# Patient Record
Sex: MALE | Race: WHITE | NOT HISPANIC OR LATINO | URBAN - METROPOLITAN AREA
[De-identification: names, ages, dates, MRNs, and addresses within clinical notes are randomized per-mention and may not be internally consistent; named-entity substitution may affect disease eponyms.]

---

## 2018-02-24 ENCOUNTER — INPATIENT (INPATIENT)
Facility: HOSPITAL | Age: 83
LOS: 8 days | Discharge: NURSING, REHAB OR LONG-TERM CARE | End: 2018-03-05
Payer: MEDICARE

## 2018-02-24 VITALS
DIASTOLIC BLOOD PRESSURE: 55 MMHG | SYSTOLIC BLOOD PRESSURE: 112 MMHG | OXYGEN SATURATION: 97 % | HEIGHT: 68 IN | TEMPERATURE: 98 F | WEIGHT: 169.98 LBS | HEART RATE: 64 BPM | RESPIRATION RATE: 14 BRPM

## 2018-02-24 LAB
ADD ON TEST-SPECIMEN IN LAB: SIGNIFICANT CHANGE UP
ALBUMIN SERPL ELPH-MCNC: 3.3 G/DL — SIGNIFICANT CHANGE UP (ref 3.3–5)
ALP SERPL-CCNC: 128 U/L — HIGH (ref 40–120)
ALT FLD-CCNC: 10 U/L — LOW (ref 12–78)
ANION GAP SERPL CALC-SCNC: 6 MMOL/L — SIGNIFICANT CHANGE UP (ref 5–17)
ANION GAP SERPL CALC-SCNC: 8 MMOL/L — SIGNIFICANT CHANGE UP (ref 5–17)
APTT BLD: 40.3 SEC — HIGH (ref 27.5–37.4)
AST SERPL-CCNC: 25 U/L — SIGNIFICANT CHANGE UP (ref 15–37)
BILIRUB SERPL-MCNC: 0.7 MG/DL — SIGNIFICANT CHANGE UP (ref 0.2–1.2)
BUN SERPL-MCNC: 49 MG/DL — HIGH (ref 7–23)
BUN SERPL-MCNC: 50 MG/DL — HIGH (ref 7–23)
CALCIUM SERPL-MCNC: 8.2 MG/DL — LOW (ref 8.5–10.1)
CALCIUM SERPL-MCNC: 8.4 MG/DL — LOW (ref 8.5–10.1)
CHLORIDE SERPL-SCNC: 95 MMOL/L — LOW (ref 96–108)
CHLORIDE SERPL-SCNC: 98 MMOL/L — SIGNIFICANT CHANGE UP (ref 96–108)
CK SERPL-CCNC: 85 U/L — SIGNIFICANT CHANGE UP (ref 26–308)
CO2 SERPL-SCNC: 35 MMOL/L — HIGH (ref 22–31)
CO2 SERPL-SCNC: 35 MMOL/L — HIGH (ref 22–31)
CREAT SERPL-MCNC: 1.91 MG/DL — HIGH (ref 0.5–1.3)
CREAT SERPL-MCNC: 2.15 MG/DL — HIGH (ref 0.5–1.3)
GLUCOSE SERPL-MCNC: 117 MG/DL — HIGH (ref 70–99)
GLUCOSE SERPL-MCNC: 164 MG/DL — HIGH (ref 70–99)
HCT VFR BLD CALC: 35.5 % — LOW (ref 39–50)
HGB BLD-MCNC: 12 G/DL — LOW (ref 13–17)
INR BLD: 1.99 RATIO — HIGH (ref 0.88–1.16)
MAGNESIUM SERPL-MCNC: 2 MG/DL — SIGNIFICANT CHANGE UP (ref 1.6–2.6)
MCHC RBC-ENTMCNC: 31 PG — SIGNIFICANT CHANGE UP (ref 27–34)
MCHC RBC-ENTMCNC: 33.7 GM/DL — SIGNIFICANT CHANGE UP (ref 32–36)
MCV RBC AUTO: 92 FL — SIGNIFICANT CHANGE UP (ref 80–100)
NT-PROBNP SERPL-SCNC: 1379 PG/ML — HIGH (ref 0–450)
PLATELET # BLD AUTO: 132 K/UL — LOW (ref 150–400)
POTASSIUM SERPL-MCNC: 2.6 MMOL/L — CRITICAL LOW (ref 3.5–5.3)
POTASSIUM SERPL-MCNC: 2.9 MMOL/L — CRITICAL LOW (ref 3.5–5.3)
POTASSIUM SERPL-SCNC: 2.6 MMOL/L — CRITICAL LOW (ref 3.5–5.3)
POTASSIUM SERPL-SCNC: 2.9 MMOL/L — CRITICAL LOW (ref 3.5–5.3)
PROT SERPL-MCNC: 7.3 GM/DL — SIGNIFICANT CHANGE UP (ref 6–8.3)
PROTHROM AB SERPL-ACNC: 21.8 SEC — HIGH (ref 9.8–12.7)
RBC # BLD: 3.86 M/UL — LOW (ref 4.2–5.8)
RBC # FLD: 13.9 % — SIGNIFICANT CHANGE UP (ref 10.3–14.5)
SODIUM SERPL-SCNC: 138 MMOL/L — SIGNIFICANT CHANGE UP (ref 135–145)
SODIUM SERPL-SCNC: 139 MMOL/L — SIGNIFICANT CHANGE UP (ref 135–145)
TROPONIN I SERPL-MCNC: 0.02 NG/ML — SIGNIFICANT CHANGE UP (ref 0.01–0.04)
TROPONIN I SERPL-MCNC: <0.015 NG/ML — SIGNIFICANT CHANGE UP (ref 0.01–0.04)
WBC # BLD: 3.9 K/UL — SIGNIFICANT CHANGE UP (ref 3.8–10.5)
WBC # FLD AUTO: 3.9 K/UL — SIGNIFICANT CHANGE UP (ref 3.8–10.5)

## 2018-02-24 PROCEDURE — 71045 X-RAY EXAM CHEST 1 VIEW: CPT | Mod: 26

## 2018-02-24 PROCEDURE — 99285 EMERGENCY DEPT VISIT HI MDM: CPT

## 2018-02-24 PROCEDURE — 93010 ELECTROCARDIOGRAM REPORT: CPT

## 2018-02-24 RX ORDER — CLOPIDOGREL BISULFATE 75 MG/1
75 TABLET, FILM COATED ORAL DAILY
Qty: 0 | Refills: 0 | Status: DISCONTINUED | OUTPATIENT
Start: 2018-02-24 | End: 2018-03-05

## 2018-02-24 RX ORDER — AMPICILLIN SODIUM AND SULBACTAM SODIUM 250; 125 MG/ML; MG/ML
3 INJECTION, POWDER, FOR SUSPENSION INTRAMUSCULAR; INTRAVENOUS ONCE
Qty: 0 | Refills: 0 | Status: COMPLETED | OUTPATIENT
Start: 2018-02-24 | End: 2018-02-24

## 2018-02-24 RX ORDER — SENNA PLUS 8.6 MG/1
2 TABLET ORAL AT BEDTIME
Qty: 0 | Refills: 0 | Status: DISCONTINUED | OUTPATIENT
Start: 2018-02-24 | End: 2018-03-05

## 2018-02-24 RX ORDER — POTASSIUM CHLORIDE 20 MEQ
40 PACKET (EA) ORAL EVERY 4 HOURS
Qty: 0 | Refills: 0 | Status: COMPLETED | OUTPATIENT
Start: 2018-02-24 | End: 2018-02-25

## 2018-02-24 RX ORDER — AMPICILLIN SODIUM AND SULBACTAM SODIUM 250; 125 MG/ML; MG/ML
INJECTION, POWDER, FOR SUSPENSION INTRAMUSCULAR; INTRAVENOUS
Qty: 0 | Refills: 0 | Status: DISCONTINUED | OUTPATIENT
Start: 2018-02-24 | End: 2018-03-05

## 2018-02-24 RX ORDER — SODIUM CHLORIDE 9 MG/ML
3 INJECTION INTRAMUSCULAR; INTRAVENOUS; SUBCUTANEOUS EVERY 8 HOURS
Qty: 0 | Refills: 0 | Status: DISCONTINUED | OUTPATIENT
Start: 2018-02-24 | End: 2018-02-24

## 2018-02-24 RX ORDER — ONDANSETRON 8 MG/1
4 TABLET, FILM COATED ORAL EVERY 6 HOURS
Qty: 0 | Refills: 0 | Status: DISCONTINUED | OUTPATIENT
Start: 2018-02-24 | End: 2018-03-05

## 2018-02-24 RX ORDER — POTASSIUM CHLORIDE 20 MEQ
10 PACKET (EA) ORAL
Qty: 0 | Refills: 0 | Status: COMPLETED | OUTPATIENT
Start: 2018-02-24 | End: 2018-02-24

## 2018-02-24 RX ORDER — ATORVASTATIN CALCIUM 80 MG/1
10 TABLET, FILM COATED ORAL AT BEDTIME
Qty: 0 | Refills: 0 | Status: DISCONTINUED | OUTPATIENT
Start: 2018-02-24 | End: 2018-03-05

## 2018-02-24 RX ORDER — IPRATROPIUM/ALBUTEROL SULFATE 18-103MCG
3 AEROSOL WITH ADAPTER (GRAM) INHALATION EVERY 6 HOURS
Qty: 0 | Refills: 0 | Status: DISCONTINUED | OUTPATIENT
Start: 2018-02-24 | End: 2018-03-05

## 2018-02-24 RX ORDER — CARVEDILOL PHOSPHATE 80 MG/1
3.12 CAPSULE, EXTENDED RELEASE ORAL EVERY 12 HOURS
Qty: 0 | Refills: 0 | Status: DISCONTINUED | OUTPATIENT
Start: 2018-02-24 | End: 2018-03-05

## 2018-02-24 RX ORDER — MAGNESIUM SULFATE 500 MG/ML
1 VIAL (ML) INJECTION ONCE
Qty: 0 | Refills: 0 | Status: COMPLETED | OUTPATIENT
Start: 2018-02-24 | End: 2018-02-24

## 2018-02-24 RX ORDER — FLUDROCORTISONE ACETATE 0.1 MG/1
0.1 TABLET ORAL DAILY
Qty: 0 | Refills: 0 | Status: DISCONTINUED | OUTPATIENT
Start: 2018-02-24 | End: 2018-03-05

## 2018-02-24 RX ORDER — PANTOPRAZOLE SODIUM 20 MG/1
40 TABLET, DELAYED RELEASE ORAL
Qty: 0 | Refills: 0 | Status: DISCONTINUED | OUTPATIENT
Start: 2018-02-24 | End: 2018-03-05

## 2018-02-24 RX ORDER — FUROSEMIDE 40 MG
20 TABLET ORAL ONCE
Qty: 0 | Refills: 0 | Status: COMPLETED | OUTPATIENT
Start: 2018-02-24 | End: 2018-02-24

## 2018-02-24 RX ORDER — FUROSEMIDE 40 MG
40 TABLET ORAL
Qty: 0 | Refills: 0 | Status: DISCONTINUED | OUTPATIENT
Start: 2018-02-24 | End: 2018-02-25

## 2018-02-24 RX ORDER — DONEPEZIL HYDROCHLORIDE 10 MG/1
5 TABLET, FILM COATED ORAL AT BEDTIME
Qty: 0 | Refills: 0 | Status: DISCONTINUED | OUTPATIENT
Start: 2018-02-24 | End: 2018-03-05

## 2018-02-24 RX ORDER — AMPICILLIN SODIUM AND SULBACTAM SODIUM 250; 125 MG/ML; MG/ML
3 INJECTION, POWDER, FOR SUSPENSION INTRAMUSCULAR; INTRAVENOUS EVERY 12 HOURS
Qty: 0 | Refills: 0 | Status: DISCONTINUED | OUTPATIENT
Start: 2018-02-25 | End: 2018-03-05

## 2018-02-24 RX ORDER — WARFARIN SODIUM 2.5 MG/1
2 TABLET ORAL DAILY
Qty: 0 | Refills: 0 | Status: COMPLETED | OUTPATIENT
Start: 2018-02-24 | End: 2018-02-26

## 2018-02-24 RX ORDER — ACETAMINOPHEN 500 MG
650 TABLET ORAL EVERY 6 HOURS
Qty: 0 | Refills: 0 | Status: DISCONTINUED | OUTPATIENT
Start: 2018-02-24 | End: 2018-03-05

## 2018-02-24 RX ORDER — ENOXAPARIN SODIUM 100 MG/ML
40 INJECTION SUBCUTANEOUS EVERY 24 HOURS
Qty: 0 | Refills: 0 | Status: DISCONTINUED | OUTPATIENT
Start: 2018-02-24 | End: 2018-02-24

## 2018-02-24 RX ORDER — CARBIDOPA AND LEVODOPA 25; 100 MG/1; MG/1
1 TABLET ORAL THREE TIMES A DAY
Qty: 0 | Refills: 0 | Status: DISCONTINUED | OUTPATIENT
Start: 2018-02-24 | End: 2018-03-05

## 2018-02-24 RX ADMIN — Medication 100 MILLIEQUIVALENT(S): at 17:02

## 2018-02-24 RX ADMIN — ATORVASTATIN CALCIUM 10 MILLIGRAM(S): 80 TABLET, FILM COATED ORAL at 23:16

## 2018-02-24 RX ADMIN — Medication 100 MILLIEQUIVALENT(S): at 13:59

## 2018-02-24 RX ADMIN — Medication 100 MILLIEQUIVALENT(S): at 19:51

## 2018-02-24 RX ADMIN — Medication 40 MILLIGRAM(S): at 19:51

## 2018-02-24 RX ADMIN — DONEPEZIL HYDROCHLORIDE 5 MILLIGRAM(S): 10 TABLET, FILM COATED ORAL at 23:17

## 2018-02-24 RX ADMIN — AMPICILLIN SODIUM AND SULBACTAM SODIUM 200 GRAM(S): 250; 125 INJECTION, POWDER, FOR SUSPENSION INTRAMUSCULAR; INTRAVENOUS at 23:16

## 2018-02-24 RX ADMIN — WARFARIN SODIUM 2 MILLIGRAM(S): 2.5 TABLET ORAL at 23:17

## 2018-02-24 RX ADMIN — Medication 100 GRAM(S): at 18:13

## 2018-02-24 RX ADMIN — CARBIDOPA AND LEVODOPA 1 TABLET(S): 25; 100 TABLET ORAL at 23:16

## 2018-02-24 RX ADMIN — Medication 20 MILLIGRAM(S): at 13:59

## 2018-02-24 RX ADMIN — Medication 40 MILLIEQUIVALENT(S): at 18:13

## 2018-02-24 RX ADMIN — Medication 40 MILLIEQUIVALENT(S): at 23:17

## 2018-02-24 NOTE — H&P ADULT - ASSESSMENT
89 y/o M with a PMHx of CAD, AICD, CABG, HTN, CHF, Parkinson's presents to the ED with one of increased lower extremity edema and blisters on her lower extremities.  Patient lives with daughter and son in law  whom are the care providers for patient. Patient is a poor historian and hisotry is provided soley by the familyt at bedside.  Patient has had increased sob, weakness and edema developing over the last several days.  He denies anychest pain, sob, fevers, n/v/d.     1- Acute exacerbation of chf  -echo pending  -BNP 1379  -c/w lasix 40 iv q12h  -c/w metolazone   -cardiology consult      2-CAD- c/w plavix and statin    3- HTN- c/w carvedilol    4-elevated creatinine- ARF vs CKD?  -hold lisinopril    5-Hypokalemia  -2.9  -one krider given in ER  -start 40meq x 3   -magnesium 1 gm iv ordered  -repeat BMP @10pm    6-dvt proph- sc lovenox

## 2018-02-24 NOTE — ED PROVIDER NOTE - OBJECTIVE STATEMENT
89 y/o M with a PMHx of CAD, AICD, CABG, HTN, CHF, Parkinson's presents to the ED c/o AMS, weakness as per son who is at bedside. Pt son states that he has been experiencing worsening bilat lower extremity swelling with weeping. Pt son states that he takes Lasix every day in attempt to take fluid off. Pt currently calm, unable to provide hx himself with no other acute c/o at this time. Cardio Anto.

## 2018-02-24 NOTE — H&P ADULT - HISTORY OF PRESENT ILLNESS
89 y/o M with a PMHx of CAD, AICD, CABG, HTN, CHF, Parkinson's presents to the ED c/o AMS, weakness as per son who is at bedside. Pt son states that he has been experiencing worsening bilat lower extremity swelling with weeping. Pt son states that he takes Lasix every day in attempt to take fluid off. Pt currently calm, unable to provide hx himself with no other acute c/o at this time. Cardio Anto. 89 y/o M with a PMHx of CAD, AICD, CABG, HTN, CHF, Parkinson's presents to the ED with one of increased lower extremity edema and blisters on her lower extremities.  Patient lives with daughter and son in law  whom are the care providers for patient. Patient is a poor historian and hisotry is provided soley by the familyt at bedside.  Patient has had increased sob, weakness and edema developing over the last several days.  He denies anychest pain, sob, fevers, n/v/d.

## 2018-02-24 NOTE — ED PROVIDER NOTE - PMH
CAD (coronary artery disease)    CHF (congestive heart failure)    HTN (hypertension)    Hyperlipidemia

## 2018-02-24 NOTE — H&P ADULT - NSHPLABSRESULTS_GEN_ALL_CORE
24 Feb 2018 11:42    139    |  98     |  49     ----------------------------<  117    2.9     |  35     |  2.15     Ca    8.4        24 Feb 2018 11:42    TPro  7.3    /  Alb  3.3    /  TBili  0.7    /  DBili  x      /  AST  25     /  ALT  10     /  AlkPhos  128    24 Feb 2018 11:42  LIVER FUNCTIONS - ( 24 Feb 2018 11:42 )  Alb: 3.3 g/dL / Pro: 7.3 gm/dL / ALK PHOS: 128 U/L / ALT: 10 U/L / AST: 25 U/L / GGT: x         PT/INR - ( 24 Feb 2018 15:59 )   PT: 21.8 sec;   INR: 1.99 ratio         PTT - ( 24 Feb 2018 15:59 )  PTT:40.3 secCBC Full  -  ( 24 Feb 2018 11:42 )  WBC Count : 3.9 K/uL  Hemoglobin : 12.0 g/dL  Hematocrit : 35.5 %  Platelet Count - Automated : 132 K/uL  Mean Cell Volume : 92.0 fl  Mean Cell Hemoglobin : 31.0 pg  Mean Cell Hemoglobin Concentration : 33.7 gm/dL  Auto Neutrophil # : x  Auto Lymphocyte # : x  Auto Monocyte # : x  Auto Eosinophil # : x  Auto Basophil # : x  Auto Neutrophil % : x  Auto Lymphocyte % : x  Auto Monocyte % : x  Auto Eosinophil % : x  Auto Basophil % : x  CARDIAC MARKERS ( 24 Feb 2018 15:59 )  <0.015 ng/mL / x     / x     / x     / x      CARDIAC MARKERS ( 24 Feb 2018 12:11 )  0.021 ng/mL / x     / x     / x     / x      CARDIAC MARKERS ( 24 Feb 2018 11:42 )  x     / x     / 85 U/L / x     / x

## 2018-02-24 NOTE — PATIENT PROFILE ADULT. - ABILITY TO HEAR (WITH HEARING AID OR HEARING APPLIANCE IF NORMALLY USED):
Mildly to Moderately Impaired: difficulty hearing in some environments or speaker may need to increase volume or speak distinctly/has hearing aide; not brought to hospital

## 2018-02-24 NOTE — ED PROVIDER NOTE - SKIN, MLM
Skin normal color for race, warm, dry and intact, except for bilat lower extremity edema with weeping, bandages in place.

## 2018-02-24 NOTE — ED ADULT NURSE REASSESSMENT NOTE - NS ED NURSE REASSESS COMMENT FT1
Patient care received from EDMUND KHAN Patient A&Ox4, resting comfortably in bed. VSS, denies pain/discomfort. Reports mild SOB, lungs diminished with expiratory wheezing to auscultation; no s/s of respiratory distress present. MD Jewell notified, new orders received. BLE edema, extremities elevated at rest. Safety & comfort measures in place, family at bedside. Will continue to monitor.
Patient received from Chichi CHEW RN. Patient resting comfortably in bed. Safety and comfort maintained. Will continue to monitor

## 2018-02-24 NOTE — ED ADULT NURSE NOTE - OBJECTIVE STATEMENT
Pt presents to ED via EMS from home, pt alert and orientedx4, VSS afebrile, pt c/o bilateral elg swelling with weeping, as well as weight gain, SOB and wheezing. pt hypotensive at home given 750ml of fluid by EMS, fluid discontinued at time of arrival. pt denies respiratory distres sor pain. pt states he has hx of parkinsons. safety maintained will continue to monitor

## 2018-02-24 NOTE — H&P ADULT - NSHPPHYSICALEXAM_GEN_ALL_CORE
Vital Signs Last 24 Hrs  T(C): 36.6 (24 Feb 2018 17:29), Max: 36.9 (24 Feb 2018 11:28)  T(F): 97.9 (24 Feb 2018 17:29), Max: 98.5 (24 Feb 2018 11:28)  HR: 88 (24 Feb 2018 17:29) (64 - 88)  BP: 101/61 (24 Feb 2018 17:29) (98/67 - 120/74)  BP(mean): --  RR: 18 (24 Feb 2018 17:29) (14 - 18)  SpO2: 97% (24 Feb 2018 17:29) (97% - 100%)    HEENT:   pupils equal and reactive, EOMI, no oropharyngeal lesions, erythema, exudates, oral thrush    NECK:   supple, no carotid bruits, no palpable lymph nodes, no thyromegaly    CV:  +S1, +S2, regular, no murmurs or rubs    RESP:   lungs clear to auscultation bilaterally, no wheezing, rales, rhonchi, good air entry bilaterally    BREAST:  not examined    GI:  abdomen soft, non-tender, non-distended, normal BS, no bruits, no abdominal masses, no palpable masses    RECTAL:  not examined    :  not examined    MSK:   normal muscle tone, no atrophy, no rigidity, no contractions    EXT:   no clubbing, no cyanosis, no edema, no calf pain, swelling or erythema    VASCULAR:  pulses equal and symmetric in the upper and lower extremities    NEURO:  AAOX3, no focal neurological deficits, follows all commands, able to move extremities spontaneously    SKIN:  no ulcers, lesions or rashes

## 2018-02-25 ENCOUNTER — MOBILE ON CALL (OUTPATIENT)
Age: 83
End: 2018-02-25

## 2018-02-25 DIAGNOSIS — I50.9 HEART FAILURE, UNSPECIFIED: ICD-10-CM

## 2018-02-25 DIAGNOSIS — I25.10 ATHEROSCLEROTIC HEART DISEASE OF NATIVE CORONARY ARTERY WITHOUT ANGINA PECTORIS: ICD-10-CM

## 2018-02-25 LAB
ALBUMIN SERPL ELPH-MCNC: 3.2 G/DL — LOW (ref 3.3–5)
ALP SERPL-CCNC: 159 U/L — HIGH (ref 40–120)
ALT FLD-CCNC: 31 U/L — SIGNIFICANT CHANGE UP (ref 12–78)
ANION GAP SERPL CALC-SCNC: 6 MMOL/L — SIGNIFICANT CHANGE UP (ref 5–17)
ANION GAP SERPL CALC-SCNC: 6 MMOL/L — SIGNIFICANT CHANGE UP (ref 5–17)
APTT BLD: 40.7 SEC — HIGH (ref 27.5–37.4)
AST SERPL-CCNC: 32 U/L — SIGNIFICANT CHANGE UP (ref 15–37)
BASOPHILS # BLD AUTO: 0.1 K/UL — SIGNIFICANT CHANGE UP (ref 0–0.2)
BASOPHILS NFR BLD AUTO: 1.5 % — SIGNIFICANT CHANGE UP (ref 0–2)
BILIRUB SERPL-MCNC: 0.5 MG/DL — SIGNIFICANT CHANGE UP (ref 0.2–1.2)
BUN SERPL-MCNC: 46 MG/DL — HIGH (ref 7–23)
BUN SERPL-MCNC: 49 MG/DL — HIGH (ref 7–23)
CALCIUM SERPL-MCNC: 8.4 MG/DL — LOW (ref 8.5–10.1)
CALCIUM SERPL-MCNC: 8.6 MG/DL — SIGNIFICANT CHANGE UP (ref 8.5–10.1)
CHLORIDE SERPL-SCNC: 96 MMOL/L — SIGNIFICANT CHANGE UP (ref 96–108)
CHLORIDE SERPL-SCNC: 98 MMOL/L — SIGNIFICANT CHANGE UP (ref 96–108)
CO2 SERPL-SCNC: 37 MMOL/L — HIGH (ref 22–31)
CO2 SERPL-SCNC: 38 MMOL/L — HIGH (ref 22–31)
CREAT SERPL-MCNC: 1.8 MG/DL — HIGH (ref 0.5–1.3)
CREAT SERPL-MCNC: 1.94 MG/DL — HIGH (ref 0.5–1.3)
EOSINOPHIL # BLD AUTO: 0.1 K/UL — SIGNIFICANT CHANGE UP (ref 0–0.5)
EOSINOPHIL NFR BLD AUTO: 3.2 % — SIGNIFICANT CHANGE UP (ref 0–6)
GLUCOSE SERPL-MCNC: 114 MG/DL — HIGH (ref 70–99)
GLUCOSE SERPL-MCNC: 134 MG/DL — HIGH (ref 70–99)
HCT VFR BLD CALC: 36.2 % — LOW (ref 39–50)
HGB BLD-MCNC: 12 G/DL — LOW (ref 13–17)
INR BLD: 2.02 RATIO — HIGH (ref 0.88–1.16)
LYMPHOCYTES # BLD AUTO: 1.2 K/UL — SIGNIFICANT CHANGE UP (ref 1–3.3)
LYMPHOCYTES # BLD AUTO: 28.2 % — SIGNIFICANT CHANGE UP (ref 13–44)
MAGNESIUM SERPL-MCNC: 2.1 MG/DL — SIGNIFICANT CHANGE UP (ref 1.6–2.6)
MCHC RBC-ENTMCNC: 30.5 PG — SIGNIFICANT CHANGE UP (ref 27–34)
MCHC RBC-ENTMCNC: 33 GM/DL — SIGNIFICANT CHANGE UP (ref 32–36)
MCV RBC AUTO: 92.5 FL — SIGNIFICANT CHANGE UP (ref 80–100)
MONOCYTES # BLD AUTO: 0.5 K/UL — SIGNIFICANT CHANGE UP (ref 0–0.9)
MONOCYTES NFR BLD AUTO: 12.4 % — SIGNIFICANT CHANGE UP (ref 2–14)
NEUTROPHILS # BLD AUTO: 2.4 K/UL — SIGNIFICANT CHANGE UP (ref 1.8–7.4)
NEUTROPHILS NFR BLD AUTO: 54.8 % — SIGNIFICANT CHANGE UP (ref 43–77)
PHOSPHATE SERPL-MCNC: 3.7 MG/DL — SIGNIFICANT CHANGE UP (ref 2.5–4.5)
PLATELET # BLD AUTO: 116 K/UL — LOW (ref 150–400)
POTASSIUM SERPL-MCNC: 3 MMOL/L — LOW (ref 3.5–5.3)
POTASSIUM SERPL-MCNC: 3.3 MMOL/L — LOW (ref 3.5–5.3)
POTASSIUM SERPL-SCNC: 3 MMOL/L — LOW (ref 3.5–5.3)
POTASSIUM SERPL-SCNC: 3.3 MMOL/L — LOW (ref 3.5–5.3)
PROT SERPL-MCNC: 7.6 GM/DL — SIGNIFICANT CHANGE UP (ref 6–8.3)
PROTHROM AB SERPL-ACNC: 22.1 SEC — HIGH (ref 9.8–12.7)
RBC # BLD: 3.92 M/UL — LOW (ref 4.2–5.8)
RBC # FLD: 14.2 % — SIGNIFICANT CHANGE UP (ref 10.3–14.5)
SODIUM SERPL-SCNC: 140 MMOL/L — SIGNIFICANT CHANGE UP (ref 135–145)
SODIUM SERPL-SCNC: 141 MMOL/L — SIGNIFICANT CHANGE UP (ref 135–145)
WBC # BLD: 4.4 K/UL — SIGNIFICANT CHANGE UP (ref 3.8–10.5)
WBC # FLD AUTO: 4.4 K/UL — SIGNIFICANT CHANGE UP (ref 3.8–10.5)

## 2018-02-25 PROCEDURE — 99223 1ST HOSP IP/OBS HIGH 75: CPT

## 2018-02-25 RX ORDER — POTASSIUM CHLORIDE 20 MEQ
10 PACKET (EA) ORAL
Qty: 0 | Refills: 0 | Status: COMPLETED | OUTPATIENT
Start: 2018-02-25 | End: 2018-02-25

## 2018-02-25 RX ORDER — MAGNESIUM SULFATE 500 MG/ML
1 VIAL (ML) INJECTION ONCE
Qty: 0 | Refills: 0 | Status: DISCONTINUED | OUTPATIENT
Start: 2018-02-25 | End: 2018-02-25

## 2018-02-25 RX ORDER — POTASSIUM CHLORIDE 20 MEQ
40 PACKET (EA) ORAL EVERY 4 HOURS
Qty: 0 | Refills: 0 | Status: COMPLETED | OUTPATIENT
Start: 2018-02-25 | End: 2018-02-26

## 2018-02-25 RX ORDER — POTASSIUM CHLORIDE 20 MEQ
40 PACKET (EA) ORAL EVERY 4 HOURS
Qty: 0 | Refills: 0 | Status: DISCONTINUED | OUTPATIENT
Start: 2018-02-25 | End: 2018-02-25

## 2018-02-25 RX ADMIN — CARVEDILOL PHOSPHATE 3.12 MILLIGRAM(S): 80 CAPSULE, EXTENDED RELEASE ORAL at 06:58

## 2018-02-25 RX ADMIN — Medication 40 MILLIEQUIVALENT(S): at 09:41

## 2018-02-25 RX ADMIN — Medication 100 MILLIEQUIVALENT(S): at 17:06

## 2018-02-25 RX ADMIN — Medication 3 MILLILITER(S): at 09:59

## 2018-02-25 RX ADMIN — AMPICILLIN SODIUM AND SULBACTAM SODIUM 200 GRAM(S): 250; 125 INJECTION, POWDER, FOR SUSPENSION INTRAMUSCULAR; INTRAVENOUS at 22:15

## 2018-02-25 RX ADMIN — CLOPIDOGREL BISULFATE 75 MILLIGRAM(S): 75 TABLET, FILM COATED ORAL at 11:28

## 2018-02-25 RX ADMIN — Medication 40 MILLIEQUIVALENT(S): at 22:15

## 2018-02-25 RX ADMIN — AMPICILLIN SODIUM AND SULBACTAM SODIUM 200 GRAM(S): 250; 125 INJECTION, POWDER, FOR SUSPENSION INTRAMUSCULAR; INTRAVENOUS at 09:37

## 2018-02-25 RX ADMIN — Medication 3 MILLILITER(S): at 21:20

## 2018-02-25 RX ADMIN — WARFARIN SODIUM 2 MILLIGRAM(S): 2.5 TABLET ORAL at 22:15

## 2018-02-25 RX ADMIN — FLUDROCORTISONE ACETATE 0.1 MILLIGRAM(S): 0.1 TABLET ORAL at 06:52

## 2018-02-25 RX ADMIN — ATORVASTATIN CALCIUM 10 MILLIGRAM(S): 80 TABLET, FILM COATED ORAL at 22:15

## 2018-02-25 RX ADMIN — Medication 40 MILLIEQUIVALENT(S): at 07:14

## 2018-02-25 RX ADMIN — PANTOPRAZOLE SODIUM 40 MILLIGRAM(S): 20 TABLET, DELAYED RELEASE ORAL at 06:50

## 2018-02-25 RX ADMIN — Medication 3 MILLILITER(S): at 01:34

## 2018-02-25 RX ADMIN — Medication 100 MILLIEQUIVALENT(S): at 13:07

## 2018-02-25 RX ADMIN — CARVEDILOL PHOSPHATE 3.12 MILLIGRAM(S): 80 CAPSULE, EXTENDED RELEASE ORAL at 17:06

## 2018-02-25 RX ADMIN — Medication 3 MILLILITER(S): at 15:05

## 2018-02-25 RX ADMIN — Medication 100 MILLIEQUIVALENT(S): at 10:19

## 2018-02-25 RX ADMIN — CARBIDOPA AND LEVODOPA 1 TABLET(S): 25; 100 TABLET ORAL at 06:52

## 2018-02-25 RX ADMIN — CARBIDOPA AND LEVODOPA 1 TABLET(S): 25; 100 TABLET ORAL at 15:01

## 2018-02-25 RX ADMIN — Medication 40 MILLIEQUIVALENT(S): at 17:05

## 2018-02-25 RX ADMIN — CARBIDOPA AND LEVODOPA 1 TABLET(S): 25; 100 TABLET ORAL at 22:15

## 2018-02-25 RX ADMIN — DONEPEZIL HYDROCHLORIDE 5 MILLIGRAM(S): 10 TABLET, FILM COATED ORAL at 22:15

## 2018-02-25 RX ADMIN — Medication 40 MILLIGRAM(S): at 07:00

## 2018-02-25 NOTE — PROGRESS NOTE ADULT - SUBJECTIVE AND OBJECTIVE BOX
CHIEF COMPLAINT:    SUBJECTIVE:     REVIEW OF SYSTEMS:    CONSTITUTIONAL: No weakness, fevers or chills  EYES/ENT: No visual changes;  No vertigo or throat pain   NECK: No pain or stiffness  RESPIRATORY: No cough, wheezing, hemoptysis; No shortness of breath  CARDIOVASCULAR: No chest pain or palpitations  GASTROINTESTINAL: No abdominal or epigastric pain. No nausea, vomiting, or hematemesis; No diarrhea or constipation. No melena or hematochezia.  GENITOURINARY: No dysuria, frequency or hematuria  NEUROLOGICAL: No numbness or weakness  SKIN: No itching, burning, rashes, or lesions   All other review of systems is negative unless indicated above    Vital Signs Last 24 Hrs  T(C): 36.6 (25 Feb 2018 05:22), Max: 36.9 (24 Feb 2018 11:28)  T(F): 97.9 (25 Feb 2018 05:22), Max: 98.5 (24 Feb 2018 11:28)  HR: 54 (25 Feb 2018 05:22) (54 - 88)  BP: 96/52 (25 Feb 2018 05:22) (96/52 - 120/74)  BP(mean): --  RR: 18 (25 Feb 2018 05:22) (14 - 20)  SpO2: 97% (25 Feb 2018 05:22) (97% - 100%)    I&O's Summary      CAPILLARY BLOOD GLUCOSE          PHYSICAL EXAM:    Constitutional: NAD, awake and alert, well-developed  HEENT: PERR, EOMI, Normal Hearing, MMM  Neck: Soft and supple, No LAD, No JVD  Respiratory: Breath sounds are clear bilaterally, No wheezing, rales or rhonchi  Cardiovascular: S1 and S2, regular rate and rhythm, no Murmurs, gallops or rubs  Gastrointestinal: Bowel Sounds present, soft, nontender, nondistended, no guarding, no rebound  Extremities: No peripheral edema  Vascular: 2+ peripheral pulses  Neurological: A/O x 3, no focal deficits  Musculoskeletal: 5/5 strength b/l upper and lower extremities  Skin: No rashes    MEDICATIONS:  MEDICATIONS  (STANDING):  ALBUTerol/ipratropium for Nebulization 3 milliLiter(s) Nebulizer every 6 hours  ampicillin/sulbactam  IVPB      ampicillin/sulbactam  IVPB 3 Gram(s) IV Intermittent every 12 hours  atorvastatin 10 milliGRAM(s) Oral at bedtime  carbidopa/levodopa  10/100 1 Tablet(s) Oral three times a day  carvedilol 3.125 milliGRAM(s) Oral every 12 hours  clopidogrel Tablet 75 milliGRAM(s) Oral daily  donepezil 5 milliGRAM(s) Oral at bedtime  fludroCORTISONE 0.1 milliGRAM(s) Oral daily  furosemide   Injectable 40 milliGRAM(s) IV Push two times a day  metolazone 2.5 milliGRAM(s) Oral daily  pantoprazole    Tablet 40 milliGRAM(s) Oral before breakfast  warfarin 2 milliGRAM(s) Oral daily      LABS: All Labs Reviewed:                        12.0   4.4   )-----------( 116      ( 25 Feb 2018 07:47 )             36.2     02-24    138  |  95<L>  |  50<H>  ----------------------------<  164<H>  2.6<LL>   |  35<H>  |  1.91<H>    Ca    8.2<L>      24 Feb 2018 21:48  Mg     2.0     02-24    TPro  7.3  /  Alb  3.3  /  TBili  0.7  /  DBili  x   /  AST  25  /  ALT  10<L>  /  AlkPhos  128<H>  02-24    PT/INR - ( 25 Feb 2018 07:47 )   PT: 22.1 sec;   INR: 2.02 ratio         PTT - ( 25 Feb 2018 07:47 )  PTT:40.7 sec  CARDIAC MARKERS ( 24 Feb 2018 15:59 )  <0.015 ng/mL / x     / x     / x     / x      CARDIAC MARKERS ( 24 Feb 2018 12:11 )  0.021 ng/mL / x     / x     / x     / x      CARDIAC MARKERS ( 24 Feb 2018 11:42 )  x     / x     / 85 U/L / x     / x              Assessment and Plan    89 y/o M with a PMHx of CAD, AICD, CABG, HTN, CHF, Parkinson's presents to the ED with one of increased lower extremity edema and blisters on her lower extremities.  Patient lives with daughter and son in law  whom are the care providers for patient. Patient is a poor historian and hisotry is provided soley by the familyt at bedside.  Patient has had increased sob, weakness and edema developing over the last several days.  He denies anychest pain, sob, fevers, n/v/d.     1- Acute exacerbation of chf  -echo pending  -BNP 1379  -c/w lasix 40 iv q12h  -c/w metolazone   -cardiology consult      2-CAD- c/w plavix and statin    3- HTN- c/w carvedilol    4-elevated creatinine- ARF vs CKD?  -hold lisinopril    5-Hypokalemia  -2.9  -one krider given in ER  -start 40meq x 3   -magnesium 1 gm iv ordered  -repeat BMP @10pm    6-dvt proph- sc lovenox CHIEF COMPLAINT/Diagnosis: chf exacerbation/ hypokalemia    SUBJECTIVE: no complaints    REVIEW OF SYSTEMS:    CONSTITUTIONAL: No weakness, fevers or chills  EYES/ENT: No visual changes;  No vertigo or throat pain   NECK: No pain or stiffness  RESPIRATORY: No cough, wheezing, hemoptysis; No shortness of breath  CARDIOVASCULAR: No chest pain or palpitations  GASTROINTESTINAL: No abdominal or epigastric pain. No nausea, vomiting, or hematemesis; No diarrhea or constipation. No melena or hematochezia.  GENITOURINARY: No dysuria, frequency or hematuria  NEUROLOGICAL: No numbness or weakness  SKIN: No itching, burning, rashes, or lesions   All other review of systems is negative unless indicated above    Vital Signs Last 24 Hrs  T(C): 36.6 (25 Feb 2018 05:22), Max: 36.9 (24 Feb 2018 11:28)  T(F): 97.9 (25 Feb 2018 05:22), Max: 98.5 (24 Feb 2018 11:28)  HR: 54 (25 Feb 2018 05:22) (54 - 88)  BP: 96/52 (25 Feb 2018 05:22) (96/52 - 120/74)  BP(mean): --  RR: 18 (25 Feb 2018 05:22) (14 - 20)  SpO2: 97% (25 Feb 2018 05:22) (97% - 100%)    I&O's Summary      CAPILLARY BLOOD GLUCOSE          PHYSICAL EXAM:    Constitutional: NAD, awake and alert, well-developed  HEENT: PERR, EOMI, Normal Hearing, MMM  Neck: Soft and supple, No LAD, No JVD  Respiratory: Breath sounds are clear bilaterally, No wheezing, rales or rhonchi  Cardiovascular: S1 and S2, regular rate and rhythm, no Murmurs, gallops or rubs  Gastrointestinal: Bowel Sounds present, soft, nontender, nondistended, no guarding, no rebound  Extremities: No peripheral edema  Vascular: 2+ peripheral pulses  Neurological: A/O x 3, no focal deficits  Musculoskeletal: 5/5 strength b/l upper and lower extremities  Skin: No rashes    MEDICATIONS:  MEDICATIONS  (STANDING):  ALBUTerol/ipratropium for Nebulization 3 milliLiter(s) Nebulizer every 6 hours  ampicillin/sulbactam  IVPB      ampicillin/sulbactam  IVPB 3 Gram(s) IV Intermittent every 12 hours  atorvastatin 10 milliGRAM(s) Oral at bedtime  carbidopa/levodopa  10/100 1 Tablet(s) Oral three times a day  carvedilol 3.125 milliGRAM(s) Oral every 12 hours  clopidogrel Tablet 75 milliGRAM(s) Oral daily  donepezil 5 milliGRAM(s) Oral at bedtime  fludroCORTISONE 0.1 milliGRAM(s) Oral daily  furosemide   Injectable 40 milliGRAM(s) IV Push two times a day  metolazone 2.5 milliGRAM(s) Oral daily  pantoprazole    Tablet 40 milliGRAM(s) Oral before breakfast  warfarin 2 milliGRAM(s) Oral daily      LABS: All Labs Reviewed:                        12.0   4.4   )-----------( 116      ( 25 Feb 2018 07:47 )             36.2     02-24    138  |  95<L>  |  50<H>  ----------------------------<  164<H>  2.6<LL>   |  35<H>  |  1.91<H>    Ca    8.2<L>      24 Feb 2018 21:48  Mg     2.0     02-24    TPro  7.3  /  Alb  3.3  /  TBili  0.7  /  DBili  x   /  AST  25  /  ALT  10<L>  /  AlkPhos  128<H>  02-24    PT/INR - ( 25 Feb 2018 07:47 )   PT: 22.1 sec;   INR: 2.02 ratio         PTT - ( 25 Feb 2018 07:47 )  PTT:40.7 sec  CARDIAC MARKERS ( 24 Feb 2018 15:59 )  <0.015 ng/mL / x     / x     / x     / x      CARDIAC MARKERS ( 24 Feb 2018 12:11 )  0.021 ng/mL / x     / x     / x     / x      CARDIAC MARKERS ( 24 Feb 2018 11:42 )  x     / x     / 85 U/L / x     / x              Assessment and Plan    89 y/o M with a PMHx of CAD, AICD, CABG, HTN, CHF, Parkinson's presents to the ED with one of increased lower extremity edema and blisters on her lower extremities.  Patient lives with daughter and son in law  whom are the care providers for patient. Patient is a poor historian and hisotry is provided soley by the familyt at bedside.  Patient has had increased sob, weakness and edema developing over the last several days.  He denies anychest pain, sob, fevers, n/v/d.     1- Acute exacerbation of chf  -echo pending  -BNP 1379  -c/w lasix 40 iv q12h  -c/w metolazone   -cardiology consult    2-CAD- c/w plavix and statin    3- HTN- c/w carvedilol    4-elevated creatinine- ARF vs CKD?  -hold lisinopril    5-Hypokalemia  -2.9 >> 2.6 after several oral doses and krider  -give 3 more doses of oral potassium and 3 kriders now  -magneisium is WNLS  -repeat BMP @ 6PM    6-dvt proph- sc lovenox CHIEF COMPLAINT/Diagnosis: chf exacerbation/ hypokalemia    SUBJECTIVE: no complaints    REVIEW OF SYSTEMS:    CONSTITUTIONAL: No weakness, fevers or chills  EYES/ENT: No visual changes;  No vertigo or throat pain   NECK: No pain or stiffness  RESPIRATORY: No cough, wheezing, hemoptysis; No shortness of breath  CARDIOVASCULAR: No chest pain or palpitations  GASTROINTESTINAL: No abdominal or epigastric pain. No nausea, vomiting, or hematemesis; No diarrhea or constipation. No melena or hematochezia.  GENITOURINARY: No dysuria, frequency or hematuria  NEUROLOGICAL: No numbness or weakness  SKIN: No itching, burning, rashes, or lesions   All other review of systems is negative unless indicated above    Vital Signs Last 24 Hrs  T(C): 36.6 (25 Feb 2018 05:22), Max: 36.9 (24 Feb 2018 11:28)  T(F): 97.9 (25 Feb 2018 05:22), Max: 98.5 (24 Feb 2018 11:28)  HR: 54 (25 Feb 2018 05:22) (54 - 88)  BP: 96/52 (25 Feb 2018 05:22) (96/52 - 120/74)  BP(mean): --  RR: 18 (25 Feb 2018 05:22) (14 - 20)  SpO2: 97% (25 Feb 2018 05:22) (97% - 100%)    I&O's Summary      CAPILLARY BLOOD GLUCOSE          PHYSICAL EXAM:    Constitutional: NAD, awake and alert, well-developed  HEENT: PERR, EOMI, Normal Hearing, MMM  Neck: Soft and supple, No LAD, No JVD  Respiratory: Breath sounds are clear bilaterally, No wheezing, rales or rhonchi  Cardiovascular: S1 and S2, regular rate and rhythm, no Murmurs, gallops or rubs  Gastrointestinal: Bowel Sounds present, soft, nontender, nondistended, no guarding, no rebound  Extremities: No peripheral edema  Vascular: 2+ peripheral pulses  Neurological: A/O x 3, no focal deficits  Musculoskeletal: 5/5 strength b/l upper and lower extremities  Skin: No rashes    MEDICATIONS:  MEDICATIONS  (STANDING):  ALBUTerol/ipratropium for Nebulization 3 milliLiter(s) Nebulizer every 6 hours  ampicillin/sulbactam  IVPB      ampicillin/sulbactam  IVPB 3 Gram(s) IV Intermittent every 12 hours  atorvastatin 10 milliGRAM(s) Oral at bedtime  carbidopa/levodopa  10/100 1 Tablet(s) Oral three times a day  carvedilol 3.125 milliGRAM(s) Oral every 12 hours  clopidogrel Tablet 75 milliGRAM(s) Oral daily  donepezil 5 milliGRAM(s) Oral at bedtime  fludroCORTISONE 0.1 milliGRAM(s) Oral daily  furosemide   Injectable 40 milliGRAM(s) IV Push two times a day  metolazone 2.5 milliGRAM(s) Oral daily  pantoprazole    Tablet 40 milliGRAM(s) Oral before breakfast  warfarin 2 milliGRAM(s) Oral daily      LABS: All Labs Reviewed:                        12.0   4.4   )-----------( 116      ( 25 Feb 2018 07:47 )             36.2     02-24    138  |  95<L>  |  50<H>  ----------------------------<  164<H>  2.6<LL>   |  35<H>  |  1.91<H>    Ca    8.2<L>      24 Feb 2018 21:48  Mg     2.0     02-24    TPro  7.3  /  Alb  3.3  /  TBili  0.7  /  DBili  x   /  AST  25  /  ALT  10<L>  /  AlkPhos  128<H>  02-24    PT/INR - ( 25 Feb 2018 07:47 )   PT: 22.1 sec;   INR: 2.02 ratio         PTT - ( 25 Feb 2018 07:47 )  PTT:40.7 sec  CARDIAC MARKERS ( 24 Feb 2018 15:59 )  <0.015 ng/mL / x     / x     / x     / x      CARDIAC MARKERS ( 24 Feb 2018 12:11 )  0.021 ng/mL / x     / x     / x     / x      CARDIAC MARKERS ( 24 Feb 2018 11:42 )  x     / x     / 85 U/L / x     / x              Assessment and Plan    88 y/o M with a PMHx of CAD, AICD, CABG, HTN, CHF, Parkinson's presents to the ED with one of increased lower extremity edema and blisters on her lower extremities.  Patient lives with daughter and son in law  whom are the care providers for patient. Patient is a poor historian and hisotry is provided soley by the familyt at bedside.  Patient has had increased sob, weakness and edema developing over the last several days.  He denies anychest pain, sob, fevers, n/v/d.     1- Acute exacerbation of chf  -echo pending  -BNP 1379  -hold iv lasix until electrolytes have normalized  -c/w metolazone   -cardiology consult    2-CAD- c/w plavix and statin    3- HTN- c/w carvedilol    4-elevated creatinine- ARF vs CKD?  -hold lisinopril    5-Hypokalemia  -2.9 >> 2.6 after several oral doses and krider  -give 3 more doses of oral potassium and 3 kriders now  -magneisium is WNLS  -repeat BMP @ 6PM    6-dvt proph- sc lovenox

## 2018-02-25 NOTE — CONSULT NOTE ADULT - SUBJECTIVE AND OBJECTIVE BOX
CHIEF COMPLAINT: Patient is a 89y old  Male who presents with a chief complaint worsening lower extremity edema    HPI:  89 y/o man with a history of CAD s/p CABG, CHF (chronic systolic with mild LV dysfunction ~ 48% in 2012), HTN, Parkinson's Disease, presented to the ER with complaints of bilateral lower extremity edema -- present x "months" and worsening; now associated with wound / blistering of distal RLE; unable to identify exacerbating or alleviating factors.  He also describes dyspnea ("comes and goes"); denies angina.  Patient cannot tell me who his outpatient physicians are -- his last office visit with Dr. Brady was in January 2016.    PAST MEDICAL & SURGICAL HISTORY:  CHF (congestive heart failure)  Hyperlipidemia  HTN (hypertension)  CAD (coronary artery disease)  AICD (automatic cardioverter/defibrillator) present  S/P CABG    SOCIAL HISTORY:   Alcohol: Denied  Smoking: Nonsmoker    FAMILY HISTORY:   Non-contributory to presenting problem    MEDICATIONS  (STANDING):  ALBUTerol/ipratropium for Nebulization 3 milliLiter(s) Nebulizer every 6 hours  ampicillin/sulbactam  IVPB 3 Gram(s) IV Intermittent every 12 hours  atorvastatin 10 milliGRAM(s) Oral at bedtime  carbidopa/levodopa  10/100 1 Tablet(s) Oral three times a day  carvedilol 3.125 milliGRAM(s) Oral every 12 hours  clopidogrel Tablet 75 milliGRAM(s) Oral daily  donepezil 5 milliGRAM(s) Oral at bedtime  fludroCORTISONE 0.1 milliGRAM(s) Oral daily  metolazone 2.5 milliGRAM(s) Oral daily  pantoprazole    Tablet 40 milliGRAM(s) Oral before breakfast  potassium chloride    Tablet ER 40 milliEquivalent(s) Oral every 4 hours  potassium chloride  10 mEq/100 mL IVPB 10 milliEquivalent(s) IV Intermittent every 1 hour  warfarin 2 milliGRAM(s) Oral daily    MEDICATIONS  (PRN):  acetaminophen   Tablet 650 milliGRAM(s) Oral every 6 hours PRN For Temp greater than 38 C (100.4 F) or mild pain  ondansetron Injectable 4 milliGRAM(s) IV Push every 6 hours PRN Nausea  senna 2 Tablet(s) Oral at bedtime PRN Constipation    Allergies: morphine (Headache)    REVIEW OF SYSTEMS:  CONSTITUTIONAL: No weakness, fevers or chills  EYES/ENT: No visual changes;  No throat pain   NECK: No pain or stiffness  RESPIRATORY: No cough, wheezing, hemoptysis; + shortness of breath  CARDIOVASCULAR: No chest pain or palpitations; + edema  GASTROINTESTINAL: No abdominal pain.   GENITOURINARY: No dysuria, frequency or hematuria  NEUROLOGICAL: No numbness.  SKIN: + erythema of legs  All other review of systems is negative unless indicated above    VITAL SIGNS:   Vital Signs Last 24 Hrs  T(C): 36.6 (25 Feb 2018 05:22), Max: 36.9 (24 Feb 2018 11:28)  T(F): 97.9 (25 Feb 2018 05:22), Max: 98.5 (24 Feb 2018 11:28)  HR: 79 (25 Feb 2018 10:01) (54 - 88)  BP: 96/52 (25 Feb 2018 05:22) (96/52 - 120/74)  RR: 18 (25 Feb 2018 05:22) (14 - 20)  SpO2: 97% (25 Feb 2018 05:22) (97% - 100%)    PHYSICAL EXAM:  Constitutional: NAD, appears stated age  HEENT:  Hoarse; Pupils round, No oral cyanosis.  Pulmonary: Non-labored, breath sounds are clear bilaterally but decreased at bases  Cardiovascular: S1 and S2, regular rate and rhythm  Gastrointestinal: Bowel Sounds present, soft, nontender.   Lymph: + pitting b/l lower extremity edema to mid leg. No cervical lymphadenopathy.  Neurological: Alert, no focal deficits  Skin: B/l leg erythema  Psych:  Mood & affect appropriate    LABS:              12.0   3.9   )-----------( 132      ( 24 Feb 2018 11:42 )             35.5     139    |  98     |  49     ----------------------------<  117    2.9     |  35     |  2.15     PT/INR - ( 25 Feb 2018 07:47 )   PT: 22.1 sec;   INR: 2.02 ratio    PTT - ( 25 Feb 2018 07:47 )  PTT:40.7 sec    CARDIAC MARKERS ( 24 Feb 2018 15:59 ) <0.015 ng/mL / x     / x     / x     / x      CARDIAC MARKERS ( 24 Feb 2018 12:11 ) 0.021 ng/mL / x     / x     / x     / x      CARDIAC MARKERS ( 24 Feb 2018 11:42 ) x     / x     / 85 U/L / x     / x        Pro Bnp 1379    ECG: Artifact, possible AF with ventricular demand pacing; RBBB

## 2018-02-25 NOTE — CONSULT NOTE ADULT - PROBLEM SELECTOR RECOMMENDATION 9
Edema is likely related to worsening of chronic HF -- probably systolic but no recent echocardiogram and only mildly decreased EF on echo from 2012; continue to diurese with metolazone; can add furosemide (IV) if BP allows. No ACE-I due to recent worsening of renal function and low BP; await echocardiogram.

## 2018-02-26 LAB
ANION GAP SERPL CALC-SCNC: 4 MMOL/L — LOW (ref 5–17)
APTT BLD: 41.9 SEC — HIGH (ref 27.5–37.4)
BUN SERPL-MCNC: 41 MG/DL — HIGH (ref 7–23)
CALCIUM SERPL-MCNC: 8.4 MG/DL — LOW (ref 8.5–10.1)
CHLORIDE SERPL-SCNC: 100 MMOL/L — SIGNIFICANT CHANGE UP (ref 96–108)
CO2 SERPL-SCNC: 37 MMOL/L — HIGH (ref 22–31)
CREAT SERPL-MCNC: 1.53 MG/DL — HIGH (ref 0.5–1.3)
GLUCOSE SERPL-MCNC: 110 MG/DL — HIGH (ref 70–99)
INR BLD: 2.43 RATIO — HIGH (ref 0.88–1.16)
POTASSIUM SERPL-MCNC: 3.5 MMOL/L — SIGNIFICANT CHANGE UP (ref 3.5–5.3)
POTASSIUM SERPL-SCNC: 3.5 MMOL/L — SIGNIFICANT CHANGE UP (ref 3.5–5.3)
PROTHROM AB SERPL-ACNC: 26.7 SEC — HIGH (ref 9.8–12.7)
SODIUM SERPL-SCNC: 141 MMOL/L — SIGNIFICANT CHANGE UP (ref 135–145)

## 2018-02-26 PROCEDURE — 93306 TTE W/DOPPLER COMPLETE: CPT | Mod: 26

## 2018-02-26 PROCEDURE — 93010 ELECTROCARDIOGRAM REPORT: CPT

## 2018-02-26 PROCEDURE — 99233 SBSQ HOSP IP/OBS HIGH 50: CPT

## 2018-02-26 RX ORDER — FUROSEMIDE 40 MG
40 TABLET ORAL
Qty: 0 | Refills: 0 | Status: DISCONTINUED | OUTPATIENT
Start: 2018-02-26 | End: 2018-03-03

## 2018-02-26 RX ORDER — POTASSIUM CHLORIDE 20 MEQ
20 PACKET (EA) ORAL DAILY
Qty: 0 | Refills: 0 | Status: DISCONTINUED | OUTPATIENT
Start: 2018-02-26 | End: 2018-03-05

## 2018-02-26 RX ORDER — POTASSIUM CHLORIDE 20 MEQ
40 PACKET (EA) ORAL ONCE
Qty: 0 | Refills: 0 | Status: COMPLETED | OUTPATIENT
Start: 2018-02-26 | End: 2018-02-26

## 2018-02-26 RX ORDER — LISINOPRIL 2.5 MG/1
5 TABLET ORAL DAILY
Qty: 0 | Refills: 0 | Status: DISCONTINUED | OUTPATIENT
Start: 2018-02-26 | End: 2018-03-05

## 2018-02-26 RX ADMIN — Medication 40 MILLIEQUIVALENT(S): at 06:26

## 2018-02-26 RX ADMIN — Medication 3 MILLILITER(S): at 13:29

## 2018-02-26 RX ADMIN — Medication 40 MILLIEQUIVALENT(S): at 02:13

## 2018-02-26 RX ADMIN — ATORVASTATIN CALCIUM 10 MILLIGRAM(S): 80 TABLET, FILM COATED ORAL at 22:12

## 2018-02-26 RX ADMIN — LISINOPRIL 5 MILLIGRAM(S): 2.5 TABLET ORAL at 17:44

## 2018-02-26 RX ADMIN — AMPICILLIN SODIUM AND SULBACTAM SODIUM 200 GRAM(S): 250; 125 INJECTION, POWDER, FOR SUSPENSION INTRAMUSCULAR; INTRAVENOUS at 11:34

## 2018-02-26 RX ADMIN — Medication 40 MILLIEQUIVALENT(S): at 11:35

## 2018-02-26 RX ADMIN — CARBIDOPA AND LEVODOPA 1 TABLET(S): 25; 100 TABLET ORAL at 22:12

## 2018-02-26 RX ADMIN — CARVEDILOL PHOSPHATE 3.12 MILLIGRAM(S): 80 CAPSULE, EXTENDED RELEASE ORAL at 06:27

## 2018-02-26 RX ADMIN — PANTOPRAZOLE SODIUM 40 MILLIGRAM(S): 20 TABLET, DELAYED RELEASE ORAL at 06:26

## 2018-02-26 RX ADMIN — CLOPIDOGREL BISULFATE 75 MILLIGRAM(S): 75 TABLET, FILM COATED ORAL at 11:35

## 2018-02-26 RX ADMIN — Medication 40 MILLIGRAM(S): at 16:47

## 2018-02-26 RX ADMIN — DONEPEZIL HYDROCHLORIDE 5 MILLIGRAM(S): 10 TABLET, FILM COATED ORAL at 22:12

## 2018-02-26 RX ADMIN — Medication 3 MILLILITER(S): at 19:56

## 2018-02-26 RX ADMIN — CARBIDOPA AND LEVODOPA 1 TABLET(S): 25; 100 TABLET ORAL at 06:27

## 2018-02-26 RX ADMIN — WARFARIN SODIUM 2 MILLIGRAM(S): 2.5 TABLET ORAL at 22:12

## 2018-02-26 RX ADMIN — FLUDROCORTISONE ACETATE 0.1 MILLIGRAM(S): 0.1 TABLET ORAL at 06:27

## 2018-02-26 RX ADMIN — AMPICILLIN SODIUM AND SULBACTAM SODIUM 200 GRAM(S): 250; 125 INJECTION, POWDER, FOR SUSPENSION INTRAMUSCULAR; INTRAVENOUS at 22:12

## 2018-02-26 RX ADMIN — CARVEDILOL PHOSPHATE 3.12 MILLIGRAM(S): 80 CAPSULE, EXTENDED RELEASE ORAL at 17:44

## 2018-02-26 RX ADMIN — Medication 3 MILLILITER(S): at 08:26

## 2018-02-26 RX ADMIN — CARBIDOPA AND LEVODOPA 1 TABLET(S): 25; 100 TABLET ORAL at 14:55

## 2018-02-26 NOTE — PROGRESS NOTE ADULT - ASSESSMENT
Assessment and Recommendation:   Problem/Recommendation - 1:  Problem: Acute on chronic congestive heart failure, systolic - with valvular heart disease with severe pulmonary hypertension. Recommendation: Edema is likely related to worsening of chronic HF in association with severe pulmonary hypertension.  -- echocardiogram ( as above) with LVEF 35-40%; continue to diurese with metolazone and IV Lasix. Is receiving Carvedilol. ACEI on hold in setting of renal insufficiency. Continue to monitor renal function and electrolytes. Was taking ACEI at home. May be able to tolerate it if renal function continues to improve and BP allows. Monitor BMP, Mg++.     Problem/Recommendation - 2:  ·  Problem: CAD (coronary artery disease).  Recommendation: Chronic CAD; no angina; continue medical therapy with atorvastatin, Coreg; repeat ECG is without significant change from yesterday.    Atrial fibrillation - controlled VR. Is anticoagulated with Coumadin. INR todayis therapeutic. Maintain it 2-3.     AICD - will request EP to interrogate.

## 2018-02-26 NOTE — ADVANCED PRACTICE NURSE CONSULT - RECOMMEDATIONS
1) Turn and position every 2 hours  2) Elevate both lower heels off mattress  3) Change dressing to RLE daily with xeroform and kerlix

## 2018-02-26 NOTE — PHYSICAL THERAPY INITIAL EVALUATION ADULT - PERTINENT HX OF CURRENT PROBLEM, REHAB EVAL
89M presents to the ED with one of increased lower extremity edema and blisters on his lower extremities.  Patient lives with daughter and son in law whom are the care providers for patient.

## 2018-02-26 NOTE — PHYSICAL THERAPY INITIAL EVALUATION ADULT - ADDITIONAL COMMENTS
pt is a poor historian, as per pt he lives with his son and occasionally uses a RW for ambulation. pt states his son works and would like to look into hiring [employer] during the day.

## 2018-02-26 NOTE — PROGRESS NOTE ADULT - SUBJECTIVE AND OBJECTIVE BOX
CHIEF COMPLAINT/Diagnosis: lower ext edema/ chf/ cellulitis    SUBJECTIVE: no complaints; says he feels bettter    REVIEW OF SYSTEMS:    CONSTITUTIONAL: No weakness, fevers or chills  EYES/ENT: No visual changes;  No vertigo or throat pain   NECK: No pain or stiffness  RESPIRATORY: No cough, wheezing, hemoptysis; No shortness of breath  CARDIOVASCULAR: No chest pain or palpitations  GASTROINTESTINAL: No abdominal or epigastric pain. No nausea, vomiting, or hematemesis; No diarrhea or constipation. No melena or hematochezia.  GENITOURINARY: No dysuria, frequency or hematuria  NEUROLOGICAL: No numbness or weakness  SKIN: No itching, burning, rashes, or lesions   All other review of systems is negative unless indicated above    Vital Signs Last 24 Hrs  T(C): 36.2 (26 Feb 2018 13:20), Max: 36.9 (26 Feb 2018 05:06)  T(F): 97.1 (26 Feb 2018 13:20), Max: 98.5 (26 Feb 2018 05:06)  HR: 69 (26 Feb 2018 13:30) (63 - 77)  BP: 139/60 (26 Feb 2018 13:20) (103/40 - 139/60)  BP(mean): --  RR: 18 (26 Feb 2018 13:20) (18 - 20)  SpO2: 98% (26 Feb 2018 13:20) (95% - 100%)    I&O's Summary      CAPILLARY BLOOD GLUCOSE          PHYSICAL EXAM:    Constitutional: NAD, awake and alert, well-developed  HEENT: PERR, EOMI, Normal Hearing, MMM  Neck: Soft and supple, No LAD, No JVD  Respiratory: Breath sounds are clear bilaterally, No wheezing, rales or rhonchi  Cardiovascular: S1 and S2, regular rate and rhythm, no Murmurs, gallops or rubs  Gastrointestinal: Bowel Sounds present, soft, nontender, nondistended, no guarding, no rebound  Extremities: No peripheral edema  Vascular: 2+ peripheral pulses  Neurological: A/O x 3, no focal deficits  Musculoskeletal: 5/5 strength b/l upper and lower extremities  Skin: No rashes    MEDICATIONS:  MEDICATIONS  (STANDING):  ALBUTerol/ipratropium for Nebulization 3 milliLiter(s) Nebulizer every 6 hours  ampicillin/sulbactam  IVPB      ampicillin/sulbactam  IVPB 3 Gram(s) IV Intermittent every 12 hours  atorvastatin 10 milliGRAM(s) Oral at bedtime  carbidopa/levodopa  10/100 1 Tablet(s) Oral three times a day  carvedilol 3.125 milliGRAM(s) Oral every 12 hours  clopidogrel Tablet 75 milliGRAM(s) Oral daily  donepezil 5 milliGRAM(s) Oral at bedtime  fludroCORTISONE 0.1 milliGRAM(s) Oral daily  furosemide   Injectable 40 milliGRAM(s) IV Push two times a day  metolazone 2.5 milliGRAM(s) Oral daily  pantoprazole    Tablet 40 milliGRAM(s) Oral before breakfast  potassium chloride    Tablet ER 20 milliEquivalent(s) Oral daily  warfarin 2 milliGRAM(s) Oral daily      LABS: All Labs Reviewed:                        12.0   4.4   )-----------( 116      ( 25 Feb 2018 07:47 )             36.2     02-26    141  |  100  |  41<H>  ----------------------------<  110<H>  3.5   |  37<H>  |  1.53<H>    Ca    8.4<L>      26 Feb 2018 08:35  Phos  3.7     02-25  Mg     2.1     02-25    TPro  7.6  /  Alb  3.2<L>  /  TBili  0.5  /  DBili  x   /  AST  32  /  ALT  31  /  AlkPhos  159<H>  02-25    PT/INR - ( 26 Feb 2018 08:35 )   PT: 26.7 sec;   INR: 2.43 ratio         PTT - ( 26 Feb 2018 08:35 )  PTT:41.9 sec          Assessment and Plan    88 y/o M with a PMHx of CAD, AICD, CABG, HTN, CHF, Parkinson's presents to the ED with one of increased lower extremity edema and blisters on her lower extremities.  Patient lives with daughter and son in law  whom are the care providers for patient. Patient is a poor historian and hisotry is provided soley by the familyt at bedside.  Patient has had increased sob, weakness and edema developing over the last several days.  He denies any chest pain, sob, fevers, n/v/d.     1- Acute exacerbation of chf  -echo pending  -BNP 1379  -restart lasix now that electrolytes are normalized >>> restart 40 IV q12  -c/w metolazone   -cardiology consult    2-lower extremey edema w/ ulcers and udnelrying cellulitis L>R  -c/w unasyn  -can likely transition to oral abx jose    2-CAD- c/w plavix and statin    3- HTN- c/w carvedilol    4-elevated creatinine- ARF vs CKD?  -renal function improving  -restart home lisinopril    5-Hypokalemia  -potassium finally normalzied after several kriders and oral potassium  -3.5  -start daily 20meq potassium    6-dvt proph- sc lovenox

## 2018-02-26 NOTE — ADVANCED PRACTICE NURSE CONSULT - ASSESSMENT
This is an 89 year old male that was admitted to the hospital for swollen lower extremities on 2/24/2018. PMH- CAD s/p CABG, CHF (chronic systolic with mild LV dysfunction ~ 48% in 2012), HTN, Parkinson's Disease.    Requested by MD to assess patient's RLE. Patient presents sitting in chair at the bedside. Patient assisted back to bed for assessment. LLE intact with some edema, weak pedal pulse palpated. RLE with a ruptured/scabbed area that patient reports was a blister to dorsum of foot, positive pedal pulse. Anterior aspect of LE with ruptured blister. Wound 100% pink.  No odor noted. Periwound intact with some erythema. Wound irrigated with normal saline. Xeroform gauze applied to wound to promote moist wound healing and covered with dry gauze and wrapped with kasie.     After assessment, RN and NA changing patient as he is incontinent of urine and stool.

## 2018-02-26 NOTE — PHYSICAL THERAPY INITIAL EVALUATION ADULT - DISCHARGE DISPOSITION, PT EVAL
rehabilitation facility/Recommend continued skilled PT and [facility] when medically stable for discharge.

## 2018-02-27 DIAGNOSIS — I50.9 HEART FAILURE, UNSPECIFIED: ICD-10-CM

## 2018-02-27 LAB
ANION GAP SERPL CALC-SCNC: 7 MMOL/L — SIGNIFICANT CHANGE UP (ref 5–17)
APTT BLD: 37.6 SEC — HIGH (ref 27.5–37.4)
BUN SERPL-MCNC: 43 MG/DL — HIGH (ref 7–23)
CALCIUM SERPL-MCNC: 8.2 MG/DL — LOW (ref 8.5–10.1)
CHLORIDE SERPL-SCNC: 98 MMOL/L — SIGNIFICANT CHANGE UP (ref 96–108)
CO2 SERPL-SCNC: 36 MMOL/L — HIGH (ref 22–31)
CREAT SERPL-MCNC: 1.47 MG/DL — HIGH (ref 0.5–1.3)
GLUCOSE SERPL-MCNC: 118 MG/DL — HIGH (ref 70–99)
HCT VFR BLD CALC: 32.9 % — LOW (ref 39–50)
HGB BLD-MCNC: 10.8 G/DL — LOW (ref 13–17)
INR BLD: 2.01 RATIO — HIGH (ref 0.88–1.16)
MAGNESIUM SERPL-MCNC: 1.8 MG/DL — SIGNIFICANT CHANGE UP (ref 1.6–2.6)
MCHC RBC-ENTMCNC: 30.5 PG — SIGNIFICANT CHANGE UP (ref 27–34)
MCHC RBC-ENTMCNC: 32.9 GM/DL — SIGNIFICANT CHANGE UP (ref 32–36)
MCV RBC AUTO: 92.6 FL — SIGNIFICANT CHANGE UP (ref 80–100)
PLATELET # BLD AUTO: 102 K/UL — LOW (ref 150–400)
POTASSIUM SERPL-MCNC: 3 MMOL/L — LOW (ref 3.5–5.3)
POTASSIUM SERPL-SCNC: 3 MMOL/L — LOW (ref 3.5–5.3)
PROTHROM AB SERPL-ACNC: 22 SEC — HIGH (ref 9.8–12.7)
RBC # BLD: 3.55 M/UL — LOW (ref 4.2–5.8)
RBC # FLD: 14.2 % — SIGNIFICANT CHANGE UP (ref 10.3–14.5)
SODIUM SERPL-SCNC: 141 MMOL/L — SIGNIFICANT CHANGE UP (ref 135–145)
WBC # BLD: 4.6 K/UL — SIGNIFICANT CHANGE UP (ref 3.8–10.5)
WBC # FLD AUTO: 4.6 K/UL — SIGNIFICANT CHANGE UP (ref 3.8–10.5)

## 2018-02-27 PROCEDURE — 99233 SBSQ HOSP IP/OBS HIGH 50: CPT

## 2018-02-27 PROCEDURE — 93287 PERI-PX DEVICE EVAL & PRGR: CPT | Mod: 26

## 2018-02-27 RX ORDER — WARFARIN SODIUM 2.5 MG/1
2 TABLET ORAL DAILY
Qty: 0 | Refills: 0 | Status: COMPLETED | OUTPATIENT
Start: 2018-02-27 | End: 2018-03-01

## 2018-02-27 RX ADMIN — Medication 40 MILLIGRAM(S): at 05:42

## 2018-02-27 RX ADMIN — PANTOPRAZOLE SODIUM 40 MILLIGRAM(S): 20 TABLET, DELAYED RELEASE ORAL at 05:44

## 2018-02-27 RX ADMIN — LISINOPRIL 5 MILLIGRAM(S): 2.5 TABLET ORAL at 06:25

## 2018-02-27 RX ADMIN — ATORVASTATIN CALCIUM 10 MILLIGRAM(S): 80 TABLET, FILM COATED ORAL at 22:28

## 2018-02-27 RX ADMIN — DONEPEZIL HYDROCHLORIDE 5 MILLIGRAM(S): 10 TABLET, FILM COATED ORAL at 22:28

## 2018-02-27 RX ADMIN — Medication 3 MILLILITER(S): at 20:34

## 2018-02-27 RX ADMIN — Medication 40 MILLIGRAM(S): at 14:49

## 2018-02-27 RX ADMIN — CARBIDOPA AND LEVODOPA 1 TABLET(S): 25; 100 TABLET ORAL at 14:49

## 2018-02-27 RX ADMIN — AMPICILLIN SODIUM AND SULBACTAM SODIUM 200 GRAM(S): 250; 125 INJECTION, POWDER, FOR SUSPENSION INTRAMUSCULAR; INTRAVENOUS at 11:17

## 2018-02-27 RX ADMIN — Medication 3 MILLILITER(S): at 14:30

## 2018-02-27 RX ADMIN — CARVEDILOL PHOSPHATE 3.12 MILLIGRAM(S): 80 CAPSULE, EXTENDED RELEASE ORAL at 05:42

## 2018-02-27 RX ADMIN — CARBIDOPA AND LEVODOPA 1 TABLET(S): 25; 100 TABLET ORAL at 05:42

## 2018-02-27 RX ADMIN — WARFARIN SODIUM 2 MILLIGRAM(S): 2.5 TABLET ORAL at 22:28

## 2018-02-27 RX ADMIN — CARVEDILOL PHOSPHATE 3.12 MILLIGRAM(S): 80 CAPSULE, EXTENDED RELEASE ORAL at 17:52

## 2018-02-27 RX ADMIN — CLOPIDOGREL BISULFATE 75 MILLIGRAM(S): 75 TABLET, FILM COATED ORAL at 11:14

## 2018-02-27 RX ADMIN — FLUDROCORTISONE ACETATE 0.1 MILLIGRAM(S): 0.1 TABLET ORAL at 05:42

## 2018-02-27 RX ADMIN — AMPICILLIN SODIUM AND SULBACTAM SODIUM 200 GRAM(S): 250; 125 INJECTION, POWDER, FOR SUSPENSION INTRAMUSCULAR; INTRAVENOUS at 22:28

## 2018-02-27 RX ADMIN — Medication 20 MILLIEQUIVALENT(S): at 11:14

## 2018-02-27 RX ADMIN — Medication 40 MILLIGRAM(S): at 17:52

## 2018-02-27 RX ADMIN — CARBIDOPA AND LEVODOPA 1 TABLET(S): 25; 100 TABLET ORAL at 22:28

## 2018-02-27 NOTE — PROGRESS NOTE ADULT - SUBJECTIVE AND OBJECTIVE BOX
REASON FOR VISIT: CHF    HPI:  88 y/o man with a history of CAD s/p CABG, CHF (chronic systolic with mild LV dysfunction ~ 48% in 2012), HTN, Parkinson's Disease, AF, admitted 2/24 with an exacerbation of CHF manifesting as lower extremity edema and dyspnea.    2/27/18: Continues to feel "better," no new complaints.    MEDICATIONS  (STANDING):  ALBUTerol/ipratropium for Nebulization 3 milliLiter(s) Nebulizer every 6 hours  ampicillin/sulbactam  IVPB 3 Gram(s) IV Intermittent every 12 hours  atorvastatin 10 milliGRAM(s) Oral at bedtime  carbidopa/levodopa  10/100 1 Tablet(s) Oral three times a day  carvedilol 3.125 milliGRAM(s) Oral every 12 hours  clopidogrel Tablet 75 milliGRAM(s) Oral daily  donepezil 5 milliGRAM(s) Oral at bedtime  fludroCORTISONE 0.1 milliGRAM(s) Oral daily  furosemide   Injectable 40 milliGRAM(s) IV Push two times a day  lisinopril 5 milliGRAM(s) Oral daily  metolazone 2.5 milliGRAM(s) Oral daily  pantoprazole    Tablet 40 milliGRAM(s) Oral before breakfast  potassium chloride    Tablet ER 20 milliEquivalent(s) Oral daily    MEDICATIONS  (PRN):  acetaminophen   Tablet 650 milliGRAM(s) Oral every 6 hours PRN For Temp greater than 38 C (100.4 F) or mild pain  ondansetron Injectable 4 milliGRAM(s) IV Push every 6 hours PRN Nausea  senna 2 Tablet(s) Oral at bedtime PRN Constipation    Vital Signs Last 24 Hrs  T(C): 37.2 (27 Feb 2018 05:40), Max: 37.7 (26 Feb 2018 16:46)  T(F): 99 (27 Feb 2018 05:40), Max: 99.9 (26 Feb 2018 16:46)  HR: 67 (27 Feb 2018 05:40) (64 - 74)  BP: 117/44 (27 Feb 2018 05:40) (103/40 - 139/60)  RR: 19 (27 Feb 2018 05:40) (17 - 20)  SpO2: 95% (27 Feb 2018 05:40) (95% - 98%)      PHYSICAL EXAM:    Constitutional: NAD, awake and alert, chronically ill appearing.   HEENT: NC; Nooral cyanosis. + hoarse voice.   Respiratory: Breath sounds are clear bilaterally with decreased BS at the bases.   Cardiovascular: S1 and S2, irregularly, irregular  rhythm.   Gastrointestinal: Bowel Sounds present, soft, nontender.   Extremities: Bilateral LE edema.  No clubbing or cyanosis.   Neurological: Awake and alert.  Musculoskeletal: no calf tenderness.  Skin: LE erythema. Bandage to LE.      LABS: All Labs Reviewed:                        12.0   4.4   )-----------( 116      ( 25 Feb 2018 07:47 )             36.2                         12.0   3.9   )-----------( 132      ( 24 Feb 2018 11:42 )             35.5     26 Feb 2018 08:35    141    |  100    |  41     ----------------------------<  110    3.5     |  37     |  1.53   25 Feb 2018 18:31    141    |  98     |  46     ----------------------------<  134    3.0     |  37     |  1.80   25 Feb 2018 07:47    140    |  96     |  49     ----------------------------<  114    3.3     |  38     |  1.94     Ca    8.4        26 Feb 2018 08:35  Ca    8.4        25 Feb 2018 18:31  Ca    8.6        25 Feb 2018 07:47  Phos  3.7       25 Feb 2018 07:47  Mg     2.1       25 Feb 2018 07:47  Mg     2.0       24 Feb 2018 21:48    TPro  7.6    /  Alb  3.2    /  TBili  0.5    /  DBili  x      /  AST  32     /  ALT  31     /  AlkPhos  159    25 Feb 2018 07:47  TPro  7.3    /  Alb  3.3    /  TBili  0.7    /  DBili  x      /  AST  25     /  ALT  10     /  AlkPhos  128    24 Feb 2018 11:42    PT/INR - ( 26 Feb 2018 08:35 )   PT: 26.7 sec;   INR: 2.43 ratio         PTT - ( 26 Feb 2018 08:35 )  PTT:41.9 sec  CARDIAC MARKERS ( 24 Feb 2018 15:59 )  <0.015 ng/mL / x     / x     / x     / x        02-24 @ 12:11  Pro Bnp 1379        RADIOLOGY/EKG:       Date of study       02/26/2018 08:41                       AM     Height              67.72 in          Weight        169.76 pounds    Type of Study:     TTE procedure: 2D echocardiogram     BP:126/56 mmHg     Study Location: 5ETechnical Quality: Fair    Indications   1) I50.9 - Heart failure    M-Mode Measurements (cm)     LVEDd: 5.4 cm             LVESd: 4.23 cm   IVSEd: 0.95 cm   LVPWd: 0.91 cm            AO Root Dimension: 3.2 cm                       ACS: 1.7 cm                             LA: 4.8 cm    Doppler Measurements:                                    MV Peak E-Wave: 141 cm/s   TR Velocity:381 cm/s           MV Peak A-Wave: 87.4 cm/s   TR Gradient:58.0644 mmHg       MV E/A Ratio: 1.61 %   Estimated RAP:10 mmHg          MV Peak Gradient: 7.95 mmHg   RVSP:57 mmHg     Findings     Mitral Valve   Normal appearing mitral valve structure and function.   Moderate (2+) mitral regurgitation is present.   Mild mitral annular calcification is present.     Aortic Valve   Normal aortic valve structure and function.     Tricuspid Valve   Normal appearing tricuspid valve structure and function.   Moderate to severe (3+) tricuspid valve regurgitation is present.   There is severe elevation in right ventricular systolic pressure     Pulmonic Valve   Normal appearing pulmonic valve structure and function.   Mild pulmonic valvular regurgitation (1+) is present.     Left Atrium   The left atrium is mildly dilated.     Left Ventricle   The left ventricle is normal in size, wall thickness. Moderately   decreased   left ventricular systolic function with an estimated left ventricular   ejection fraction of 35-40 %. There is paradoxical septal motion     Right Atrium   The right atrium appears moderately dilated.   A device wire is seen in the RV and RA.     Right Ventricle   Normal appearing right ventricle structure and function.     Pericardial Effusion   No evidence of pericardial effusion.     Pleural Effusion   No evidence of pleural effusion.     Miscellaneous   All visualized extra cardiac structures appears to be normal.     Impression     Summary     The left ventricle is normal in size, wall thickness. Moderately   decreased   left ventricular systolic function with an estimated left ventricular   ejection fraction of 35-40 %. There is paradoxical septal motion   The right atrium appears moderately dilated.   A device wire is seen in the RV and RA.   Normal aortic valve structure and function.   Normal appearing mitral valve structure and function.   Moderate (2+) mitral regurgitation is present.   Mild mitral annular calcification is present.   Normal appearing tricuspid valve structure and function.   Moderate to severe (3+) tricuspid valve regurgitation is present.   There is severe elevation in right ventricular systolic pressure     Signature     ----------------------------------------------------------------   Electronically signed by Bigg Beyer(Interpreting physician)   on 02/26/2018 11:51 AM   ----------------------------------------------------------------    EXAM:  XR CHEST PORTABLE URGENT 1V                            PROCEDURE DATE:  02/24/2018          INTERPRETATION:  Clinical information: Cough    Portable AP chest radiograph from 2022 hours:    COMPARISON:  December 25, 2016    FINDINGS:  Sternotomy wires, mediastinal surgical clips and a left chest   wall unipolar AICD are in place. The sternotomy wires are fractured.    There is a small left pleural effusion and patchy left mid lung opacity.   There is mild pulmonary vascular congestion. Nopneumothorax.    IMPRESSION: Mild pulmonary vascular congestion. Patchy airspace disease   in the left midlung could be due to more confluent pulmonary edema versus   pneumonia. Continued radiographic follow-up is recommended.        CHLOE BIRD   This document has been electronically signed. Feb 25 2018 10:27A

## 2018-02-27 NOTE — PROGRESS NOTE ADULT - SUBJECTIVE AND OBJECTIVE BOX
CHIEF COMPLAINT/Diagnosis: lower ext edema/ chf/ cellulitis    SUBJECTIVE: pain to leg wound if touched or manipulated, + wheezing    REVIEW OF SYSTEMS:    CONSTITUTIONAL: No weakness, fevers or chills  EYES/ENT: No visual changes;  No vertigo or throat pain   NECK: No pain or stiffness  RESPIRATORY: No cough, wheezing, hemoptysis; No shortness of breath  CARDIOVASCULAR: No chest pain or palpitations  GASTROINTESTINAL: No abdominal or epigastric pain. No nausea, vomiting, or hematemesis; No diarrhea or constipation. No melena or hematochezia.  GENITOURINARY: No dysuria, frequency or hematuria  NEUROLOGICAL: No numbness or weakness  SKIN: No itching, burning, rashes, or lesions   All other review of systems is negative unless indicated above    ICU Vital Signs Last 24 Hrs  T(C): 36.7 (27 Feb 2018 11:01), Max: 37.7 (26 Feb 2018 16:46)  T(F): 98.1 (27 Feb 2018 11:01), Max: 99.9 (26 Feb 2018 16:46)  HR: 57 (27 Feb 2018 11:01) (57 - 74)  BP: 99/45 (27 Feb 2018 11:01) (99/45 - 128/68)  BP(mean): --  ABP: --  ABP(mean): --  RR: 17 (27 Feb 2018 11:01) (17 - 19)  SpO2: 97% (27 Feb 2018 11:01) (95% - 97%)          PHYSICAL EXAM:    Constitutional: NAD, awake and alert, well-developed  HEENT: PERR, EOMI, Normal Hearing, MMM  Neck: Soft and supple, No LAD, No JVD  Respiratory: expiratory wheezing  Cardiovascular: S1 and S2, regular rate and rhythm, no Murmurs, gallops or rubs  Gastrointestinal: Bowel Sounds present, soft, nontender, nondistended, no guarding, no rebound  Extremities: No peripheral edema  Vascular: 2+ peripheral pulses  Neurological: A/O x 3, no focal deficits  Musculoskeletal: 5/5 strength b/l upper and lower extremities  Skin: RLE ulceration to ant leg approx 3cm.     MEDICATIONS:  MEDICATIONS  (STANDING):  ALBUTerol/ipratropium for Nebulization 3 milliLiter(s) Nebulizer every 6 hours  ampicillin/sulbactam  IVPB      ampicillin/sulbactam  IVPB 3 Gram(s) IV Intermittent every 12 hours  atorvastatin 10 milliGRAM(s) Oral at bedtime  carbidopa/levodopa  10/100 1 Tablet(s) Oral three times a day  carvedilol 3.125 milliGRAM(s) Oral every 12 hours  clopidogrel Tablet 75 milliGRAM(s) Oral daily  donepezil 5 milliGRAM(s) Oral at bedtime  fludroCORTISONE 0.1 milliGRAM(s) Oral daily  furosemide   Injectable 40 milliGRAM(s) IV Push two times a day  metolazone 2.5 milliGRAM(s) Oral daily  pantoprazole    Tablet 40 milliGRAM(s) Oral before breakfast  potassium chloride    Tablet ER 20 milliEquivalent(s) Oral daily  warfarin 2 milliGRAM(s) Oral daily      LABS: All Labs Reviewed:                        12.0   4.4   )-----------( 116      ( 25 Feb 2018 07:47 )             36.2     02-26    141  |  100  |  41<H>  ----------------------------<  110<H>  3.5   |  37<H>  |  1.53<H>    Ca    8.4<L>      26 Feb 2018 08:35  Phos  3.7     02-25  Mg     2.1     02-25    TPro  7.6  /  Alb  3.2<L>  /  TBili  0.5  /  DBili  x   /  AST  32  /  ALT  31  /  AlkPhos  159<H>  02-25    PT/INR - ( 26 Feb 2018 08:35 )   PT: 26.7 sec;   INR: 2.43 ratio         PTT - ( 26 Feb 2018 08:35 )  PTT:41.9 sec          Assessment and Plan    88 y/o M with a PMHx of CAD, AICD, CABG, HTN, CHF, Parkinson's presents to the ED with one of increased lower extremity edema and blisters on her lower extremities.  Patient lives with daughter and son in law  whom are the care providers for patient. Patient is a poor historian and hisotry is provided soley by the familyt at bedside.  Patient has had increased sob, weakness and edema developing over the last several days.  He denies any chest pain, sob, fevers, n/v/d.     1- Acute exacerbation of systolic chf  -EF 35%  -BNP 1379  -restart lasix now that electrolytes are normalized >>> restart 40 IV q12  -c/w metolazone   -cont IV lasix BID    2-lower extremity edema w/ ulcers and underlying cellulitis L>R  -c/w unasyn  -can likely transition to oral abx tommorrow    2-CAD- c/w plavix and statin    3- HTN- c/w carvedilol    4-elevated creatinine- ARF vs CKD III  -renal function improving (baseline around 1.5-1.8)   -restart home lisinopril    5-Hypokalemia  -potassium finally normalized after several kriders and oral potassium  -3.5  -start daily 20meq potassium    6-dvt proph- sc lovenox

## 2018-02-27 NOTE — CDI QUERY NOTE - NSCDIOTHERTXTBX_GEN_ALL_CORE_HH
As per medical record pt presents with Cr level of 2.15, now improved to 1.53. GFRs ranging from 26-40, documented with MARILYN vs CKD, and chf exacerbation treated with lasix IV. If possible, please clarify the diagnosed in your note:  A. MARILYN on CKD (include stage of CKD)  B. CKD (include stage)  without MARILYN  C. MARILYN without CKD  D. other

## 2018-02-27 NOTE — PROGRESS NOTE ADULT - ASSESSMENT
PHYSICAL EXAM:  GENERAL: NAD, well-developed  HEAD:  Atraumatic, Normocephalic  EYES: EOMI, PERRLA, conjunctiva and sclera clear  NECK: Supple, No JVD  CHEST/LUNG: right upper- rhonchi, right lower- crackles; left upper clear, left lower crackles.  HEART: IRRegular rate and rhythm; No murmurs, rubs, or gallops  ABDOMEN: Soft, Nontender, Nondistended; Bowel sounds present  EXTREMITIES:  2+ Peripheral Pulses, No clubbing, cyanosis, +4 pitting and weeping left LLE edema.  Right LLE +3 pitting edema  PSYCH: AAOx2  NEUROLOGY: delayed speech secondary to Parkinson's  SKIN: Right LLE has gauze dressing secondary to blisters from edema- CDI.

## 2018-02-27 NOTE — PROGRESS NOTE ADULT - ASSESSMENT
Assessment and Recommendation:     Acute on chronic congestive heart failure, systolic - with valvular heart disease with severe pulmonary hypertension: Continue to diurese with IV lasix + metolazone while monitoring renal function and repleting serum potassium to goal >4; continue coreg and lisinopril.    CAD (coronary artery disease): Chronic CAD; no angina; continue medical therapy with atorvastatin, Coreg;     Atrial fibrillation:  Stable / controlled rate; continue warfarin and daily INRs with warfarin dosing titrated to goal INR 2-3.

## 2018-02-27 NOTE — PROGRESS NOTE ADULT - SUBJECTIVE AND OBJECTIVE BOX
Patient is a 89y old  Male who presents with a chief complaint of sob and increased edema (24 Feb 2018 15:06)    HPI:  89 y/o M with a PMHx of CAD, AICD, CABG, HTN, CHF, Parkinson's presents to the ED with one of increased lower extremity edema and blisters on her lower extremities.  Patient lives with daughter and son in law  whom are the care providers for patient. Patient is a poor historian and hisotry is provided soley by the familyt at bedside.  Patient has had increased sob, weakness and edema developing over the last several days.  He denies anychest pain, sob, fevers, n/v/d. (24 Feb 2018 15:06)    2/27/18- pt A & o x 2.  Knows name and that he's in hospital, but not sure which hospital.  Denies any SOB or CP at this time.  Speech is delayed but clear.  states he is very tired and his son and daughter and law manage his health. RN at bedside states the family usually comes in evening.  Unable to do CHF education secondary to current mentation.    HEALTH ISSUES - PROBLEM Dx:  CAD (coronary artery disease): CAD (coronary artery disease)  Acute on chronic congestive heart failure, unspecified congestive heart failure type: Acute on chronic congestive heart failure, unspecified congestive heart failure type          Daily     Daily     T(C): 36.7 (02-27-18 @ 11:01), Max: 37.7 (02-26-18 @ 16:46)  HR: 70 (02-27-18 @ 14:14) (57 - 74)  BP: 99/45 (02-27-18 @ 11:01) (99/45 - 128/68)  RR: 17 (02-27-18 @ 11:01) (17 - 19)  SpO2: 97% (02-27-18 @ 11:01) (95% - 97%)    Home Medications:  atorvastatin 10 mg oral tablet: 1 tab(s) orally once a day (at bedtime) (24 Feb 2018 15:36)  carbidopa-levodopa 10 mg-100 mg oral tablet: 1 tab(s) orally 3 times a day (24 Feb 2018 15:38)  carvedilol 3.125 mg oral tablet: 1 tab(s) orally 2 times a day (24 Feb 2018 15:40)  Coumadin 2 mg oral tablet: 1 tab(s) orally once a day (24 Feb 2018 15:36)  donepezil 5 mg oral tablet: 1 tab(s) orally once a day (at bedtime) (24 Feb 2018 15:37)  fludrocortisone 0.1 mg oral tablet: 1 tab(s) orally once a day (24 Feb 2018 15:37)  Lasix 40 mg oral tablet: 1  orally 2 times a day (24 Feb 2018 15:39)  lisinopril 5 mg oral tablet: 1 tab(s) orally once a day (24 Feb 2018 15:39)  metOLazone 2.5 mg oral tablet: 1  orally every other day (24 Feb 2018 15:39)  pantoprazole 40 mg oral delayed release tablet: 1 tab(s) orally once a day (24 Feb 2018 15:37)  Plavix 75 mg oral tablet: 1 tab(s) orally once a day (24 Feb 2018 15:38)      Transthoracic Echocardiogram:    EXAM:  2D ECHOCARDIOGRAM AD         PROCEDURE DATE:  02/26/2018        INTERPRETATION:  Transthoracic Echocardiography Report (TTE)     Demographics     Patient name        GABRIEL ZUNIGA   Age           89 year(s)     Med Rec #           [id number]         Gender        Male     Account #           [id number]           Date of Birth 09/02/1928     Interpreting        Bigg Beyer     Room Number   0017   Physician     Referring Physician Rebekah Jewell        Sonographer   Nighat Fernando RDCS     Date of study       02/26/2018 08:41                       AM     Height              67.72 in          Weight        169.76 pounds    Type of Study:     TTE procedure: 2D echocardiogram     BP:126/56 mmHg     Study Location: 5ETechnical Quality: Fair    Indications   1) I50.9 - Heart failure    M-Mode Measurements (cm)     LVEDd: 5.4 cm             LVESd: 4.23 cm   IVSEd: 0.95 cm   LVPWd: 0.91 cm            AO Root Dimension: 3.2 cm                       ACS: 1.7 cm                             LA: 4.8 cm    Doppler Measurements:                                    MV Peak E-Wave: 141 cm/s   TR Velocity:381 cm/s           MV Peak A-Wave: 87.4 cm/s   TR Gradient:58.0644 mmHg       MV E/A Ratio: 1.61 %   Estimated RAP:10 mmHg          MV Peak Gradient: 7.95 mmHg   RVSP:57 mmHg     Findings     Mitral Valve   Normal appearing mitral valve structure and function.   Moderate (2+) mitral regurgitation is present.   Mild mitral annular calcification is present.     Aortic Valve   Normal aortic valve structure and function.     Tricuspid Valve   Normal appearing tricuspid valve structure and function.   Moderate to severe (3+) tricuspid valve regurgitation is present.   There is severe elevation in right ventricular systolic pressure     Pulmonic Valve   Normal appearing pulmonic valve structure and function.   Mild pulmonic valvular regurgitation (1+) is present.     Left Atrium   The left atrium is mildly dilated.     Left Ventricle   The left ventricle is normal in size, wall thickness. Moderately   decreased   left ventricular systolic function with an estimated left ventricular   ejection fraction of 35-40 %. There is paradoxical septal motion     Right Atrium   The right atrium appears moderately dilated.   A device wire is seen in the RV and RA.     Right Ventricle   Normal appearing right ventricle structure and function.     Pericardial Effusion   No evidence of pericardial effusion.     Pleural Effusion   No evidence of pleural effusion.     Miscellaneous   All visualized extra cardiac structures appears to be normal.     Impression     Summary     The left ventricle is normal in size, wall thickness. Moderately   decreased   left ventricular systolic function with an estimated left ventricular   ejection fraction of 35-40 %. There is paradoxical septal motion   The right atrium appears moderately dilated.   A device wire is seen in the RV and RA.   Normal aortic valve structure and function.   Normal appearing mitral valve structure and function.   Moderate (2+) mitral regurgitation is present.   Mild mitral annular calcification is present.   Normal appearing tricuspid valve structure and function.   Moderate to severe (3+) tricuspid valve regurgitation is present.   There is severe elevation in right ventricular systolic pressure     Signature     ----------------------------------------------------------------   Electronically signed by Jason BeyerInterpreting physician)   on 02/26/2018 11:51 AM   ----------------------------------------------------------------    Valves     Mitral Valve     Peak E-Wave: 141 cm/s   Peak A-Wave: 87.4 cm/s   Peak Gradient: 7.95 mmHg                                                 E/A Ratio: 1.61     Aortic Valve     Cusp Separation: 1.7 cm     Tricuspid Valve     TR Velocity: 381 cm/s                  Estimated RAP: 10 mmHg   TR Gradient: 58.0644 mmHg              Estimated RVSP: 57 mmHg     Pulmonic Valve              Estimated PASP: 68.06 mmHg    Structures     Left Atrium     LA Dimension: 4.8 cm          LA Area: 27 cm^2   LA/Aorta: 1.5                 LA Volume/Index: 84 ml /44m^2     Left Ventricle     Diastolic Dimension: 5.4 cm          Systolic Dimension: 4.23 cm   Septum Diastolic: 0.95 cm   PW Diastolic: 0.91 cm     FS: 21.67 %     Right Atrium     RA Systolic Pressure: 10 mmHg     Right Ventricle              RV Systolic Pressure: 68.06 mmHg     Miscellaneous     Aorta     Aortic Root: 3.2 cm                    BIGG BEYER M.D., ATTENDING CARDIOLOGIST  This document has been electronically signed. Feb 26 2018 11:52AM             (02-26-18 @ 09:24)

## 2018-02-28 LAB
ANION GAP SERPL CALC-SCNC: 7 MMOL/L — SIGNIFICANT CHANGE UP (ref 5–17)
BUN SERPL-MCNC: 49 MG/DL — HIGH (ref 7–23)
CALCIUM SERPL-MCNC: 8.4 MG/DL — LOW (ref 8.5–10.1)
CHLORIDE SERPL-SCNC: 95 MMOL/L — LOW (ref 96–108)
CO2 SERPL-SCNC: 37 MMOL/L — HIGH (ref 22–31)
CREAT SERPL-MCNC: 1.58 MG/DL — HIGH (ref 0.5–1.3)
GLUCOSE SERPL-MCNC: 223 MG/DL — HIGH (ref 70–99)
INR BLD: 1.92 RATIO — HIGH (ref 0.88–1.16)
POTASSIUM SERPL-MCNC: 2.3 MMOL/L — CRITICAL LOW (ref 3.5–5.3)
POTASSIUM SERPL-SCNC: 2.3 MMOL/L — CRITICAL LOW (ref 3.5–5.3)
PROTHROM AB SERPL-ACNC: 21 SEC — HIGH (ref 9.8–12.7)
SODIUM SERPL-SCNC: 139 MMOL/L — SIGNIFICANT CHANGE UP (ref 135–145)

## 2018-02-28 PROCEDURE — 99233 SBSQ HOSP IP/OBS HIGH 50: CPT

## 2018-02-28 RX ORDER — POTASSIUM CHLORIDE 20 MEQ
10 PACKET (EA) ORAL ONCE
Qty: 0 | Refills: 0 | Status: COMPLETED | OUTPATIENT
Start: 2018-02-28 | End: 2018-02-28

## 2018-02-28 RX ORDER — POTASSIUM CHLORIDE 20 MEQ
40 PACKET (EA) ORAL EVERY 4 HOURS
Qty: 0 | Refills: 0 | Status: COMPLETED | OUTPATIENT
Start: 2018-02-28 | End: 2018-02-28

## 2018-02-28 RX ORDER — WARFARIN SODIUM 2.5 MG/1
0.5 TABLET ORAL ONCE
Qty: 0 | Refills: 0 | Status: COMPLETED | OUTPATIENT
Start: 2018-02-28 | End: 2018-02-28

## 2018-02-28 RX ADMIN — PANTOPRAZOLE SODIUM 40 MILLIGRAM(S): 20 TABLET, DELAYED RELEASE ORAL at 05:52

## 2018-02-28 RX ADMIN — Medication 40 MILLIGRAM(S): at 05:52

## 2018-02-28 RX ADMIN — WARFARIN SODIUM 0.5 MILLIGRAM(S): 2.5 TABLET ORAL at 22:24

## 2018-02-28 RX ADMIN — Medication 40 MILLIEQUIVALENT(S): at 10:16

## 2018-02-28 RX ADMIN — Medication 20 MILLIEQUIVALENT(S): at 10:17

## 2018-02-28 RX ADMIN — CARBIDOPA AND LEVODOPA 1 TABLET(S): 25; 100 TABLET ORAL at 22:25

## 2018-02-28 RX ADMIN — Medication 3 MILLILITER(S): at 07:48

## 2018-02-28 RX ADMIN — LISINOPRIL 5 MILLIGRAM(S): 2.5 TABLET ORAL at 05:51

## 2018-02-28 RX ADMIN — Medication 3 MILLILITER(S): at 14:20

## 2018-02-28 RX ADMIN — CARBIDOPA AND LEVODOPA 1 TABLET(S): 25; 100 TABLET ORAL at 13:07

## 2018-02-28 RX ADMIN — AMPICILLIN SODIUM AND SULBACTAM SODIUM 200 GRAM(S): 250; 125 INJECTION, POWDER, FOR SUSPENSION INTRAMUSCULAR; INTRAVENOUS at 22:24

## 2018-02-28 RX ADMIN — AMPICILLIN SODIUM AND SULBACTAM SODIUM 200 GRAM(S): 250; 125 INJECTION, POWDER, FOR SUSPENSION INTRAMUSCULAR; INTRAVENOUS at 10:15

## 2018-02-28 RX ADMIN — DONEPEZIL HYDROCHLORIDE 5 MILLIGRAM(S): 10 TABLET, FILM COATED ORAL at 22:25

## 2018-02-28 RX ADMIN — Medication 40 MILLIGRAM(S): at 18:09

## 2018-02-28 RX ADMIN — Medication 40 MILLIEQUIVALENT(S): at 13:07

## 2018-02-28 RX ADMIN — CARVEDILOL PHOSPHATE 3.12 MILLIGRAM(S): 80 CAPSULE, EXTENDED RELEASE ORAL at 05:51

## 2018-02-28 RX ADMIN — Medication 100 MILLIEQUIVALENT(S): at 10:54

## 2018-02-28 RX ADMIN — WARFARIN SODIUM 2 MILLIGRAM(S): 2.5 TABLET ORAL at 22:24

## 2018-02-28 RX ADMIN — CARBIDOPA AND LEVODOPA 1 TABLET(S): 25; 100 TABLET ORAL at 05:51

## 2018-02-28 RX ADMIN — ATORVASTATIN CALCIUM 10 MILLIGRAM(S): 80 TABLET, FILM COATED ORAL at 22:24

## 2018-02-28 RX ADMIN — FLUDROCORTISONE ACETATE 0.1 MILLIGRAM(S): 0.1 TABLET ORAL at 05:51

## 2018-02-28 RX ADMIN — Medication 3 MILLILITER(S): at 20:46

## 2018-02-28 RX ADMIN — CLOPIDOGREL BISULFATE 75 MILLIGRAM(S): 75 TABLET, FILM COATED ORAL at 10:17

## 2018-02-28 RX ADMIN — CARVEDILOL PHOSPHATE 3.12 MILLIGRAM(S): 80 CAPSULE, EXTENDED RELEASE ORAL at 18:09

## 2018-02-28 NOTE — PROGRESS NOTE ADULT - SUBJECTIVE AND OBJECTIVE BOX
CHIEF COMPLAINT/Diagnosis: lower ext edema/ chf/ cellulitis    SUBJECTIVE: appears weak but has no complaints. wheezing resolved.     REVIEW OF SYSTEMS:    CONSTITUTIONAL: No weakness, fevers or chills  EYES/ENT: No visual changes;  No vertigo or throat pain   NECK: No pain or stiffness  RESPIRATORY: No cough, wheezing, hemoptysis; No shortness of breath  CARDIOVASCULAR: No chest pain or palpitations  GASTROINTESTINAL: No abdominal or epigastric pain. No nausea, vomiting, or hematemesis; No diarrhea or constipation. No melena or hematochezia.  GENITOURINARY: No dysuria, frequency or hematuria  NEUROLOGICAL: No numbness or weakness  SKIN: No itching, burning, rashes, or lesions   All other review of systems is negative unless indicated above    ICU Vital Signs Last 24 Hrs  T(C): 36.8 (28 Feb 2018 11:45), Max: 37.2 (27 Feb 2018 16:29)  T(F): 98.2 (28 Feb 2018 11:45), Max: 99 (27 Feb 2018 16:29)  HR: 55 (28 Feb 2018 11:45) (55 - 74)  BP: 109/44 (28 Feb 2018 11:45) (109/44 - 122/48)  BP(mean): --  ABP: --  ABP(mean): --  RR: 18 (28 Feb 2018 11:45) (18 - 18)  SpO2: 96% (28 Feb 2018 11:45) (96% - 99%)            PHYSICAL EXAM:    Constitutional: NAD, awake and alert, well-developed  HEENT: PERR, EOMI, Normal Hearing, MMM  Neck: Soft and supple, No LAD, No JVD  Respiratory: poor insipratory effort, decreased breath sounds b/l, no rales or rhonchi appreciated  Cardiovascular: S1 and S2, regular rate and rhythm, no Murmurs, gallops or rubs  Gastrointestinal: Bowel Sounds present, soft, nontender, nondistended, no guarding, no rebound  Extremities: +2 pitting edema  Vascular: 2+ peripheral pulses  Neurological: A/O x 3, no focal deficits  Musculoskeletal: 5/5 strength b/l upper and lower extremities  Skin: RLE ulceration to ant leg approx 3cm.     MEDICATIONS:  MEDICATIONS  (STANDING):  ALBUTerol/ipratropium for Nebulization 3 milliLiter(s) Nebulizer every 6 hours  ampicillin/sulbactam  IVPB      ampicillin/sulbactam  IVPB 3 Gram(s) IV Intermittent every 12 hours  atorvastatin 10 milliGRAM(s) Oral at bedtime  carbidopa/levodopa  10/100 1 Tablet(s) Oral three times a day  carvedilol 3.125 milliGRAM(s) Oral every 12 hours  clopidogrel Tablet 75 milliGRAM(s) Oral daily  donepezil 5 milliGRAM(s) Oral at bedtime  fludroCORTISONE 0.1 milliGRAM(s) Oral daily  furosemide   Injectable 40 milliGRAM(s) IV Push two times a day  metolazone 2.5 milliGRAM(s) Oral daily  pantoprazole    Tablet 40 milliGRAM(s) Oral before breakfast  potassium chloride    Tablet ER 20 milliEquivalent(s) Oral daily  warfarin 2 milliGRAM(s) Oral daily      LABS: All Labs Reviewed:                        12.0   4.4   )-----------( 116      ( 25 Feb 2018 07:47 )             36.2     02-26    141  |  100  |  41<H>  ----------------------------<  110<H>  3.5   |  37<H>  |  1.53<H>    Ca    8.4<L>      26 Feb 2018 08:35  Phos  3.7     02-25  Mg     2.1     02-25    TPro  7.6  /  Alb  3.2<L>  /  TBili  0.5  /  DBili  x   /  AST  32  /  ALT  31  /  AlkPhos  159<H>  02-25    PT/INR - ( 26 Feb 2018 08:35 )   PT: 26.7 sec;   INR: 2.43 ratio         PTT - ( 26 Feb 2018 08:35 )  PTT:41.9 sec          Assessment and Plan    89 y/o M with a PMHx of CAD, AICD, CABG, HTN, CHF, Parkinson's presents to the ED with one of increased lower extremity edema and blisters on her lower extremities.  Patient lives with daughter and son in law  whom are the care providers for patient. Patient is a poor historian and hisotry is provided soley by the familyt at bedside.  Patient has had increased sob, weakness and edema developing over the last several days.  He denies any chest pain, sob, fevers, n/v/d.     1- Acute exacerbation of systolic chf  -EF 35%  -BNP 1379  -cont lasix BID/po metolazone lasix-> aggressively replete potassium      2-lower extremity edema w/ ulcers and underlying cellulitis L>R  -c/w unasyn  -can likely transition to oral abx tommorrow    2-CAD/afib- c/w plavix and statin. inr subtx. cont coumadin and inr in am    3- HTN- c/w carvedilol    4-elevated creatinine- ARF vs CKD III  -renal function improving (baseline around 1.5-1.8)   -restart home lisinopril    5-Hypokalemia  replete  potassium 20 BID    6-dvt proph- sc lovenox

## 2018-02-28 NOTE — PROGRESS NOTE ADULT - ASSESSMENT
Assessment     Acute on chronic congestive heart failure, systolic - with valvular heart disease with severe pulmonary hypertension:   Continue to diurese with IV lasix + metolazone while monitoring renal function and repleting serum potassium to goal >4;   continue coreg and lisinopril.    CAD (coronary artery disease): Chronic CAD; no angina;   continue medical therapy with atorvastatin, Coreg;     Atrial fibrillation:  Stable / controlled rate;   continue warfarin and daily INRs with warfarin dosing titrated to goal INR 2-3. Assessment     Acute on chronic congestive heart failure, systolic - with valvular heart disease with severe pulmonary hypertension:   Continue to diurese with IV lasix + metolazone while monitoring renal function and repleting serum potassium to goal >4;   continue coreg and lisinopril.    CAD (coronary artery disease): Chronic CAD; no angina;   continue medical therapy with atorvastatin, Coreg;     Atrial fibrillation:  Stable / controlled rate;   continue warfarin and daily INRs with warfarin dosing titrated to goal INR 2-3.    we will follow PRN

## 2018-02-28 NOTE — PROGRESS NOTE ADULT - SUBJECTIVE AND OBJECTIVE BOX
REASON FOR VISIT: CHF    HPI:  89 y/o man with a history of CAD s/p CABG, CHF (chronic systolic with mild LV dysfunction ~ 48% in 2012), HTN, Parkinson's Disease, AF, admitted 2/24 with an exacerbation of CHF manifesting as lower extremity edema and dyspnea.    2/27/18: Continues to feel "better," no new complaints.  2/28/18 no new complaints    MEDICATIONS  (STANDING):  ALBUTerol/ipratropium for Nebulization 3 milliLiter(s) Nebulizer every 6 hours  ampicillin/sulbactam  IVPB 3 Gram(s) IV Intermittent every 12 hours  atorvastatin 10 milliGRAM(s) Oral at bedtime  carbidopa/levodopa  10/100 1 Tablet(s) Oral three times a day  carvedilol 3.125 milliGRAM(s) Oral every 12 hours  clopidogrel Tablet 75 milliGRAM(s) Oral daily  donepezil 5 milliGRAM(s) Oral at bedtime  fludroCORTISONE 0.1 milliGRAM(s) Oral daily  furosemide   Injectable 40 milliGRAM(s) IV Push two times a day  lisinopril 5 milliGRAM(s) Oral daily  metolazone 2.5 milliGRAM(s) Oral daily  pantoprazole    Tablet 40 milliGRAM(s) Oral before breakfast  potassium chloride    Tablet ER 20 milliEquivalent(s) Oral daily    MEDICATIONS  (PRN):  acetaminophen   Tablet 650 milliGRAM(s) Oral every 6 hours PRN For Temp greater than 38 C (100.4 F) or mild pain  ondansetron Injectable 4 milliGRAM(s) IV Push every 6 hours PRN Nausea  senna 2 Tablet(s) Oral at bedtime PRN Constipation    Vital Signs Last 24 Hrs  T(C): 37.2 (27 Feb 2018 05:40), Max: 37.7 (26 Feb 2018 16:46)  T(F): 99 (27 Feb 2018 05:40), Max: 99.9 (26 Feb 2018 16:46)  HR: 67 (27 Feb 2018 05:40) (64 - 74)  BP: 117/44 (27 Feb 2018 05:40) (103/40 - 139/60)  RR: 19 (27 Feb 2018 05:40) (17 - 20)  SpO2: 95% (27 Feb 2018 05:40) (95% - 98%)      PHYSICAL EXAM:    Constitutional: NAD, awake and alert, chronically ill appearing.   HEENT: NC; Nooral cyanosis. + hoarse voice.   Respiratory: Breath sounds are clear bilaterally with decreased BS at the bases.   Cardiovascular: S1 and S2, irregularly, irregular  rhythm.   Gastrointestinal: Bowel Sounds present, soft, nontender.   Extremities: Bilateral LE edema.  No clubbing or cyanosis.   Neurological: Awake and alert.  Musculoskeletal: no calf tenderness.  Skin: LE erythema. Bandage to LE.      LABS: All Labs Reviewed:                        12.0   4.4   )-----------( 116      ( 25 Feb 2018 07:47 )             36.2                         12.0   3.9   )-----------( 132      ( 24 Feb 2018 11:42 )             35.5     26 Feb 2018 08:35    141    |  100    |  41     ----------------------------<  110    3.5     |  37     |  1.53   25 Feb 2018 18:31    141    |  98     |  46     ----------------------------<  134    3.0     |  37     |  1.80   25 Feb 2018 07:47    140    |  96     |  49     ----------------------------<  114    3.3     |  38     |  1.94     Ca    8.4        26 Feb 2018 08:35  Ca    8.4        25 Feb 2018 18:31  Ca    8.6        25 Feb 2018 07:47  Phos  3.7       25 Feb 2018 07:47  Mg     2.1       25 Feb 2018 07:47  Mg     2.0       24 Feb 2018 21:48    TPro  7.6    /  Alb  3.2    /  TBili  0.5    /  DBili  x      /  AST  32     /  ALT  31     /  AlkPhos  159    25 Feb 2018 07:47  TPro  7.3    /  Alb  3.3    /  TBili  0.7    /  DBili  x      /  AST  25     /  ALT  10     /  AlkPhos  128    24 Feb 2018 11:42    PT/INR - ( 26 Feb 2018 08:35 )   PT: 26.7 sec;   INR: 2.43 ratio         PTT - ( 26 Feb 2018 08:35 )  PTT:41.9 sec  CARDIAC MARKERS ( 24 Feb 2018 15:59 )  <0.015 ng/mL / x     / x     / x     / x        02-24 @ 12:11  Pro Bnp [id number]        RADIOLOGY/EKG:       Date of study       02/26/2018 08:41                       AM     Height              67.72 in          Weight        169.76 pounds    Type of Study:     TTE procedure: 2D echocardiogram     BP:126/56 mmHg     Study Location: [facility] Quality: Fair    Indications   1) I50.9 - Heart failure    M-Mode Measurements (cm)     LVEDd: 5.4 cm             LVESd: 4.23 cm   IVSEd: 0.95 cm   LVPWd: 0.91 cm            AO Root Dimension: 3.2 cm                       ACS: 1.7 cm                             LA: 4.8 cm    Doppler Measurements:                                    MV Peak E-Wave: 141 cm/s   TR Velocity:381 cm/s           MV Peak A-Wave: 87.4 cm/s   TR Gradient:58.0644 mmHg       MV E/A Ratio: 1.61 %   Estimated RAP:10 mmHg          MV Peak Gradient: 7.95 mmHg   RVSP:57 mmHg     Findings     Mitral Valve   Normal appearing mitral valve structure and function.   Moderate (2+) mitral regurgitation is present.   Mild mitral annular calcification is present.     Aortic Valve   Normal aortic valve structure and function.     Tricuspid Valve   Normal appearing tricuspid valve structure and function.   Moderate to severe (3+) tricuspid valve regurgitation is present.   There is severe elevation in right ventricular systolic pressure     Pulmonic Valve   Normal appearing pulmonic valve structure and function.   Mild pulmonic valvular regurgitation (1+) is present.     Left Atrium   The left atrium is mildly dilated.     Left Ventricle   The left ventricle is normal in size, wall thickness. Moderately   decreased   left ventricular systolic function with an estimated left ventricular   ejection fraction of 35-40 %. There is paradoxical septal motion     Right Atrium   The right atrium appears moderately dilated.   A device wire is seen in the RV and RA.     Right Ventricle   Normal appearing right ventricle structure and function.     Pericardial Effusion   No evidence of pericardial effusion.     Pleural Effusion   No evidence of pleural effusion.     Miscellaneous   All visualized extra cardiac structures appears to be normal.     Impression     Summary     The left ventricle is normal in size, wall thickness. Moderately   decreased   left ventricular systolic function with an estimated left ventricular   ejection fraction of 35-40 %. There is paradoxical septal motion   The right atrium appears moderately dilated.   A device wire is seen in the RV and RA.   Normal aortic valve structure and function.   Normal appearing mitral valve structure and function.   Moderate (2+) mitral regurgitation is present.   Mild mitral annular calcification is present.   Normal appearing tricuspid valve structure and function.   Moderate to severe (3+) tricuspid valve regurgitation is present.   There is severe elevation in right ventricular systolic pressure     Signature     ----------------------------------------------------------------   Electronically signed by Bigg Beyer(Interpreting physician)   on 02/26/2018 11:51 AM   ----------------------------------------------------------------    EXAM:  XR CHEST PORTABLE URGENT 1V                            PROCEDURE DATE:  02/24/2018          INTERPRETATION:  Clinical information: Cough    Portable AP chest radiograph from 2022 hours:    COMPARISON:  December 25, 2016    FINDINGS:  Sternotomy wires, mediastinal surgical clips and a left chest   wall unipolar AICD are in place. The sternotomy wires are fractured.    There is a small left pleural effusion and patchy left mid lung opacity.   There is mild pulmonary vascular congestion. Nopneumothorax.    IMPRESSION: Mild pulmonary vascular congestion. Patchy airspace disease   in the left midlung could be due to more confluent pulmonary edema versus   pneumonia. Continued radiographic follow-up is recommended.        CHLOE BIRD   This document has been electronically signed. Feb 25 2018 10:27A REASON FOR VISIT: CHF    HPI:  89 y/o man with a history of CAD s/p CABG, CHF (chronic systolic with mild LV dysfunction ~ 48% in 2012), HTN, Parkinson's Disease, AF, admitted 2/24 with an exacerbation of CHF manifesting as lower extremity edema and dyspnea.    2/27/18: Continues to feel "better," no new complaints.  2/28/18 no new complaints,pedal edema better    MEDICATIONS  (STANDING):  ALBUTerol/ipratropium for Nebulization 3 milliLiter(s) Nebulizer every 6 hours  ampicillin/sulbactam  IVPB 3 Gram(s) IV Intermittent every 12 hours  atorvastatin 10 milliGRAM(s) Oral at bedtime  carbidopa/levodopa  10/100 1 Tablet(s) Oral three times a day  carvedilol 3.125 milliGRAM(s) Oral every 12 hours  clopidogrel Tablet 75 milliGRAM(s) Oral daily  donepezil 5 milliGRAM(s) Oral at bedtime  fludroCORTISONE 0.1 milliGRAM(s) Oral daily  furosemide   Injectable 40 milliGRAM(s) IV Push two times a day  lisinopril 5 milliGRAM(s) Oral daily  metolazone 2.5 milliGRAM(s) Oral daily  pantoprazole    Tablet 40 milliGRAM(s) Oral before breakfast  potassium chloride    Tablet ER 20 milliEquivalent(s) Oral daily    MEDICATIONS  (PRN):  acetaminophen   Tablet 650 milliGRAM(s) Oral every 6 hours PRN For Temp greater than 38 C (100.4 F) or mild pain  ondansetron Injectable 4 milliGRAM(s) IV Push every 6 hours PRN Nausea  senna 2 Tablet(s) Oral at bedtime PRN Constipation    Vital Signs Last 24 Hrs  T(C): 37.2 (27 Feb 2018 05:40), Max: 37.7 (26 Feb 2018 16:46)  T(F): 99 (27 Feb 2018 05:40), Max: 99.9 (26 Feb 2018 16:46)  HR: 67 (27 Feb 2018 05:40) (64 - 74)  BP: 117/44 (27 Feb 2018 05:40) (103/40 - 139/60)  RR: 19 (27 Feb 2018 05:40) (17 - 20)  SpO2: 95% (27 Feb 2018 05:40) (95% - 98%)      PHYSICAL EXAM:    Constitutional: NAD, awake and alert, chronically ill appearing.   HEENT: NC; Nooral cyanosis. + hoarse voice.   Respiratory: Breath sounds are clear bilaterally with decreased BS at the bases.   Cardiovascular: S1 and S2, irregularly, irregular  rhythm.   Gastrointestinal: Bowel Sounds present, soft, nontender.   Extremities: Bilateral LE edema.  No clubbing or cyanosis.   Neurological: Awake and alert.  Musculoskeletal: no calf tenderness.  Skin: LE erythema. Bandage to LE.      LABS: All Labs Reviewed:                        12.0   4.4   )-----------( 116      ( 25 Feb 2018 07:47 )             36.2                         12.0   3.9   )-----------( 132      ( 24 Feb 2018 11:42 )             35.5     26 Feb 2018 08:35    141    |  100    |  41     ----------------------------<  110    3.5     |  37     |  1.53   25 Feb 2018 18:31    141    |  98     |  46     ----------------------------<  134    3.0     |  37     |  1.80   25 Feb 2018 07:47    140    |  96     |  49     ----------------------------<  114    3.3     |  38     |  1.94     Ca    8.4        26 Feb 2018 08:35  Ca    8.4        25 Feb 2018 18:31  Ca    8.6        25 Feb 2018 07:47  Phos  3.7       25 Feb 2018 07:47  Mg     2.1       25 Feb 2018 07:47  Mg     2.0       24 Feb 2018 21:48    TPro  7.6    /  Alb  3.2    /  TBili  0.5    /  DBili  x      /  AST  32     /  ALT  31     /  AlkPhos  159    25 Feb 2018 07:47  TPro  7.3    /  Alb  3.3    /  TBili  0.7    /  DBili  x      /  AST  25     /  ALT  10     /  AlkPhos  128    24 Feb 2018 11:42    PT/INR - ( 26 Feb 2018 08:35 )   PT: 26.7 sec;   INR: 2.43 ratio         PTT - ( 26 Feb 2018 08:35 )  PTT:41.9 sec  CARDIAC MARKERS ( 24 Feb 2018 15:59 )  <0.015 ng/mL / x     / x     / x     / x        02-24 @ 12:11  Pro Bnp 1379        RADIOLOGY/EKG:       Date of study       02/26/2018 08:41                       AM     Height              67.72 in          Weight        169.76 pounds    Type of Study:     TTE procedure: 2D echocardiogram     BP:126/56 mmHg     Study Location: [facility] Quality: Fair    Indications   1) I50.9 - Heart failure    M-Mode Measurements (cm)     LVEDd: 5.4 cm             LVESd: 4.23 cm   IVSEd: 0.95 cm   LVPWd: 0.91 cm            AO Root Dimension: 3.2 cm                       ACS: 1.7 cm                             LA: 4.8 cm    Doppler Measurements:                                    MV Peak E-Wave: 141 cm/s   TR Velocity:381 cm/s           MV Peak A-Wave: 87.4 cm/s   TR Gradient:58.0644 mmHg       MV E/A Ratio: 1.61 %   Estimated RAP:10 mmHg          MV Peak Gradient: 7.95 mmHg   RVSP:57 mmHg     Findings     Mitral Valve   Normal appearing mitral valve structure and function.   Moderate (2+) mitral regurgitation is present.   Mild mitral annular calcification is present.     Aortic Valve   Normal aortic valve structure and function.     Tricuspid Valve   Normal appearing tricuspid valve structure and function.   Moderate to severe (3+) tricuspid valve regurgitation is present.   There is severe elevation in right ventricular systolic pressure     Pulmonic Valve   Normal appearing pulmonic valve structure and function.   Mild pulmonic valvular regurgitation (1+) is present.     Left Atrium   The left atrium is mildly dilated.     Left Ventricle   The left ventricle is normal in size, wall thickness. Moderately   decreased   left ventricular systolic function with an estimated left ventricular   ejection fraction of 35-40 %. There is paradoxical septal motion     Right Atrium   The right atrium appears moderately dilated.   A device wire is seen in the RV and RA.     Right Ventricle   Normal appearing right ventricle structure and function.     Pericardial Effusion   No evidence of pericardial effusion.     Pleural Effusion   No evidence of pleural effusion.     Miscellaneous   All visualized extra cardiac structures appears to be normal.     Impression     Summary     The left ventricle is normal in size, wall thickness. Moderately   decreased   left ventricular systolic function with an estimated left ventricular   ejection fraction of 35-40 %. There is paradoxical septal motion   The right atrium appears moderately dilated.   A device wire is seen in the RV and RA.   Normal aortic valve structure and function.   Normal appearing mitral valve structure and function.   Moderate (2+) mitral regurgitation is present.   Mild mitral annular calcification is present.   Normal appearing tricuspid valve structure and function.   Moderate to severe (3+) tricuspid valve regurgitation is present.   There is severe elevation in right ventricular systolic pressure     Signature     ----------------------------------------------------------------   Electronically signed by Bigg Beyer(Interpreting physician)   on 02/26/2018 11:51 AM   ----------------------------------------------------------------    EXAM:  XR CHEST PORTABLE URGENT 1V                            PROCEDURE DATE:  02/24/2018          INTERPRETATION:  Clinical information: Cough    Portable AP chest radiograph from 2022 hours:    COMPARISON:  December 25, 2016    FINDINGS:  Sternotomy wires, mediastinal surgical clips and a left chest   wall unipolar AICD are in place. The sternotomy wires are fractured.    There is a small left pleural effusion and patchy left mid lung opacity.   There is mild pulmonary vascular congestion. Nopneumothorax.    IMPRESSION: Mild pulmonary vascular congestion. Patchy airspace disease   in the left midlung could be due to more confluent pulmonary edema versus   pneumonia. Continued radiographic follow-up is recommended.        CHLOE BIRD   This document has been electronically signed. Feb 25 2018 10:27A

## 2018-03-01 LAB
ANION GAP SERPL CALC-SCNC: 2 MMOL/L — LOW (ref 5–17)
BUN SERPL-MCNC: 46 MG/DL — HIGH (ref 7–23)
CALCIUM SERPL-MCNC: 8.5 MG/DL — SIGNIFICANT CHANGE UP (ref 8.5–10.1)
CHLORIDE SERPL-SCNC: 95 MMOL/L — LOW (ref 96–108)
CO2 SERPL-SCNC: 41 MMOL/L — HIGH (ref 22–31)
CREAT SERPL-MCNC: 1.45 MG/DL — HIGH (ref 0.5–1.3)
GLUCOSE SERPL-MCNC: 109 MG/DL — HIGH (ref 70–99)
HCT VFR BLD CALC: 33.2 % — LOW (ref 39–50)
HGB BLD-MCNC: 10.9 G/DL — LOW (ref 13–17)
INR BLD: 2.51 RATIO — HIGH (ref 0.88–1.16)
POTASSIUM SERPL-MCNC: 2.5 MMOL/L — CRITICAL LOW (ref 3.5–5.3)
POTASSIUM SERPL-MCNC: 2.7 MMOL/L — CRITICAL LOW (ref 3.5–5.3)
POTASSIUM SERPL-SCNC: 2.5 MMOL/L — CRITICAL LOW (ref 3.5–5.3)
POTASSIUM SERPL-SCNC: 2.7 MMOL/L — CRITICAL LOW (ref 3.5–5.3)
PROTHROM AB SERPL-ACNC: 27.6 SEC — HIGH (ref 9.8–12.7)
SODIUM SERPL-SCNC: 138 MMOL/L — SIGNIFICANT CHANGE UP (ref 135–145)

## 2018-03-01 PROCEDURE — 99233 SBSQ HOSP IP/OBS HIGH 50: CPT

## 2018-03-01 RX ORDER — POTASSIUM CHLORIDE 20 MEQ
20 PACKET (EA) ORAL ONCE
Qty: 0 | Refills: 0 | Status: COMPLETED | OUTPATIENT
Start: 2018-03-01 | End: 2018-03-01

## 2018-03-01 RX ORDER — POTASSIUM CHLORIDE 20 MEQ
40 PACKET (EA) ORAL ONCE
Qty: 0 | Refills: 0 | Status: COMPLETED | OUTPATIENT
Start: 2018-03-01 | End: 2018-03-01

## 2018-03-01 RX ORDER — POTASSIUM CHLORIDE 20 MEQ
40 PACKET (EA) ORAL ONCE
Qty: 0 | Refills: 0 | Status: COMPLETED | OUTPATIENT
Start: 2018-03-02 | End: 2018-03-01

## 2018-03-01 RX ADMIN — Medication 40 MILLIGRAM(S): at 05:08

## 2018-03-01 RX ADMIN — CARBIDOPA AND LEVODOPA 1 TABLET(S): 25; 100 TABLET ORAL at 21:36

## 2018-03-01 RX ADMIN — Medication 20 MILLIEQUIVALENT(S): at 19:53

## 2018-03-01 RX ADMIN — CARVEDILOL PHOSPHATE 3.12 MILLIGRAM(S): 80 CAPSULE, EXTENDED RELEASE ORAL at 05:08

## 2018-03-01 RX ADMIN — WARFARIN SODIUM 2 MILLIGRAM(S): 2.5 TABLET ORAL at 21:36

## 2018-03-01 RX ADMIN — AMPICILLIN SODIUM AND SULBACTAM SODIUM 200 GRAM(S): 250; 125 INJECTION, POWDER, FOR SUSPENSION INTRAMUSCULAR; INTRAVENOUS at 21:36

## 2018-03-01 RX ADMIN — LISINOPRIL 5 MILLIGRAM(S): 2.5 TABLET ORAL at 05:08

## 2018-03-01 RX ADMIN — AMPICILLIN SODIUM AND SULBACTAM SODIUM 200 GRAM(S): 250; 125 INJECTION, POWDER, FOR SUSPENSION INTRAMUSCULAR; INTRAVENOUS at 09:25

## 2018-03-01 RX ADMIN — FLUDROCORTISONE ACETATE 0.1 MILLIGRAM(S): 0.1 TABLET ORAL at 05:08

## 2018-03-01 RX ADMIN — Medication 3 MILLILITER(S): at 20:31

## 2018-03-01 RX ADMIN — CARBIDOPA AND LEVODOPA 1 TABLET(S): 25; 100 TABLET ORAL at 13:15

## 2018-03-01 RX ADMIN — Medication 20 MILLIEQUIVALENT(S): at 17:48

## 2018-03-01 RX ADMIN — CARBIDOPA AND LEVODOPA 1 TABLET(S): 25; 100 TABLET ORAL at 05:08

## 2018-03-01 RX ADMIN — DONEPEZIL HYDROCHLORIDE 5 MILLIGRAM(S): 10 TABLET, FILM COATED ORAL at 21:36

## 2018-03-01 RX ADMIN — Medication 40 MILLIEQUIVALENT(S): at 09:24

## 2018-03-01 RX ADMIN — Medication 40 MILLIEQUIVALENT(S): at 23:59

## 2018-03-01 RX ADMIN — Medication 20 MILLIEQUIVALENT(S): at 13:14

## 2018-03-01 RX ADMIN — CLOPIDOGREL BISULFATE 75 MILLIGRAM(S): 75 TABLET, FILM COATED ORAL at 13:14

## 2018-03-01 RX ADMIN — PANTOPRAZOLE SODIUM 40 MILLIGRAM(S): 20 TABLET, DELAYED RELEASE ORAL at 05:09

## 2018-03-01 RX ADMIN — ATORVASTATIN CALCIUM 10 MILLIGRAM(S): 80 TABLET, FILM COATED ORAL at 21:36

## 2018-03-01 RX ADMIN — Medication 40 MILLIGRAM(S): at 17:55

## 2018-03-01 NOTE — PROGRESS NOTE ADULT - ASSESSMENT
Assessment& Plan:    Acute on chronic congestive heart failure (systolic) with right-sided heart failure + severe pulmonary hypertension: Continue to diurese with IV lasix + metolazone (hopefully can change to oral route in 24-48 hours); marked hypokalemia has been repleted -- repeat serum potassium tonight; continue coreg and lisinopril.    CAD (coronary artery disease): Chronic CAD; no angina; continue medical therapy with atorvastatin, Coreg;     Atrial fibrillation:  Stable / controlled rate; continue warfarin and daily INRs with warfarin dosing titrated to goal INR 2-3.

## 2018-03-01 NOTE — PROGRESS NOTE ADULT - ASSESSMENT
89 y/o M with a PMHx of CAD, AICD, CABG, HTN, CHF, Parkinson's presents to the ED with one of increased lower extremity edema and blisters on her lower extremities.  Patient lives with daughter and son in law  whom are the care providers for patient. Patient is a poor historian and hisotry is provided soley by the familyt at bedside.  Patient has had increased sob, weakness and edema developing over the last several days.  He denies any chest pain, sob, fevers, n/v/d.     # Acute exacerbation of systolic chf  -EF 35%  -BNP 1379  -cont lasix BID change to po tomorrow  - change metolazone to QOD (Home regimen)  - Cw ACEI and BB    # lower extremity edema w/ ulcers and underlying cellulitis L>R  -c/w unasyn  -can likely transition to oral abx tommorrow    # CAD/afib- c/w plavix and statin. inr subtx. cont coumadin and inr in am    # HTN- c/w carvedilol    # elevated creatinine- ARF vs CKD III  -renal function improving (baseline around 1.5-1.8)   -restart home lisinopril    # Hypokalemia  replete    # dvt proph- sc lovenox

## 2018-03-01 NOTE — PROGRESS NOTE ADULT - SUBJECTIVE AND OBJECTIVE BOX
REASON FOR VISIT: CHF    HPI:  89 y/o man with a history of CAD s/p CABG, CHF (chronic systolic with mild LV dysfunction ~ 48% in 2012), HTN, Parkinson's Disease, AF, admitted 2/24 with an exacerbation of CHF manifesting as lower extremity edema and dyspnea, cellulitis.    3/1/18:  Comfortable; feels "better."  No new complaints.    MEDICATIONS  (STANDING):  ALBUTerol/ipratropium for Nebulization 3 milliLiter(s) Nebulizer every 6 hours  ampicillin/sulbactam  IVPB      ampicillin/sulbactam  IVPB 3 Gram(s) IV Intermittent every 12 hours  atorvastatin 10 milliGRAM(s) Oral at bedtime  carbidopa/levodopa  10/100 1 Tablet(s) Oral three times a day  carvedilol 3.125 milliGRAM(s) Oral every 12 hours  clopidogrel Tablet 75 milliGRAM(s) Oral daily  donepezil 5 milliGRAM(s) Oral at bedtime  fludroCORTISONE 0.1 milliGRAM(s) Oral daily  furosemide   Injectable 40 milliGRAM(s) IV Push two times a day  lisinopril 5 milliGRAM(s) Oral daily  pantoprazole    Tablet 40 milliGRAM(s) Oral before breakfast  potassium chloride    Tablet ER 20 milliEquivalent(s) Oral daily  potassium chloride    Tablet ER 20 milliEquivalent(s) Oral once  potassium chloride    Tablet ER 20 milliEquivalent(s) Oral once  warfarin 2 milliGRAM(s) Oral daily    MEDICATIONS  (PRN):  acetaminophen   Tablet 650 milliGRAM(s) Oral every 6 hours PRN For Temp greater than 38 C (100.4 F) or mild pain  ondansetron Injectable 4 milliGRAM(s) IV Push every 6 hours PRN Nausea  senna 2 Tablet(s) Oral at bedtime PRN Constipation    Vital Signs Last 24 Hrs  T(C): 36.9 (01 Mar 2018 16:25), Max: 36.9 (01 Mar 2018 16:25)  T(F): 98.4 (01 Mar 2018 16:25), Max: 98.4 (01 Mar 2018 16:25)  HR: 58 (01 Mar 2018 16:25) (58 - 80)  BP: 100/40 (01 Mar 2018 16:25) (100/40 - 118/53)  RR: 18 (01 Mar 2018 16:25) (18 - 20)  SpO2: 94% (01 Mar 2018 16:25) (92% - 100%)    PHYSICAL EXAM:  Constitutional: Supine / flat in bed, awake and alert, chronically ill appearing.   HEENT: + hoarse voice.   Respiratory: Decreased BS at the bases.   Cardiovascular: S1 and S2, irregularly, irregular  rhythm.   Gastrointestinal: Bowel Sounds present, soft, nontender.   Extremities: Bilateral LE edema.     Neurological: Awake and alert.  Skin: LE erythema. Bandage to RLE.    LABS:                  10.9   x     )-----------( x        ( 01 Mar 2018 06:18 )             33.2   138  |  95<L>  |  46<H>  ----------------------------<  109<H>  2.5<LL>   |  41<H>  |  1.45<H>    Ca    8.5      01 Mar 2018 06:18    Transthoracic Echocardiogram (02.26.18 @ 09:24):  The left ventricle is normal in size, wall thickness. Moderately decreased left ventricular systolic function with an estimated left ventricular ejection fraction of 35-40 %. There is paradoxical septal motion  The right atrium appears moderately dilated.  Moderate (2+) mitral regurgitation is present.  Moderate to severe (3+) tricuspid valve regurgitation is present.  There is severe elevation in right ventricular systolic pressure    12 Lead ECG (02.26.18 @ 07:49):  Atrial fibrillation.  Right bundle branch block.  T wave abnormality, consider inferior ischemia

## 2018-03-01 NOTE — PROVIDER CONTACT NOTE (CRITICAL VALUE NOTIFICATION) - SITUATION
Received call from lab, Potassium 2.5. Called Dr. Fry, received ordered for one time dose of K+ 40meq stat once.

## 2018-03-01 NOTE — PROGRESS NOTE ADULT - SUBJECTIVE AND OBJECTIVE BOX
CHIEF COMPLAINT/Diagnosis: lower ext edema/ chf/ cellulitis    SUBJECTIVE: Sitting up in chair. No new complaints. Legs still very edematous but reports improved significantly since admission.    REVIEW OF SYSTEMS: All other review of systems is negative unless indicated above    Vital Signs Last 24 Hrs  T(C): 36.9 (01 Mar 2018 16:25), Max: 36.9 (01 Mar 2018 16:25)  T(F): 98.4 (01 Mar 2018 16:25), Max: 98.4 (01 Mar 2018 16:25)  HR: 58 (01 Mar 2018 16:25) (58 - 80)  BP: 100/40 (01 Mar 2018 16:25) (100/40 - 118/53)  BP(mean): --  RR: 18 (01 Mar 2018 16:25) (18 - 20)  SpO2: 94% (01 Mar 2018 16:25) (92% - 100%)    Constitutional: NAD, awake and alert, well-developed  HEENT: PERR, EOMI, Normal Hearing, MMM  Neck: Soft and supple, No LAD, No JVD  Respiratory: poor insipratory effort, decreased breath sounds b/l, no rales or rhonchi appreciated  Cardiovascular: S1 and S2, regular rate and rhythm, no Murmurs, gallops or rubs  Gastrointestinal: Bowel Sounds present, soft, nontender, nondistended, no guarding, no rebound  Extremities: +2 pitting edema  Vascular: 2+ peripheral pulses  Neurological: A/O x 3, no focal deficits  Musculoskeletal: 5/5 strength b/l upper and lower extremities  Skin: RLE ulceration to ant leg approx 3cm.     MEDICATIONS:  MEDICATIONS  (STANDING):  ALBUTerol/ipratropium for Nebulization 3 milliLiter(s) Nebulizer every 6 hours  ampicillin/sulbactam  IVPB      ampicillin/sulbactam  IVPB 3 Gram(s) IV Intermittent every 12 hours  atorvastatin 10 milliGRAM(s) Oral at bedtime  carbidopa/levodopa  10/100 1 Tablet(s) Oral three times a day  carvedilol 3.125 milliGRAM(s) Oral every 12 hours  clopidogrel Tablet 75 milliGRAM(s) Oral daily  donepezil 5 milliGRAM(s) Oral at bedtime  fludroCORTISONE 0.1 milliGRAM(s) Oral daily  furosemide   Injectable 40 milliGRAM(s) IV Push two times a day  lisinopril 5 milliGRAM(s) Oral daily  pantoprazole    Tablet 40 milliGRAM(s) Oral before breakfast  potassium chloride    Tablet ER 20 milliEquivalent(s) Oral daily  potassium chloride    Tablet ER 20 milliEquivalent(s) Oral once  potassium chloride    Tablet ER 20 milliEquivalent(s) Oral once  warfarin 2 milliGRAM(s) Oral daily    LABS: All Labs Reviewed:                        10.9   x     )-----------( x        ( 01 Mar 2018 06:18 )             33.2     138  |  95<L>  |  46<H>  ----------------------------<  109<H>  2.5<LL>   |  41<H>  |  1.45<H>    Ca    8.5      01 Mar 2018 06:18    PT/INR - ( 01 Mar 2018 06:18 )   PT: 27.6 sec;   INR: 2.51 ratio

## 2018-03-02 LAB
ANION GAP SERPL CALC-SCNC: 6 MMOL/L — SIGNIFICANT CHANGE UP (ref 5–17)
BUN SERPL-MCNC: 46 MG/DL — HIGH (ref 7–23)
CALCIUM SERPL-MCNC: 8.6 MG/DL — SIGNIFICANT CHANGE UP (ref 8.5–10.1)
CHLORIDE SERPL-SCNC: 93 MMOL/L — LOW (ref 96–108)
CO2 SERPL-SCNC: 40 MMOL/L — HIGH (ref 22–31)
CREAT SERPL-MCNC: 1.5 MG/DL — HIGH (ref 0.5–1.3)
GLUCOSE SERPL-MCNC: 99 MG/DL — SIGNIFICANT CHANGE UP (ref 70–99)
INR BLD: 2.64 RATIO — HIGH (ref 0.88–1.16)
MAGNESIUM SERPL-MCNC: 1.8 MG/DL — SIGNIFICANT CHANGE UP (ref 1.6–2.6)
POTASSIUM SERPL-MCNC: 2.9 MMOL/L — CRITICAL LOW (ref 3.5–5.3)
POTASSIUM SERPL-SCNC: 2.9 MMOL/L — CRITICAL LOW (ref 3.5–5.3)
PROTHROM AB SERPL-ACNC: 29.1 SEC — HIGH (ref 9.8–12.7)
SODIUM SERPL-SCNC: 139 MMOL/L — SIGNIFICANT CHANGE UP (ref 135–145)

## 2018-03-02 PROCEDURE — 99232 SBSQ HOSP IP/OBS MODERATE 35: CPT

## 2018-03-02 RX ORDER — POTASSIUM CHLORIDE 20 MEQ
40 PACKET (EA) ORAL ONCE
Qty: 0 | Refills: 0 | Status: COMPLETED | OUTPATIENT
Start: 2018-03-02 | End: 2018-03-02

## 2018-03-02 RX ORDER — POTASSIUM CHLORIDE 20 MEQ
20 PACKET (EA) ORAL
Qty: 0 | Refills: 0 | Status: COMPLETED | OUTPATIENT
Start: 2018-03-02 | End: 2018-03-02

## 2018-03-02 RX ORDER — POTASSIUM CHLORIDE 20 MEQ
20 PACKET (EA) ORAL ONCE
Qty: 0 | Refills: 0 | Status: COMPLETED | OUTPATIENT
Start: 2018-03-02 | End: 2018-03-02

## 2018-03-02 RX ORDER — WARFARIN SODIUM 2.5 MG/1
2 TABLET ORAL DAILY
Qty: 0 | Refills: 0 | Status: DISCONTINUED | OUTPATIENT
Start: 2018-03-02 | End: 2018-03-05

## 2018-03-02 RX ADMIN — AMPICILLIN SODIUM AND SULBACTAM SODIUM 200 GRAM(S): 250; 125 INJECTION, POWDER, FOR SUSPENSION INTRAMUSCULAR; INTRAVENOUS at 21:30

## 2018-03-02 RX ADMIN — DONEPEZIL HYDROCHLORIDE 5 MILLIGRAM(S): 10 TABLET, FILM COATED ORAL at 20:46

## 2018-03-02 RX ADMIN — FLUDROCORTISONE ACETATE 0.1 MILLIGRAM(S): 0.1 TABLET ORAL at 05:20

## 2018-03-02 RX ADMIN — Medication 3 MILLILITER(S): at 13:49

## 2018-03-02 RX ADMIN — CARBIDOPA AND LEVODOPA 1 TABLET(S): 25; 100 TABLET ORAL at 14:54

## 2018-03-02 RX ADMIN — Medication 20 MILLIEQUIVALENT(S): at 10:12

## 2018-03-02 RX ADMIN — Medication 3 MILLILITER(S): at 19:42

## 2018-03-02 RX ADMIN — WARFARIN SODIUM 2 MILLIGRAM(S): 2.5 TABLET ORAL at 20:46

## 2018-03-02 RX ADMIN — Medication 20 MILLIEQUIVALENT(S): at 14:54

## 2018-03-02 RX ADMIN — Medication 20 MILLIEQUIVALENT(S): at 12:01

## 2018-03-02 RX ADMIN — Medication 40 MILLIEQUIVALENT(S): at 05:43

## 2018-03-02 RX ADMIN — PANTOPRAZOLE SODIUM 40 MILLIGRAM(S): 20 TABLET, DELAYED RELEASE ORAL at 05:20

## 2018-03-02 RX ADMIN — Medication 40 MILLIGRAM(S): at 05:20

## 2018-03-02 RX ADMIN — Medication 40 MILLIGRAM(S): at 17:55

## 2018-03-02 RX ADMIN — AMPICILLIN SODIUM AND SULBACTAM SODIUM 200 GRAM(S): 250; 125 INJECTION, POWDER, FOR SUSPENSION INTRAMUSCULAR; INTRAVENOUS at 10:11

## 2018-03-02 RX ADMIN — Medication 20 MILLIEQUIVALENT(S): at 17:55

## 2018-03-02 RX ADMIN — CARBIDOPA AND LEVODOPA 1 TABLET(S): 25; 100 TABLET ORAL at 05:20

## 2018-03-02 RX ADMIN — Medication 20 MILLIEQUIVALENT(S): at 20:46

## 2018-03-02 RX ADMIN — Medication 3 MILLILITER(S): at 08:10

## 2018-03-02 RX ADMIN — CARVEDILOL PHOSPHATE 3.12 MILLIGRAM(S): 80 CAPSULE, EXTENDED RELEASE ORAL at 17:55

## 2018-03-02 RX ADMIN — ATORVASTATIN CALCIUM 10 MILLIGRAM(S): 80 TABLET, FILM COATED ORAL at 20:45

## 2018-03-02 RX ADMIN — CLOPIDOGREL BISULFATE 75 MILLIGRAM(S): 75 TABLET, FILM COATED ORAL at 12:01

## 2018-03-02 RX ADMIN — CARBIDOPA AND LEVODOPA 1 TABLET(S): 25; 100 TABLET ORAL at 20:46

## 2018-03-02 NOTE — PROGRESS NOTE ADULT - ASSESSMENT
Assessment& Plan:    Acute on chronic congestive heart failure (systolic) with right-sided heart failure + severe pulmonary hypertension: Continue to diurese with IV lasix + metolazone (hopefully can change to oral route in 24-48 hours); persistent marked hypokalemia  potassium  supplement and repeat level ; continue coreg and lisinopril.    CAD (coronary artery disease): Chronic CAD; no angina; continue medical therapy with atorvastatin, Coreg;     Atrial fibrillation:  Stable / controlled rate; continue warfarin and daily INRs with warfarin dosing titrated to goal INR 2-3.

## 2018-03-02 NOTE — PROGRESS NOTE ADULT - SUBJECTIVE AND OBJECTIVE BOX
CHIEF COMPLAINT/Diagnosis: lower ext edema/ chf/ cellulitis    SUBJECTIVE: Sitting up in chair. No new complaints. leg swelling improving.    REVIEW OF SYSTEMS: All other review of systems is negative unless indicated above    Vital Signs Last 24 Hrs  T(C): 36.8 (02 Mar 2018 11:01), Max: 36.9 (01 Mar 2018 16:25)  T(F): 98.3 (02 Mar 2018 11:01), Max: 98.4 (01 Mar 2018 16:25)  HR: 78 (02 Mar 2018 13:50) (58 - 81)  BP: 131/71 (02 Mar 2018 11:01) (100/40 - 131/71)  BP(mean): --  RR: 22 (02 Mar 2018 11:01) (18 - 22)  SpO2: 96% (02 Mar 2018 11:01) (94% - 99%)    Constitutional: NAD, awake and alert, well-developed  HEENT: PERR, EOMI, Normal Hearing, MMM  Neck: Soft and supple, No LAD, No JVD  Respiratory: poor insipratory effort, decreased breath sounds b/l, no rales or rhonchi appreciated  Cardiovascular: S1 and S2, regular rate and rhythm, no Murmurs, gallops or rubs  Gastrointestinal: Bowel Sounds present, soft, nontender, nondistended, no guarding, no rebound  Extremities: +2 pitting edema  Vascular: 2+ peripheral pulses  Neurological: A/O x 3, no focal deficits  Musculoskeletal: 5/5 strength b/l upper and lower extremities  Skin: RLE ulceration to ant leg approx 3cm.    MEDICATIONS:  MEDICATIONS  (STANDING):  ALBUTerol/ipratropium for Nebulization 3 milliLiter(s) Nebulizer every 6 hours  ampicillin/sulbactam  IVPB      ampicillin/sulbactam  IVPB 3 Gram(s) IV Intermittent every 12 hours  atorvastatin 10 milliGRAM(s) Oral at bedtime  carbidopa/levodopa  10/100 1 Tablet(s) Oral three times a day  carvedilol 3.125 milliGRAM(s) Oral every 12 hours  clopidogrel Tablet 75 milliGRAM(s) Oral daily  donepezil 5 milliGRAM(s) Oral at bedtime  fludroCORTISONE 0.1 milliGRAM(s) Oral daily  furosemide   Injectable 40 milliGRAM(s) IV Push two times a day  lisinopril 5 milliGRAM(s) Oral daily  pantoprazole    Tablet 40 milliGRAM(s) Oral before breakfast  potassium chloride    Tablet ER 20 milliEquivalent(s) Oral daily  potassium chloride    Tablet ER 20 milliEquivalent(s) Oral once  warfarin 2 milliGRAM(s) Oral daily    LABS: All Labs Reviewed:                        10.9   x     )-----------( x        ( 01 Mar 2018 06:18 )             33.2     139  |  93<L>  |  46<H>  ----------------------------<  99  2.9<LL>   |  40<H>  |  1.50<H>    Ca    8.6      02 Mar 2018 04:45    PT/INR - ( 02 Mar 2018 04:45 )   PT: 29.1 sec;   INR: 2.64 ratio

## 2018-03-02 NOTE — PROGRESS NOTE ADULT - ASSESSMENT
89 y/o M with a PMHx of CAD, AICD, CABG, HTN, CHF, Parkinson's presents to the ED with one of increased lower extremity edema and blisters on her lower extremities.  Patient lives with daughter and son in law  whom are the care providers for patient. Patient is a poor historian and hisotry is provided soley by the familyt at bedside.  Patient has had increased sob, weakness and edema developing over the last several days.  He denies any chest pain, sob, fevers, n/v/d.     # Acute exacerbation of systolic chf  -EF 35%  -BNP 1379  -cont lasix BID today, possibly to po tomorrow  - change metolazone to QOD (Home regimen)  - Cw ACEI and BB    # lower extremity edema w/ ulcers and underlying cellulitis L>R  -c/w unasyn  -can likely transition to oral abx tommorrow    # CAD/afib- c/w plavix and statin. inr subtx. cont coumadin and inr in am    # HTN- c/w carvedilol    # elevated creatinine- ARF vs CKD III  -renal function improving (baseline around 1.5-1.8)   -restart home lisinopril    # Hypokalemia  - monitor and replete    # dvt proph- sc lovenox

## 2018-03-02 NOTE — PROGRESS NOTE ADULT - SUBJECTIVE AND OBJECTIVE BOX
REASON FOR VISIT: CHF    HPI:  88 y/o man with a history of CAD s/p CABG, CHF (chronic systolic with mild LV dysfunction ~ 48% in 2012), HTN, Parkinson's Disease, AF, admitted 2/24 with an exacerbation of CHF manifesting as lower extremity edema and dyspnea, cellulitis.    3/1/18:  Comfortable; feels "better."  No new complaints.  3/2/18 Patient is sitting in chair , slow to answer , feels ok , no sob     MEDICATIONS  (STANDING):  ALBUTerol/ipratropium for Nebulization 3 milliLiter(s) Nebulizer every 6 hours  ampicillin/sulbactam  IVPB      ampicillin/sulbactam  IVPB 3 Gram(s) IV Intermittent every 12 hours  atorvastatin 10 milliGRAM(s) Oral at bedtime  carbidopa/levodopa  10/100 1 Tablet(s) Oral three times a day  carvedilol 3.125 milliGRAM(s) Oral every 12 hours  clopidogrel Tablet 75 milliGRAM(s) Oral daily  donepezil 5 milliGRAM(s) Oral at bedtime  fludroCORTISONE 0.1 milliGRAM(s) Oral daily  furosemide   Injectable 40 milliGRAM(s) IV Push two times a day  lisinopril 5 milliGRAM(s) Oral daily  pantoprazole    Tablet 40 milliGRAM(s) Oral before breakfast  potassium chloride    Tablet ER 20 milliEquivalent(s) Oral daily  potassium chloride    Tablet ER 20 milliEquivalent(s) Oral once  warfarin 2 milliGRAM(s) Oral daily    MEDICATIONS  (PRN):  acetaminophen   Tablet 650 milliGRAM(s) Oral every 6 hours PRN For Temp greater than 38 C (100.4 F) or mild pain  ondansetron Injectable 4 milliGRAM(s) IV Push every 6 hours PRN Nausea  senna 2 Tablet(s) Oral at bedtime PRN Constipation      Vital Signs Last 24 Hrs  T(C): 36.8 (02 Mar 2018 11:01), Max: 36.9 (02 Mar 2018 04:19)  T(F): 98.3 (02 Mar 2018 11:01), Max: 98.4 (02 Mar 2018 04:19)  HR: 78 (02 Mar 2018 13:50) (60 - 81)  BP: 131/71 (02 Mar 2018 11:01) (101/48 - 131/71)  BP(mean): --  RR: 22 (02 Mar 2018 11:01) (22 - 22)  SpO2: 96% (02 Mar 2018 11:01) (96% - 99%)    I&O's Summary    01 Mar 2018 07:01  -  02 Mar 2018 07:00  --------------------------------------------------------  IN: 0 mL / OUT: 250 mL / NET: -250 mL    02 Mar 2018 07:01  -  02 Mar 2018 16:39  --------------------------------------------------------  IN: 100 mL / OUT: 0 mL / NET: 100 mL    PHYSICAL EXAM:  Constitutional: Supine / flat in bed, awake and alert, chronically ill appearing.   HEENT: + hoarse voice.   Respiratory: Decreased BS at the bases.   Cardiovascular: S1 and S2, irregularly, irregular  rhythm.   Gastrointestinal: Bowel Sounds present, soft, nontender.   Extremities: Bilateral LE edema.     Neurological: Awake and alert.  Skin: LE erythema. Bandage to RLE.    LABS:                               10.9   x     )-----------( x        ( 01 Mar 2018 06:18 )             33.2     03-02    139  |  93<L>  |  46<H>  ----------------------------<  99  2.9<LL>   |  40<H>  |  1.50<H>    Ca    8.6      02 Mar 2018 04:45            PT/INR - ( 02 Mar 2018 04:45 )   PT: 29.1 sec;   INR: 2.64 ratio             Transthoracic Echocardiogram (02.26.18 @ 09:24):  The left ventricle is normal in size, wall thickness. Moderately decreased left ventricular systolic function with an estimated left ventricular ejection fraction of 35-40 %. There is paradoxical septal motion  The right atrium appears moderately dilated.  Moderate (2+) mitral regurgitation is present.  Moderate to severe (3+) tricuspid valve regurgitation is present.  There is severe elevation in right ventricular systolic pressure    12 Lead ECG (02.26.18 @ 07:49):  Atrial fibrillation.  Right bundle branch block.  T wave abnormality, consider inferior ischemia

## 2018-03-03 LAB
ANION GAP SERPL CALC-SCNC: 5 MMOL/L — SIGNIFICANT CHANGE UP (ref 5–17)
BUN SERPL-MCNC: 39 MG/DL — HIGH (ref 7–23)
CALCIUM SERPL-MCNC: 9.2 MG/DL — SIGNIFICANT CHANGE UP (ref 8.5–10.1)
CHLORIDE SERPL-SCNC: 99 MMOL/L — SIGNIFICANT CHANGE UP (ref 96–108)
CO2 SERPL-SCNC: 41 MMOL/L — HIGH (ref 22–31)
CREAT SERPL-MCNC: 1.49 MG/DL — HIGH (ref 0.5–1.3)
GLUCOSE SERPL-MCNC: 124 MG/DL — HIGH (ref 70–99)
INR BLD: 2.2 RATIO — HIGH (ref 0.88–1.16)
MAGNESIUM SERPL-MCNC: 2 MG/DL — SIGNIFICANT CHANGE UP (ref 1.6–2.6)
POTASSIUM SERPL-MCNC: 3.2 MMOL/L — LOW (ref 3.5–5.3)
POTASSIUM SERPL-SCNC: 3.2 MMOL/L — LOW (ref 3.5–5.3)
PROTHROM AB SERPL-ACNC: 24.1 SEC — HIGH (ref 9.8–12.7)
SODIUM SERPL-SCNC: 145 MMOL/L — SIGNIFICANT CHANGE UP (ref 135–145)

## 2018-03-03 RX ORDER — FUROSEMIDE 40 MG
40 TABLET ORAL
Qty: 0 | Refills: 0 | Status: DISCONTINUED | OUTPATIENT
Start: 2018-03-04 | End: 2018-03-05

## 2018-03-03 RX ORDER — POTASSIUM CHLORIDE 20 MEQ
20 PACKET (EA) ORAL ONCE
Qty: 0 | Refills: 0 | Status: COMPLETED | OUTPATIENT
Start: 2018-03-03 | End: 2018-03-03

## 2018-03-03 RX ORDER — POTASSIUM CHLORIDE 20 MEQ
20 PACKET (EA) ORAL
Qty: 0 | Refills: 0 | Status: COMPLETED | OUTPATIENT
Start: 2018-03-03 | End: 2018-03-03

## 2018-03-03 RX ADMIN — Medication 40 MILLIGRAM(S): at 17:48

## 2018-03-03 RX ADMIN — Medication 40 MILLIGRAM(S): at 06:15

## 2018-03-03 RX ADMIN — LISINOPRIL 5 MILLIGRAM(S): 2.5 TABLET ORAL at 06:16

## 2018-03-03 RX ADMIN — CARVEDILOL PHOSPHATE 3.12 MILLIGRAM(S): 80 CAPSULE, EXTENDED RELEASE ORAL at 17:48

## 2018-03-03 RX ADMIN — Medication 3 MILLILITER(S): at 08:55

## 2018-03-03 RX ADMIN — Medication 3 MILLILITER(S): at 20:18

## 2018-03-03 RX ADMIN — Medication 20 MILLIEQUIVALENT(S): at 11:30

## 2018-03-03 RX ADMIN — Medication 650 MILLIGRAM(S): at 20:22

## 2018-03-03 RX ADMIN — CARBIDOPA AND LEVODOPA 1 TABLET(S): 25; 100 TABLET ORAL at 14:50

## 2018-03-03 RX ADMIN — AMPICILLIN SODIUM AND SULBACTAM SODIUM 200 GRAM(S): 250; 125 INJECTION, POWDER, FOR SUSPENSION INTRAMUSCULAR; INTRAVENOUS at 09:42

## 2018-03-03 RX ADMIN — DONEPEZIL HYDROCHLORIDE 5 MILLIGRAM(S): 10 TABLET, FILM COATED ORAL at 22:52

## 2018-03-03 RX ADMIN — CARVEDILOL PHOSPHATE 3.12 MILLIGRAM(S): 80 CAPSULE, EXTENDED RELEASE ORAL at 06:16

## 2018-03-03 RX ADMIN — ATORVASTATIN CALCIUM 10 MILLIGRAM(S): 80 TABLET, FILM COATED ORAL at 20:25

## 2018-03-03 RX ADMIN — CARBIDOPA AND LEVODOPA 1 TABLET(S): 25; 100 TABLET ORAL at 22:52

## 2018-03-03 RX ADMIN — FLUDROCORTISONE ACETATE 0.1 MILLIGRAM(S): 0.1 TABLET ORAL at 06:16

## 2018-03-03 RX ADMIN — Medication 20 MILLIEQUIVALENT(S): at 14:50

## 2018-03-03 RX ADMIN — Medication 3 MILLILITER(S): at 14:27

## 2018-03-03 RX ADMIN — Medication 20 MILLIEQUIVALENT(S): at 20:25

## 2018-03-03 RX ADMIN — PANTOPRAZOLE SODIUM 40 MILLIGRAM(S): 20 TABLET, DELAYED RELEASE ORAL at 06:16

## 2018-03-03 RX ADMIN — AMPICILLIN SODIUM AND SULBACTAM SODIUM 200 GRAM(S): 250; 125 INJECTION, POWDER, FOR SUSPENSION INTRAMUSCULAR; INTRAVENOUS at 22:52

## 2018-03-03 RX ADMIN — CLOPIDOGREL BISULFATE 75 MILLIGRAM(S): 75 TABLET, FILM COATED ORAL at 11:30

## 2018-03-03 RX ADMIN — Medication 20 MILLIEQUIVALENT(S): at 17:48

## 2018-03-03 RX ADMIN — WARFARIN SODIUM 2 MILLIGRAM(S): 2.5 TABLET ORAL at 20:25

## 2018-03-03 RX ADMIN — CARBIDOPA AND LEVODOPA 1 TABLET(S): 25; 100 TABLET ORAL at 06:16

## 2018-03-03 NOTE — PROGRESS NOTE ADULT - ASSESSMENT
89 y/o M with a PMHx of CAD, AICD, CABG, HTN, CHF, Parkinson's presents to the ED with one of increased lower extremity edema and blisters on her lower extremities.  Patient lives with daughter and son in law  whom are the care providers for patient. Patient is a poor historian and hisotry is provided soley by the familyt at bedside.  Patient has had increased sob, weakness and edema developing over the last several days.  He denies any chest pain, sob, fevers, n/v/d.     # Acute on chronic systolic chf  - EF 35%  - lasix to 40mg po BID tomorrow.   - metolozone QOD  - Cw ACEI and BB    # lower extremity edema w/ ulcers and underlying cellulitis L>R  -c/w unasyn while inpt.  -can likely transition to oral abx at dc    # CAD/afib- c/w plavix and statin.cont coumadin and inr in am    # HTN- c/w carvedilol    # elevated creatinine- ARF vs CKD III  -renal function improved to BL (baseline around 1.5-1.8)     # Hypokalemia  - monitor and replete    # dvt proph- sc lovenox      Dispo: Likely Dc to [facility] on Monday.

## 2018-03-03 NOTE — PROGRESS NOTE ADULT - SUBJECTIVE AND OBJECTIVE BOX
CHIEF COMPLAINT/Diagnosis: lower ext edema/ chf/ cellulitis    SUBJECTIVE: Sitting up in chair. No new complaints. leg swelling improving.    REVIEW OF SYSTEMS: All other review of systems is negative unless indicated above    Vital Signs Last 24 Hrs  T(C): 36.6 (03 Mar 2018 11:00), Max: 37.1 (02 Mar 2018 23:31)  T(F): 97.8 (03 Mar 2018 11:00), Max: 98.8 (02 Mar 2018 23:31)  HR: 80 (03 Mar 2018 14:56) (65 - 80)  BP: 125/60 (03 Mar 2018 11:00) (116/58 - 125/60)  BP(mean): --  RR: 20 (03 Mar 2018 11:00) (20 - 20)  SpO2: 98% (03 Mar 2018 14:56) (96% - 100%)    PHYSICAL EXAM:  Constitutional: NAD, awake and alert, well-developed  HEENT: PERR, EOMI, Normal Hearing, MMM  Neck: Soft and supple, No LAD, No JVD  Respiratory: poor insipratory effort, decreased breath sounds b/l, no rales or rhonchi appreciated  Cardiovascular: S1 and S2, regular rate and rhythm, no Murmurs, gallops or rubs  Gastrointestinal: Bowel Sounds present, soft, nontender, nondistended, no guarding, no rebound  Extremities: +2 pitting edema  Vascular: 2+ peripheral pulses  Neurological: A/O x 3, no focal deficits  Musculoskeletal: 5/5 strength b/l upper and lower extremities  Skin: RLE ulceration to ant leg approx 3cm.    MEDICATIONS:  MEDICATIONS  (STANDING):  ALBUTerol/ipratropium for Nebulization 3 milliLiter(s) Nebulizer every 6 hours  ampicillin/sulbactam  IVPB      ampicillin/sulbactam  IVPB 3 Gram(s) IV Intermittent every 12 hours  atorvastatin 10 milliGRAM(s) Oral at bedtime  carbidopa/levodopa  10/100 1 Tablet(s) Oral three times a day  carvedilol 3.125 milliGRAM(s) Oral every 12 hours  clopidogrel Tablet 75 milliGRAM(s) Oral daily  donepezil 5 milliGRAM(s) Oral at bedtime  fludroCORTISONE 0.1 milliGRAM(s) Oral daily  lisinopril 5 milliGRAM(s) Oral daily  metolazone 2.5 milliGRAM(s) Oral every other day  pantoprazole    Tablet 40 milliGRAM(s) Oral before breakfast  potassium chloride    Tablet ER 20 milliEquivalent(s) Oral daily  potassium chloride    Tablet ER 20 milliEquivalent(s) Oral once  warfarin 2 milliGRAM(s) Oral daily    LABS: All Labs Reviewed:    145  |  99  |  39<H>  ----------------------------<  124<H>  3.2<L>   |  41<H>  |  1.49<H>    Ca    9.2      03 Mar 2018 07:04  Mg     2.0     03-03    PT/INR - ( 03 Mar 2018 07:04 )   PT: 24.1 sec;   INR: 2.20 ratio

## 2018-03-04 LAB
ANION GAP SERPL CALC-SCNC: 3 MMOL/L — LOW (ref 5–17)
BUN SERPL-MCNC: 40 MG/DL — HIGH (ref 7–23)
CALCIUM SERPL-MCNC: 9.1 MG/DL — SIGNIFICANT CHANGE UP (ref 8.5–10.1)
CHLORIDE SERPL-SCNC: 99 MMOL/L — SIGNIFICANT CHANGE UP (ref 96–108)
CO2 SERPL-SCNC: 42 MMOL/L — HIGH (ref 22–31)
CREAT SERPL-MCNC: 1.52 MG/DL — HIGH (ref 0.5–1.3)
GLUCOSE SERPL-MCNC: 138 MG/DL — HIGH (ref 70–99)
INR BLD: 2.21 RATIO — HIGH (ref 0.88–1.16)
POTASSIUM SERPL-MCNC: 3.5 MMOL/L — SIGNIFICANT CHANGE UP (ref 3.5–5.3)
POTASSIUM SERPL-SCNC: 3.5 MMOL/L — SIGNIFICANT CHANGE UP (ref 3.5–5.3)
PROTHROM AB SERPL-ACNC: 24.3 SEC — HIGH (ref 9.8–12.7)
SODIUM SERPL-SCNC: 144 MMOL/L — SIGNIFICANT CHANGE UP (ref 135–145)

## 2018-03-04 RX ORDER — POTASSIUM CHLORIDE 20 MEQ
20 PACKET (EA) ORAL
Qty: 0 | Refills: 0 | Status: COMPLETED | OUTPATIENT
Start: 2018-03-04 | End: 2018-03-04

## 2018-03-04 RX ADMIN — ATORVASTATIN CALCIUM 10 MILLIGRAM(S): 80 TABLET, FILM COATED ORAL at 21:26

## 2018-03-04 RX ADMIN — Medication 3 MILLILITER(S): at 08:14

## 2018-03-04 RX ADMIN — CARBIDOPA AND LEVODOPA 1 TABLET(S): 25; 100 TABLET ORAL at 15:00

## 2018-03-04 RX ADMIN — WARFARIN SODIUM 2 MILLIGRAM(S): 2.5 TABLET ORAL at 21:27

## 2018-03-04 RX ADMIN — Medication 3 MILLILITER(S): at 20:09

## 2018-03-04 RX ADMIN — CLOPIDOGREL BISULFATE 75 MILLIGRAM(S): 75 TABLET, FILM COATED ORAL at 11:19

## 2018-03-04 RX ADMIN — Medication 20 MILLIEQUIVALENT(S): at 11:19

## 2018-03-04 RX ADMIN — LISINOPRIL 5 MILLIGRAM(S): 2.5 TABLET ORAL at 06:17

## 2018-03-04 RX ADMIN — CARVEDILOL PHOSPHATE 3.12 MILLIGRAM(S): 80 CAPSULE, EXTENDED RELEASE ORAL at 06:17

## 2018-03-04 RX ADMIN — Medication 20 MILLIEQUIVALENT(S): at 15:00

## 2018-03-04 RX ADMIN — Medication 3 MILLILITER(S): at 13:32

## 2018-03-04 RX ADMIN — PANTOPRAZOLE SODIUM 40 MILLIGRAM(S): 20 TABLET, DELAYED RELEASE ORAL at 06:17

## 2018-03-04 RX ADMIN — Medication 20 MILLIEQUIVALENT(S): at 18:33

## 2018-03-04 RX ADMIN — CARVEDILOL PHOSPHATE 3.12 MILLIGRAM(S): 80 CAPSULE, EXTENDED RELEASE ORAL at 18:33

## 2018-03-04 RX ADMIN — AMPICILLIN SODIUM AND SULBACTAM SODIUM 200 GRAM(S): 250; 125 INJECTION, POWDER, FOR SUSPENSION INTRAMUSCULAR; INTRAVENOUS at 21:26

## 2018-03-04 RX ADMIN — Medication 40 MILLIGRAM(S): at 06:17

## 2018-03-04 RX ADMIN — CARBIDOPA AND LEVODOPA 1 TABLET(S): 25; 100 TABLET ORAL at 06:17

## 2018-03-04 RX ADMIN — CARBIDOPA AND LEVODOPA 1 TABLET(S): 25; 100 TABLET ORAL at 21:26

## 2018-03-04 RX ADMIN — DONEPEZIL HYDROCHLORIDE 5 MILLIGRAM(S): 10 TABLET, FILM COATED ORAL at 21:27

## 2018-03-04 RX ADMIN — AMPICILLIN SODIUM AND SULBACTAM SODIUM 200 GRAM(S): 250; 125 INJECTION, POWDER, FOR SUSPENSION INTRAMUSCULAR; INTRAVENOUS at 09:45

## 2018-03-04 RX ADMIN — FLUDROCORTISONE ACETATE 0.1 MILLIGRAM(S): 0.1 TABLET ORAL at 06:17

## 2018-03-04 RX ADMIN — Medication 40 MILLIGRAM(S): at 18:33

## 2018-03-04 NOTE — PROGRESS NOTE ADULT - ASSESSMENT
88 y/o M with a PMHx of CAD, AICD, CABG, HTN, CHF, Parkinson's presents to the ED with one of increased lower extremity edema and blisters on her lower extremities.  Patient lives with daughter and son in law  whom are the care providers for patient. Patient is a poor historian and hisotry is provided soley by the familyt at bedside.  Patient has had increased sob, weakness and edema developing over the last several days.  He denies any chest pain, sob, fevers, n/v/d.     # Acute on chronic systolic chf  - EF 35%  - lasix to 40mg po BID   - metolozone QOD  - Cw ACEI and BB    # lower extremity edema w/ ulcers and underlying cellulitis L>R  -c/w unasyn while inpt will complete course tomorrow.    # CAD/afib- c/w plavix and statin cont coumadin and inr in am    # HTN- c/w carvedilol    # elevated creatinine- ARF vs CKD III  -renal function improved to BL (baseline around 1.5-1.8)     # Hypokalemia  - monitor and replete    # dvt proph- sc lovenox       Dispo: Likely Dc to [facility] on Monday.

## 2018-03-04 NOTE — PROGRESS NOTE ADULT - PROVIDER SPECIALTY LIST ADULT
Cardiology
Heart Failure
Hospitalist
Cardiology
Hospitalist

## 2018-03-04 NOTE — PROGRESS NOTE ADULT - SUBJECTIVE AND OBJECTIVE BOX
CHIEF COMPLAINT/Diagnosis: lower ext edema/ chf/ cellulitis    SUBJECTIVE: Sitting up in chair. No new complaints. leg swelling improving.    REVIEW OF SYSTEMS: All other review of systems is negative unless indicated above    Vital Signs Last 24 Hrs  T(C): 36.4 (04 Mar 2018 10:36), Max: 36.7 (03 Mar 2018 16:40)  T(F): 97.5 (04 Mar 2018 10:36), Max: 98.1 (03 Mar 2018 16:40)  HR: 58 (04 Mar 2018 13:34) (58 - 96)  BP: 145/65 (04 Mar 2018 10:36) (110/69 - 145/65)  BP(mean): --  RR: 18 (04 Mar 2018 10:36) (18 - 20)  SpO2: 97% (04 Mar 2018 13:34) (95% - 100%)    PHYSICAL EXAM:  Constitutional: NAD, awake and alert, well-developed  HEENT: PERR, EOMI, Normal Hearing, MMM  Neck: Soft and supple, No LAD, No JVD  Respiratory: poor insipratory effort, decreased breath sounds b/l, no rales or rhonchi appreciated  Cardiovascular: S1 and S2, regular rate and rhythm, no Murmurs, gallops or rubs  Gastrointestinal: Bowel Sounds present, soft, nontender, nondistended, no guarding, no rebound  Extremities: +2 pitting edema  Vascular: 2+ peripheral pulses  Neurological: A/O x 3, no focal deficits  Musculoskeletal: 5/5 strength b/l upper and lower extremities  Skin: RLE ulceration to ant leg approx 3cm.    MEDICATIONS:  MEDICATIONS  (STANDING):  ALBUTerol/ipratropium for Nebulization 3 milliLiter(s) Nebulizer every 6 hours  ampicillin/sulbactam  IVPB      ampicillin/sulbactam  IVPB 3 Gram(s) IV Intermittent every 12 hours  atorvastatin 10 milliGRAM(s) Oral at bedtime  carbidopa/levodopa  10/100 1 Tablet(s) Oral three times a day  carvedilol 3.125 milliGRAM(s) Oral every 12 hours  clopidogrel Tablet 75 milliGRAM(s) Oral daily  donepezil 5 milliGRAM(s) Oral at bedtime  fludroCORTISONE 0.1 milliGRAM(s) Oral daily  furosemide    Tablet 40 milliGRAM(s) Oral two times a day  lisinopril 5 milliGRAM(s) Oral daily  metolazone 2.5 milliGRAM(s) Oral every other day  pantoprazole    Tablet 40 milliGRAM(s) Oral before breakfast  potassium chloride    Tablet ER 20 milliEquivalent(s) Oral daily  potassium chloride    Tablet ER 20 milliEquivalent(s) Oral every 2 hours  warfarin 2 milliGRAM(s) Oral daily    LABS: All Labs Reviewed:    144  |  99  |  40<H>  ----------------------------<  138<H>  3.5   |  42<H>  |  1.52<H>    Ca    9.1      04 Mar 2018 07:34  Mg     2.0     03-03    PT/INR - ( 04 Mar 2018 07:34 )   PT: 24.3 sec;   INR: 2.21 ratio

## 2018-03-05 ENCOUNTER — TRANSCRIPTION ENCOUNTER (OUTPATIENT)
Age: 83
End: 2018-03-05

## 2018-03-05 VITALS — WEIGHT: 194.89 LBS

## 2018-03-05 RX ORDER — POTASSIUM CHLORIDE 20 MEQ
1 PACKET (EA) ORAL
Qty: 0 | Refills: 0 | DISCHARGE
Start: 2018-03-05

## 2018-03-05 RX ORDER — POTASSIUM CHLORIDE 20 MEQ
1 PACKET (EA) ORAL
Qty: 0 | Refills: 0 | COMMUNITY
Start: 2018-03-05

## 2018-03-05 RX ADMIN — FLUDROCORTISONE ACETATE 0.1 MILLIGRAM(S): 0.1 TABLET ORAL at 05:45

## 2018-03-05 RX ADMIN — Medication 40 MILLIGRAM(S): at 05:44

## 2018-03-05 RX ADMIN — Medication 3 MILLILITER(S): at 13:06

## 2018-03-05 RX ADMIN — AMPICILLIN SODIUM AND SULBACTAM SODIUM 200 GRAM(S): 250; 125 INJECTION, POWDER, FOR SUSPENSION INTRAMUSCULAR; INTRAVENOUS at 10:58

## 2018-03-05 RX ADMIN — Medication 20 MILLIEQUIVALENT(S): at 12:48

## 2018-03-05 RX ADMIN — CLOPIDOGREL BISULFATE 75 MILLIGRAM(S): 75 TABLET, FILM COATED ORAL at 12:48

## 2018-03-05 RX ADMIN — Medication 3 MILLILITER(S): at 08:49

## 2018-03-05 RX ADMIN — PANTOPRAZOLE SODIUM 40 MILLIGRAM(S): 20 TABLET, DELAYED RELEASE ORAL at 05:44

## 2018-03-05 RX ADMIN — CARVEDILOL PHOSPHATE 3.12 MILLIGRAM(S): 80 CAPSULE, EXTENDED RELEASE ORAL at 05:45

## 2018-03-05 RX ADMIN — CARBIDOPA AND LEVODOPA 1 TABLET(S): 25; 100 TABLET ORAL at 05:45

## 2018-03-05 RX ADMIN — LISINOPRIL 5 MILLIGRAM(S): 2.5 TABLET ORAL at 12:48

## 2018-03-05 NOTE — DISCHARGE NOTE ADULT - PATIENT PORTAL LINK FT
You can access the [facility] Patient Portal, offered by [facility], by registering with the following website: [email or web address]

## 2018-03-05 NOTE — DISCHARGE NOTE ADULT - CARE PLAN
Principal Discharge DX:	CHF (congestive heart failure)  Goal:	medical management  Assessment and plan of treatment:	take medications as prescribed. Do not add salt to your food, maintain low sodium diet.  Follow up with your cardiologist 1 week after discharge from rehab.

## 2018-03-05 NOTE — DISCHARGE NOTE ADULT - CARE PROVIDER_API CALL
Hi Brady), Cardiovascular Disease; Internal Medicine  [address] [place] [address]  Phone: [phone]  Fax: [phone]    Michi Madison), Family Practice  [address] [place] [address]  Phone: [phone]  Fax: [phone]

## 2018-03-05 NOTE — DISCHARGE NOTE ADULT - MEDICATION SUMMARY - MEDICATIONS TO TAKE
I will START or STAY ON the medications listed below when I get home from the hospital:    fludrocortisone 0.1 mg oral tablet  -- 1 tab(s) by mouth once a day  -- Indication: For .    lisinopril 5 mg oral tablet  -- 1 tab(s) by mouth once a day  -- Indication: For .    Coumadin 2 mg oral tablet  -- 1 tab(s) by mouth once a day  -- Indication: For .    atorvastatin 10 mg oral tablet  -- 1 tab(s) by mouth once a day (at bedtime)  -- Indication: For .    carbidopa-levodopa 10 mg-100 mg oral tablet  -- 1 tab(s) by mouth 3 times a day  -- Indication: For .    Plavix 75 mg oral tablet  -- 1 tab(s) by mouth once a day  -- Indication: For .    carvedilol 3.125 mg oral tablet  -- 1 tab(s) by mouth 2 times a day  -- Indication: For .    donepezil 5 mg oral tablet  -- 1 tab(s) by mouth once a day (at bedtime)  -- Indication: For .    Lasix 40 mg oral tablet  -- 1  by mouth 2 times a day  -- Indication: For .    metOLazone 2.5 mg oral tablet  -- 1  by mouth every other day  -- Indication: For .    potassium chloride 20 mEq oral tablet, extended release  -- 1 tab(s) by mouth once a day  -- Indication: For .    pantoprazole 40 mg oral delayed release tablet  -- 1 tab(s) by mouth once a day  -- Indication: For .

## 2018-03-05 NOTE — DISCHARGE NOTE ADULT - HOSPITAL COURSE
CHIEF COMPLAINT/Diagnosis: lower ext edema/ chf/ cellulitis    SUBJECTIVE: Sitting up in chair. No new complaints. leg swelling improving.    REVIEW OF SYSTEMS: All other review of systems is negative unless indicated above    Vital Signs Last 24 Hrs  T(C): 37 (05 Mar 2018 05:00), Max: 37 (05 Mar 2018 05:00)  T(F): 98.6 (05 Mar 2018 05:00), Max: 98.6 (05 Mar 2018 05:00)  HR: 90 (05 Mar 2018 08:59) (58 - 96)  BP: 108/51 (05 Mar 2018 05:00) (101/52 - 108/51)  BP(mean): --  RR: 18 (05 Mar 2018 05:00) (18 - 18)  SpO2: 92% (05 Mar 2018 05:00) (92% - 97%)    PHYSICAL EXAM:  Constitutional: NAD, awake and alert, well-developed  HEENT: PERR, EOMI, Normal Hearing, MMM  Neck: Soft and supple, No LAD, No JVD  Respiratory: poor insipratory effort, decreased breath sounds b/l, no rales or rhonchi appreciated  Cardiovascular: S1 and S2, regular rate and rhythm, no Murmurs, gallops or rubs  Gastrointestinal: Bowel Sounds present, soft, nontender, nondistended, no guarding, no rebound  Extremities: +1 pitting edema  Vascular: 2+ peripheral pulses  Neurological: A/O x 3, no focal deficits  Musculoskeletal: 5/5 strength b/l upper and lower extremities  Skin: RLE ulceration to ant leg approx 3cm.    144  |  99  |  40<H>  ----------------------------<  138<H>  3.5   |  42<H>  |  1.52<H>    Ca    9.1      04 Mar 2018 07:34    PT/INR - ( 04 Mar 2018 07:34 )   PT: 24.3 sec;   INR: 2.21 ratio      assessment and plan  88 y/o M with a PMHx of CAD, AICD, CABG, HTN, CHF, Parkinson's presents to the ED with one of increased lower extremity edema and blisters on her lower extremities.  Patient lives with daughter and son in law  whom are the care providers for patient. Patient is a poor historian and hisotry is provided soley by the familyt at bedside.  Patient has had increased sob, weakness and edema developing over the last several days pta.  admitted for acute on chronic systolic chf aggressively diuresed and required aggressive potassium replacement. Pt still with some BL LE edema however significantly improved.        # Acute on chronic systolic chf  - EF 35%  - lasix to 40mg po BID   - metolozone QOD  - Cw ACEI and BB  - 20 meq KCl daily- needs intermittent BMP to monitor need for adjustment in daily KCl dosing.    # lower extremity edema w/ ulcers and underlying cellulitis L>R  -completed course of unasyn while in hospital    # CAD/afib- c/w plavix and statin cont coumadin and inr in am    # HTN- c/w carvedilol    # elevated creatinine- ARF vs CKD III  -renal function improved to BL (baseline around 1.5-1.8)     # Hypokalemia  - monitor and replete    Discussed with Dr. Guillory     Time spent on discharge 35 minutes

## 2018-03-05 NOTE — DISCHARGE NOTE ADULT - PLAN OF CARE
medical management take medications as prescribed. Do not add salt to your food, maintain low sodium diet.  Follow up with your cardiologist 1 week after discharge from rehab.

## 2018-03-09 DIAGNOSIS — Z88.5 ALLERGY STATUS TO NARCOTIC AGENT: ICD-10-CM

## 2018-03-09 DIAGNOSIS — I50.9 HEART FAILURE, UNSPECIFIED: ICD-10-CM

## 2018-03-09 DIAGNOSIS — I25.10 ATHEROSCLEROTIC HEART DISEASE OF NATIVE CORONARY ARTERY WITHOUT ANGINA PECTORIS: ICD-10-CM

## 2018-03-09 DIAGNOSIS — Z79.01 LONG TERM (CURRENT) USE OF ANTICOAGULANTS: ICD-10-CM

## 2018-03-09 DIAGNOSIS — I50.23 ACUTE ON CHRONIC SYSTOLIC (CONGESTIVE) HEART FAILURE: ICD-10-CM

## 2018-03-09 DIAGNOSIS — Z95.1 PRESENCE OF AORTOCORONARY BYPASS GRAFT: ICD-10-CM

## 2018-03-09 DIAGNOSIS — E87.6 HYPOKALEMIA: ICD-10-CM

## 2018-03-09 DIAGNOSIS — I13.0 HYPERTENSIVE HEART AND CHRONIC KIDNEY DISEASE WITH HEART FAILURE AND STAGE 1 THROUGH STAGE 4 CHRONIC KIDNEY DISEASE, OR UNSPECIFIED CHRONIC KIDNEY DISEASE: ICD-10-CM

## 2018-03-09 DIAGNOSIS — G20 PARKINSON'S DISEASE: ICD-10-CM

## 2018-03-09 DIAGNOSIS — N17.9 ACUTE KIDNEY FAILURE, UNSPECIFIED: ICD-10-CM

## 2018-03-09 DIAGNOSIS — I27.29 OTHER SECONDARY PULMONARY HYPERTENSION: ICD-10-CM

## 2018-03-09 DIAGNOSIS — E78.5 HYPERLIPIDEMIA, UNSPECIFIED: ICD-10-CM

## 2018-03-09 DIAGNOSIS — Z95.810 PRESENCE OF AUTOMATIC (IMPLANTABLE) CARDIAC DEFIBRILLATOR: ICD-10-CM

## 2018-03-09 DIAGNOSIS — N18.3 CHRONIC KIDNEY DISEASE, STAGE 3 (MODERATE): ICD-10-CM

## 2018-03-09 DIAGNOSIS — I48.2 CHRONIC ATRIAL FIBRILLATION: ICD-10-CM

## 2018-06-20 ENCOUNTER — APPOINTMENT (OUTPATIENT)
Dept: CARDIOLOGY | Facility: CLINIC | Age: 83
End: 2018-06-20

## 2018-06-26 ENCOUNTER — APPOINTMENT (OUTPATIENT)
Dept: CARDIOLOGY | Facility: CLINIC | Age: 83
End: 2018-06-26
Payer: MEDICARE

## 2018-06-26 ENCOUNTER — NON-APPOINTMENT (OUTPATIENT)
Age: 83
End: 2018-06-26

## 2018-06-26 VITALS — OXYGEN SATURATION: 97 % | SYSTOLIC BLOOD PRESSURE: 96 MMHG | DIASTOLIC BLOOD PRESSURE: 62 MMHG | HEART RATE: 78 BPM

## 2018-06-26 DIAGNOSIS — R06.02 SHORTNESS OF BREATH: ICD-10-CM

## 2018-06-26 PROCEDURE — 93000 ELECTROCARDIOGRAM COMPLETE: CPT

## 2018-06-26 PROCEDURE — 99214 OFFICE O/P EST MOD 30 MIN: CPT | Mod: 25

## 2018-06-28 ENCOUNTER — NON-APPOINTMENT (OUTPATIENT)
Age: 83
End: 2018-06-28

## 2018-07-19 ENCOUNTER — INPATIENT (INPATIENT)
Facility: HOSPITAL | Age: 83
LOS: 4 days | Discharge: NURSING, REHAB OR LONG-TERM CARE | End: 2018-07-24
Attending: FAMILY MEDICINE | Admitting: FAMILY MEDICINE
Payer: MEDICARE

## 2018-07-19 VITALS
OXYGEN SATURATION: 95 % | SYSTOLIC BLOOD PRESSURE: 141 MMHG | HEIGHT: 67 IN | WEIGHT: 179.9 LBS | DIASTOLIC BLOOD PRESSURE: 66 MMHG | HEART RATE: 70 BPM | RESPIRATION RATE: 19 BRPM

## 2018-07-19 LAB
ALBUMIN SERPL ELPH-MCNC: 3.5 G/DL — SIGNIFICANT CHANGE UP (ref 3.3–5)
ALP SERPL-CCNC: 96 U/L — SIGNIFICANT CHANGE UP (ref 40–120)
ALT FLD-CCNC: 18 U/L — SIGNIFICANT CHANGE UP (ref 12–78)
ANION GAP SERPL CALC-SCNC: 9 MMOL/L — SIGNIFICANT CHANGE UP (ref 5–17)
APPEARANCE UR: CLEAR — SIGNIFICANT CHANGE UP
APTT BLD: 36.2 SEC — SIGNIFICANT CHANGE UP (ref 27.5–37.4)
AST SERPL-CCNC: 22 U/L — SIGNIFICANT CHANGE UP (ref 15–37)
BACTERIA # UR AUTO: ABNORMAL
BASOPHILS # BLD AUTO: 0.04 K/UL — SIGNIFICANT CHANGE UP (ref 0–0.2)
BASOPHILS NFR BLD AUTO: 0.5 % — SIGNIFICANT CHANGE UP (ref 0–2)
BILIRUB SERPL-MCNC: 0.6 MG/DL — SIGNIFICANT CHANGE UP (ref 0.2–1.2)
BILIRUB UR-MCNC: NEGATIVE — SIGNIFICANT CHANGE UP
BUN SERPL-MCNC: 58 MG/DL — HIGH (ref 7–23)
CALCIUM SERPL-MCNC: 9.1 MG/DL — SIGNIFICANT CHANGE UP (ref 8.5–10.1)
CHLORIDE SERPL-SCNC: 98 MMOL/L — SIGNIFICANT CHANGE UP (ref 96–108)
CK SERPL-CCNC: 94 U/L — SIGNIFICANT CHANGE UP (ref 26–308)
CO2 SERPL-SCNC: 35 MMOL/L — HIGH (ref 22–31)
COLOR SPEC: YELLOW — SIGNIFICANT CHANGE UP
CREAT SERPL-MCNC: 2.22 MG/DL — HIGH (ref 0.5–1.3)
DIFF PNL FLD: ABNORMAL
EOSINOPHIL # BLD AUTO: 0.17 K/UL — SIGNIFICANT CHANGE UP (ref 0–0.5)
EOSINOPHIL NFR BLD AUTO: 2.3 % — SIGNIFICANT CHANGE UP (ref 0–6)
EPI CELLS # UR: NEGATIVE — SIGNIFICANT CHANGE UP
GLUCOSE SERPL-MCNC: 96 MG/DL — SIGNIFICANT CHANGE UP (ref 70–99)
GLUCOSE UR QL: NEGATIVE MG/DL — SIGNIFICANT CHANGE UP
HCT VFR BLD CALC: 36.9 % — LOW (ref 39–50)
HGB BLD-MCNC: 12.8 G/DL — LOW (ref 13–17)
IMM GRANULOCYTES NFR BLD AUTO: 0.5 % — SIGNIFICANT CHANGE UP (ref 0–1.5)
INR BLD: 1.79 RATIO — HIGH (ref 0.88–1.16)
KETONES UR-MCNC: NEGATIVE — SIGNIFICANT CHANGE UP
LEUKOCYTE ESTERASE UR-ACNC: NEGATIVE — SIGNIFICANT CHANGE UP
LYMPHOCYTES # BLD AUTO: 1.32 K/UL — SIGNIFICANT CHANGE UP (ref 1–3.3)
LYMPHOCYTES # BLD AUTO: 18 % — SIGNIFICANT CHANGE UP (ref 13–44)
MCHC RBC-ENTMCNC: 32.2 PG — SIGNIFICANT CHANGE UP (ref 27–34)
MCHC RBC-ENTMCNC: 34.7 GM/DL — SIGNIFICANT CHANGE UP (ref 32–36)
MCV RBC AUTO: 92.7 FL — SIGNIFICANT CHANGE UP (ref 80–100)
MONOCYTES # BLD AUTO: 0.55 K/UL — SIGNIFICANT CHANGE UP (ref 0–0.9)
MONOCYTES NFR BLD AUTO: 7.5 % — SIGNIFICANT CHANGE UP (ref 2–14)
NEUTROPHILS # BLD AUTO: 5.21 K/UL — SIGNIFICANT CHANGE UP (ref 1.8–7.4)
NEUTROPHILS NFR BLD AUTO: 71.2 % — SIGNIFICANT CHANGE UP (ref 43–77)
NITRITE UR-MCNC: NEGATIVE — SIGNIFICANT CHANGE UP
NRBC # BLD: 0 /100 WBCS — SIGNIFICANT CHANGE UP (ref 0–0)
PH UR: 5 — SIGNIFICANT CHANGE UP (ref 5–8)
PLATELET # BLD AUTO: 154 K/UL — SIGNIFICANT CHANGE UP (ref 150–400)
POTASSIUM SERPL-MCNC: 3.5 MMOL/L — SIGNIFICANT CHANGE UP (ref 3.5–5.3)
POTASSIUM SERPL-SCNC: 3.5 MMOL/L — SIGNIFICANT CHANGE UP (ref 3.5–5.3)
PROT SERPL-MCNC: 8 GM/DL — SIGNIFICANT CHANGE UP (ref 6–8.3)
PROT UR-MCNC: NEGATIVE MG/DL — SIGNIFICANT CHANGE UP
PROTHROM AB SERPL-ACNC: 19.6 SEC — HIGH (ref 9.8–12.7)
RBC # BLD: 3.98 M/UL — LOW (ref 4.2–5.8)
RBC # FLD: 13 % — SIGNIFICANT CHANGE UP (ref 10.3–14.5)
RBC CASTS # UR COMP ASSIST: SIGNIFICANT CHANGE UP /HPF (ref 0–4)
SODIUM SERPL-SCNC: 142 MMOL/L — SIGNIFICANT CHANGE UP (ref 135–145)
SP GR SPEC: 1.01 — SIGNIFICANT CHANGE UP (ref 1.01–1.02)
TROPONIN I SERPL-MCNC: 0.02 NG/ML — SIGNIFICANT CHANGE UP (ref 0.01–0.04)
UROBILINOGEN FLD QL: NEGATIVE MG/DL — SIGNIFICANT CHANGE UP
WBC # BLD: 7.33 K/UL — SIGNIFICANT CHANGE UP (ref 3.8–10.5)
WBC # FLD AUTO: 7.33 K/UL — SIGNIFICANT CHANGE UP (ref 3.8–10.5)
WBC UR QL: NEGATIVE — SIGNIFICANT CHANGE UP

## 2018-07-19 PROCEDURE — 74176 CT ABD & PELVIS W/O CONTRAST: CPT | Mod: 26

## 2018-07-19 PROCEDURE — 70450 CT HEAD/BRAIN W/O DYE: CPT | Mod: 26

## 2018-07-19 PROCEDURE — 72125 CT NECK SPINE W/O DYE: CPT | Mod: 26

## 2018-07-19 PROCEDURE — 93010 ELECTROCARDIOGRAM REPORT: CPT

## 2018-07-19 PROCEDURE — 99285 EMERGENCY DEPT VISIT HI MDM: CPT

## 2018-07-19 RX ORDER — WARFARIN SODIUM 2.5 MG/1
3 TABLET ORAL ONCE
Qty: 0 | Refills: 0 | Status: COMPLETED | OUTPATIENT
Start: 2018-07-19 | End: 2018-07-19

## 2018-07-19 RX ORDER — CARVEDILOL PHOSPHATE 80 MG/1
1 CAPSULE, EXTENDED RELEASE ORAL
Qty: 0 | Refills: 0 | COMMUNITY

## 2018-07-19 RX ORDER — FLUDROCORTISONE ACETATE 0.1 MG/1
0.1 TABLET ORAL DAILY
Qty: 0 | Refills: 0 | Status: DISCONTINUED | OUTPATIENT
Start: 2018-07-19 | End: 2018-07-24

## 2018-07-19 RX ORDER — FUROSEMIDE 40 MG
1 TABLET ORAL
Qty: 0 | Refills: 0 | COMMUNITY

## 2018-07-19 RX ORDER — PANTOPRAZOLE SODIUM 20 MG/1
40 TABLET, DELAYED RELEASE ORAL
Qty: 0 | Refills: 0 | Status: DISCONTINUED | OUTPATIENT
Start: 2018-07-19 | End: 2018-07-24

## 2018-07-19 RX ORDER — DONEPEZIL HYDROCHLORIDE 10 MG/1
5 TABLET, FILM COATED ORAL AT BEDTIME
Qty: 0 | Refills: 0 | Status: DISCONTINUED | OUTPATIENT
Start: 2018-07-19 | End: 2018-07-24

## 2018-07-19 RX ORDER — CARBIDOPA AND LEVODOPA 25; 100 MG/1; MG/1
1 TABLET ORAL THREE TIMES A DAY
Qty: 0 | Refills: 0 | Status: DISCONTINUED | OUTPATIENT
Start: 2018-07-19 | End: 2018-07-24

## 2018-07-19 RX ORDER — ATORVASTATIN CALCIUM 80 MG/1
10 TABLET, FILM COATED ORAL AT BEDTIME
Qty: 0 | Refills: 0 | Status: DISCONTINUED | OUTPATIENT
Start: 2018-07-19 | End: 2018-07-24

## 2018-07-19 RX ORDER — LISINOPRIL 2.5 MG/1
1 TABLET ORAL
Qty: 0 | Refills: 0 | COMMUNITY

## 2018-07-19 RX ORDER — CARVEDILOL PHOSPHATE 80 MG/1
3.12 CAPSULE, EXTENDED RELEASE ORAL DAILY
Qty: 0 | Refills: 0 | Status: DISCONTINUED | OUTPATIENT
Start: 2018-07-19 | End: 2018-07-21

## 2018-07-19 RX ORDER — CLOPIDOGREL BISULFATE 75 MG/1
75 TABLET, FILM COATED ORAL DAILY
Qty: 0 | Refills: 0 | Status: DISCONTINUED | OUTPATIENT
Start: 2018-07-19 | End: 2018-07-24

## 2018-07-19 RX ORDER — WARFARIN SODIUM 2.5 MG/1
1 TABLET ORAL
Qty: 0 | Refills: 0 | COMMUNITY

## 2018-07-19 RX ADMIN — WARFARIN SODIUM 3 MILLIGRAM(S): 2.5 TABLET ORAL at 23:53

## 2018-07-19 RX ADMIN — ATORVASTATIN CALCIUM 10 MILLIGRAM(S): 80 TABLET, FILM COATED ORAL at 23:53

## 2018-07-19 NOTE — H&P ADULT - ASSESSMENT
89 y.o. male with PMH CAD s/p CABG x3, s/p stent, CHF s/p AICD, HTN, HLD, Parkinson's disease presents with unwitnessed fall, syncope. Pt reports blacking out. Denies n/v, f/c, CP or SOB. Denies lightheadedness or dizziness. No tongue biting. Pt was on the floor and was found by family couple hours later. Pt reports neck pain and back pain, but pt had neck pain for the past few days. Reports some urine incontinence but reports from constant need to urinate.      #unwitnessed fall, syncope suspecting dehydration and orthostatic hypotension  -admit to tele  -check orthostatic vital signs  -AICD interrogation   -GERALDO  -hold metolazone and lasix  -cardio consult, Dr Brady  -PT eval  -SW eval    #CAD s/p CABG, stent  -on plavix, statin    #MARILYN on CKD3  -hold metolazone and lasix  -hold lisinopril  -reintroduce once Cr improves    #AFib on Coumadin  -CHADSVASc >4  -INR subtherapeutic  -increase coumadin   -rate controlled on BB    #chronic systolic CHF  -I/O, daily weight  -echo (2/26/18) EF 35-40% mod MR, mod to severe TR  -currently, not in exacerbation  -lasix and metolazone held for now    #DVT ppx  -coumadin

## 2018-07-19 NOTE — ED PROVIDER NOTE - CONSTITUTIONAL, MLM
Well appearing, well nourished, awake, alert, oriented to person, place, time/situation and in no apparent distress. normal... Well appearing, well nourished, awake, alert, oriented to person, place, time/situation and in no apparent distress. Nontoxic appearing.

## 2018-07-19 NOTE — ED PROVIDER NOTE - MUSCULOSKELETAL, MLM
Spine appears normal, range of motion is not limited, no muscle or joint tenderness Spine appears normal, range of motion is not limited, no muscle or joint tenderness. 5/5 strength on flexion and extension of all limbs.

## 2018-07-19 NOTE — ED PROVIDER NOTE - PMH
CAD (coronary artery disease)    CHF (congestive heart failure)    HTN (hypertension)    Hyperlipidemia    Parkinson's disease

## 2018-07-19 NOTE — H&P ADULT - NSHPPHYSICALEXAM_GEN_ALL_CORE
Vital Signs Last 24 Hrs  T(C): 36.6 (19 Jul 2018 20:23), Max: 36.6 (19 Jul 2018 20:23)  T(F): 97.8 (19 Jul 2018 20:23), Max: 97.8 (19 Jul 2018 20:23)  HR: 71 (19 Jul 2018 20:23) (70 - 71)  BP: 125/60 (19 Jul 2018 20:23) (125/60 - 141/66)  BP(mean): --  RR: 16 (19 Jul 2018 20:23) (16 - 19)  SpO2: 96% (19 Jul 2018 20:23) (95% - 96%)    GEN: appears comfortable  Neuro: Alert, CN nonfocal  HEENT: NC/AT, EOMI  Neck: no thyroidmegaly, no JVD  Cardiovascular: S1S2 present, irregularly irregular rhythm, no murmur  Respiratory: breath sounds normal bilaterally, no wheezing, no rales, occ rhonchi  Gastrointestinal: bowel sounds normal, soft, no abdominal tenderness  Musculoskeletal: no muscle tenderness  Extremities: trace edema  Skin: No rash

## 2018-07-19 NOTE — ED PROVIDER NOTE - NS_ ATTENDINGSCRIBEDETAILS _ED_A_ED_FT
I Joseph Wynne MD saw and examined the patient. Scribe documented for me and under my supervision. I have modified the scribe's documentation where necessary to reflect my history, physical exam and other relevant documentations pertinent to the care of the patient.

## 2018-07-19 NOTE — ED PROVIDER NOTE - OBJECTIVE STATEMENT
88 y/o m with PMHx of CAD, AICD, CABG x3, stent x1, HTN on Coumadin, CHF/PVD, Parkinson's presents to the ED c/o unwitnessed syncopal episode today. Pt states he was walking in his house, going to the kitchen when he synopsized and fell. Denies CP, SOB, palpitations, fever, or chills before the syncopal episode or currently in the ED.  Now c/o abrasions to BL arms. Pt unable to stand after fall, called daughter who contacted EMS. Patient lives with daughter and son in law who are the care providers for the patient. Hx obtained by pt and son at bedside.

## 2018-07-19 NOTE — ED ADULT TRIAGE NOTE - CHIEF COMPLAINT QUOTE
Pt presents s/p unwitnessed fall on coumadin. Pt was down approximately 2 hours before being found my son in law. Abrasions noted to b/l arms. Trauma alert called at 5:15pm.

## 2018-07-19 NOTE — H&P ADULT - HISTORY OF PRESENT ILLNESS
89 y.o. male with PMH CAD s/p CABG x3, s/p stent, CHF s/p AICD, HTN, HLD, Parkinson's disease presents with unwitnessed fall, syncope. Pt reports blacking out. Denies n/v, f/c, CP or SOB. Denies lightheadedness or dizziness. No tongue biting. Pt was on the floor and was found by family couple hours later. Pt reports neck pain and back pain, but pt had neck pain for the past few days. Reports some urine incontinence but reports from constant need to urinate.    PMH: as above  PSH: as above  Social Hx: nonsmoker, nondrinker  Family Hx: Mother-hernia, cardiac disease  ROS: per HPI

## 2018-07-19 NOTE — PROVIDER CONTACT NOTE (OTHER) - DATE AND TIME:
Bren returns to the clinic in follow-up after undergoing imaging of the abdomen and pelvis in addition to stool workup. The patient indicates that her pain is since resolved she no longer has any discomfort whatsoever in her abdomen. She denies any bowel habit changes at this time.    On physical examination the patient's abdomen is benign. I did review the results of the CT scan which showed no evidence of mesenteric vascular disease. In addition it did show incidental improvement of the thickening of the colon and small bowel. The patient's ultrasound of the adnexa showed evidence of a 2 cm simple appearing ovarian cyst.    Assessment #1 probable acute self-limiting colitis now resolved #2 evidence of left ovarian cyst    Plan we'll plan to see the patient if her pain recurs. As a pertains to her left ovarian cyst a recommend the patient be seen by gynecology. She was given information about local resources she does indicate she has established in the past with an outside provider.  I spent 25 minutes during this visit, with more than 50% of the total time spent with face to face counseling and coordinating care for this patient as outlined in the impression and plan. The patient asked appropriate questions which were answered to my ability.  A discussion of expectations also occurred and there was no disagreement.    
19-Jul-2018 22:43
19-Jul-2018 22:48

## 2018-07-20 DIAGNOSIS — R55 SYNCOPE AND COLLAPSE: ICD-10-CM

## 2018-07-20 DIAGNOSIS — I25.10 ATHEROSCLEROTIC HEART DISEASE OF NATIVE CORONARY ARTERY WITHOUT ANGINA PECTORIS: ICD-10-CM

## 2018-07-20 DIAGNOSIS — E78.2 MIXED HYPERLIPIDEMIA: ICD-10-CM

## 2018-07-20 DIAGNOSIS — I10 ESSENTIAL (PRIMARY) HYPERTENSION: ICD-10-CM

## 2018-07-20 LAB
ANION GAP SERPL CALC-SCNC: 8 MMOL/L — SIGNIFICANT CHANGE UP (ref 5–17)
BUN SERPL-MCNC: 57 MG/DL — HIGH (ref 7–23)
CALCIUM SERPL-MCNC: 8.5 MG/DL — SIGNIFICANT CHANGE UP (ref 8.5–10.1)
CHLORIDE SERPL-SCNC: 101 MMOL/L — SIGNIFICANT CHANGE UP (ref 96–108)
CO2 SERPL-SCNC: 36 MMOL/L — HIGH (ref 22–31)
CREAT SERPL-MCNC: 1.98 MG/DL — HIGH (ref 0.5–1.3)
GLUCOSE SERPL-MCNC: 94 MG/DL — SIGNIFICANT CHANGE UP (ref 70–99)
HCT VFR BLD CALC: 32.7 % — LOW (ref 39–50)
HGB BLD-MCNC: 11.1 G/DL — LOW (ref 13–17)
INR BLD: 1.88 RATIO — HIGH (ref 0.88–1.16)
MCHC RBC-ENTMCNC: 32 PG — SIGNIFICANT CHANGE UP (ref 27–34)
MCHC RBC-ENTMCNC: 33.9 GM/DL — SIGNIFICANT CHANGE UP (ref 32–36)
MCV RBC AUTO: 94.2 FL — SIGNIFICANT CHANGE UP (ref 80–100)
NRBC # BLD: 0 /100 WBCS — SIGNIFICANT CHANGE UP (ref 0–0)
PLATELET # BLD AUTO: 132 K/UL — LOW (ref 150–400)
POTASSIUM SERPL-MCNC: 3.2 MMOL/L — LOW (ref 3.5–5.3)
POTASSIUM SERPL-SCNC: 3.2 MMOL/L — LOW (ref 3.5–5.3)
PROTHROM AB SERPL-ACNC: 20.6 SEC — HIGH (ref 9.8–12.7)
RBC # BLD: 3.47 M/UL — LOW (ref 4.2–5.8)
RBC # FLD: 13 % — SIGNIFICANT CHANGE UP (ref 10.3–14.5)
SODIUM SERPL-SCNC: 145 MMOL/L — SIGNIFICANT CHANGE UP (ref 135–145)
TROPONIN I SERPL-MCNC: 0.04 NG/ML — SIGNIFICANT CHANGE UP (ref 0.01–0.04)
TROPONIN I SERPL-MCNC: 0.04 NG/ML — SIGNIFICANT CHANGE UP (ref 0.01–0.04)
WBC # BLD: 5.36 K/UL — SIGNIFICANT CHANGE UP (ref 3.8–10.5)
WBC # FLD AUTO: 5.36 K/UL — SIGNIFICANT CHANGE UP (ref 3.8–10.5)

## 2018-07-20 PROCEDURE — 93010 ELECTROCARDIOGRAM REPORT: CPT

## 2018-07-20 PROCEDURE — 99223 1ST HOSP IP/OBS HIGH 75: CPT

## 2018-07-20 PROCEDURE — 93282 PRGRMG EVAL IMPLANTABLE DFB: CPT | Mod: 26

## 2018-07-20 RX ORDER — WARFARIN SODIUM 2.5 MG/1
3 TABLET ORAL ONCE
Qty: 0 | Refills: 0 | Status: COMPLETED | OUTPATIENT
Start: 2018-07-20 | End: 2018-07-20

## 2018-07-20 RX ORDER — WARFARIN SODIUM 2.5 MG/1
3 TABLET ORAL AT BEDTIME
Qty: 0 | Refills: 0 | Status: DISCONTINUED | OUTPATIENT
Start: 2018-07-20 | End: 2018-07-23

## 2018-07-20 RX ORDER — SODIUM CHLORIDE 9 MG/ML
250 INJECTION INTRAMUSCULAR; INTRAVENOUS; SUBCUTANEOUS ONCE
Qty: 0 | Refills: 0 | Status: COMPLETED | OUTPATIENT
Start: 2018-07-20 | End: 2018-07-20

## 2018-07-20 RX ORDER — SODIUM CHLORIDE 9 MG/ML
500 INJECTION INTRAMUSCULAR; INTRAVENOUS; SUBCUTANEOUS ONCE
Qty: 0 | Refills: 0 | Status: COMPLETED | OUTPATIENT
Start: 2018-07-20 | End: 2018-07-20

## 2018-07-20 RX ORDER — POTASSIUM CHLORIDE 20 MEQ
40 PACKET (EA) ORAL ONCE
Qty: 0 | Refills: 0 | Status: COMPLETED | OUTPATIENT
Start: 2018-07-20 | End: 2018-07-20

## 2018-07-20 RX ADMIN — CARBIDOPA AND LEVODOPA 1 TABLET(S): 25; 100 TABLET ORAL at 22:20

## 2018-07-20 RX ADMIN — ATORVASTATIN CALCIUM 10 MILLIGRAM(S): 80 TABLET, FILM COATED ORAL at 22:21

## 2018-07-20 RX ADMIN — CARVEDILOL PHOSPHATE 3.12 MILLIGRAM(S): 80 CAPSULE, EXTENDED RELEASE ORAL at 06:44

## 2018-07-20 RX ADMIN — WARFARIN SODIUM 3 MILLIGRAM(S): 2.5 TABLET ORAL at 22:21

## 2018-07-20 RX ADMIN — SODIUM CHLORIDE 500 MILLILITER(S): 9 INJECTION INTRAMUSCULAR; INTRAVENOUS; SUBCUTANEOUS at 07:00

## 2018-07-20 RX ADMIN — CLOPIDOGREL BISULFATE 75 MILLIGRAM(S): 75 TABLET, FILM COATED ORAL at 10:31

## 2018-07-20 RX ADMIN — Medication 40 MILLIEQUIVALENT(S): at 22:21

## 2018-07-20 RX ADMIN — CARBIDOPA AND LEVODOPA 1 TABLET(S): 25; 100 TABLET ORAL at 10:31

## 2018-07-20 RX ADMIN — PANTOPRAZOLE SODIUM 40 MILLIGRAM(S): 20 TABLET, DELAYED RELEASE ORAL at 10:31

## 2018-07-20 RX ADMIN — CARBIDOPA AND LEVODOPA 1 TABLET(S): 25; 100 TABLET ORAL at 15:58

## 2018-07-20 RX ADMIN — SODIUM CHLORIDE 1000 MILLILITER(S): 9 INJECTION INTRAMUSCULAR; INTRAVENOUS; SUBCUTANEOUS at 07:00

## 2018-07-20 RX ADMIN — FLUDROCORTISONE ACETATE 0.1 MILLIGRAM(S): 0.1 TABLET ORAL at 06:45

## 2018-07-20 RX ADMIN — DONEPEZIL HYDROCHLORIDE 5 MILLIGRAM(S): 10 TABLET, FILM COATED ORAL at 22:21

## 2018-07-20 NOTE — CONSULT NOTE ADULT - SUBJECTIVE AND OBJECTIVE BOX
PCP:    REQUESTING PHYSICIAN:    REASON FOR CONSULT:    CHIEF COMPLAINT:    HPI:  89 y.o. male with PMH CAD s/p CABG x3, s/p stent, CHF s/p AICD, HTN, HLD, Parkinson's disease presents with unwitnessed fall, syncope. Pt reports blacking out. Denies n/v, f/c, CP or SOB. Denies lightheadedness or dizziness. No tongue biting. Pt was on the floor and was found by family couple hours later. Pt reports neck pain and back pain, but pt had neck pain for the past few days. Reports some urine incontinence but reports from constant need to urinate. Cardiology was called to evaluate syncope. He was seen in our office appx 1 month ago without significant complaints or findings. Medications were maintained. Pt denies chest pain or shortness of breath at this time but is lethargic and unaware of the events preceding his episode.     PMH: as above  PSH: as above  Social Hx: nonsmoker, nondrinker  Family Hx: Mother-hernia, cardiac disease  ROS: per HPI (19 Jul 2018 22:08)      PAST MEDICAL & SURGICAL HISTORY:  CHF (congestive heart failure)  Hyperlipidemia  HTN (hypertension)  CAD (coronary artery disease)  AICD (automatic cardioverter/defibrillator) present  S/P CABG      Allergies    morphine (Headache)    Intolerances        SOCIAL HISTORY:    FAMILY HISTORY:      MEDICATIONS:  MEDICATIONS  (STANDING):  atorvastatin 10 milliGRAM(s) Oral at bedtime  carbidopa/levodopa  10/100 1 Tablet(s) Oral three times a day  carvedilol 3.125 milliGRAM(s) Oral daily  clopidogrel Tablet 75 milliGRAM(s) Oral daily  donepezil 5 milliGRAM(s) Oral at bedtime  fludroCORTISONE 0.1 milliGRAM(s) Oral daily  pantoprazole    Tablet 40 milliGRAM(s) Oral before breakfast    MEDICATIONS  (PRN):      REVIEW OF SYSTEMS:    CONSTITUTIONAL: No weakness, fevers or chills  EYES/ENT: No visual changes;  No vertigo or throat pain   NECK: No pain or stiffness  RESPIRATORY: No cough, wheezing, hemoptysis; No shortness of breath  CARDIOVASCULAR: No chest pain or palpitations  GASTROINTESTINAL: No abdominal or epigastric pain. No nausea, vomiting, or hematemesis; No diarrhea or constipation. No melena or hematochezia.  GENITOURINARY: No dysuria, frequency or hematuria  NEUROLOGICAL: Syncope  SKIN: No itching, burning, rashes, or lesions   All other review of systems is negative unless indicated above    Vital Signs Last 24 Hrs  T(C): 36.2 (20 Jul 2018 05:19), Max: 36.8 (19 Jul 2018 23:47)  T(F): 97.1 (20 Jul 2018 05:19), Max: 98.2 (19 Jul 2018 23:47)  HR: 63 (20 Jul 2018 04:00) (57 - 71)  BP: 109/47 (20 Jul 2018 04:00) (78/40 - 141/66)  BP(mean): 63 (20 Jul 2018 04:00) (40 - 63)  RR: 16 (20 Jul 2018 04:00) (14 - 19)  SpO2: 95% (20 Jul 2018 04:00) (94% - 96%)    I&O's Summary    19 Jul 2018 07:01  -  20 Jul 2018 07:00  --------------------------------------------------------  IN: 0 mL / OUT: 450 mL / NET: -450 mL        PHYSICAL EXAM:    Constitutional: NAD, awake and lethargic  HEENT: PERR, EOMI,  No oral cyananosis.  Neck:  supple,  No JVD  Respiratory: Breath sounds are clear bilaterally, No wheezing, rales or rhonchi  Cardiovascular: S1 and S2, regular rate and rhythm, limited exam  Gastrointestinal: Bowel Sounds present, soft, nontender.   Extremities: No peripheral edema. No clubbing or cyanosis.  Vascular: 2+ peripheral pulses  Neurological: A/O x 3, no focal deficits  Musculoskeletal: no calf tenderness.  Skin: No rashes.      LABS: All Labs Reviewed:                        11.1   5.36  )-----------( 132      ( 20 Jul 2018 04:37 )             32.7                         12.8   7.33  )-----------( 154      ( 19 Jul 2018 18:42 )             36.9     20 Jul 2018 04:37    145    |  101    |  57     ----------------------------<  94     3.2     |  36     |  1.98   19 Jul 2018 18:42    142    |  98     |  58     ----------------------------<  96     3.5     |  35     |  2.22     Ca    8.5        20 Jul 2018 04:37  Ca    9.1        19 Jul 2018 18:42    TPro  8.0    /  Alb  3.5    /  TBili  0.6    /  DBili  x      /  AST  22     /  ALT  18     /  AlkPhos  96     19 Jul 2018 18:42    PT/INR - ( 20 Jul 2018 04:37 )   PT: 20.6 sec;   INR: 1.88 ratio         PTT - ( 19 Jul 2018 18:42 )  PTT:36.2 sec  CARDIAC MARKERS ( 20 Jul 2018 04:37 )  0.035 ng/mL / x     / x     / x     / x      CARDIAC MARKERS ( 20 Jul 2018 00:30 )  0.040 ng/mL / x     / x     / x     / x      CARDIAC MARKERS ( 19 Jul 2018 18:42 )  0.022 ng/mL / x     / 94 U/L / x     / x          Blood Culture:         RADIOLOGY/EKG:< from: 12 Lead ECG (07.19.18 @ 17:38) >  Diagnosis Line Sinus rhythm with Premature supraventricular complexes  Right bundle branch block  T wave abnormality, consider inferior ischemia  Abnormal ECG  When compared with ECG of 26-FEB-2018 07:49,  Sinus rhythm has replaced Atrial fibrillation  Nonspecific T wave abnormality no longer evident in Lateral leads  Confirmed by LUZMA ROSAS, [facility] ([id number]) on 7/20/2018 6:15:31 AM    < end of copied text >  < from: Transthoracic Echocardiogram (02.26.18 @ 09:24) >  Impression     Summary     The left ventricle is normal in size, wall thickness. Moderately   decreased   left ventricular systolic function with an estimated left ventricular   ejection fraction of 35-40 %. There is paradoxical septal motion   The right atrium appears moderately dilated.   A device wire is seen in the RV and RA.   Normal aortic valve structure and function.   Normal appearing mitral valve structure and function.   Moderate (2+) mitral regurgitation is present.   Mild mitral annular calcification is present.   Normal appearing tricuspid valve structure and function.   Moderate to severe (3+) tricuspid valve regurgitation is present.   There is severe elevation in right ventricular systolic pressure     Signature     ----------------------------------------------------------------   Electronically signed by Bigg Beyer(Interpreting physician)    < end of copied text >

## 2018-07-20 NOTE — PROGRESS NOTE ADULT - SUBJECTIVE AND OBJECTIVE BOX
Reason for Admission: syncope, fall	  History of Present Illness: 	  89 y.o. male with PMH CAD s/p CABG x3, s/p stent, CHF s/p AICD, HTN, HLD, Parkinson's disease presents with unwitnessed fall, syncope. Pt reports blacking out. Denies n/v, f/c, CP or SOB. Denies lightheadedness or dizziness. No tongue biting. Pt was on the floor and was found by family couple hours later. Pt reports neck pain and back pain, but pt had neck pain for the past few days. Reports some urine incontinence but reports from constant need to urinate.    REVIEW OF SYSTEMS:  General: NAD, hemodynamically stable, (-)  fever, (-) chills, (-) weakness  HEENT:  Eyes:  No visual loss, blurred vision, double vision or yellow sclerae. Ears, Nose, Throat:  No hearing loss, sneezing, congestion, runny nose or sore throat.  SKIN:  No rash or itching.  CARDIOVASCULAR:  No chest pain, chest pressure or chest discomfort. No palpitations or edema.  RESPIRATORY:  No shortness of breath, cough or sputum.  GASTROINTESTINAL:  No anorexia, nausea, vomiting or diarrhea. No abdominal pain or blood.  NEUROLOGICAL:  No headache, dizziness, syncope, paralysis, ataxia, numbness or tingling in the extremities. No change in bowel or bladder control.  MUSCULOSKELETAL:  No muscle, back pain, joint pain or stiffness.  HEMATOLOGIC:  No anemia, bleeding or bruising.  LYMPHATICS:  No enlarged nodes. No history of splenectomy.  ENDOCRINOLOGIC:  No reports of sweating, cold or heat intolerance. No polyuria or polydipsia.  ALLERGIES:  No history of asthma, hives, eczema or rhinitis.    Physical Exam:   GENERAL APPEARANCE:  NAD, hemodynamically stable  T(C): 36.1 (18 @ 16:34), Max: 36.8 (18 @ 23:47)  HR: 59 (18 @ 16:00) (57 - 71)  BP: 85/37 (18 @ 16:00) (78/40 - 141/66)  RR: 18 (18 @ 11:00) (14 - 19)  SpO2: 92% (18 @ 08:00) (92% - 96%)  Wt(kg): --  HEENT:  Head is normocephalic    Skin:  Warm and dry without any rash   NECK:  Supple without lymphadenopathy.   HEART:  Regular rate and rhythm. normal S1 and S2, No M/R/G  LUNGS:  Good ins/exp effort, no W/R/R/C  ABDOMEN:  Soft, nontender, nondistended with good bowel sounds heard  EXTREMITIES:  Without cyanosis, clubbing or edema.   NEUROLOGICAL:  Gross nonfocal       CBC Full  -  ( 2018 04:37 )  WBC Count : 5.36 K/uL  Hemoglobin : 11.1 g/dL  Hematocrit : 32.7 %  Platelet Count - Automated : 132 K/uL    PT/INR - ( 2018 04:37 )   PT: 20.6 sec;   INR: 1.88 ratio         PTT - ( 2018 18:42 )  PTT:36.2 sec  Urinalysis Basic - ( 2018 18:42 )    Color: Yellow / Appearance: Clear / S.010 / pH: x  Gluc: x / Ketone: Negative  / Bili: Negative / Urobili: Negative mg/dL   Blood: x / Protein: Negative mg/dL / Nitrite: Negative   Leuk Esterase: Negative / RBC: 0-2 /HPF / WBC Negative   Sq Epi: x / Non Sq Epi: Negative / Bacteria: Occasional      -    145  |  101  |  57<H>  ----------------------------<  94  3.2<L>   |  36<H>  |  1.98<H>    Ca    8.5      2018 04:37    TPro  8.0  /  Alb  3.5  /  TBili  0.6  /  DBili  x   /  AST  22  /  ALT  18  /  AlkPhos  96  07-19    #unwitnessed fall, syncope suspecting dehydration and orthostatic hypotension  - admit to tele  - post AICD interogation - 5 NSVT episodes since March lasting seconds only  - check orthostatic vital signs  - hold metolazone and lasix  - PT eval  - SW eval  - Cardiology following    # CAD s/p CABG, stent  - on plavix, statin    # MARILYN on CKD3  - hold metolazone and lasix  - hold lisinopril  - reintroduce once Cr improves    #AFib on Coumadin  -CHADSVASc >4  -INR subtherapeutic  -increase coumadin   -rate controlled on BB    #chronic systolic CHF  -I/O, daily weight  -echo (18) EF 35-40% mod MR, mod to severe TR  -currently, not in exacerbation  -lasix and metolazone held for now Reason for Admission: syncope, fall	  History of Present Illness: 	  89 y.o. male with PMH CAD s/p CABG x3, s/p stent, CHF s/p AICD, HTN, HLD, Parkinson's disease presents with unwitnessed fall, syncope. Pt reports blacking out. Denies n/v, f/c, CP or SOB. Denies lightheadedness or dizziness. No tongue biting. Pt was on the floor and was found by family couple hours later. Pt reports neck pain and back pain, but pt had neck pain for the past few days. Reports some urine incontinence but reports from constant need to urinate.    : patients care has been discussed at the bedside with the patient. patient with delayed speech. Denies any HA, CP, SOB. Passing out episode - with being on the floor 1 hour as per son. Pt now comfortable. BPs on a lower side. Will closely monitor.     REVIEW OF SYSTEMS:  General: NAD, hemodynamically stable   HEENT:  Eyes:  No visual loss, blurred vision, double vision or yellow sclerae. Ears, Nose, Throat:  No hearing loss, sneezing, congestion, runny nose or sore throat.  SKIN:  No rash or itching.  CARDIOVASCULAR:  No chest pain, chest pressure or chest discomfort. No palpitations or edema.  RESPIRATORY:  No shortness of breath, cough or sputum.  GASTROINTESTINAL:  No anorexia, nausea, vomiting or diarrhea. No abdominal pain or blood.  NEUROLOGICAL:  No headache, dizziness, syncope, paralysis, ataxia, numbness or tingling in the extremities. No change in bowel or bladder control.  MUSCULOSKELETAL:  No muscle, back pain, joint pain or stiffness.     Physical Exam:   GENERAL APPEARANCE:  NAD, hemodynamically stable  T(C): 36.1 (18 @ 16:34), Max: 36.8 (18 @ 23:47)  HR: 59 (18 @ 16:00) (57 - 71)  BP: 85/37 (18 @ 16:00) (78/40 - 141/66)  RR: 18 (18 @ 11:00) (14 - 19)  SpO2: 92% (18 @ 08:00) (92% - 96%)  HEENT:  normocephalic    Skin:  dry  NECK:  no JVD  HEART:  Regular rate and rhythm. large well healed scar around the chest  LUNGS:  Good ins/exp effort   ABDOMEN:  Soft, nontender, nondistended with good bowel sounds heard  EXTREMITIES:  Without cyanosis, clubbing or edema.   NEUROLOGICAL:  Gross nonfocal       CBC Full  -  ( 2018 04:37 )  WBC Count : 5.36 K/uL  Hemoglobin : 11.1 g/dL  Hematocrit : 32.7 %  Platelet Count - Automated : 132 K/uL    PT/INR - ( 2018 04:37 )   PT: 20.6 sec;   INR: 1.88 ratio         PTT - ( 2018 18:42 )  PTT:36.2 sec  Urinalysis Basic - ( 2018 18:42 )    Color: Yellow / Appearance: Clear / S.010 / pH: x  Gluc: x / Ketone: Negative  / Bili: Negative / Urobili: Negative mg/dL   Blood: x / Protein: Negative mg/dL / Nitrite: Negative   Leuk Esterase: Negative / RBC: 0-2 /HPF / WBC Negative   Sq Epi: x / Non Sq Epi: Negative / Bacteria: Occasional          145  |  101  |  57<H>  ----------------------------<  94  3.2<L>   |  36<H>  |  1.98<H>    Ca    8.5      2018 04:37    TPro  8.0  /  Alb  3.5  /  TBili  0.6  /  DBili  x   /  AST  22  /  ALT  18  /  AlkPhos  96  07-19    #unwitnessed fall, syncope suspecting dehydration in combination with autonomic dysregulation with parkinsons  - admit to tele  - post AICD interogation - 5 NSVT episodes since March lasting seconds only  - check orthostatic vital signs  - hold metolazone and lasix  - PT eval  - SW eval  - Cardiology following    # CAD s/p CABG, stent  - on plavix, statin    # MARILYN on CKD3  - hold metolazone and lasix  - hold lisinopril  - reintroduce once Cr improves    #AFib on Coumadin  -CHADSVASc >4  -INR subtherapeutic, continue with coumadin    #chronic systolic CHF  -I/O, daily weight  -echo (18) EF 35-40% mod MR, mod to severe TR  -currently, not in exacerbation  -lasix and metolazone held for now

## 2018-07-20 NOTE — PHYSICAL THERAPY INITIAL EVALUATION ADULT - PERTINENT HX OF CURRENT PROBLEM, REHAB EVAL
89 y.o. male with PMH CAD s/p CABG x3, s/p stent, CHF s/p AICD, HTN, HLD, Parkinson's disease presents with unwitnessed fall, syncope. Pt reports blacking out. Denies n/v, f/c, CP or SOB. Denies lightheadedness or dizziness. No tongue biting. Pt was on the floor and was found by family couple hours later.

## 2018-07-20 NOTE — PHYSICAL THERAPY INITIAL EVALUATION ADULT - MODALITIES TREATMENT COMMENTS
Pt with +orthostatics BP 74/28 in standing and was returned to bed and put in trendelenberg position BP resolving to 112/41.

## 2018-07-21 LAB
ANION GAP SERPL CALC-SCNC: 8 MMOL/L — SIGNIFICANT CHANGE UP (ref 5–17)
APTT BLD: 34 SEC — SIGNIFICANT CHANGE UP (ref 27.5–37.4)
BUN SERPL-MCNC: 46 MG/DL — HIGH (ref 7–23)
CALCIUM SERPL-MCNC: 8.3 MG/DL — LOW (ref 8.5–10.1)
CHLORIDE SERPL-SCNC: 103 MMOL/L — SIGNIFICANT CHANGE UP (ref 96–108)
CO2 SERPL-SCNC: 33 MMOL/L — HIGH (ref 22–31)
CREAT SERPL-MCNC: 1.71 MG/DL — HIGH (ref 0.5–1.3)
GLUCOSE SERPL-MCNC: 105 MG/DL — HIGH (ref 70–99)
HCT VFR BLD CALC: 32.5 % — LOW (ref 39–50)
HGB BLD-MCNC: 11.4 G/DL — LOW (ref 13–17)
INR BLD: 1.83 RATIO — HIGH (ref 0.88–1.16)
MCHC RBC-ENTMCNC: 32.9 PG — SIGNIFICANT CHANGE UP (ref 27–34)
MCHC RBC-ENTMCNC: 35.1 GM/DL — SIGNIFICANT CHANGE UP (ref 32–36)
MCV RBC AUTO: 93.7 FL — SIGNIFICANT CHANGE UP (ref 80–100)
NRBC # BLD: 0 /100 WBCS — SIGNIFICANT CHANGE UP (ref 0–0)
PLATELET # BLD AUTO: 137 K/UL — LOW (ref 150–400)
POTASSIUM SERPL-MCNC: 3.4 MMOL/L — LOW (ref 3.5–5.3)
POTASSIUM SERPL-SCNC: 3.4 MMOL/L — LOW (ref 3.5–5.3)
PROTHROM AB SERPL-ACNC: 20 SEC — HIGH (ref 9.8–12.7)
RBC # BLD: 3.47 M/UL — LOW (ref 4.2–5.8)
RBC # FLD: 12.9 % — SIGNIFICANT CHANGE UP (ref 10.3–14.5)
SODIUM SERPL-SCNC: 144 MMOL/L — SIGNIFICANT CHANGE UP (ref 135–145)
WBC # BLD: 5.61 K/UL — SIGNIFICANT CHANGE UP (ref 3.8–10.5)
WBC # FLD AUTO: 5.61 K/UL — SIGNIFICANT CHANGE UP (ref 3.8–10.5)

## 2018-07-21 PROCEDURE — 99233 SBSQ HOSP IP/OBS HIGH 50: CPT

## 2018-07-21 PROCEDURE — 93010 ELECTROCARDIOGRAM REPORT: CPT

## 2018-07-21 RX ORDER — POTASSIUM CHLORIDE 20 MEQ
40 PACKET (EA) ORAL ONCE
Qty: 0 | Refills: 0 | Status: COMPLETED | OUTPATIENT
Start: 2018-07-21 | End: 2018-07-21

## 2018-07-21 RX ORDER — METOPROLOL TARTRATE 50 MG
12.5 TABLET ORAL EVERY 12 HOURS
Qty: 0 | Refills: 0 | Status: DISCONTINUED | OUTPATIENT
Start: 2018-07-21 | End: 2018-07-24

## 2018-07-21 RX ADMIN — ATORVASTATIN CALCIUM 10 MILLIGRAM(S): 80 TABLET, FILM COATED ORAL at 21:14

## 2018-07-21 RX ADMIN — CARBIDOPA AND LEVODOPA 1 TABLET(S): 25; 100 TABLET ORAL at 20:58

## 2018-07-21 RX ADMIN — Medication 40 MILLIEQUIVALENT(S): at 14:24

## 2018-07-21 RX ADMIN — Medication 12.5 MILLIGRAM(S): at 18:01

## 2018-07-21 RX ADMIN — PANTOPRAZOLE SODIUM 40 MILLIGRAM(S): 20 TABLET, DELAYED RELEASE ORAL at 06:23

## 2018-07-21 RX ADMIN — CARBIDOPA AND LEVODOPA 1 TABLET(S): 25; 100 TABLET ORAL at 14:28

## 2018-07-21 RX ADMIN — CLOPIDOGREL BISULFATE 75 MILLIGRAM(S): 75 TABLET, FILM COATED ORAL at 11:38

## 2018-07-21 RX ADMIN — DONEPEZIL HYDROCHLORIDE 5 MILLIGRAM(S): 10 TABLET, FILM COATED ORAL at 21:14

## 2018-07-21 RX ADMIN — CARBIDOPA AND LEVODOPA 1 TABLET(S): 25; 100 TABLET ORAL at 08:56

## 2018-07-21 RX ADMIN — CARVEDILOL PHOSPHATE 3.12 MILLIGRAM(S): 80 CAPSULE, EXTENDED RELEASE ORAL at 06:23

## 2018-07-21 RX ADMIN — FLUDROCORTISONE ACETATE 0.1 MILLIGRAM(S): 0.1 TABLET ORAL at 06:23

## 2018-07-21 NOTE — PROGRESS NOTE ADULT - SUBJECTIVE AND OBJECTIVE BOX
Reason for Admission: syncope, fall	  History of Present Illness: 	  89 y.o. male with PMH CAD s/p CABG x3, s/p stent, CHF s/p AICD, HTN, HLD, Parkinson's disease presents with unwitnessed fall, syncope. Pt reports blacking out. Denies n/v, f/c, CP or SOB. Denies lightheadedness or dizziness. No tongue biting. Pt was on the floor and was found by family couple hours later. Pt reports neck pain and back pain, but pt had neck pain for the past few days. Reports some urine incontinence but reports from constant need to urinate.    : patients care has been discussed at the bedside with the patient. patient with delayed speech. Denies any HA, CP, SOB. Passing out episode - with being on the floor 1 hour as per son. Pt now comfortable. BPs on a lower side. Will closely monitor.     : patient feels better today. No new complaints start physical therapy.    REVIEW OF SYSTEMS:  General: NAD, hemodynamically stable   HEENT:  Eyes:  No visual loss, blurred vision, double vision or yellow sclerae. Ears, Nose, Throat:  No hearing loss, sneezing, congestion, runny nose or sore throat.  SKIN:  No rash or itching.  CARDIOVASCULAR:  No chest pain, chest pressure or chest discomfort. No palpitations or edema.  RESPIRATORY:  No shortness of breath, cough or sputum.  GASTROINTESTINAL:  No anorexia, nausea, vomiting or diarrhea. No abdominal pain or blood.  NEUROLOGICAL:  No headache, dizziness, syncope, paralysis, ataxia, numbness or tingling in the extremities. No change in bowel or bladder control.  MUSCULOSKELETAL:  No muscle, back pain, joint pain or stiffness.     Physical Exam:   GENERAL APPEARANCE:  NAD, hemodynamically stable  ICU Vital Signs Last 24 Hrs  T(C): 36.8 (2018 11:27), Max: 36.9 (2018 00:51)  T(F): 98.3 (2018 11:27), Max: 98.5 (2018 00:51)  HR: 60 (2018 11:27) (59 - 71)  BP: 117/43 (2018 11:27) (85/37 - 117/43)  BP(mean): 51 (2018 18:00) (50 - 59)  RR: 18 (2018 11:27) (17 - 18)  SpO2: 95% (2018 11:27) (95% - 95%)    HEENT:  normocephalic    Skin:  dry  NECK:  no JVD  HEART:  Regular rate and rhythm. large well healed scar around the chest  LUNGS:  Good ins/exp effort   ABDOMEN:  Soft, nontender, nondistended with good bowel sounds heard  EXTREMITIES:  Without cyanosis, clubbing or edema.   NEUROLOGICAL:  Gross nonfocal       CBC Full  -  ( 2018 04:37 )  WBC Count : 5.36 K/uL  Hemoglobin : 11.1 g/dL  Hematocrit : 32.7 %  Platelet Count - Automated : 132 K/uL    PT/INR - ( 2018 04:37 )   PT: 20.6 sec;   INR: 1.88 ratio         PTT - ( 2018 18:42 )  PTT:36.2 sec  Urinalysis Basic - ( 2018 18:42 )    Color: Yellow / Appearance: Clear / S.010 / pH: x  Gluc: x / Ketone: Negative  / Bili: Negative / Urobili: Negative mg/dL   Blood: x / Protein: Negative mg/dL / Nitrite: Negative   Leuk Esterase: Negative / RBC: 0-2 /HPF / WBC Negative   Sq Epi: x / Non Sq Epi: Negative / Bacteria: Occasional        CBC Full  -  ( 2018 05:26 )  WBC Count : 5.61 K/uL  Hemoglobin : 11.4 g/dL  Hematocrit : 32.5 %  Platelet Count - Automated : 137 K/uL  Mean Cell Volume : 93.7 fl  Mean Cell Hemoglobin : 32.9 pg  Mean Cell Hemoglobin Concentration : 35.1 gm/dL  Auto Neutrophil # : x  Auto Lymphocyte # : x  Auto Monocyte # : x  Auto Eosinophil # : x  Auto Basophil # : x  Auto Neutrophil % : x  Auto Lymphocyte % : x  Auto Monocyte % : x  Auto Eosinophil % : x  Auto Basophil % : x    PT/INR - ( 2018 05:26 )   PT: 20.0 sec;   INR: 1.83 ratio         PTT - ( 2018 05:26 )  PTT:34.0 sec  Urinalysis Basic - ( 2018 18:42 )    Color: Yellow / Appearance: Clear / S.010 / pH: x  Gluc: x / Ketone: Negative  / Bili: Negative / Urobili: Negative mg/dL   Blood: x / Protein: Negative mg/dL / Nitrite: Negative   Leuk Esterase: Negative / RBC: 0-2 /HPF / WBC Negative   Sq Epi: x / Non Sq Epi: Negative / Bacteria: Occasional          144  |  103  |  46<H>  ----------------------------<  105<H>  3.4<L>   |  33<H>  |  1.71<H>    Ca    8.3<L>      2018 05:26    TPro  8.0  /  Alb  3.5  /  TBili  0.6  /  DBili  x   /  AST  22  /  ALT  18  /  AlkPhos  96  -      #unwitnessed fall, syncope suspecting dehydration in combination with autonomic dysregulation with parkinsons  - admit to tele  - post AICD interogation - 5 NSVT episodes since March lasting seconds only  - check orthostatic vital signs  - hold metolazone and lasix  - PT eval  - SW eval  - Cardiology following    # CAD s/p CABG, stent  - on plavix, statin    # MARILYN on CKD3  - hold metolazone and lasix  - hold lisinopril  - reintroduce once Cr improves    #AFib on Coumadin  -CHADSVASc >4  -INR subtherapeutic, continue with coumadin    #chronic systolic CHF  -I/O, daily weight  -echo (18) EF 35-40% mod MR, mod to severe TR  -currently, not in exacerbation  -lasix and metolazone held for now

## 2018-07-21 NOTE — PROGRESS NOTE ADULT - PROBLEM SELECTOR PLAN 1
probably due to orthostatic hypotension , associated with dehydration , parkinsons disease and medication  , would encourage diuretic , change coreg to lopressor  , continue florinef ,

## 2018-07-21 NOTE — PROGRESS NOTE ADULT - SUBJECTIVE AND OBJECTIVE BOX
REASON FOR CONSULT: syncope    CHIEF COMPLAINT: syncope     HPI:  89 y.o. male with PMH CAD s/p CABG x3, s/p stent, CHF s/p AICD, HTN, HLD, Parkinson's disease presents with unwitnessed fall, syncope. Pt reports blacking out. Denies n/v, f/c, CP or SOB. Denies lightheadedness or dizziness. No tongue biting. Pt was on the floor and was found by family couple hours later. Pt reports neck pain and back pain, but pt had neck pain for the past few days. Reports some urine incontinence but reports from constant need to urinate. Cardiology was called to evaluate syncope. He was seen in our office appx 1 month ago without significant complaints or findings. Medications were maintained. Pt denies chest pain or shortness of breath at this time but is lethargic and unaware of the events preceding his episode.     7/21/18  Patient is feeling well , did have episodes of syncope in last one and half month , patient having low normal range SBP , patient AICD good battery status , brief VT              PAST MEDICAL & SURGICAL HISTORY:  CHF (congestive heart failure)  Hyperlipidemia  HTN (hypertension)  CAD (coronary artery disease)  AICD (automatic cardioverter/defibrillator) present  S/P CABG      Allergies    morphine (Headache)    Intolerances        SOCIAL HISTORY: nonsmoker, nondrinker      FAMILY HISTORY: mother had CAD      MEDICATIONS:  MEDICATIONS  (STANDING):  atorvastatin 10 milliGRAM(s) Oral at bedtime  carbidopa/levodopa  10/100 1 Tablet(s) Oral three times a day  carvedilol 3.125 milliGRAM(s) Oral daily  clopidogrel Tablet 75 milliGRAM(s) Oral daily  donepezil 5 milliGRAM(s) Oral at bedtime  fludroCORTISONE 0.1 milliGRAM(s) Oral daily  pantoprazole    Tablet 40 milliGRAM(s) Oral before breakfast    MEDICATIONS  (PRN):      REVIEW OF SYSTEMS:    unsteady gait , denies any sob or chest pain or palpitation   All other review of systems is negative unless indicated above    Vital Signs Last 24 Hrs  T(C): 36.2 (20 Jul 2018 05:19), Max: 36.8 (19 Jul 2018 23:47)  T(F): 97.1 (20 Jul 2018 05:19), Max: 98.2 (19 Jul 2018 23:47)  HR: 63 (20 Jul 2018 04:00) (57 - 71)  BP: 109/47 (20 Jul 2018 04:00) (78/40 - 141/66)  BP(mean): 63 (20 Jul 2018 04:00) (40 - 63)  RR: 16 (20 Jul 2018 04:00) (14 - 19)  SpO2: 95% (20 Jul 2018 04:00) (94% - 96%)    I&O's Summary    19 Jul 2018 07:01  -  20 Jul 2018 07:00  --------------------------------------------------------  IN: 0 mL / OUT: 450 mL / NET: -450 mL        PHYSICAL EXAM:    Constitutional: NAD, awake and lethargic  HEENT: PERR, EOMI,  No oral cyananosis.  Neck:  supple,  No JVD  Respiratory: Breath sounds are clear bilaterally, No wheezing, rales or rhonchi  Cardiovascular: S1 and S2, regular rate and  irregular rhythm, limited exam  Gastrointestinal: Bowel Sounds present, soft, nontender.   Extremities: No peripheral edema. No clubbing or cyanosis.  Vascular: 2+ peripheral pulses  Neurological: A/O x 3, unsteady on his feet   Musculoskeletal: no calf tenderness.  Skin: No rashes.      LABS: All Labs Reviewed:                        11.1   5.36  )-----------( 132      ( 20 Jul 2018 04:37 )             32.7                         12.8   7.33  )-----------( 154      ( 19 Jul 2018 18:42 )             36.9     20 Jul 2018 04:37    145    |  101    |  57     ----------------------------<  94     3.2     |  36     |  1.98   19 Jul 2018 18:42    142    |  98     |  58     ----------------------------<  96     3.5     |  35     |  2.22     Ca    8.5        20 Jul 2018 04:37  Ca    9.1        19 Jul 2018 18:42    TPro  8.0    /  Alb  3.5    /  TBili  0.6    /  DBili  x      /  AST  22     /  ALT  18     /  AlkPhos  96     19 Jul 2018 18:42    PT/INR - ( 20 Jul 2018 04:37 )   PT: 20.6 sec;   INR: 1.88 ratio         PTT - ( 19 Jul 2018 18:42 )  PTT:36.2 sec  CARDIAC MARKERS ( 20 Jul 2018 04:37 )  0.035 ng/mL / x     / x     / x     / x      CARDIAC MARKERS ( 20 Jul 2018 00:30 )  0.040 ng/mL / x     / x     / x     / x      CARDIAC MARKERS ( 19 Jul 2018 18:42 )  0.022 ng/mL / x     / 94 U/L / x     / x          Blood Culture:         RADIOLOGY/EKG:< from: 12 Lead ECG (07.19.18 @ 17:38) >  Diagnosis Line Sinus rhythm with Premature supraventricular complexes  Right bundle branch block  T wave abnormality, consider inferior ischemia  Abnormal ECG  When compared with ECG of 26-FEB-2018 07:49,  Sinus rhythm has replaced Atrial fibrillation  Nonspecific T wave abnormality no longer evident in Lateral leads  Confirmed by JULIENNE SEGAL MD ([id number]) on 7/20/2018 6:15:31 AM    < end of copied text >  < from: Transthoracic Echocardiogram (02.26.18 @ 09:24) >  Impression     Summary     The left ventricle is normal in size, wall thickness. Moderately   decreased   left ventricular systolic function with an estimated left ventricular   ejection fraction of 35-40 %. There is paradoxical septal motion   The right atrium appears moderately dilated.   A device wire is seen in the RV and RA.   Normal aortic valve structure and function.   Normal appearing mitral valve structure and function.   Moderate (2+) mitral regurgitation is present.   Mild mitral annular calcification is present.   Normal appearing tricuspid valve structure and function.   Moderate to severe (3+) tricuspid valve regurgitation is present.   There is severe elevation in right ventricular systolic pressure     Signature     ----------------------------------------------------------------   Electronically signed by Bigg Beyer(Interpreting physician)    < end of copied text >      Monitor afib demand paced rhythm

## 2018-07-22 LAB
ANION GAP SERPL CALC-SCNC: 5 MMOL/L — SIGNIFICANT CHANGE UP (ref 5–17)
BUN SERPL-MCNC: 35 MG/DL — HIGH (ref 7–23)
CALCIUM SERPL-MCNC: 8.4 MG/DL — LOW (ref 8.5–10.1)
CHLORIDE SERPL-SCNC: 104 MMOL/L — SIGNIFICANT CHANGE UP (ref 96–108)
CO2 SERPL-SCNC: 34 MMOL/L — HIGH (ref 22–31)
CREAT SERPL-MCNC: 1.53 MG/DL — HIGH (ref 0.5–1.3)
GLUCOSE SERPL-MCNC: 114 MG/DL — HIGH (ref 70–99)
HCT VFR BLD CALC: 34.1 % — LOW (ref 39–50)
HGB BLD-MCNC: 11.6 G/DL — LOW (ref 13–17)
INR BLD: 2.03 RATIO — HIGH (ref 0.88–1.16)
MCHC RBC-ENTMCNC: 32.2 PG — SIGNIFICANT CHANGE UP (ref 27–34)
MCHC RBC-ENTMCNC: 34 GM/DL — SIGNIFICANT CHANGE UP (ref 32–36)
MCV RBC AUTO: 94.7 FL — SIGNIFICANT CHANGE UP (ref 80–100)
NRBC # BLD: 0 /100 WBCS — SIGNIFICANT CHANGE UP (ref 0–0)
PLATELET # BLD AUTO: 144 K/UL — LOW (ref 150–400)
POTASSIUM SERPL-MCNC: 3.3 MMOL/L — LOW (ref 3.5–5.3)
POTASSIUM SERPL-SCNC: 3.3 MMOL/L — LOW (ref 3.5–5.3)
PROTHROM AB SERPL-ACNC: 22.2 SEC — HIGH (ref 9.8–12.7)
RBC # BLD: 3.6 M/UL — LOW (ref 4.2–5.8)
RBC # FLD: 13 % — SIGNIFICANT CHANGE UP (ref 10.3–14.5)
SODIUM SERPL-SCNC: 143 MMOL/L — SIGNIFICANT CHANGE UP (ref 135–145)
WBC # BLD: 5.07 K/UL — SIGNIFICANT CHANGE UP (ref 3.8–10.5)
WBC # FLD AUTO: 5.07 K/UL — SIGNIFICANT CHANGE UP (ref 3.8–10.5)

## 2018-07-22 PROCEDURE — 99233 SBSQ HOSP IP/OBS HIGH 50: CPT

## 2018-07-22 RX ORDER — WARFARIN SODIUM 2.5 MG/1
3 TABLET ORAL ONCE
Qty: 0 | Refills: 0 | Status: COMPLETED | OUTPATIENT
Start: 2018-07-22 | End: 2018-07-22

## 2018-07-22 RX ADMIN — CARBIDOPA AND LEVODOPA 1 TABLET(S): 25; 100 TABLET ORAL at 21:20

## 2018-07-22 RX ADMIN — WARFARIN SODIUM 3 MILLIGRAM(S): 2.5 TABLET ORAL at 21:20

## 2018-07-22 RX ADMIN — PANTOPRAZOLE SODIUM 40 MILLIGRAM(S): 20 TABLET, DELAYED RELEASE ORAL at 05:35

## 2018-07-22 RX ADMIN — CARBIDOPA AND LEVODOPA 1 TABLET(S): 25; 100 TABLET ORAL at 09:10

## 2018-07-22 RX ADMIN — CLOPIDOGREL BISULFATE 75 MILLIGRAM(S): 75 TABLET, FILM COATED ORAL at 14:34

## 2018-07-22 RX ADMIN — Medication 12.5 MILLIGRAM(S): at 05:35

## 2018-07-22 RX ADMIN — Medication 12.5 MILLIGRAM(S): at 17:54

## 2018-07-22 RX ADMIN — ATORVASTATIN CALCIUM 10 MILLIGRAM(S): 80 TABLET, FILM COATED ORAL at 21:20

## 2018-07-22 RX ADMIN — FLUDROCORTISONE ACETATE 0.1 MILLIGRAM(S): 0.1 TABLET ORAL at 05:35

## 2018-07-22 RX ADMIN — DONEPEZIL HYDROCHLORIDE 5 MILLIGRAM(S): 10 TABLET, FILM COATED ORAL at 21:20

## 2018-07-22 RX ADMIN — WARFARIN SODIUM 3 MILLIGRAM(S): 2.5 TABLET ORAL at 00:43

## 2018-07-22 RX ADMIN — CARBIDOPA AND LEVODOPA 1 TABLET(S): 25; 100 TABLET ORAL at 14:34

## 2018-07-22 NOTE — PROGRESS NOTE ADULT - SUBJECTIVE AND OBJECTIVE BOX
REASON FOR CONSULT: syncope    CHIEF COMPLAINT: syncope     HPI:  89 y.o. male with PMH CAD s/p CABG x3, s/p stent, CHF s/p AICD, HTN, HLD, Parkinson's disease presents with unwitnessed fall, syncope. Pt reports blacking out. Denies n/v, f/c, CP or SOB. Denies lightheadedness or dizziness. No tongue biting. Pt was on the floor and was found by family couple hours later. Pt reports neck pain and back pain, but pt had neck pain for the past few days. Reports some urine incontinence but reports from constant need to urinate. Cardiology was called to evaluate syncope. He was seen in our office appx 1 month ago without significant complaints or findings. Medications were maintained. Pt denies chest pain or shortness of breath at this time but is lethargic and unaware of the events preceding his episode.     7/21/18  Patient is feeling well , did have episodes of syncope in last one and half month , patient having low normal range SBP , patient AICD good battery status , brief VT   7/22/18 Patient is feeling fine , has significant immediate orthostatic drop SBP ,  drops to SBP 70s while standing             PAST MEDICAL & SURGICAL HISTORY:  CHF (congestive heart failure)  Hyperlipidemia  HTN (hypertension)  CAD (coronary artery disease)  AICD (automatic cardioverter/defibrillator) present  S/P CABG      Allergies    morphine (Headache)    Intolerances        SOCIAL HISTORY: nonsmoker, nondrinker      FAMILY HISTORY: mother had CAD      MEDICATIONS  (STANDING):  atorvastatin 10 milliGRAM(s) Oral at bedtime  carbidopa/levodopa  10/100 1 Tablet(s) Oral three times a day  clopidogrel Tablet 75 milliGRAM(s) Oral daily  donepezil 5 milliGRAM(s) Oral at bedtime  fludroCORTISONE 0.1 milliGRAM(s) Oral daily  metoprolol tartrate 12.5 milliGRAM(s) Oral every 12 hours  pantoprazole    Tablet 40 milliGRAM(s) Oral before breakfast  warfarin 3 milliGRAM(s) Oral at bedtime    MEDICATIONS  (PRN):  MEDICATIONS  (PRN):      REVIEW OF SYSTEMS:    unsteady gait , denies any sob or chest pain or palpitation   All other review of systems is negative unless indicated above    Vital Signs Last 24 Hrs  T(C): 36.5 (22 Jul 2018 04:27), Max: 36.8 (21 Jul 2018 11:27)  T(F): 97.7 (22 Jul 2018 04:27), Max: 98.3 (21 Jul 2018 11:27)  HR: 55 (22 Jul 2018 04:27) (55 - 62)  BP: 113/43 (22 Jul 2018 04:27) (113/43 - 118/52)  BP(mean): --  RR: 17 (22 Jul 2018 04:27) (16 - 18)  SpO2: 97% (22 Jul 2018 04:27) (95% - 98%)    I&O's Summary    21 Jul 2018 07:01  -  22 Jul 2018 07:00  --------------------------------------------------------  IN: 0 mL / OUT: 120 mL / NET: -120 mL        PHYSICAL EXAM:    Constitutional: NAD, awake and lethargic  HEENT: PERR, EOMI,  No oral cyananosis.  Neck:  supple,  No JVD  Respiratory: Breath sounds are clear bilaterally, No wheezing, rales or rhonchi  Cardiovascular: S1 and S2, regular rate and  irregular rhythm, limited exam  Gastrointestinal: Bowel Sounds present, soft, nontender.   Extremities: No peripheral edema. No clubbing or cyanosis.  Vascular: 2+ peripheral pulses  Neurological: A/O x 3, unsteady on his feet   Musculoskeletal: no calf tenderness.  Skin: No rashes.      LABS: All Labs Reviewed:                          11.6   5.07  )-----------( 144      ( 22 Jul 2018 05:42 )             34.1     07-22    143  |  104  |  35<H>  ----------------------------<  114<H>  3.3<L>   |  34<H>  |  1.53<H>    Ca    8.4<L>      22 Jul 2018 05:42            PT/INR - ( 22 Jul 2018 05:42 )   PT: 22.2 sec;   INR: 2.03 ratio         PTT - ( 21 Jul 2018 05:26 )  PTT:34.0 sec      RADIOLOGY/EKG:< from: 12 Lead ECG (07.19.18 @ 17:38) >  Diagnosis Line Sinus rhythm with Premature supraventricular complexes  Right bundle branch block  T wave abnormality, consider inferior ischemia  Abnormal ECG  When compared with ECG of 26-FEB-2018 07:49,  Sinus rhythm has replaced Atrial fibrillation  Nonspecific T wave abnormality no longer evident in Lateral leads  Confirmed by JULIENNE SEGAL MD ([id number]) on 7/20/2018 6:15:31 AM    < end of copied text >  < from: Transthoracic Echocardiogram (02.26.18 @ 09:24) >  Impression     Summary     The left ventricle is normal in size, wall thickness. Moderately   decreased   left ventricular systolic function with an estimated left ventricular   ejection fraction of 35-40 %. There is paradoxical septal motion   The right atrium appears moderately dilated.   A device wire is seen in the RV and RA.   Normal aortic valve structure and function.   Normal appearing mitral valve structure and function.   Moderate (2+) mitral regurgitation is present.   Mild mitral annular calcification is present.   Normal appearing tricuspid valve structure and function.   Moderate to severe (3+) tricuspid valve regurgitation is present.   There is severe elevation in right ventricular systolic pressure     Signature     ----------------------------------------------------------------   Electronically signed by Bigg Beyer(Interpreting physician)    < end of copied text >      Monitor afib demand paced rhythm

## 2018-07-22 NOTE — PROGRESS NOTE ADULT - PROBLEM SELECTOR PLAN 1
probably disease and medication  , would decrease diuretic dose , continue lopressor  , continue florinef , consider decrease carbidopa frequency, stockings probably disease and medication  , diuretic on hold  , continue lopressor  , continue florinef , consider decrease carbidopa frequency, stockings

## 2018-07-22 NOTE — PROGRESS NOTE ADULT - SUBJECTIVE AND OBJECTIVE BOX
Reason for Admission: syncope, fall	  History of Present Illness: 	  89 y.o. male with PMH CAD s/p CABG x3, s/p stent, CHF s/p AICD, HTN, HLD, Parkinson's disease presents with unwitnessed fall, syncope. Pt reports blacking out. Denies n/v, f/c, CP or SOB. Denies lightheadedness or dizziness. No tongue biting. Pt was on the floor and was found by family couple hours later. Pt reports neck pain and back pain, but pt had neck pain for the past few days. Reports some urine incontinence but reports from constant need to urinate.    7/20: patients care has been discussed at the bedside with the patient. patient with delayed speech. Denies any HA, CP, SOB. Passing out episode - with being on the floor 1 hour as per son. Pt now comfortable. BPs on a lower side. Will closely monitor.     7/22: still orthostatic. Cr improving. continue holding lasix /  metalazone.     REVIEW OF SYSTEMS:  General: NAD, hemodynamically stable   HEENT:  Eyes:  No visual loss, blurred vision, double vision or yellow sclerae. Ears, Nose, Throat:  No hearing loss, sneezing, congestion, runny nose or sore throat.  SKIN:  No rash or itching.  CARDIOVASCULAR:  No chest pain, chest pressure or chest discomfort. No palpitations or edema.  RESPIRATORY:  No shortness of breath, cough or sputum.  GASTROINTESTINAL:  No anorexia, nausea, vomiting or diarrhea. No abdominal pain or blood.  NEUROLOGICAL:  No headache, dizziness, syncope, paralysis, ataxia, numbness or tingling in the extremities. No change in bowel or bladder control.  MUSCULOSKELETAL:  No muscle, back pain, joint pain or stiffness.     Physical Exam:   GENERAL APPEARANCE:  NAD, hemodynamically stable  ICU Vital Signs Last 24 Hrs  T(C): 36.8 (21 Jul 2018 11:27), Max: 36.9 (21 Jul 2018 00:51)  T(F): 98.3 (21 Jul 2018 11:27), Max: 98.5 (21 Jul 2018 00:51)  HR: 60 (21 Jul 2018 11:27) (59 - 71)  BP: 117/43 (21 Jul 2018 11:27) (85/37 - 117/43)  BP(mean): 51 (20 Jul 2018 18:00) (50 - 59)  RR: 18 (21 Jul 2018 11:27) (17 - 18)  SpO2: 95% (21 Jul 2018 11:27) (95% - 95%)  HEENT:  normocephalic    Skin:  dry  NECK:  no JVD  HEART:  Regular rate and rhythm. large well healed scar around the chest  LUNGS:  Good ins/exp effort   ABDOMEN:  Soft, nontender, nondistended with good bowel sounds heard  EXTREMITIES:  Without cyanosis, clubbing or edema.   NEUROLOGICAL:  Gross nonfocal             CBC Full  -  ( 22 Jul 2018 05:42 )  WBC Count : 5.07 K/uL  Hemoglobin : 11.6 g/dL  Hematocrit : 34.1 %  Platelet Count - Automated : 144 K/uL  Mean Cell Volume : 94.7 fl  Mean Cell Hemoglobin : 32.2 pg  Mean Cell Hemoglobin Concentration : 34.0 gm/dL  Auto Neutrophil # : x  Auto Lymphocyte # : x  Auto Monocyte # : x  Auto Eosinophil # : x  Auto Basophil # : x  Auto Neutrophil % : x  Auto Lymphocyte % : x  Auto Monocyte % : x  Auto Eosinophil % : x  Auto Basophil % : x    PT/INR - ( 22 Jul 2018 05:42 )   PT: 22.2 sec;   INR: 2.03 ratio         PTT - ( 21 Jul 2018 05:26 )  PTT:34.0 sec    07-22    143  |  104  |  35<H>  ----------------------------<  114<H>  3.3<L>   |  34<H>  |  1.53<H>    Ca    8.4<L>      22 Jul 2018 05:42          #unwitnessed fall, syncope suspecting dehydration in combination with autonomic dysregulation with parkinsons  - admit to tele  - post AICD interogation - 5 NSVT episodes since March lasting seconds only  - check orthostatic vital signs  - hold metolazone and lasix  - PT eval  - SW eval  - care discussed with Dr. Palla    # CAD s/p CABG, stent  - on plavix, statin    # MARILYN on CKD3  - hold metolazone and lasix  - hold lisinopril  - reintroduce once Cr improves    #AFib on Coumadin  -CHADSVASc >4  -INR subtherapeutic, continue with coumadin    #chronic systolic CHF  -I/O, daily weight  -echo (2/26/18) EF 35-40% mod MR, mod to severe TR  -currently, not in exacerbation  -lasix and metolazone held for now

## 2018-07-23 DIAGNOSIS — I48.0 PAROXYSMAL ATRIAL FIBRILLATION: ICD-10-CM

## 2018-07-23 LAB
INR BLD: 1.97 RATIO — HIGH (ref 0.88–1.16)
PROTHROM AB SERPL-ACNC: 21.6 SEC — HIGH (ref 9.8–12.7)

## 2018-07-23 PROCEDURE — 99233 SBSQ HOSP IP/OBS HIGH 50: CPT

## 2018-07-23 RX ORDER — WARFARIN SODIUM 2.5 MG/1
3 TABLET ORAL ONCE
Qty: 0 | Refills: 0 | Status: COMPLETED | OUTPATIENT
Start: 2018-07-23 | End: 2018-07-23

## 2018-07-23 RX ORDER — POTASSIUM CHLORIDE 20 MEQ
40 PACKET (EA) ORAL ONCE
Qty: 0 | Refills: 0 | Status: COMPLETED | OUTPATIENT
Start: 2018-07-23 | End: 2018-07-23

## 2018-07-23 RX ADMIN — DONEPEZIL HYDROCHLORIDE 5 MILLIGRAM(S): 10 TABLET, FILM COATED ORAL at 20:43

## 2018-07-23 RX ADMIN — WARFARIN SODIUM 3 MILLIGRAM(S): 2.5 TABLET ORAL at 20:43

## 2018-07-23 RX ADMIN — PANTOPRAZOLE SODIUM 40 MILLIGRAM(S): 20 TABLET, DELAYED RELEASE ORAL at 05:18

## 2018-07-23 RX ADMIN — FLUDROCORTISONE ACETATE 0.1 MILLIGRAM(S): 0.1 TABLET ORAL at 05:18

## 2018-07-23 RX ADMIN — Medication 40 MILLIEQUIVALENT(S): at 13:49

## 2018-07-23 RX ADMIN — CLOPIDOGREL BISULFATE 75 MILLIGRAM(S): 75 TABLET, FILM COATED ORAL at 13:49

## 2018-07-23 RX ADMIN — CARBIDOPA AND LEVODOPA 1 TABLET(S): 25; 100 TABLET ORAL at 20:43

## 2018-07-23 RX ADMIN — ATORVASTATIN CALCIUM 10 MILLIGRAM(S): 80 TABLET, FILM COATED ORAL at 20:43

## 2018-07-23 RX ADMIN — Medication 12.5 MILLIGRAM(S): at 05:18

## 2018-07-23 RX ADMIN — Medication 12.5 MILLIGRAM(S): at 17:52

## 2018-07-23 RX ADMIN — CARBIDOPA AND LEVODOPA 1 TABLET(S): 25; 100 TABLET ORAL at 13:49

## 2018-07-23 NOTE — PROGRESS NOTE ADULT - SUBJECTIVE AND OBJECTIVE BOX
REASON FOR CONSULT: syncope    CHIEF COMPLAINT: syncope     HPI:  89 y.o. male with PMH CAD s/p CABG x3, s/p stent, CHF s/p AICD, HTN, HLD, Parkinson's disease presents with unwitnessed fall, syncope. Pt reports blacking out. Denies n/v, f/c, CP or SOB. Denies lightheadedness or dizziness. No tongue biting. Pt was on the floor and was found by family couple hours later. Pt reports neck pain and back pain, but pt had neck pain for the past few days. Reports some urine incontinence but reports from constant need to urinate. Cardiology was called to evaluate syncope. He was seen in our office appx 1 month ago without significant complaints or findings. Medications were maintained. Pt denies chest pain or shortness of breath at this time but is lethargic and unaware of the events preceding his episode.     7/21/18  Patient is feeling well , did have episodes of syncope in last one and half month , patient having low normal range SBP , patient AICD good battery status , brief VT   7/22/18 Patient is feeling fine , has significant immediate orthostatic drop SBP ,  drops to SBP 70s while standing   7/23/18 Patient is doing ok , patient was started on florinef , bood pressure is improving ,             PAST MEDICAL & SURGICAL HISTORY:  CHF (congestive heart failure)  Hyperlipidemia  HTN (hypertension)  CAD (coronary artery disease)  AICD (automatic cardioverter/defibrillator) present  S/P CABG      Allergies    morphine (Headache)    Intolerances        SOCIAL HISTORY: nonsmoker, nondrinker      FAMILY HISTORY: mother had CAD    MEDICATIONS  (STANDING):  atorvastatin 10 milliGRAM(s) Oral at bedtime  carbidopa/levodopa  10/100 1 Tablet(s) Oral three times a day  clopidogrel Tablet 75 milliGRAM(s) Oral daily  donepezil 5 milliGRAM(s) Oral at bedtime  fludroCORTISONE 0.1 milliGRAM(s) Oral daily  metoprolol tartrate 12.5 milliGRAM(s) Oral every 12 hours  pantoprazole    Tablet 40 milliGRAM(s) Oral before breakfast  warfarin 3 milliGRAM(s) Oral at bedtime    MEDICATIONS  (PRN):      REVIEW OF SYSTEMS:    unsteady gait , denies any sob or chest pain or palpitation   All other review of systems is negative unless indicated above    Vital Signs Last 24 Hrs  T(C): 36.8 (23 Jul 2018 04:53), Max: 36.9 (22 Jul 2018 11:10)  T(F): 98.2 (23 Jul 2018 04:53), Max: 98.4 (22 Jul 2018 11:10)  HR: 61 (23 Jul 2018 04:53) (55 - 65)  BP: 121/53 (23 Jul 2018 04:53) (95/39 - 136/53)  BP(mean): --  RR: 16 (22 Jul 2018 11:10) (16 - 16)  SpO2: 97% (23 Jul 2018 04:53) (97% - 98%)    I&O's Summary    22 Jul 2018 07:01  -  23 Jul 2018 07:00  --------------------------------------------------------  IN: 120 mL / OUT: 0 mL / NET: 120 mL          PHYSICAL EXAM:    Constitutional: NAD, awake and lethargic  HEENT: PERR, EOMI,  No oral cyananosis.  Neck:  supple,  No JVD  Respiratory: Breath sounds are clear bilaterally, No wheezing, rales or rhonchi  Cardiovascular: S1 and S2, regular rate and  irregular rhythm, limited exam  Gastrointestinal: Bowel Sounds present, soft, nontender.   Extremities: No peripheral edema. No clubbing or cyanosis.  Vascular: 2+ peripheral pulses  Neurological: A/O x 3, unsteady on his feet   Musculoskeletal: no calf tenderness.  Skin: No rashes.      LABS: All Labs Reviewed:                                        11.6   5.07  )-----------( 144      ( 22 Jul 2018 05:42 )             34.1     07-22    143  |  104  |  35<H>  ----------------------------<  114<H>  3.3<L>   |  34<H>  |  1.53<H>    Ca    8.4<L>      22 Jul 2018 05:42            PT/INR - ( 22 Jul 2018 05:42 )   PT: 22.2 sec;   INR: 2.03 ratio                 RADIOLOGY/EKG:< from: 12 Lead ECG (07.19.18 @ 17:38) >  Diagnosis Line Sinus rhythm with Premature supraventricular complexes  Right bundle branch block  T wave abnormality, consider inferior ischemia  Abnormal ECG  When compared with ECG of 26-FEB-2018 07:49,  Sinus rhythm has replaced Atrial fibrillation  Nonspecific T wave abnormality no longer evident in Lateral leads  Confirmed by JULIENNE SEGAL MD ([id number]) on 7/20/2018 6:15:31 AM    < end of copied text >  < from: Transthoracic Echocardiogram (02.26.18 @ 09:24) >  Impression     Summary     The left ventricle is normal in size, wall thickness. Moderately   decreased   left ventricular systolic function with an estimated left ventricular   ejection fraction of 35-40 %. There is paradoxical septal motion   The right atrium appears moderately dilated.   A device wire is seen in the RV and RA.   Normal aortic valve structure and function.   Normal appearing mitral valve structure and function.   Moderate (2+) mitral regurgitation is present.   Mild mitral annular calcification is present.   Normal appearing tricuspid valve structure and function.   Moderate to severe (3+) tricuspid valve regurgitation is present.   There is severe elevation in right ventricular systolic pressure     Signature     ----------------------------------------------------------------   Electronically signed by Bigg Beyer(Interpreting physician)    < end of copied text >      Monitor afib demand paced rhythm

## 2018-07-23 NOTE — PROGRESS NOTE ADULT - ASSESSMENT
89M.  admitted 07/19/18.  presented to ED after unwitnessed fall and blacking out.  found on the floor 1 hour later as per son.    PMHx:  CAD-PCI + CABG;  HFrEF-AICD;  HTN;  HLD;  PD.    #unwitnessed fall, syncope suspecting dehydration in combination with autonomic dysregulation with parkinsons  - admit to tele  - post AICD interogation - 5 NSVT episodes since March lasting seconds only  - check orthostatic vital signs  - hold metolazone and lasix  - PT eval  - SW eval  - care discussed with Dr. Palla    # CAD s/p CABG, stent  - on plavix, statin    # MARILYN on CKD3  - hold metolazone and lasix  - hold lisinopril  - reintroduce once Cr improves    #AFib on Coumadin  -CHADSVASc >4  -INR subtherapeutic, continue with coumadin    #chronic systolic CHF  -I/O, daily weight  -echo (2/26/18) EF 35-40% mod MR, mod to severe TR  -currently, not in exacerbation  -lasix and metolazone held for now

## 2018-07-23 NOTE — PROGRESS NOTE ADULT - SUBJECTIVE AND OBJECTIVE BOX
CC:  Patient is a 89y old  Male who presents with a chief complaint of syncope, fall (19 Jul 2018 22:08)    SUBJECTIVE:  offers no new complaints at current time.    ROS:  all other review of systems are negative unless indicated above.    atorvastatin 10 milliGRAM(s) Oral at bedtime  carbidopa/levodopa  10/100 1 Tablet(s) Oral three times a day  clopidogrel Tablet 75 milliGRAM(s) Oral daily  donepezil 5 milliGRAM(s) Oral at bedtime  fludroCORTISONE 0.1 milliGRAM(s) Oral daily  metoprolol tartrate 12.5 milliGRAM(s) Oral every 12 hours  pantoprazole    Tablet 40 milliGRAM(s) Oral before breakfast  warfarin 3 milliGRAM(s) Oral at bedtime    T(C): 36.8 (07-23-18 @ 04:53), Max: 36.9 (07-22-18 @ 11:10)  HR: 61 (07-23-18 @ 04:53) (55 - 65)  BP: 121/53 (07-23-18 @ 04:53) (95/39 - 136/53)  RR: 16 (07-22-18 @ 11:10) (16 - 16)  SpO2: 97% (07-23-18 @ 04:53) (97% - 98%)    Constitutional: NAD, awake and alert, well-developed with good responses to questions, mentally intact.   HEENT: PERRL, EOMI, MMM.  Neck: Soft and supple, No carotid bruit, No JVD  Respiratory: Breath sounds are clear bilaterally, No wheezing, rales or rhonchi  Cardiovascular: S1 and S2, regular rate and rhythm, no murmur, rub or gallop.  Gastrointestinal: Bowel Sounds present, soft, nontender, nondistended, no guarding, no rebound, no mass.  Extremities: No peripheral edema  Vascular: 2+ peripheral pulses  Neurological: A/O x 3, no focal deficits  Musculoskeletal: 5/5 strength b/l upper and lower extremities  Skin:  no visible rashes.                         11.6   5.07  )-----------( 144      ( 22 Jul 2018 05:42 )             34.1     PT/INR - ( 22 Jul 2018 05:42 )   PT: 22.2 sec;   INR: 2.03 ratio           07-22    143  |  104  |  35<H>  ----------------------------<  114<H>  3.3<L>   |  34<H>  |  1.53<H>    Ca    8.4<L>      22 Jul 2018 05:42

## 2018-07-24 ENCOUNTER — TRANSCRIPTION ENCOUNTER (OUTPATIENT)
Age: 83
End: 2018-07-24

## 2018-07-24 ENCOUNTER — APPOINTMENT (OUTPATIENT)
Dept: CARDIOLOGY | Facility: CLINIC | Age: 83
End: 2018-07-24

## 2018-07-24 VITALS
HEART RATE: 62 BPM | RESPIRATION RATE: 18 BRPM | SYSTOLIC BLOOD PRESSURE: 110 MMHG | OXYGEN SATURATION: 100 % | TEMPERATURE: 98 F | DIASTOLIC BLOOD PRESSURE: 37 MMHG

## 2018-07-24 DIAGNOSIS — I47.2 VENTRICULAR TACHYCARDIA: ICD-10-CM

## 2018-07-24 LAB
ANION GAP SERPL CALC-SCNC: 9 MMOL/L — SIGNIFICANT CHANGE UP (ref 5–17)
BUN SERPL-MCNC: 32 MG/DL — HIGH (ref 7–23)
CALCIUM SERPL-MCNC: 8.2 MG/DL — LOW (ref 8.5–10.1)
CHLORIDE SERPL-SCNC: 106 MMOL/L — SIGNIFICANT CHANGE UP (ref 96–108)
CO2 SERPL-SCNC: 29 MMOL/L — SIGNIFICANT CHANGE UP (ref 22–31)
CREAT SERPL-MCNC: 1.36 MG/DL — HIGH (ref 0.5–1.3)
GLUCOSE SERPL-MCNC: 116 MG/DL — HIGH (ref 70–99)
HCT VFR BLD CALC: 32.6 % — LOW (ref 39–50)
HGB BLD-MCNC: 11.2 G/DL — LOW (ref 13–17)
INR BLD: 1.98 RATIO — HIGH (ref 0.88–1.16)
MCHC RBC-ENTMCNC: 32.8 PG — SIGNIFICANT CHANGE UP (ref 27–34)
MCHC RBC-ENTMCNC: 34.4 GM/DL — SIGNIFICANT CHANGE UP (ref 32–36)
MCV RBC AUTO: 95.6 FL — SIGNIFICANT CHANGE UP (ref 80–100)
NRBC # BLD: 0 /100 WBCS — SIGNIFICANT CHANGE UP (ref 0–0)
PLATELET # BLD AUTO: 161 K/UL — SIGNIFICANT CHANGE UP (ref 150–400)
POTASSIUM SERPL-MCNC: 3.5 MMOL/L — SIGNIFICANT CHANGE UP (ref 3.5–5.3)
POTASSIUM SERPL-SCNC: 3.5 MMOL/L — SIGNIFICANT CHANGE UP (ref 3.5–5.3)
PROTHROM AB SERPL-ACNC: 21.7 SEC — HIGH (ref 9.8–12.7)
RBC # BLD: 3.41 M/UL — LOW (ref 4.2–5.8)
RBC # FLD: 13.2 % — SIGNIFICANT CHANGE UP (ref 10.3–14.5)
SODIUM SERPL-SCNC: 144 MMOL/L — SIGNIFICANT CHANGE UP (ref 135–145)
WBC # BLD: 5.04 K/UL — SIGNIFICANT CHANGE UP (ref 3.8–10.5)
WBC # FLD AUTO: 5.04 K/UL — SIGNIFICANT CHANGE UP (ref 3.8–10.5)

## 2018-07-24 PROCEDURE — 99233 SBSQ HOSP IP/OBS HIGH 50: CPT

## 2018-07-24 RX ORDER — FLUDROCORTISONE ACETATE 0.1 MG/1
1 TABLET ORAL
Qty: 0 | Refills: 0 | COMMUNITY
Start: 2018-07-24

## 2018-07-24 RX ORDER — WARFARIN SODIUM 2.5 MG/1
1 TABLET ORAL
Qty: 0 | Refills: 0 | COMMUNITY
Start: 2018-07-24

## 2018-07-24 RX ORDER — WARFARIN SODIUM 2.5 MG/1
4 TABLET ORAL ONCE
Qty: 0 | Refills: 0 | Status: DISCONTINUED | OUTPATIENT
Start: 2018-07-24 | End: 2018-07-24

## 2018-07-24 RX ORDER — LISINOPRIL 2.5 MG/1
0.5 TABLET ORAL
Qty: 0 | Refills: 0 | COMMUNITY

## 2018-07-24 RX ORDER — DONEPEZIL HYDROCHLORIDE 10 MG/1
1 TABLET, FILM COATED ORAL
Qty: 0 | Refills: 0 | COMMUNITY
Start: 2018-07-24

## 2018-07-24 RX ORDER — DONEPEZIL HYDROCHLORIDE 10 MG/1
1 TABLET, FILM COATED ORAL
Qty: 0 | Refills: 0 | COMMUNITY

## 2018-07-24 RX ORDER — ATORVASTATIN CALCIUM 80 MG/1
1 TABLET, FILM COATED ORAL
Qty: 0 | Refills: 0 | COMMUNITY
Start: 2018-07-24

## 2018-07-24 RX ORDER — METOPROLOL TARTRATE 50 MG
12.5 TABLET ORAL
Qty: 0 | Refills: 0 | COMMUNITY
Start: 2018-07-24

## 2018-07-24 RX ORDER — CARVEDILOL PHOSPHATE 80 MG/1
1 CAPSULE, EXTENDED RELEASE ORAL
Qty: 0 | Refills: 0 | COMMUNITY

## 2018-07-24 RX ORDER — CARBIDOPA AND LEVODOPA 25; 100 MG/1; MG/1
1 TABLET ORAL
Qty: 0 | Refills: 0 | DISCHARGE
Start: 2018-07-24

## 2018-07-24 RX ORDER — CLOPIDOGREL BISULFATE 75 MG/1
1 TABLET, FILM COATED ORAL
Qty: 0 | Refills: 0 | DISCHARGE
Start: 2018-07-24

## 2018-07-24 RX ORDER — CLOPIDOGREL BISULFATE 75 MG/1
1 TABLET, FILM COATED ORAL
Qty: 0 | Refills: 0 | COMMUNITY

## 2018-07-24 RX ORDER — DONEPEZIL HYDROCHLORIDE 10 MG/1
2 TABLET, FILM COATED ORAL
Qty: 0 | Refills: 0 | COMMUNITY
Start: 2018-07-24

## 2018-07-24 RX ORDER — ATORVASTATIN CALCIUM 80 MG/1
1 TABLET, FILM COATED ORAL
Qty: 0 | Refills: 0 | COMMUNITY

## 2018-07-24 RX ORDER — CARBIDOPA AND LEVODOPA 25; 100 MG/1; MG/1
1 TABLET ORAL
Qty: 0 | Refills: 0 | COMMUNITY

## 2018-07-24 RX ORDER — PANTOPRAZOLE SODIUM 20 MG/1
1 TABLET, DELAYED RELEASE ORAL
Qty: 0 | Refills: 0 | COMMUNITY
Start: 2018-07-24

## 2018-07-24 RX ORDER — POTASSIUM CHLORIDE 20 MEQ
2 PACKET (EA) ORAL
Qty: 0 | Refills: 0 | COMMUNITY

## 2018-07-24 RX ORDER — FLUDROCORTISONE ACETATE 0.1 MG/1
1 TABLET ORAL
Qty: 0 | Refills: 0 | COMMUNITY

## 2018-07-24 RX ORDER — WARFARIN SODIUM 2.5 MG/1
1 TABLET ORAL
Qty: 0 | Refills: 0 | COMMUNITY

## 2018-07-24 RX ORDER — CARBIDOPA AND LEVODOPA 25; 100 MG/1; MG/1
1 TABLET ORAL
Qty: 0 | Refills: 0 | COMMUNITY
Start: 2018-07-24

## 2018-07-24 RX ORDER — WARFARIN SODIUM 2.5 MG/1
3 TABLET ORAL DAILY
Qty: 0 | Refills: 0 | Status: DISCONTINUED | OUTPATIENT
Start: 2018-07-25 | End: 2018-07-24

## 2018-07-24 RX ORDER — PANTOPRAZOLE SODIUM 20 MG/1
1 TABLET, DELAYED RELEASE ORAL
Qty: 0 | Refills: 0 | COMMUNITY

## 2018-07-24 RX ORDER — FUROSEMIDE 40 MG
1.5 TABLET ORAL
Qty: 0 | Refills: 0 | COMMUNITY

## 2018-07-24 RX ADMIN — CARBIDOPA AND LEVODOPA 1 TABLET(S): 25; 100 TABLET ORAL at 13:36

## 2018-07-24 RX ADMIN — PANTOPRAZOLE SODIUM 40 MILLIGRAM(S): 20 TABLET, DELAYED RELEASE ORAL at 05:36

## 2018-07-24 RX ADMIN — FLUDROCORTISONE ACETATE 0.1 MILLIGRAM(S): 0.1 TABLET ORAL at 05:36

## 2018-07-24 RX ADMIN — CLOPIDOGREL BISULFATE 75 MILLIGRAM(S): 75 TABLET, FILM COATED ORAL at 13:33

## 2018-07-24 NOTE — DISCHARGE NOTE ADULT - MEDICATION SUMMARY - MEDICATIONS TO TAKE
I will START or STAY ON the medications listed below when I get home from the hospital:    fludrocortisone 0.1 mg oral tablet  -- 1 tab(s) by mouth once a day  -- Indication: For orthostatic hypotension    warfarin 4 mg oral tablet  -- 1 tab(s) by mouth once  -- Indication: For once tonight, 07/24/18.  AF.    warfarin 3 mg oral tablet  -- 1 tab(s) by mouth once a day  -- Indication: For Start tomorrow, 07/25/18.  AF.    atorvastatin 10 mg oral tablet  -- 1 tab(s) by mouth once a day (at bedtime)  -- Indication: For hyperlipidemia    carbidopa-levodopa 10 mg-100 mg oral tablet  -- 1 tab(s) by mouth 3 times a day  -- Indication: For PD    clopidogrel 75 mg oral tablet  -- 1 tab(s) by mouth once a day  -- Indication: For Coronary artery disease involving native coronary artery of native heart without angina pectoris    metoprolol  -- 12.5 milligram(s) by mouth every 12 hours  -- Indication: For Coronary artery disease involving native coronary artery of native heart without angina pectoris    donepezil 5 mg oral tablet  -- 1 tab(s) by mouth once a day (at bedtime)  -- Indication: For PD    Lasix 40 mg oral tablet  -- 1  by mouth once a day  -- Indication: For CHF    potassium chloride 20 mEq oral tablet, extended release  -- 1 tab(s) by mouth every other day  -- Indication: For Supplement    pantoprazole 40 mg oral delayed release tablet  -- 1 tab(s) by mouth once a day (before a meal)  -- Indication: For GI prophylaxis    Multiple Vitamins oral tablet  -- 1 tab(s) by mouth once a day  -- Indication: For Supplement

## 2018-07-24 NOTE — DISCHARGE NOTE ADULT - PATIENT PORTAL LINK FT
You can access the [facility] Patient Portal, offered by [facility], by registering with the following website: [email or web address]

## 2018-07-24 NOTE — DISCHARGE NOTE ADULT - CARE PROVIDERS DIRECT ADDRESSES
,[email or web address]

## 2018-07-24 NOTE — PROGRESS NOTE ADULT - SUBJECTIVE AND OBJECTIVE BOX
REASON FOR VISIT: Syncope    HPI:  89 year old man with a history of CAD s/p CABG x3 and PCI, chronic systolic HF, ICD, HTN, HLD, Parkinson's Disease admitted 7/19 following an unwitnessed fall / possible syncopal episode; found to be markedly orthostatic.    7/21/18  Patient is feeling well , did have episodes of syncope in last one and half month , patient having low normal range SBP , patient AICD good battery status , brief VT   7/22/18 Patient is feeling fine , has significant immediate orthostatic drop SBP ,  drops to SBP 70s while standing   7/23/18 Patient is doing ok , patient was started on florinef , blood pressure is improving.  7/24/18:  "I'm better."  No new complaints; denies dizziness, palpitations.    MEDICATIONS  (STANDING):  atorvastatin 10 milliGRAM(s) Oral at bedtime  carbidopa/levodopa  10/100 1 Tablet(s) Oral three times a day  clopidogrel Tablet 75 milliGRAM(s) Oral daily  donepezil 5 milliGRAM(s) Oral at bedtime  fludroCORTISONE 0.1 milliGRAM(s) Oral daily  metoprolol tartrate 12.5 milliGRAM(s) Oral every 12 hours  pantoprazole    Tablet 40 milliGRAM(s) Oral before breakfast    Vital Signs Last 24 Hrs  T(C): 36.3 (24 Jul 2018 05:05), Max: 36.9 (23 Jul 2018 17:07)  T(F): 97.3 (24 Jul 2018 05:05), Max: 98.4 (23 Jul 2018 17:07)  HR: 59 (24 Jul 2018 05:05) (51 - 61)  BP: 100/55 (24 Jul 2018 05:05) (100/55 - 135/54)  RR: 18 (23 Jul 2018 17:07) (16 - 18)  SpO2: 95% (24 Jul 2018 05:05) (95% - 100%)    PHYSICAL EXAM:  Constitutional: Supine in bed, appears stated age, no distress  Neck:  supple,  No JVD  Respiratory: Breath sounds are clear bilaterally, No wheezing, rales or rhonchi  Cardiovascular: Normal S2, irregular  Gastrointestinal: Bowel Sounds present, soft, nontender.   Extremities: No pedal edema.   Skin: No rashes.  Psych:  Mood and affect appropriate    LABS:                     11.2   5.04  )-----------( 161      ( 24 Jul 2018 06:09 )             32.6     144  |  106  |  32<H>  ----------------------------<  116<H>  3.5   |  29  |  1.36<H>    Transthoracic Echocardiogram (02.26.18 @ 09:24):  The left ventricle is normal in size, wall thickness. Moderately decreased left ventricular systolic function with an estimated left ventricular ejection fraction of 35-40 %. There is paradoxical septal motion  The right atrium appears moderately dilated.  Moderate (2+) mitral regurgitation.  Moderate to severe (3+) tricuspid valve regurgitation.  There is severe elevation in right ventricular systolic pressure    Tele: AF, ventricular-demand pacing, PVCs    12 Lead ECG (07.21.18 @ 07:38):  Sinus rhythm with occasional ventricular-paced complexes and with occasional Premature ventricular complexes and Premature atrial complexes.    Right bundle branch block.  T wave abnormality, consider inferior ischemia.

## 2018-07-24 NOTE — DISCHARGE NOTE ADULT - CARE PLAN
Principal Discharge DX:	Syncope, unspecified syncope type  Goal:	.  Assessment and plan of treatment:	.

## 2018-07-24 NOTE — PROGRESS NOTE ADULT - ASSESSMENT
89M.  admitted 07/19/18.  presented to ED after unwitnessed fall and blacking out.  found on the floor 1 hour later as per son.    PMHx:  CAD-PCI + CABG;  HFrEF-AICD;  HTN;  HLD;  PD.    #unwitnessed fall, syncope suspecting dehydration in combination with autonomic dysregulation with parkinsons  - admit to tele  - post AICD interogation - 5 NSVT episodes since March lasting seconds only  - check orthostatic vital signs  - hold metolazone and lasix  - PT eval  - SW eval  - care discussed with Dr. Palla    # CAD s/p CABG, stent  - on plavix, statin    # MARILYN on CKD3  - hold metolazone and lasix  - hold lisinopril  - reintroduce once Cr improves    #AFib on Coumadin  -CHADSVASc >4  -INR subtherapeutic, continue with coumadin    #chronic systolic CHF  -I/O, daily weight  -echo (2/26/18) EF 35-40% mod MR, mod to severe TR  -currently, not in exacerbation  -lasix and metolazone held for now 89M.  admitted 07/19/18.  presented to ED after unwitnessed fall and blacking out.  found on the floor 1 hour later as per son.    PMHx:  CAD-PCI + CABG;  HFrEF-AICD;  HTN;  HLD;  PD.    syncope due to orthostatic hypotension.  -AICD interrogation-5 NSVT episodes since 03/2018 lasting seconds.  -telemetry and orthostatic VS reviewed.  -holding diuretics, metoloazone + Lasix.  consider resuming at discharge.  -physical therapy.    MARILYN upon CKD-3.  -Cr normalizing.  -holding diuretics.  -consider resuming at discharge.    CAD-PCI + CABG.  -stable.  -Plavix.  -metoprolol.    AF.  -INR subtherapeutic, 1.98.  -additonal dose of warfarin tonight.  -INR in AM.    HFrEF.  -compensated.  -02/2018 LVEF 35-40%.  -holding diuretics, metoloazone + Lasix.  consider resuming at discharge.  -BB.    DVT prophylaxis.  -VKA.    disposition.  -transfer to the general medical [facility].  -ERICKSON.    communication.  -RN.  -Case Management, re:  d/c planning.

## 2018-07-24 NOTE — DISCHARGE NOTE ADULT - HOSPITAL COURSE
CC:  Patient is a 89y old  Male who presents with a chief complaint of syncope, fall (19 Jul 2018 22:08)    SUBJECTIVE:  OOB in chair.  offers no new complaints at current time.  lives at home w/ his daughter and son-in-law.    ROS:  all other review of systems are negative unless indicated above.    T(C): 36.3 (07-24-18 @ 05:05), Max: 36.9 (07-23-18 @ 17:07)  HR: 59 (07-24-18 @ 05:05) (51 - 61)  BP: 100/55 (07-24-18 @ 05:05) (100/55 - 135/54)  RR: 18 (07-23-18 @ 17:07) (16 - 18)  SpO2: 95% (07-24-18 @ 05:05) (95% - 100%)    Constitutional: elderly wm.  no acute distress.  awake and alert.   HEENT: PERRL, EOMI, MMM.  Neck: Soft and supple, No carotid bruit, No JVD  Respiratory: Breath sounds are clear bilaterally, No wheezing, rales or rhonchi  Cardiovascular: S1 and S2, regular rate and rhythm, no murmur, rub or gallop.  Gastrointestinal: Bowel Sounds present, soft, nontender, nondistended, no guarding, no rebound, no mass.  Extremities: No peripheral edema  Vascular: 2+ peripheral pulses  Neurological: A/O x 3, no focal deficits  Musculoskeletal: 5/5 strength b/l upper and lower extremities  Skin:  no visible rashes.                         11.2   5.04  )-----------( 161      ( 24 Jul 2018 06:09 )             32.6     PT/INR - ( 24 Jul 2018 06:09 )   PT: 21.7 sec;   INR: 1.98 ratio      07-24    144  |  106  |  32<H>  ----------------------------<  116<H>  3.5   |  29  |  1.36<H>    Ca    8.2<L>      24 Jul 2018 06:09    89M.  admitted 07/19/18.  presented to ED after unwitnessed fall and blacking out.  found on the floor 1 hour later as per son.    PMHx:  CAD-PCI + CABG;  HFrEF-AICD;  HTN;  HLD;  PD.    syncope due to orthostatic hypotension.  -AICD interrogation-5 NSVT episodes since 03/2018 lasting seconds.  -telemetry and orthostatic VS reviewed.  -held diuretics, metoloazone + Lasix.    -may resume Lasix 40mg po qd.  monitor BMP and orthostatic VS.  -physical therapy.    MARILYN upon CKD-3.  -Cr normalizing.  -may resume Lasix 40mg po qd.  monitor BMP and orthostatic VS.  -consider resuming at discharge.    CAD-PCI + CABG.  -stable.  -Plavix.  -metoprolol.    AF.  -INR subtherapeutic, 1.98.  -warfarin 4mg po tonight, then resume 3mg po qPM on 07/25/18.  -INR in AM.    HFrEF.  -compensated.  -02/2018 LVEF 35-40%.  -held diuretics, metoloazone + Lasix.    -may resume Lasix 40mg po qd.  monitor BMP and orthostatic VS.   -BB.    DVT prophylaxis.  -VKA.    disposition.  -transfer to the general medical [facility].  -ERICKSON.  -d/c > 35 minutes.    communication.  -RN.  -Case Management, re:  d/c planning.  -PMD notified.  case discussed.

## 2018-07-24 NOTE — DISCHARGE NOTE ADULT - MEDICATION SUMMARY - MEDICATIONS TO CHANGE
I will SWITCH the dose or number of times a day I take the medications listed below when I get home from the hospital:    Lasix 40 mg oral tablet  -- 1.5  by mouth once a day

## 2018-07-24 NOTE — PROGRESS NOTE ADULT - SUBJECTIVE AND OBJECTIVE BOX
CC:  Patient is a 89y old  Male who presents with a chief complaint of syncope, fall (19 Jul 2018 22:08)    SUBJECTIVE:  offers no new complaints at current time.    ROS:  all other review of systems are negative unless indicated above.    atorvastatin 10 milliGRAM(s) Oral at bedtime  carbidopa/levodopa  10/100 1 Tablet(s) Oral three times a day  clopidogrel Tablet 75 milliGRAM(s) Oral daily  donepezil 5 milliGRAM(s) Oral at bedtime  fludroCORTISONE 0.1 milliGRAM(s) Oral daily  metoprolol tartrate 12.5 milliGRAM(s) Oral every 12 hours  pantoprazole    Tablet 40 milliGRAM(s) Oral before breakfast    T(C): 36.3 (07-24-18 @ 05:05), Max: 36.9 (07-23-18 @ 17:07)  HR: 59 (07-24-18 @ 05:05) (51 - 61)  BP: 100/55 (07-24-18 @ 05:05) (100/55 - 135/54)  RR: 18 (07-23-18 @ 17:07) (16 - 18)  SpO2: 95% (07-24-18 @ 05:05) (95% - 100%)    Constitutional: NAD, awake and alert, well-developed with good responses to questions, mentally intact.   HEENT: PERRL, EOMI, MMM.  Neck: Soft and supple, No carotid bruit, No JVD  Respiratory: Breath sounds are clear bilaterally, No wheezing, rales or rhonchi  Cardiovascular: S1 and S2, regular rate and rhythm, no murmur, rub or gallop.  Gastrointestinal: Bowel Sounds present, soft, nontender, nondistended, no guarding, no rebound, no mass.  Extremities: No peripheral edema  Vascular: 2+ peripheral pulses  Neurological: A/O x 3, no focal deficits  Musculoskeletal: 5/5 strength b/l upper and lower extremities  Skin:  no visible rashes.                         11.2   5.04  )-----------( 161      ( 24 Jul 2018 06:09 )             32.6     PT/INR - ( 24 Jul 2018 06:09 )   PT: 21.7 sec;   INR: 1.98 ratio           07-24    144  |  106  |  32<H>  ----------------------------<  116<H>  3.5   |  29  |  1.36<H>    Ca    8.2<L>      24 Jul 2018 06:09 CC:  Patient is a 89y old  Male who presents with a chief complaint of syncope, fall (19 Jul 2018 22:08)    SUBJECTIVE:  OOB in chair.  offers no new complaints at current time.  lives at home w/ his daughter and son-in-law.    ROS:  all other review of systems are negative unless indicated above.    atorvastatin 10 milliGRAM(s) Oral at bedtime  carbidopa/levodopa  10/100 1 Tablet(s) Oral three times a day  clopidogrel Tablet 75 milliGRAM(s) Oral daily  donepezil 5 milliGRAM(s) Oral at bedtime  fludroCORTISONE 0.1 milliGRAM(s) Oral daily  metoprolol tartrate 12.5 milliGRAM(s) Oral every 12 hours  pantoprazole    Tablet 40 milliGRAM(s) Oral before breakfast    T(C): 36.3 (07-24-18 @ 05:05), Max: 36.9 (07-23-18 @ 17:07)  HR: 59 (07-24-18 @ 05:05) (51 - 61)  BP: 100/55 (07-24-18 @ 05:05) (100/55 - 135/54)  RR: 18 (07-23-18 @ 17:07) (16 - 18)  SpO2: 95% (07-24-18 @ 05:05) (95% - 100%)    Constitutional: elderly wm.  no acute distress.  awake and alert.   HEENT: PERRL, EOMI, MMM.  Neck: Soft and supple, No carotid bruit, No JVD  Respiratory: Breath sounds are clear bilaterally, No wheezing, rales or rhonchi  Cardiovascular: S1 and S2, regular rate and rhythm, no murmur, rub or gallop.  Gastrointestinal: Bowel Sounds present, soft, nontender, nondistended, no guarding, no rebound, no mass.  Extremities: No peripheral edema  Vascular: 2+ peripheral pulses  Neurological: A/O x 3, no focal deficits  Musculoskeletal: 5/5 strength b/l upper and lower extremities  Skin:  no visible rashes.                         11.2   5.04  )-----------( 161      ( 24 Jul 2018 06:09 )             32.6     PT/INR - ( 24 Jul 2018 06:09 )   PT: 21.7 sec;   INR: 1.98 ratio           07-24    144  |  106  |  32<H>  ----------------------------<  116<H>  3.5   |  29  |  1.36<H>    Ca    8.2<L>      24 Jul 2018 06:09

## 2018-07-24 NOTE — PROGRESS NOTE ADULT - PROBLEM SELECTOR PLAN 1
Syncope probably related to orthostatic hypotension; less symptomatic; continue Florinef; repeat orthostatic vital signs; compression stockings.

## 2018-07-24 NOTE — DISCHARGE NOTE ADULT - CARE PROVIDER_API CALL
Tyler Wong), Internal Medicine  [address] [facility] [address] [address]  Phone: [phone]  Fax: [phone]    Vern Villa (MD), Cardiovascular Disease; Internal Medicine  [address] [place] [address]  Phone: [phone]  Fax: [phone]

## 2018-07-24 NOTE — DISCHARGE NOTE ADULT - MEDICATION SUMMARY - MEDICATIONS TO STOP TAKING
I will STOP taking the medications listed below when I get home from the hospital:    metOLazone 2.5 mg oral tablet  -- 1  by mouth every other day    carvedilol 3.125 mg oral tablet  -- 1 tab(s) by mouth once a day    lisinopril 5 mg oral tablet  -- 0.5 tab(s) by mouth once a day    carvedilol 3.125 mg oral tablet  -- 1 tab(s) by mouth once a day (at bedtime)    Coumadin 3 mg oral tablet  -- 1 tab(s) by mouth once a day (at bedtime)    potassium chloride 20 mEq oral tablet, extended release  -- 1 tab(s) by mouth every other day    potassium chloride 20 mEq oral tablet, extended release  -- 2 tab(s) by mouth every other day

## 2018-07-25 RX ORDER — WARFARIN SODIUM 2.5 MG/1
1 TABLET ORAL
Qty: 0 | Refills: 0 | COMMUNITY
Start: 2018-07-25

## 2018-07-27 DIAGNOSIS — Z79.899 OTHER LONG TERM (CURRENT) DRUG THERAPY: ICD-10-CM

## 2018-07-27 DIAGNOSIS — I25.10 ATHEROSCLEROTIC HEART DISEASE OF NATIVE CORONARY ARTERY WITHOUT ANGINA PECTORIS: ICD-10-CM

## 2018-07-27 DIAGNOSIS — Z95.810 PRESENCE OF AUTOMATIC (IMPLANTABLE) CARDIAC DEFIBRILLATOR: ICD-10-CM

## 2018-07-27 DIAGNOSIS — N18.3 CHRONIC KIDNEY DISEASE, STAGE 3 (MODERATE): ICD-10-CM

## 2018-07-27 DIAGNOSIS — N17.9 ACUTE KIDNEY FAILURE, UNSPECIFIED: ICD-10-CM

## 2018-07-27 DIAGNOSIS — I13.0 HYPERTENSIVE HEART AND CHRONIC KIDNEY DISEASE WITH HEART FAILURE AND STAGE 1 THROUGH STAGE 4 CHRONIC KIDNEY DISEASE, OR UNSPECIFIED CHRONIC KIDNEY DISEASE: ICD-10-CM

## 2018-07-27 DIAGNOSIS — Z95.5 PRESENCE OF CORONARY ANGIOPLASTY IMPLANT AND GRAFT: ICD-10-CM

## 2018-07-27 DIAGNOSIS — Z79.01 LONG TERM (CURRENT) USE OF ANTICOAGULANTS: ICD-10-CM

## 2018-07-27 DIAGNOSIS — G20 PARKINSON'S DISEASE: ICD-10-CM

## 2018-07-27 DIAGNOSIS — Z88.5 ALLERGY STATUS TO NARCOTIC AGENT: ICD-10-CM

## 2018-07-27 DIAGNOSIS — I47.2 VENTRICULAR TACHYCARDIA: ICD-10-CM

## 2018-07-27 DIAGNOSIS — R55 SYNCOPE AND COLLAPSE: ICD-10-CM

## 2018-07-27 DIAGNOSIS — E86.0 DEHYDRATION: ICD-10-CM

## 2018-07-27 DIAGNOSIS — I50.22 CHRONIC SYSTOLIC (CONGESTIVE) HEART FAILURE: ICD-10-CM

## 2018-07-27 DIAGNOSIS — I95.1 ORTHOSTATIC HYPOTENSION: ICD-10-CM

## 2018-07-27 DIAGNOSIS — I48.0 PAROXYSMAL ATRIAL FIBRILLATION: ICD-10-CM

## 2018-07-27 DIAGNOSIS — E78.5 HYPERLIPIDEMIA, UNSPECIFIED: ICD-10-CM

## 2018-08-21 PROBLEM — G20 PARKINSON'S DISEASE: Chronic | Status: ACTIVE | Noted: 2018-07-27

## 2018-08-22 ENCOUNTER — APPOINTMENT (OUTPATIENT)
Dept: CARDIOLOGY | Facility: CLINIC | Age: 83
End: 2018-08-22
Payer: MEDICARE

## 2018-08-22 VITALS
HEART RATE: 70 BPM | DIASTOLIC BLOOD PRESSURE: 67 MMHG | OXYGEN SATURATION: 97 % | WEIGHT: 194 LBS | SYSTOLIC BLOOD PRESSURE: 111 MMHG | BODY MASS INDEX: 29.4 KG/M2 | HEIGHT: 68 IN

## 2018-08-22 DIAGNOSIS — I10 ESSENTIAL (PRIMARY) HYPERTENSION: ICD-10-CM

## 2018-08-22 PROCEDURE — 93000 ELECTROCARDIOGRAM COMPLETE: CPT

## 2018-08-22 PROCEDURE — 99214 OFFICE O/P EST MOD 30 MIN: CPT | Mod: 25

## 2018-08-26 ENCOUNTER — NON-APPOINTMENT (OUTPATIENT)
Age: 83
End: 2018-08-26

## 2018-09-13 NOTE — PATIENT PROFILE ADULT. - CAREGIVER PHONE NUMBER
"Subjective:       Patient ID: Swapna Martinez is a 11 y.o. female.    Vitals:  height is 4' 7" (1.397 m) and weight is 30.8 kg (68 lb). Her temperature is 98.3 °F (36.8 °C). Her blood pressure is 112/73 and her pulse is 100 (abnormal). Her respiration is 18 and oxygen saturation is 98%.     Chief Complaint: Ankle Injury    Ankle Injury   This is a new problem. The current episode started yesterday. The problem occurs constantly. The problem has been unchanged. Associated symptoms include joint swelling. Pertinent negatives include no abdominal pain, chest pain, neck pain, numbness or weakness. The symptoms are aggravated by bending, standing and walking. She has tried acetaminophen for the symptoms. The treatment provided mild relief.     Review of Systems   Constitution: Negative for weakness and malaise/fatigue.   HENT: Negative for nosebleeds.    Cardiovascular: Negative for chest pain and syncope.   Respiratory: Negative for shortness of breath.    Musculoskeletal: Positive for joint pain and joint swelling. Negative for back pain and neck pain.   Gastrointestinal: Negative for abdominal pain.   Genitourinary: Negative for hematuria.   Neurological: Negative for dizziness and numbness.   All other systems reviewed and are negative.      Objective:      Physical Exam   Constitutional: She appears well-developed and well-nourished. She is active and cooperative.  Non-toxic appearance. She does not appear ill. No distress.   HENT:   Head: Normocephalic and atraumatic. No signs of injury. There is normal jaw occlusion.   Right Ear: Tympanic membrane, external ear, pinna and canal normal.   Left Ear: Tympanic membrane, external ear, pinna and canal normal.   Nose: Nose normal. No nasal discharge. No signs of injury. No epistaxis in the right nostril. No epistaxis in the left nostril.   Mouth/Throat: Mucous membranes are moist. Oropharynx is clear.   Eyes: Conjunctivae and lids are normal. Visual tracking is normal. " Right eye exhibits no discharge and no exudate. Left eye exhibits no discharge and no exudate. No scleral icterus.   Neck: Trachea normal and normal range of motion. Neck supple. No neck rigidity or neck adenopathy. No tenderness is present.   Cardiovascular: Normal rate and regular rhythm. Pulses are strong.   Pulmonary/Chest: Effort normal and breath sounds normal. No respiratory distress. She has no wheezes. She exhibits no retraction.   Abdominal: Soft. Bowel sounds are normal. She exhibits no distension. There is no tenderness.   Musculoskeletal: Normal range of motion. She exhibits no tenderness, deformity or signs of injury.        Left ankle: She exhibits swelling.   Neurological: She is alert. She has normal strength.   Skin: Skin is warm and dry. Capillary refill takes less than 2 seconds. No abrasion, no bruising, no burn, no laceration and no rash noted. She is not diaphoretic.   Psychiatric: She has a normal mood and affect. Her speech is normal and behavior is normal. Cognition and memory are normal.   Nursing note and vitals reviewed.      Xr Ankle Complete 3 View Left    Result Date: 9/13/2018  EXAMINATION: XR ANKLE COMPLETE 3 VIEW LEFT CLINICAL HISTORY: Injury, unspecified, initial encounter TECHNIQUE: AP, lateral and oblique views of the left ankle were performed. COMPARISON: None FINDINGS: There is no evidence of acute fracture or subluxation.  The soft tissues are unremarkable.     No radiographic evidence of fracture Electronically signed by: Orlando French MD Date:    09/13/2018 Time:    15:29  Assessment:       1. Trauma        Plan:         Trauma  -     XR ANKLE COMPLETE 3 VIEW LEFT; Future; Expected date: 09/13/2018    Other orders  -     ibuprofen (ADVIL,MOTRIN) 100 mg/5 mL suspension; Take 15 mLs (300 mg total) by mouth every 6 (six) hours as needed for Temperature greater than.  Dispense: 118 mL; Refill: 0        When Your Child Has a Strain, Sprain, or Contusion  Strains, sprains,  and contusions are common injuries in active children. These injuries are similar, but involve different types of body tissue. Most of these injuries happen during sports or active play. But they can happen at any time. A strain, sprain, or contusion can be painful. With the right treatment, most heal with no lasting problems.        A strain is damage to a muscle or tendon.         A sprain is damage to a ligament.         A contusion (bruise) is caused by damage to blood vessels in and under the skin.      What is a strain?  A strain is an injury to a muscle or to a tendon (tissue that connects muscle to bone). It is sometimes called a pulled muscle. A strain happens when a muscle or tendon is stretched too far or is partially torn. Symptoms of a strain are pain, swelling, and having a problem moving or using the injured area. The hamstring (thigh muscle), calf muscle, and Achilles tendon are commonly strained.   What is a sprain?  A sprain is an injury to a ligament (tissue that connects bones to other bones). Joints contain many ligaments. A sprain results when a joint is twisted or pulled and the ligament stretches or tears. Symptoms of a sprain are pain, swelling, and having a problem moving or using the injured area. Ankles, knees, and wrists are the joints most commonly sprained.   What is a contusion?  A contusion is commonly called a bruise. It is injury to tissue that causes bleeding without breaking the skin. It is often a result of being hit by a blunt object, such as a ball or bat. Symptoms of a contusion are discoloration of the skin, pain (which can be severe), and swelling. Contusions usually arent serious and usually dont need medical attention. But a large, painful, or very swollen bruise, or a bruise that limits movement of a joint such as the knee, should be seen by a healthcare provider.   How are strains, sprains, and contusions diagnosed?  The healthcare provider asks about your childs  symptoms and medical history. An exam is also done. An X-ray (test that creates images of bones) may be done to rule out broken bones.  How are strains, sprains, and contusions treated?  · Strains and sprains can take up to months to heal. If not treated and allowed to heal, a strain or sprain can lead to long-term problems. These include lasting pain and stiffness. So it is important to follow the healthcare providers instructions.  · The pain of a contusion often resolves within the first week. But the swelling and discoloration may take weeks to go away.  Treatment consists of one or more of the following:  · RICE (which stands for Rest, Ice, Compression, and Elevation)  ¨ Rest. As much as possible, the child should not use the injured area. In some cases, your child may be given a brace or sling to keep an injured joint still. Your child may also be given crutches to keep some weight off a strain to the leg or a sprain to the ankle or knee.  ¨ Ice. Put ice on the injured area 3 to 4 times a day for 20 minutes at a time. Use an ice pack or bag of frozen peas wrapped in a thin towel. Never put ice directly on your child's skin.  ¨ Compression. If instructed, wrap the area to keep swelling down. Use an elastic bandage. Do this only as instructed by your childs healthcare provider.  ¨ Elevation. Have your child raise the injured body part above the level of his or her heart.  · Medicines to relieve inflammation and pain. These will likely be NSAIDs (nonsteroidal anti-inflammatory medicines). NSAIDs include ibuprofen and naproxen. Give these medicines to your child only as directed by your childs healthcare provider.  · Physical therapy (PT) to strengthen the injured area. This is especially helpful for moderate to severe strains or sprains.  · Casting of the affected area to keep it still and allow the strain or sprain to heal.  · Surgery may be needed if the strain or sprain is severe and there is tearing.  During surgery, the torn muscle, tendon, or ligament is repaired.  What are the long-term concerns?  If allowed to heal, most strains, sprains, and contusions cause no further problems. Strains or sprains that are not treated and dont heal properly can lead to pain or stiffness that doesnt go away. Be sure to follow your childs treatment plan. Your childs healthcare provider can tell you more about the expected outcome based on your childs injury.     Preventing strains, sprains, and contusions  If playing sports or doing other athletic activity, be sure your child:  · Has proper training.  · Wears protective gear.  · Warms up before activity and cools down afterward.  · Uses proper equipment.  · Doesnt play hurt (with an injury).   Date Last Reviewed: 11/18/2015 © 2000-2017 [employer]. [address] [place] [address]. All rights reserved. This information is not intended as a substitute for professional medical care. Always follow your healthcare professional's instructions.      Please return here or go to the Emergency Department for any concerns or worsening of condition.  Please follow up with your primary care doctor or specialist as needed.    If you  smoke, please stop smoking.     same as above

## 2018-09-19 ENCOUNTER — MEDICATION RENEWAL (OUTPATIENT)
Age: 83
End: 2018-09-19

## 2018-11-21 ENCOUNTER — RX RENEWAL (OUTPATIENT)
Age: 83
End: 2018-11-21

## 2018-12-16 ENCOUNTER — INPATIENT (INPATIENT)
Facility: HOSPITAL | Age: 83
LOS: 7 days | Discharge: NURSING, REHAB OR LONG-TERM CARE | End: 2018-12-24
Attending: INTERNAL MEDICINE | Admitting: INTERNAL MEDICINE
Payer: MEDICARE

## 2018-12-16 VITALS — HEIGHT: 66 IN | WEIGHT: 188.94 LBS

## 2018-12-16 LAB
ALBUMIN SERPL ELPH-MCNC: 3.1 G/DL — LOW (ref 3.3–5)
ALP SERPL-CCNC: 102 U/L — SIGNIFICANT CHANGE UP (ref 40–120)
ALT FLD-CCNC: 13 U/L — SIGNIFICANT CHANGE UP (ref 12–78)
ANION GAP SERPL CALC-SCNC: 10 MMOL/L — SIGNIFICANT CHANGE UP (ref 5–17)
APPEARANCE UR: CLEAR — SIGNIFICANT CHANGE UP
AST SERPL-CCNC: 20 U/L — SIGNIFICANT CHANGE UP (ref 15–37)
BACTERIA # UR AUTO: NEGATIVE — SIGNIFICANT CHANGE UP
BASOPHILS # BLD AUTO: 0.03 K/UL — SIGNIFICANT CHANGE UP (ref 0–0.2)
BASOPHILS NFR BLD AUTO: 0.8 % — SIGNIFICANT CHANGE UP (ref 0–2)
BILIRUB SERPL-MCNC: 0.5 MG/DL — SIGNIFICANT CHANGE UP (ref 0.2–1.2)
BILIRUB UR-MCNC: NEGATIVE — SIGNIFICANT CHANGE UP
BUN SERPL-MCNC: 35 MG/DL — HIGH (ref 7–23)
CALCIUM SERPL-MCNC: 8.3 MG/DL — LOW (ref 8.5–10.1)
CHLORIDE SERPL-SCNC: 100 MMOL/L — SIGNIFICANT CHANGE UP (ref 96–108)
CK SERPL-CCNC: 55 U/L — SIGNIFICANT CHANGE UP (ref 26–308)
CO2 SERPL-SCNC: 29 MMOL/L — SIGNIFICANT CHANGE UP (ref 22–31)
COLOR SPEC: YELLOW — SIGNIFICANT CHANGE UP
CREAT SERPL-MCNC: 1.96 MG/DL — HIGH (ref 0.5–1.3)
DIFF PNL FLD: ABNORMAL
EOSINOPHIL # BLD AUTO: 0.07 K/UL — SIGNIFICANT CHANGE UP (ref 0–0.5)
EOSINOPHIL NFR BLD AUTO: 1.8 % — SIGNIFICANT CHANGE UP (ref 0–6)
EPI CELLS # UR: NEGATIVE — SIGNIFICANT CHANGE UP
GLUCOSE SERPL-MCNC: 108 MG/DL — HIGH (ref 70–99)
GLUCOSE UR QL: NEGATIVE MG/DL — SIGNIFICANT CHANGE UP
HCT VFR BLD CALC: 35.5 % — LOW (ref 39–50)
HGB BLD-MCNC: 11.4 G/DL — LOW (ref 13–17)
HMPV RNA SPEC QL NAA+PROBE: DETECTED
HYALINE CASTS # UR AUTO: ABNORMAL /LPF
IMM GRANULOCYTES NFR BLD AUTO: 0.3 % — SIGNIFICANT CHANGE UP (ref 0–1.5)
KETONES UR-MCNC: NEGATIVE — SIGNIFICANT CHANGE UP
LACTATE SERPL-SCNC: 1.7 MMOL/L — SIGNIFICANT CHANGE UP (ref 0.7–2)
LEUKOCYTE ESTERASE UR-ACNC: NEGATIVE — SIGNIFICANT CHANGE UP
LYMPHOCYTES # BLD AUTO: 1.05 K/UL — SIGNIFICANT CHANGE UP (ref 1–3.3)
LYMPHOCYTES # BLD AUTO: 26.9 % — SIGNIFICANT CHANGE UP (ref 13–44)
MCHC RBC-ENTMCNC: 29.4 PG — SIGNIFICANT CHANGE UP (ref 27–34)
MCHC RBC-ENTMCNC: 32.1 GM/DL — SIGNIFICANT CHANGE UP (ref 32–36)
MCV RBC AUTO: 91.5 FL — SIGNIFICANT CHANGE UP (ref 80–100)
MONOCYTES # BLD AUTO: 0.49 K/UL — SIGNIFICANT CHANGE UP (ref 0–0.9)
MONOCYTES NFR BLD AUTO: 12.5 % — SIGNIFICANT CHANGE UP (ref 2–14)
NEUTROPHILS # BLD AUTO: 2.26 K/UL — SIGNIFICANT CHANGE UP (ref 1.8–7.4)
NEUTROPHILS NFR BLD AUTO: 57.7 % — SIGNIFICANT CHANGE UP (ref 43–77)
NITRITE UR-MCNC: NEGATIVE — SIGNIFICANT CHANGE UP
NRBC # BLD: 0 /100 WBCS — SIGNIFICANT CHANGE UP (ref 0–0)
NT-PROBNP SERPL-SCNC: 3855 PG/ML — HIGH (ref 0–450)
PH UR: 5 — SIGNIFICANT CHANGE UP (ref 5–8)
PLATELET # BLD AUTO: 148 K/UL — LOW (ref 150–400)
POTASSIUM SERPL-MCNC: 3.4 MMOL/L — LOW (ref 3.5–5.3)
POTASSIUM SERPL-SCNC: 3.4 MMOL/L — LOW (ref 3.5–5.3)
PROT SERPL-MCNC: 7.5 GM/DL — SIGNIFICANT CHANGE UP (ref 6–8.3)
PROT UR-MCNC: NEGATIVE MG/DL — SIGNIFICANT CHANGE UP
RAPID RVP RESULT: DETECTED
RBC # BLD: 3.88 M/UL — LOW (ref 4.2–5.8)
RBC # FLD: 16.3 % — HIGH (ref 10.3–14.5)
RBC CASTS # UR COMP ASSIST: ABNORMAL /HPF (ref 0–4)
SODIUM SERPL-SCNC: 139 MMOL/L — SIGNIFICANT CHANGE UP (ref 135–145)
SP GR SPEC: 1.01 — SIGNIFICANT CHANGE UP (ref 1.01–1.02)
TROPONIN I SERPL-MCNC: 0.03 NG/ML — SIGNIFICANT CHANGE UP (ref 0.01–0.04)
TROPONIN I SERPL-MCNC: 0.03 NG/ML — SIGNIFICANT CHANGE UP (ref 0.01–0.04)
UROBILINOGEN FLD QL: NEGATIVE MG/DL — SIGNIFICANT CHANGE UP
WBC # BLD: 3.91 K/UL — SIGNIFICANT CHANGE UP (ref 3.8–10.5)
WBC # FLD AUTO: 3.91 K/UL — SIGNIFICANT CHANGE UP (ref 3.8–10.5)
WBC UR QL: SIGNIFICANT CHANGE UP

## 2018-12-16 PROCEDURE — 99285 EMERGENCY DEPT VISIT HI MDM: CPT

## 2018-12-16 PROCEDURE — 93010 ELECTROCARDIOGRAM REPORT: CPT

## 2018-12-16 PROCEDURE — 71045 X-RAY EXAM CHEST 1 VIEW: CPT | Mod: 26

## 2018-12-16 RX ORDER — FUROSEMIDE 40 MG
40 TABLET ORAL ONCE
Qty: 0 | Refills: 0 | Status: COMPLETED | OUTPATIENT
Start: 2018-12-16 | End: 2018-12-16

## 2018-12-16 RX ORDER — SODIUM CHLORIDE 9 MG/ML
3 INJECTION INTRAMUSCULAR; INTRAVENOUS; SUBCUTANEOUS ONCE
Qty: 0 | Refills: 0 | Status: COMPLETED | OUTPATIENT
Start: 2018-12-16 | End: 2018-12-16

## 2018-12-16 RX ADMIN — SODIUM CHLORIDE 3 MILLILITER(S): 9 INJECTION INTRAMUSCULAR; INTRAVENOUS; SUBCUTANEOUS at 18:46

## 2018-12-16 RX ADMIN — Medication 40 MILLIGRAM(S): at 20:00

## 2018-12-16 NOTE — ED PROVIDER NOTE - MEDICAL DECISION MAKING DETAILS
89 y/o m with hx of Parkinson's, CHF, BIB family regarding 2-3 days worsening SOB, +dry cough, generalized weakness. No fever, chills, CP, n/v, abd pain. +orthopnea. Audible wheezing, +respiratory distress on exam. Plan for EKG, CXR, labs including blood lactate RVP, BNP, serial troponin, monitor, observe, reevaluate.  Hold IV fluids for suspected CHF, expect TELE admission.

## 2018-12-16 NOTE — ED PROVIDER NOTE - RESPIRATORY, MLM
Breath sounds clear and equal bilaterally. Mild respiratory distress.  RLL rales, B/L diffuse expiratory wheezing.

## 2018-12-16 NOTE — ED PROVIDER NOTE - CARDIAC, MLM
+bradycardic, regular rhythm.  Heart sounds S1, S2.  No murmurs, rubs or gallops. Normal radial pulse.

## 2018-12-16 NOTE — ED PROVIDER NOTE - OBJECTIVE STATEMENT
89 y/o m with PMHx of Parkinson's disease, CHF, HLD, HTN, CAD, s/p CABG, AICD presenting to the ED c/o gradually worsening nonproductive cough, SOB, confusion x2 days. +orthopnea, sleeping in recliner last 2 nights. +chronic LE edema, questionable if worsening. Denies fever, CP, abd pain. Daughter-in-law that lives with him states she was sick earlier in the week with ear infection, given Z-craig. Family states pt normally becomes confused when he gets sick. Allergic to Morphine. PCP: Dr. Estrada, Cardio: Dr. Kimble. 89 y/o m with PMHx of Parkinson's disease, CHF, HLD, HTN, CAD s/p CABG, AICD on Plavix presenting to the ED c/o gradually worsening nonproductive cough, SOB, confusion x2 days. +orthopnea, sleeping in recliner last 2 nights. +chronic LE edema, questionable if worsening. Denies fever, CP, abd pain. Daughter-in-law that lives with him states she was sick earlier in the week with ear infection, given Z-craig. Family states pt normally becomes confused when he gets sick. Allergic to Morphine. PCP: Dr. Estrada, Cardio: Dr. Kimble. 89 y/o m with PMHx of Parkinson's disease, CHF, HLD, HTN, CAD s/p CABG, AICD on Plavix presenting to the ED c/o gradually worsening nonproductive cough, SOB, confusion x2 days. +orthopnea, sleeping in recliner last 2 nights. +chronic LE edema, questionable if worsening. Denies fever, CP, abd pain. Daughter-in-law that lives with him states she was sick earlier in the week with ear infection, given Z-craig. Family states pt normally becomes confused when he gets sick. Allergic to Morphine. PCP: Dr. Wong, Cardio: Dr. Kimble.

## 2018-12-16 NOTE — ED PROVIDER NOTE - MUSCULOSKELETAL, MLM
Spine appears normal, range of motion is not limited, no muscle or joint tenderness. WONG x4, no focal tenderness, +BL pretibial edema, 2-3+ LLE chronic, 1-2+ in RLE, chronic, no tactile warmth.

## 2018-12-16 NOTE — ED ADULT NURSE NOTE - NSIMPLEMENTINTERV_GEN_ALL_ED
Implemented All Fall Risk Interventions:  [place] to call system. Call bell, personal items and telephone within reach. Instruct patient to call for assistance. Room bathroom lighting operational. Non-slip footwear when patient is off stretcher. Physically safe environment: no spills, clutter or unnecessary equipment. Stretcher in lowest position, wheels locked, appropriate side rails in place. Provide visual cue, wrist band, yellow gown, etc. Monitor gait and stability. Monitor for mental status changes and reorient to person, place, and time. Review medications for side effects contributing to fall risk. Reinforce activity limits and safety measures with patient and family.

## 2018-12-16 NOTE — ED ADULT NURSE NOTE - OBJECTIVE STATEMENT
BIB family with c/o SOB, cough x 2 days worsened today. Hx of CHF, AICD, HTN, HLD, CAD. As per daughter patient has been sleeping in recliner. Wheezing at ED/

## 2018-12-17 DIAGNOSIS — I10 ESSENTIAL (PRIMARY) HYPERTENSION: ICD-10-CM

## 2018-12-17 DIAGNOSIS — R06.02 SHORTNESS OF BREATH: ICD-10-CM

## 2018-12-17 DIAGNOSIS — R09.89 OTHER SPECIFIED SYMPTOMS AND SIGNS INVOLVING THE CIRCULATORY AND RESPIRATORY SYSTEMS: ICD-10-CM

## 2018-12-17 DIAGNOSIS — I25.10 ATHEROSCLEROTIC HEART DISEASE OF NATIVE CORONARY ARTERY WITHOUT ANGINA PECTORIS: ICD-10-CM

## 2018-12-17 DIAGNOSIS — I50.9 HEART FAILURE, UNSPECIFIED: ICD-10-CM

## 2018-12-17 LAB
ALBUMIN SERPL ELPH-MCNC: 3.1 G/DL — LOW (ref 3.3–5)
ALP SERPL-CCNC: 110 U/L — SIGNIFICANT CHANGE UP (ref 40–120)
ALT FLD-CCNC: 8 U/L — LOW (ref 12–78)
ANION GAP SERPL CALC-SCNC: 9 MMOL/L — SIGNIFICANT CHANGE UP (ref 5–17)
AST SERPL-CCNC: 19 U/L — SIGNIFICANT CHANGE UP (ref 15–37)
BASOPHILS # BLD AUTO: 0.02 K/UL — SIGNIFICANT CHANGE UP (ref 0–0.2)
BASOPHILS NFR BLD AUTO: 0.5 % — SIGNIFICANT CHANGE UP (ref 0–2)
BILIRUB SERPL-MCNC: 0.6 MG/DL — SIGNIFICANT CHANGE UP (ref 0.2–1.2)
BUN SERPL-MCNC: 36 MG/DL — HIGH (ref 7–23)
CALCIUM SERPL-MCNC: 8.3 MG/DL — LOW (ref 8.5–10.1)
CHLORIDE SERPL-SCNC: 99 MMOL/L — SIGNIFICANT CHANGE UP (ref 96–108)
CHOLEST SERPL-MCNC: 105 MG/DL — SIGNIFICANT CHANGE UP (ref 10–199)
CO2 SERPL-SCNC: 31 MMOL/L — SIGNIFICANT CHANGE UP (ref 22–31)
CREAT SERPL-MCNC: 1.83 MG/DL — HIGH (ref 0.5–1.3)
EOSINOPHIL # BLD AUTO: 0.14 K/UL — SIGNIFICANT CHANGE UP (ref 0–0.5)
EOSINOPHIL NFR BLD AUTO: 3.6 % — SIGNIFICANT CHANGE UP (ref 0–6)
GLUCOSE SERPL-MCNC: 110 MG/DL — HIGH (ref 70–99)
HCT VFR BLD CALC: 35 % — LOW (ref 39–50)
HDLC SERPL-MCNC: 38 MG/DL — LOW
HGB BLD-MCNC: 11.3 G/DL — LOW (ref 13–17)
IMM GRANULOCYTES NFR BLD AUTO: 0.3 % — SIGNIFICANT CHANGE UP (ref 0–1.5)
INR BLD: 2.2 RATIO — HIGH (ref 0.88–1.16)
LACTATE SERPL-SCNC: 1.1 MMOL/L — SIGNIFICANT CHANGE UP (ref 0.7–2)
LIPID PNL WITH DIRECT LDL SERPL: 44 MG/DL — SIGNIFICANT CHANGE UP
LYMPHOCYTES # BLD AUTO: 1.18 K/UL — SIGNIFICANT CHANGE UP (ref 1–3.3)
LYMPHOCYTES # BLD AUTO: 30.7 % — SIGNIFICANT CHANGE UP (ref 13–44)
MAGNESIUM SERPL-MCNC: 2.1 MG/DL — SIGNIFICANT CHANGE UP (ref 1.6–2.6)
MCHC RBC-ENTMCNC: 29.4 PG — SIGNIFICANT CHANGE UP (ref 27–34)
MCHC RBC-ENTMCNC: 32.3 GM/DL — SIGNIFICANT CHANGE UP (ref 32–36)
MCV RBC AUTO: 90.9 FL — SIGNIFICANT CHANGE UP (ref 80–100)
MONOCYTES # BLD AUTO: 0.45 K/UL — SIGNIFICANT CHANGE UP (ref 0–0.9)
MONOCYTES NFR BLD AUTO: 11.7 % — SIGNIFICANT CHANGE UP (ref 2–14)
NEUTROPHILS # BLD AUTO: 2.04 K/UL — SIGNIFICANT CHANGE UP (ref 1.8–7.4)
NEUTROPHILS NFR BLD AUTO: 53.2 % — SIGNIFICANT CHANGE UP (ref 43–77)
NRBC # BLD: 0 /100 WBCS — SIGNIFICANT CHANGE UP (ref 0–0)
PHOSPHATE SERPL-MCNC: 4.1 MG/DL — SIGNIFICANT CHANGE UP (ref 2.5–4.5)
PLATELET # BLD AUTO: 141 K/UL — LOW (ref 150–400)
POTASSIUM SERPL-MCNC: 2.7 MMOL/L — CRITICAL LOW (ref 3.5–5.3)
POTASSIUM SERPL-MCNC: 2.8 MMOL/L — CRITICAL LOW (ref 3.5–5.3)
POTASSIUM SERPL-SCNC: 2.7 MMOL/L — CRITICAL LOW (ref 3.5–5.3)
POTASSIUM SERPL-SCNC: 2.8 MMOL/L — CRITICAL LOW (ref 3.5–5.3)
PROT SERPL-MCNC: 7.3 GM/DL — SIGNIFICANT CHANGE UP (ref 6–8.3)
PROTHROM AB SERPL-ACNC: 25 SEC — HIGH (ref 10–12.9)
RBC # BLD: 3.85 M/UL — LOW (ref 4.2–5.8)
RBC # FLD: 16.1 % — HIGH (ref 10.3–14.5)
SODIUM SERPL-SCNC: 139 MMOL/L — SIGNIFICANT CHANGE UP (ref 135–145)
TOTAL CHOLESTEROL/HDL RATIO MEASUREMENT: 2.8 RATIO — LOW (ref 3.4–9.6)
TRIGL SERPL-MCNC: 115 MG/DL — SIGNIFICANT CHANGE UP (ref 10–149)
TROPONIN I SERPL-MCNC: 0.03 NG/ML — SIGNIFICANT CHANGE UP (ref 0.01–0.04)
WBC # BLD: 3.84 K/UL — SIGNIFICANT CHANGE UP (ref 3.8–10.5)
WBC # FLD AUTO: 3.84 K/UL — SIGNIFICANT CHANGE UP (ref 3.8–10.5)

## 2018-12-17 PROCEDURE — 71250 CT THORAX DX C-: CPT | Mod: 26

## 2018-12-17 PROCEDURE — 99223 1ST HOSP IP/OBS HIGH 75: CPT

## 2018-12-17 PROCEDURE — 93970 EXTREMITY STUDY: CPT | Mod: 26

## 2018-12-17 RX ORDER — METOPROLOL TARTRATE 50 MG
25 TABLET ORAL DAILY
Qty: 0 | Refills: 0 | Status: DISCONTINUED | OUTPATIENT
Start: 2018-12-17 | End: 2018-12-24

## 2018-12-17 RX ORDER — FLUDROCORTISONE ACETATE 0.1 MG/1
0.1 TABLET ORAL DAILY
Qty: 0 | Refills: 0 | Status: DISCONTINUED | OUTPATIENT
Start: 2018-12-17 | End: 2018-12-17

## 2018-12-17 RX ORDER — IPRATROPIUM/ALBUTEROL SULFATE 18-103MCG
3 AEROSOL WITH ADAPTER (GRAM) INHALATION ONCE
Qty: 0 | Refills: 0 | Status: COMPLETED | OUTPATIENT
Start: 2018-12-17 | End: 2018-12-17

## 2018-12-17 RX ORDER — DOCUSATE SODIUM 100 MG
100 CAPSULE ORAL THREE TIMES A DAY
Qty: 0 | Refills: 0 | Status: DISCONTINUED | OUTPATIENT
Start: 2018-12-17 | End: 2018-12-24

## 2018-12-17 RX ORDER — ALBUTEROL 90 UG/1
1 AEROSOL, METERED ORAL EVERY 4 HOURS
Qty: 0 | Refills: 0 | Status: DISCONTINUED | OUTPATIENT
Start: 2018-12-17 | End: 2018-12-24

## 2018-12-17 RX ORDER — POTASSIUM CHLORIDE 20 MEQ
40 PACKET (EA) ORAL EVERY 4 HOURS
Qty: 0 | Refills: 0 | Status: DISCONTINUED | OUTPATIENT
Start: 2018-12-17 | End: 2018-12-17

## 2018-12-17 RX ORDER — POTASSIUM CHLORIDE 20 MEQ
40 PACKET (EA) ORAL ONCE
Qty: 0 | Refills: 0 | Status: COMPLETED | OUTPATIENT
Start: 2018-12-17 | End: 2018-12-17

## 2018-12-17 RX ORDER — PANTOPRAZOLE SODIUM 20 MG/1
40 TABLET, DELAYED RELEASE ORAL
Qty: 0 | Refills: 0 | Status: DISCONTINUED | OUTPATIENT
Start: 2018-12-17 | End: 2018-12-24

## 2018-12-17 RX ORDER — IPRATROPIUM/ALBUTEROL SULFATE 18-103MCG
3 AEROSOL WITH ADAPTER (GRAM) INHALATION EVERY 4 HOURS
Qty: 0 | Refills: 0 | Status: DISCONTINUED | OUTPATIENT
Start: 2018-12-17 | End: 2018-12-24

## 2018-12-17 RX ORDER — DONEPEZIL HYDROCHLORIDE 10 MG/1
5 TABLET, FILM COATED ORAL AT BEDTIME
Qty: 0 | Refills: 0 | Status: DISCONTINUED | OUTPATIENT
Start: 2018-12-17 | End: 2018-12-24

## 2018-12-17 RX ORDER — CEFTRIAXONE 500 MG/1
INJECTION, POWDER, FOR SOLUTION INTRAMUSCULAR; INTRAVENOUS
Qty: 0 | Refills: 0 | Status: DISCONTINUED | OUTPATIENT
Start: 2018-12-17 | End: 2018-12-22

## 2018-12-17 RX ORDER — TIOTROPIUM BROMIDE 18 UG/1
1 CAPSULE ORAL; RESPIRATORY (INHALATION) DAILY
Qty: 0 | Refills: 0 | Status: DISCONTINUED | OUTPATIENT
Start: 2018-12-17 | End: 2018-12-24

## 2018-12-17 RX ORDER — CLOPIDOGREL BISULFATE 75 MG/1
75 TABLET, FILM COATED ORAL DAILY
Qty: 0 | Refills: 0 | Status: DISCONTINUED | OUTPATIENT
Start: 2018-12-17 | End: 2018-12-24

## 2018-12-17 RX ORDER — CARBIDOPA AND LEVODOPA 25; 100 MG/1; MG/1
1 TABLET ORAL THREE TIMES A DAY
Qty: 0 | Refills: 0 | Status: DISCONTINUED | OUTPATIENT
Start: 2018-12-17 | End: 2018-12-18

## 2018-12-17 RX ORDER — CEFTRIAXONE 500 MG/1
1000 INJECTION, POWDER, FOR SOLUTION INTRAMUSCULAR; INTRAVENOUS ONCE
Qty: 0 | Refills: 0 | Status: COMPLETED | OUTPATIENT
Start: 2018-12-17 | End: 2018-12-17

## 2018-12-17 RX ORDER — FUROSEMIDE 40 MG
40 TABLET ORAL DAILY
Qty: 0 | Refills: 0 | Status: DISCONTINUED | OUTPATIENT
Start: 2018-12-17 | End: 2018-12-24

## 2018-12-17 RX ORDER — POTASSIUM CHLORIDE 20 MEQ
10 PACKET (EA) ORAL
Qty: 0 | Refills: 0 | Status: COMPLETED | OUTPATIENT
Start: 2018-12-17 | End: 2018-12-17

## 2018-12-17 RX ORDER — POTASSIUM CHLORIDE 20 MEQ
20 PACKET (EA) ORAL ONCE
Qty: 0 | Refills: 0 | Status: COMPLETED | OUTPATIENT
Start: 2018-12-17 | End: 2018-12-17

## 2018-12-17 RX ORDER — CEFTRIAXONE 500 MG/1
1000 INJECTION, POWDER, FOR SOLUTION INTRAMUSCULAR; INTRAVENOUS EVERY 24 HOURS
Qty: 0 | Refills: 0 | Status: DISCONTINUED | OUTPATIENT
Start: 2018-12-18 | End: 2018-12-22

## 2018-12-17 RX ORDER — ATORVASTATIN CALCIUM 80 MG/1
10 TABLET, FILM COATED ORAL AT BEDTIME
Qty: 0 | Refills: 0 | Status: DISCONTINUED | OUTPATIENT
Start: 2018-12-17 | End: 2018-12-24

## 2018-12-17 RX ORDER — POTASSIUM CHLORIDE 20 MEQ
20 PACKET (EA) ORAL DAILY
Qty: 0 | Refills: 0 | Status: DISCONTINUED | OUTPATIENT
Start: 2018-12-17 | End: 2018-12-23

## 2018-12-17 RX ORDER — WARFARIN SODIUM 2.5 MG/1
3 TABLET ORAL DAILY
Qty: 0 | Refills: 0 | Status: COMPLETED | OUTPATIENT
Start: 2018-12-17 | End: 2018-12-19

## 2018-12-17 RX ORDER — AZITHROMYCIN 500 MG/1
500 TABLET, FILM COATED ORAL DAILY
Qty: 0 | Refills: 0 | Status: DISCONTINUED | OUTPATIENT
Start: 2018-12-17 | End: 2018-12-21

## 2018-12-17 RX ADMIN — DONEPEZIL HYDROCHLORIDE 5 MILLIGRAM(S): 10 TABLET, FILM COATED ORAL at 22:08

## 2018-12-17 RX ADMIN — ATORVASTATIN CALCIUM 10 MILLIGRAM(S): 80 TABLET, FILM COATED ORAL at 22:08

## 2018-12-17 RX ADMIN — Medication 40 MILLIEQUIVALENT(S): at 10:18

## 2018-12-17 RX ADMIN — Medication 3 MILLILITER(S): at 08:19

## 2018-12-17 RX ADMIN — Medication 3 MILLILITER(S): at 09:38

## 2018-12-17 RX ADMIN — CARBIDOPA AND LEVODOPA 1 TABLET(S): 25; 100 TABLET ORAL at 05:13

## 2018-12-17 RX ADMIN — AZITHROMYCIN 500 MILLIGRAM(S): 500 TABLET, FILM COATED ORAL at 12:20

## 2018-12-17 RX ADMIN — CARBIDOPA AND LEVODOPA 1 TABLET(S): 25; 100 TABLET ORAL at 22:08

## 2018-12-17 RX ADMIN — Medication 20 MILLIEQUIVALENT(S): at 05:14

## 2018-12-17 RX ADMIN — Medication 100 MILLIEQUIVALENT(S): at 22:08

## 2018-12-17 RX ADMIN — Medication 1 TABLET(S): at 12:20

## 2018-12-17 RX ADMIN — Medication 100 MILLIEQUIVALENT(S): at 18:16

## 2018-12-17 RX ADMIN — Medication 3 MILLILITER(S): at 21:32

## 2018-12-17 RX ADMIN — CEFTRIAXONE 1000 MILLIGRAM(S): 500 INJECTION, POWDER, FOR SOLUTION INTRAMUSCULAR; INTRAVENOUS at 13:29

## 2018-12-17 RX ADMIN — Medication 3 MILLILITER(S): at 04:04

## 2018-12-17 RX ADMIN — CARBIDOPA AND LEVODOPA 1 TABLET(S): 25; 100 TABLET ORAL at 15:06

## 2018-12-17 RX ADMIN — Medication 40 MILLIEQUIVALENT(S): at 22:08

## 2018-12-17 RX ADMIN — FLUDROCORTISONE ACETATE 0.1 MILLIGRAM(S): 0.1 TABLET ORAL at 05:13

## 2018-12-17 RX ADMIN — WARFARIN SODIUM 3 MILLIGRAM(S): 2.5 TABLET ORAL at 22:08

## 2018-12-17 RX ADMIN — PANTOPRAZOLE SODIUM 40 MILLIGRAM(S): 20 TABLET, DELAYED RELEASE ORAL at 06:19

## 2018-12-17 RX ADMIN — Medication 3 MILLILITER(S): at 11:45

## 2018-12-17 RX ADMIN — CLOPIDOGREL BISULFATE 75 MILLIGRAM(S): 75 TABLET, FILM COATED ORAL at 12:20

## 2018-12-17 RX ADMIN — Medication 40 MILLIGRAM(S): at 12:20

## 2018-12-17 NOTE — CONSULT NOTE ADULT - ASSESSMENT
89 y/o M with PMH of CAD s/p PCI / CABG, systolic CHF, h/o NSVT, s/p AICD, HTN, dylsipidemia, h/o orthostatic hypotension, CKD III, a-fib (on coumadin), parkinson's disease, admitted on 12/16 for evaluation of increasing shortness of breath and wheezing associated nonproductive cough. The patient was receiving breathing treatment and was speaking in single words, as so short of breath. Unable to provide a history, history per medical record.   1. Patient admitted with pneumonia superimposed on infection with Human Metapneumovirus; also noted with leukopenia, most likely reactive to infection either viral or bacterial  - follow up cultures   - iv hydration and supportive care   - serial cbc and monitor temperature   - oxygen and nebs as needed   - reviewed prior medical records to evaluate for resistant or atypical pathogens   - agree with ceftriaxone and zithromax as ordered as patient to be treated for community acquired pneumonia   - continue isolation  2. other issues: CAD s/p PCI / CABG, systolic CHF, h/o NSVT, s/p AICD, HTN, dylsipidemia, h/o orthostatic hypotension, CKD III, a-fib (on coumadin), parkinson's disease  - per medicine

## 2018-12-17 NOTE — H&P ADULT - HISTORY OF PRESENT ILLNESS
89 y/o M with PMH of CAD s/p PCI / CABG, systolic CHF, h/o NSVT, s/p AICD, HTN, dylsipidemia, h/o orthostatic hypotension, CKD III, a-fib (on coumadin), parkinson's disease, p/w SOB. Patient provides minimal history, and unable to elaborate. States he developed SOB, and wheezing. Has non-productive cough. Denies fever, chills, nausea, vomiting, abdominal pain, urinary complaints. Presently no family members available. ED documents that patient was also confused, +orthopnea and was sleeping in recliner last 2 nights.     PSH: CABG    Social Hx: Denies x 3, lives with son    Family Hx: Mother - cardiac disease

## 2018-12-17 NOTE — H&P ADULT - NSHPPOAPRESSUREULCER_GEN_ALL_CORE
Quadrant Reporting?: no Post-Care Instructions: I reviewed with the patient in detail post-care instructions. Patient is not to engage in any heavy lifting, exercise, or swimming for the next 14 days. Should the patient develop any fevers, chills, bleeding, severe pain patient will contact the office immediately. Double Island Pedicle Flap Text: The defect edges were debeveled with a #15 scalpel blade.  Given the location of the defect, shape of the defect and the proximity to free margins a double island pedicle advancement flap was deemed most appropriate.  Using a sterile surgical marker, an appropriate advancement flap was drawn incorporating the defect, outlining the appropriate donor tissue and placing the expected incisions within the relaxed skin tension lines where possible.    The area thus outlined was incised deep to adipose tissue with a #15 scalpel blade.  The skin margins were undermined to an appropriate distance in all directions around the primary defect and laterally outward around the island pedicle utilizing iris scissors.  There was minimal undermining beneath the pedicle flap. Patient Will Remove Sutures At Home?: Yes Stage 12: Number Of Blocks?: 0 Localized Dermabrasion With Wire Brush Text: The patient was draped in routine manner.  Localized dermabrasion using 3 x 17 mm wire brush was performed in routine manner to papillary dermis. This spot dermabrasion is being performed to complete skin cancer reconstruction. It also will eliminate the other sun damaged precancerous cells that are known to be part of the regional effect of a lifetime's worth of sun exposure. This localized dermabrasion is therapeutic and should not be considered cosmetic in any regard. Surgeon Performing Repair (Optional): Ania Powers Modified Advancement Flap Text: The defect edges were debeveled with a #15 scalpel blade.  Given the location of the defect, shape of the defect and the proximity to free margins a modified advancement flap was deemed most appropriate.  Using a sterile surgical marker, an appropriate advancement flap was drawn incorporating the defect and placing the expected incisions within the relaxed skin tension lines where possible.    The area thus outlined was incised deep to adipose tissue with a #15 scalpel blade.  The skin margins were undermined to an appropriate distance in all directions utilizing iris scissors. Anesthesia Type: 1% lidocaine with epinephrine and a 1:10 solution of 8.4% sodium bicarbonate Stage 11: Additional Anesthesia Type: 1% lidocaine with epinephrine no Mohs Case Number: [id number] Epidermal Autograft Text: The defect edges were debeveled with a #15 scalpel blade.  Given the location of the defect, shape of the defect and the proximity to free margins an epidermal autograft was deemed most appropriate.  Using a sterile surgical marker, the primary defect shape was transferred to the donor site. The epidermal graft was then harvested.  The skin graft was then placed in the primary defect and oriented appropriately. Referred To Mid-Level For Closure Text (Leave Blank If You Do Not Want): After obtaining clear surgical margins the patient was sent to a mid-level provider for surgical repair.  The patient understands they will receive post-surgical care and follow-up from the mid-level provider. Graft Basting Suture (Optional): 6-0 Fast Absorbing Gut V-Y Flap Text: The defect edges were debeveled with a #15 scalpel blade.  Given the location of the defect, shape of the defect and the proximity to free margins a V-Y flap was deemed most appropriate.  Using a sterile surgical marker, an appropriate advancement flap was drawn incorporating the defect and placing the expected incisions within the relaxed skin tension lines where possible.    The area thus outlined was incised deep to adipose tissue with a #15 scalpel blade.  The skin margins were undermined to an appropriate distance in all directions utilizing iris scissors. Consent (Near Eyelid Margin)/Introductory Paragraph: The rationale for Mohs was explained to the patient and consent was obtained. The risks, benefits and alternatives to therapy were discussed in detail. Specifically, the risks of ectropion or eyelid deformity, infection, scarring, bleeding, prolonged wound healing, incomplete removal, allergy to anesthesia, nerve injury and recurrence were addressed. Prior to the procedure, the treatment site was clearly identified and confirmed by the patient. All components of Universal Protocol/PAUSE Rule completed. Stage 4: Number Of Blocks?: 1 Repair Hemostasis (Optional): Electrocautery Mastoid Interpolation Flap Text: A decision was made to reconstruct the defect utilizing an interpolation axial flap and a staged reconstruction.  A telfa template was made of the defect.  This telfa template was then used to outline the mastoid interpolation flap.  The donor area for the pedicle flap was then injected with anesthesia.  The flap was excised through the skin and subcutaneous tissue down to the layer of the underlying musculature.  The pedicle flap was carefully excised within this deep plane to maintain its blood supply.  The edges of the donor site were undermined.   The donor site was closed in a primary fashion.  The pedicle was then rotated into position and sutured.  Once the tube was sutured into place, adequate blood supply was confirmed with blanching and refill.  The pedicle was then wrapped with xeroform gauze and dressed appropriately with a telfa and gauze bandage to ensure continued blood supply and protect the attached pedicle. Consent Type: Consent 1 (Standard) Dorsal Nasal Flap Text: The defect edges were debeveled with a #15 scalpel blade.  Given the location of the defect and the proximity to free margins a dorsal nasal flap was deemed most appropriate.  Using a sterile surgical marker, an appropriate dorsal nasal flap was drawn around the defect.    The area thus outlined was incised deep to adipose tissue with a #15 scalpel blade.  The skin margins were undermined to an appropriate distance in all directions utilizing iris scissors. O-L Flap Text: The defect edges were debeveled with a #15 scalpel blade.  Given the location of the defect, shape of the defect and the proximity to free margins an O-L flap was deemed most appropriate.  Using a sterile surgical marker, an appropriate advancement flap was drawn incorporating the defect and placing the expected incisions within the relaxed skin tension lines where possible.    The area thus outlined was incised deep to adipose tissue with a #15 scalpel blade.  The skin margins were undermined to an appropriate distance in all directions utilizing iris scissors. Surgical Defect Length In Cm (Optional): 1.3 Closure 2 Information: This tab is for additional flaps and grafts, including complex repair and grafts and complex repair and flaps. You can also specify a different location for the additional defect, if the location is the same you do not need to select a new one. We will insert the automated text for the repair you select below just as we do for solitary flaps and grafts. Please note that at this time if you select a location with a different insurance zone you will need to override the ICD10 and CPT if appropriate. X Size Of Lesion In Cm (Optional): 0.6 Same Histology In Subsequent Stages Text: The pattern and morphology of the tumor is as described in the first stage. Additional Anesthesia Volume In Cc: 6 Tissue Cultured Epidermal Autograft Text: The defect edges were debeveled with a #15 scalpel blade.  Given the location of the defect, shape of the defect and the proximity to free margins a tissue cultured epidermal autograft was deemed most appropriate.  The graft was then trimmed to fit the size of the defect.  The graft was then placed in the primary defect and oriented appropriately. Wound Care: Petrolatum Keystone Flap Text: The defect edges were debeveled with a #15 scalpel blade.  Given the location of the defect, shape of the defect a keystone flap was deemed most appropriate.  Using a sterile surgical marker, an appropriate keystone flap was drawn incorporating the defect, outlining the appropriate donor tissue and placing the expected incisions within the relaxed skin tension lines where possible. The area thus outlined was incised deep to adipose tissue with a #15 scalpel blade.  The skin margins were undermined to an appropriate distance in all directions around the primary defect and laterally outward around the flap utilizing iris scissors. Wound Check: 7 days Bcc Infiltrative Histology Text: There were numerous aggregates of basaloid cells demonstrating an infiltrative pattern. Bilobed Flap Text: The defect edges were debeveled with a #15 scalpel blade.  Given the location of the defect and the proximity to free margins a bilobe flap was deemed most appropriate.  Using a sterile surgical marker, an appropriate bilobe flap drawn around the defect.    The area thus outlined was incised deep to adipose tissue with a #15 scalpel blade.  The skin margins were undermined to an appropriate distance in all directions utilizing iris scissors. Simple / Intermediate / Complex Repair - Final Wound Length In Cm: 5 Paramedian Forehead Flap Text: A decision was made to reconstruct the defect utilizing an interpolation axial flap and a staged reconstruction.  A telfa template was made of the defect.  This telfa template was then used to outline the paramedian forehead pedicle flap.  The donor area for the pedicle flap was then injected with anesthesia.  The flap was excised through the skin and subcutaneous tissue down to the layer of the underlying musculature.  The pedicle flap was carefully excised within this deep plane to maintain its blood supply.  The edges of the donor site were undermined.   The donor site was closed in a primary fashion.  The pedicle was then rotated into position and sutured.  Once the tube was sutured into place, adequate blood supply was confirmed with blanching and refill.  The pedicle was then wrapped with xeroform gauze and dressed appropriately with a telfa and gauze bandage to ensure continued blood supply and protect the attached pedicle. Referred To Asc For Closure Text (Leave Blank If You Do Not Want): After obtaining clear surgical margins the patient was sent to an ASC for surgical repair.  The patient understands they will receive post-surgical care and follow-up from the ASC physician. Surgical Defect Length In Cm (Optional): 1.7 Transposition Flap Text: The defect edges were debeveled with a #15 scalpel blade.  Given the location of the defect and the proximity to free margins a transposition flap was deemed most appropriate.  Using a sterile surgical marker, an appropriate transposition flap was drawn incorporating the defect.    The area thus outlined was incised deep to adipose tissue with a #15 scalpel blade.  The skin margins were undermined to an appropriate distance in all directions utilizing iris scissors. Intermediate Repair Preamble Text (Leave Blank If You Do Not Want): Undermining was performed with blunt dissection. Cheek-To-Nose Interpolation Flap Text: A decision was made to reconstruct the defect utilizing an interpolation axial flap and a staged reconstruction.  A telfa template was made of the defect.  This telfa template was then used to outline the Cheek-To-Nose Interpolation flap.  The donor area for the pedicle flap was then injected with anesthesia.  The flap was excised through the skin and subcutaneous tissue down to the layer of the underlying musculature.  The interpolation flap was carefully excised within this deep plane to maintain its blood supply.  The edges of the donor site were undermined.   The donor site was closed in a primary fashion.  The pedicle was then rotated into position and sutured.  Once the tube was sutured into place, adequate blood supply was confirmed with blanching and refill.  The pedicle was then wrapped with xeroform gauze and dressed appropriately with a telfa and gauze bandage to ensure continued blood supply and protect the attached pedicle. Consent (Ear)/Introductory Paragraph: The rationale for Mohs was explained to the patient and consent was obtained. The risks, benefits and alternatives to therapy were discussed in detail. Specifically, the risks of ear deformity, infection, scarring, bleeding, prolonged wound healing, incomplete removal, allergy to anesthesia, nerve injury and recurrence were addressed. Prior to the procedure, the treatment site was clearly identified and confirmed by the patient. All components of Universal Protocol/PAUSE Rule completed. Consent 3/Introductory Paragraph: I gave the patient a chance to ask questions they had about the procedure.  Following this I explained the Mohs procedure and consent was obtained. The risks, benefits and alternatives to therapy were discussed in detail. Specifically, the risks of infection, scarring, bleeding, prolonged wound healing, incomplete removal, allergy to anesthesia, nerve injury and recurrence were addressed. Prior to the procedure, the treatment site was clearly identified and confirmed by the patient. All components of Universal Protocol/PAUSE Rule completed. Date Of Previous Biopsy (Optional): 08/02/17 Consent 2/Introductory Paragraph: Mohs surgery was explained to the patient and consent was obtained. The risks, benefits and alternatives to therapy were discussed in detail. Specifically, the risks of infection, scarring, bleeding, prolonged wound healing, incomplete removal, allergy to anesthesia, nerve injury and recurrence were addressed. Prior to the procedure, the treatment site was clearly identified and confirmed by the patient. All components of Universal Protocol/PAUSE Rule completed. No Residual Tumor Seen Histology Text: There were no malignant cells seen in the sections examined. Home Suture Removal Text: Patient was provided instructions on removing sutures and will remove their sutures at home.  If they have any questions or difficulties they will call the office. Purse String (Intermediate) Text: Given the location of the defect and the characteristics of the surrounding skin a pursestring intermediate closure was deemed most appropriate.  Undermining was performed circumfirentially around the surgical defect.  A purstring suture was then placed and tightened. Alternatives Discussed Intro (Do Not Add Period): I discussed alternative treatments to Mohs surgery and specifically discussed the risks and benefits of Partial Purse String (Intermediate) Text: Given the location of the defect and the characteristics of the surrounding skin an intermediate purse string closure was deemed most appropriate.  Undermining was performed circumfirentially around the surgical defect.  A purse string suture was then placed and tightened. Wound tension only allowed a partial closure of the circular defect. A-T Advancement Flap Text: The defect edges were debeveled with a #15 scalpel blade.  Given the location of the defect, shape of the defect and the proximity to free margins an A-T advancement flap was deemed most appropriate.  Using a sterile surgical marker, an appropriate advancement flap was drawn incorporating the defect and placing the expected incisions within the relaxed skin tension lines where possible.    The area thus outlined was incised deep to adipose tissue with a #15 scalpel blade.  The skin margins were undermined to an appropriate distance in all directions utilizing iris scissors. Mucosal Advancement Flap Text: Given the location of the defect, shape of the defect and the proximity to free margins a mucosal advancement flap was deemed most appropriate. Incisions were made with a 15 blade scalpel in the appropriate fashion along the cutaneous vermilion border and the mucosal lip. The remaining actinically damaged mucosal tissue was excised.  The mucosal advancement flap was then elevated to the gingival sulcus with care taken to preserve the neurovascular structures and advanced into the primary defect. Care was taken to ensure that precise realignment of the vermilion border was achieved. Referred To Oculoplastics For Closure Text (Leave Blank If You Do Not Want): After obtaining clear surgical margins the patient was sent to oculoplastics for surgical repair.  The patient understands they will receive post-surgical care and follow-up from the referring physician's office. Repair Anesthesia Method: local infiltration Bi-Rhombic Flap Text: The defect edges were debeveled with a #15 scalpel blade.  Given the location of the defect and the proximity to free margins a bi-rhombic flap was deemed most appropriate.  Using a sterile surgical marker, an appropriate rhombic flap was drawn incorporating the defect. The area thus outlined was incised deep to adipose tissue with a #15 scalpel blade.  The skin margins were undermined to an appropriate distance in all directions utilizing iris scissors. Referred To Plastics For Closure Text (Leave Blank If You Do Not Want): After obtaining clear surgical margins the patient was sent to plastics for surgical repair.  The patient understands they will receive post-surgical care and follow-up from the referring physician's office. Rotation Flap Text: The defect edges were debeveled with a #15 scalpel blade.  Given the location of the defect, shape of the defect and the proximity to free margins a rotation flap was deemed most appropriate.  Using a sterile surgical marker, an appropriate rotation flap was drawn incorporating the defect and placing the expected incisions within the relaxed skin tension lines where possible.    The area thus outlined was incised deep to adipose tissue with a #15 scalpel blade.  The skin margins were undermined to an appropriate distance in all directions utilizing iris scissors. Dermal Autograft Text: The defect edges were debeveled with a #15 scalpel blade.  Given the location of the defect, shape of the defect and the proximity to free margins a dermal autograft was deemed most appropriate.  Using a sterile surgical marker, the primary defect shape was transferred to the donor site. The area thus outlined was incised deep to adipose tissue with a #15 scalpel blade.  The harvested graft was then trimmed of adipose and epidermal tissue until only dermis was left.  The skin graft was then placed in the primary defect and oriented appropriately. Melolabial Transposition Flap Text: The defect edges were debeveled with a #15 scalpel blade.  Given the location of the defect and the proximity to free margins a melolabial flap was deemed most appropriate.  Using a sterile surgical marker, an appropriate melolabial transposition flap was drawn incorporating the defect.    The area thus outlined was incised deep to adipose tissue with a #15 scalpel blade.  The skin margins were undermined to an appropriate distance in all directions utilizing iris scissors. Alar Island Pedicle Flap Text: The defect edges were debeveled with a #15 scalpel blade.  Given the location of the defect, shape of the defect and the proximity to the alar rim an island pedicle advancement flap was deemed most appropriate.  Using a sterile surgical marker, an appropriate advancement flap was drawn incorporating the defect, outlining the appropriate donor tissue and placing the expected incisions within the nasal ala running parallel to the alar rim. The area thus outlined was incised with a #15 scalpel blade.  The skin margins were undermined minimally to an appropriate distance in all directions around the primary defect and laterally outward around the island pedicle utilizing iris scissors.  There was minimal undermining beneath the pedicle flap. Location Indication Override (Is Already Calculated Based On Selected Body Location): Area H Consent (Marginal Mandibular)/Introductory Paragraph: The rationale for Mohs was explained to the patient and consent was obtained. The risks, benefits and alternatives to therapy were discussed in detail. Specifically, the risks of damage to the marginal mandibular branch of the facial nerve, infection, scarring, bleeding, prolonged wound healing, incomplete removal, allergy to anesthesia, and recurrence were addressed. Prior to the procedure, the treatment site was clearly identified and confirmed by the patient. All components of Universal Protocol/PAUSE Rule completed. Spiral Flap Text: The defect edges were debeveled with a #15 scalpel blade.  Given the location of the defect, shape of the defect and the proximity to free margins a spiral flap was deemed most appropriate.  Using a sterile surgical marker, an appropriate rotation flap was drawn incorporating the defect and placing the expected incisions within the relaxed skin tension lines where possible. The area thus outlined was incised deep to adipose tissue with a #15 scalpel blade.  The skin margins were undermined to an appropriate distance in all directions utilizing iris scissors. Surgical Defect Length In Cm (Optional): 1.9 Epidermal Closure Graft Donor Site (Optional): simple interrupted Deep Sutures: 4-0 Vicryl Melolabial Interpolation Flap Text: A decision was made to reconstruct the defect utilizing an interpolation axial flap and a staged reconstruction.  A telfa template was made of the defect.  This telfa template was then used to outline the melolabial interpolation flap.  The donor area for the pedicle flap was then injected with anesthesia.  The flap was excised through the skin and subcutaneous tissue down to the layer of the underlying musculature.  The pedicle flap was carefully excised within this deep plane to maintain its blood supply.  The edges of the donor site were undermined.   The donor site was closed in a primary fashion.  The pedicle was then rotated into position and sutured.  Once the tube was sutured into place, adequate blood supply was confirmed with blanching and refill.  The pedicle was then wrapped with xeroform gauze and dressed appropriately with a telfa and gauze bandage to ensure continued blood supply and protect the attached pedicle. Island Pedicle Flap Text: The defect edges were debeveled with a #15 scalpel blade.  Given the location of the defect, shape of the defect and the proximity to free margins an island pedicle advancement flap was deemed most appropriate.  Using a sterile surgical marker, an appropriate advancement flap was drawn incorporating the defect, outlining the appropriate donor tissue and placing the expected incisions within the relaxed skin tension lines where possible.    The area thus outlined was incised deep to adipose tissue with a #15 scalpel blade.  The skin margins were undermined to an appropriate distance in all directions around the primary defect and laterally outward around the island pedicle utilizing iris scissors.  There was minimal undermining beneath the pedicle flap. Body Location Override (Optional - Billing Will Still Be Based On Selected Body Map Location If Applicable): nasal Supra tip Previous Accession (Optional): [id number] Closure 4 Information: This tab is for additional flaps and grafts above and beyond our usual structured repairs.  Please note if you enter information here it will not currently bill and you will need to add the billing information manually. Ftsg Text: The defect edges were debeveled with a #15 scalpel blade.  Given the location of the defect, shape of the defect and the proximity to free margins a full thickness skin graft was deemed most appropriate.  Using a sterile surgical marker, the primary defect shape was transferred to the donor site. The area thus outlined was incised deep to adipose tissue with a #15 scalpel blade.  The harvested graft was then trimmed of adipose tissue until only dermis and epidermis was left.  The skin margins of the secondary defect were undermined to an appropriate distance in all directions utilizing iris scissors.  The secondary defect was closed with interrupted buried subcutaneous sutures.  The skin edges were then re-apposed with running  sutures.  The skin graft was then placed in the primary defect and oriented appropriately. Inflammation Suggestive Of Cancer Camouflage Histology Text: There was a dense lymphocytic infiltrate which prevented adequate histologic evaluation of adjacent structures. Estimated Blood Loss (Cc): minimal Detail Level: Detailed Star Wedge Flap Text: The defect edges were debeveled with a #15 scalpel blade.  Given the location of the defect, shape of the defect and the proximity to free margins a star wedge flap was deemed most appropriate.  Using a sterile surgical marker, an appropriate rotation flap was drawn incorporating the defect and placing the expected incisions within the relaxed skin tension lines where possible. The area thus outlined was incised deep to adipose tissue with a #15 scalpel blade.  The skin margins were undermined to an appropriate distance in all directions utilizing iris scissors. Ear Star Wedge Flap Text: The defect edges were debeveled with a #15 blade scalpel.  Given the location of the defect and the proximity to free margins (helical rim) an ear star wedge flap was deemed most appropriate.  Using a sterile surgical marker, the appropriate flap was drawn incorporating the defect and placing the expected incisions between the helical rim and antihelix where possible.  The area thus outlined was incised through and through with a #15 scalpel blade. O-T Plasty Text: The defect edges were debeveled with a #15 scalpel blade.  Given the location of the defect, shape of the defect and the proximity to free margins an O-T plasty was deemed most appropriate.  Using a sterile surgical marker, an appropriate O-T plasty was drawn incorporating the defect and placing the expected incisions within the relaxed skin tension lines where possible.    The area thus outlined was incised deep to adipose tissue with a #15 scalpel blade.  The skin margins were undermined to an appropriate distance in all directions utilizing iris scissors. Advancement Flap (Double) Text: The defect edges were debeveled with a #15 scalpel blade.  Given the location of the defect and the proximity to free margins a double advancement flap was deemed most appropriate.  Using a sterile surgical marker, the appropriate advancement flaps were drawn incorporating the defect and placing the expected incisions within the relaxed skin tension lines where possible.    The area thus outlined was incised deep to adipose tissue with a #15 scalpel blade.  The skin margins were undermined to an appropriate distance in all directions utilizing iris scissors. Bilobed Transposition Flap Text: The defect edges were debeveled with a #15 scalpel blade.  Given the location of the defect and the proximity to free margins a bilobed transposition flap was deemed most appropriate.  Using a sterile surgical marker, an appropriate bilobe flap drawn around the defect.    The area thus outlined was incised deep to adipose tissue with a #15 scalpel blade.  The skin margins were undermined to an appropriate distance in all directions utilizing iris scissors. Surgical Defect Width In Cm (Optional): 2 Consent (Nose)/Introductory Paragraph: The rationale for Mohs was explained to the patient and consent was obtained. The risks, benefits and alternatives to therapy were discussed in detail. Specifically, the risks of nasal deformity, changes in the flow of air through the nose, infection, scarring, bleeding, prolonged wound healing, incomplete removal, allergy to anesthesia, nerve injury and recurrence were addressed. Prior to the procedure, the treatment site was clearly identified and confirmed by the patient. All components of Universal Protocol/PAUSE Rule completed. Advancement-Rotation Flap Text: The defect edges were debeveled with a #15 scalpel blade.  Given the location of the defect, shape of the defect and the proximity to free margins an advancement-rotation flap was deemed most appropriate.  Using a sterile surgical marker, an appropriate flap was drawn incorporating the defect and placing the expected incisions within the relaxed skin tension lines where possible. The area thus outlined was incised deep to adipose tissue with a #15 scalpel blade.  The skin margins were undermined to an appropriate distance in all directions utilizing iris scissors. Tarsorrhaphy Text: A tarsorrhaphy was performed using Frost sutures. Repair Type: Complex Repair Composite Graft Text: The defect edges were debeveled with a #15 scalpel blade.  Given the location of the defect, shape of the defect, the proximity to free margins and the fact the defect was full thickness a composite graft was deemed most appropriate.  The defect was outline and then transferred to the donor site.  A full thickness graft was then excised from the donor site. The graft was then placed in the primary defect, oriented appropriately and then sutured into place.  The secondary defect was then repaired using a primary closure. Consent (Scalp)/Introductory Paragraph: The rationale for Mohs was explained to the patient and consent was obtained. The risks, benefits and alternatives to therapy were discussed in detail. Specifically, the risks of changes in hair growth pattern secondary to repair, infection, scarring, bleeding, prolonged wound healing, incomplete removal, allergy to anesthesia, nerve injury and recurrence were addressed. Prior to the procedure, the treatment site was clearly identified and confirmed by the patient. All components of Universal Protocol/PAUSE Rule completed. Subsequent Stages Histo Method Verbiage: Using a similar technique to that described above, a thin layer of tissue was removed from all areas where tumor was visible on the previous stage.  The tissue was again oriented, mapped, dyed, and processed as above. Surgical Defect Width In Cm (Optional): 1.1 Muscle Hinge Flap Text: The defect edges were debeveled with a #15 scalpel blade.  Given the size, depth and location of the defect and the proximity to free margins a muscle hinge flap was deemed most appropriate.  Using a sterile surgical marker, an appropriate hinge flap was drawn incorporating the defect. The area thus outlined was incised with a #15 scalpel blade.  The skin margins were undermined to an appropriate distance in all directions utilizing iris scissors. Consent (Lip)/Introductory Paragraph: The rationale for Mohs was explained to the patient and consent was obtained. The risks, benefits and alternatives to therapy were discussed in detail. Specifically, the risks of lip deformity, changes in the oral aperture, infection, scarring, bleeding, prolonged wound healing, incomplete removal, allergy to anesthesia, nerve injury and recurrence were addressed. Prior to the procedure, the treatment site was clearly identified and confirmed by the patient. All components of Universal Protocol/PAUSE Rule completed. H Plasty Text: Given the location of the defect, shape of the defect and the proximity to free margins a H-plasty was deemed most appropriate for repair.  Using a sterile surgical marker, the appropriate advancement arms of the H-plasty were drawn incorporating the defect and placing the expected incisions within the relaxed skin tension lines where possible. The area thus outlined was incised deep to adipose tissue with a #15 scalpel blade. The skin margins were undermined to an appropriate distance in all directions utilizing iris scissors.  The opposing advancement arms were then advanced into place in opposite direction and anchored with interrupted buried subcutaneous sutures. Cheiloplasty (Less Than 50%) Text: A decision was made to reconstruct the defect with a  cheiloplasty.  The defect was undermined extensively.  Additional obicularis oris muscle was excised with a 15 blade scalpel.  The defect was converted into a full thickness wedge, of less than 50% of the vertical height of the lip, to facilite a better cosmetic result.  Small vessels were then tied off with 5-0 monocyrl. The obicularis oris, superficial fascia, adipose and dermis were then reapproximated.  After the deeper layers were approximated the epidermis was reapproximated with particular care given to realign the vermilion border. Consent (Temporal Branch)/Introductory Paragraph: The rationale for Mohs was explained to the patient and consent was obtained. The risks, benefits and alternatives to therapy were discussed in detail. Specifically, the risks of damage to the temporal branch of the facial nerve, infection, scarring, bleeding, prolonged wound healing, incomplete removal, allergy to anesthesia, and recurrence were addressed. Prior to the procedure, the treatment site was clearly identified and confirmed by the patient. All components of Universal Protocol/PAUSE Rule completed. Unna Boot Text: An Unna boot was placed to help immobilize the limb and facilitate more rapid healing. Complex Repair And Flap Additional Text (Will Appearing After The Standard Complex Repair Text): The complex repair was not sufficient to completely close the primary defect. The remaining additional defect was repaired with the flap mentioned below. Z Plasty Text: The lesion was extirpated to the level of the fat with a #15 scalpel blade.  Given the location of the defect, shape of the defect and the proximity to free margins a Z-plasty was deemed most appropriate for repair.  Using a sterile surgical marker, the appropriate transposition arms of the Z-plasty were drawn incorporating the defect and placing the expected incisions within the relaxed skin tension lines where possible.    The area thus outlined was incised deep to adipose tissue with a #15 scalpel blade.  The skin margins were undermined to an appropriate distance in all directions utilizing iris scissors.  The opposing transposition arms were then transposed into place in opposite direction and anchored with interrupted buried subcutaneous sutures. Mohs Method Verbiage: An incision at a 45 degree angle following the standard Mohs approach was done and the specimen was harvested as a microscopic controlled layer. Cheek Interpolation Flap Text: A decision was made to reconstruct the defect utilizing an interpolation axial flap and a staged reconstruction.  A telfa template was made of the defect.  This telfa template was then used to outline the Cheek Interpolation flap.  The donor area for the pedicle flap was then injected with anesthesia.  The flap was excised through the skin and subcutaneous tissue down to the layer of the underlying musculature.  The interpolation flap was carefully excised within this deep plane to maintain its blood supply.  The edges of the donor site were undermined.   The donor site was closed in a primary fashion.  The pedicle was then rotated into position and sutured.  Once the tube was sutured into place, adequate blood supply was confirmed with blanching and refill.  The pedicle was then wrapped with xeroform gauze and dressed appropriately with a telfa and gauze bandage to ensure continued blood supply and protect the attached pedicle. Suture Removal: 14 days Lazy S Complex Repair Preamble Text (Leave Blank If You Do Not Want): Extensive wide undermining was performed. Consent (Spinal Accessory)/Introductory Paragraph: The rationale for Mohs was explained to the patient and consent was obtained. The risks, benefits and alternatives to therapy were discussed in detail. Specifically, the risks of damage to the spinal accessory nerve, infection, scarring, bleeding, prolonged wound healing, incomplete removal, allergy to anesthesia, and recurrence were addressed. Prior to the procedure, the treatment site was clearly identified and confirmed by the patient. All components of Universal Protocol/PAUSE Rule completed. Xenograft Text: The defect edges were debeveled with a #15 scalpel blade.  Given the location of the defect, shape of the defect and the proximity to free margins a xenograft was deemed most appropriate.  The graft was then trimmed to fit the size of the defect.  The graft was then placed in the primary defect and oriented appropriately. Advancement Flap (Single) Text: The defect edges were debeveled with a #15 scalpel blade.  Given the location of the defect and the proximity to free margins a single advancement flap was deemed most appropriate.  Using a sterile surgical marker, an appropriate advancement flap was drawn incorporating the defect and placing the expected incisions within the relaxed skin tension lines where possible.    The area thus outlined was incised deep to adipose tissue with a #15 scalpel blade.  The skin margins were undermined to an appropriate distance in all directions utilizing iris scissors. Cartilage Graft Text: The defect edges were debeveled with a #15 scalpel blade.  Given the location of the defect, shape of the defect, the fact the defect involved a full thickness cartilage defect a cartilage graft was deemed most appropriate.  An appropriate donor site was identified, cleansed, and anesthetized. The cartilage graft was then harvested and transferred to the recipient site, oriented appropriately and then sutured into place.  The secondary defect was then repaired using a primary closure. S Plasty Text: Given the location and shape of the defect, and the orientation of relaxed skin tension lines, an S-plasty was deemed most appropriate for repair.  Using a sterile surgical marker, the appropriate outline of the S-plasty was drawn, incorporating the defect and placing the expected incisions within the relaxed skin tension lines where possible.  The area thus outlined was incised deep to adipose tissue with a #15 scalpel blade.  The skin margins were undermined to an appropriate distance in all directions utilizing iris scissors. The skin flaps were advanced over the defect.  The opposing margins were then approximated with interrupted buried subcutaneous sutures. Purse String (Simple) Text: Given the location of the defect and the characteristics of the surrounding skin a pursestring closure was deemed most appropriate.  Undermining was performed circumfirentially around the surgical defect.  A purstring suture was then placed and tightened. Eye Protection Verbiage: Before proceeding with the stage, a plastic scleral shield was inserted. The globe was anesthetized with a few drops of 1% lidocaine with 1:100,000 epinephrine. Then, an appropriate sized scleral shield was chosen and coated with lacrilube ointment. The shield was gently inserted and left in place for the duration of each stage. After the stage was completed, the shield was gently removed. Mauc Instructions: By selecting yes to the question below the MAUC number will be added into the note.  This will be calculated automatically based on the diagnosis chosen, the size entered, the body zone selected (H,M,L) and the specific indications you chose. You will also have the option to override the Mohs AUC if you disagree with the automatically calculated number and this option is found in the Case Summary tab. Crescentic Advancement Flap Text: The defect edges were debeveled with a #15 scalpel blade.  Given the location of the defect and the proximity to free margins a crescentic advancement flap was deemed most appropriate.  Using a sterile surgical marker, the appropriate advancement flap was drawn incorporating the defect and placing the expected incisions within the relaxed skin tension lines where possible.    The area thus outlined was incised deep to adipose tissue with a #15 scalpel blade.  The skin margins were undermined to an appropriate distance in all directions utilizing iris scissors. Ear Wedge Repair Text: A wedge excision was completed by carrying down an excision through the full thickness of the ear and cartilage with an inward facing Burow's triangle. The wound was then closed in a layered fashion. Island Pedicle Flap-Requiring Vessel Identification Text: The defect edges were debeveled with a #15 scalpel blade.  Given the location of the defect, shape of the defect and the proximity to free margins an island pedicle advancement flap was deemed most appropriate.  Using a sterile surgical marker, an appropriate advancement flap was drawn, based on the axial vessel mentioned above, incorporating the defect, outlining the appropriate donor tissue and placing the expected incisions within the relaxed skin tension lines where possible.    The area thus outlined was incised deep to adipose tissue with a #15 scalpel blade.  The skin margins were undermined to an appropriate distance in all directions around the primary defect and laterally outward around the island pedicle utilizing iris scissors.  There was minimal undermining beneath the pedicle flap. Referred To Otolaryngology For Closure Text (Leave Blank If You Do Not Want): After obtaining clear surgical margins the patient was sent to otolaryngology for surgical repair.  The patient understands they will receive post-surgical care and follow-up from the referring physician's office. Dressing: pressure dressing with telfa Mohs Rapid Report Verbiage: The area of clinically evident tumor was marked with skin marking ink and appropriately hatched.  The initial incision was made following the Mohs approach through the skin.  The specimen was taken to the lab, divided into the necessary number of pieces, chromacoded and processed according to the Mohs protocol.  This was repeated in successive stages until a tumor free defect was achieved. Epidermal Closure: running and interrupted Hatchet Flap Text: The defect edges were debeveled with a #15 scalpel blade.  Given the location of the defect, shape of the defect and the proximity to free margins a hatchet flap was deemed most appropriate.  Using a sterile surgical marker, an appropriate hatchet flap was drawn incorporating the defect and placing the expected incisions within the relaxed skin tension lines where possible.    The area thus outlined was incised deep to adipose tissue with a #15 scalpel blade.  The skin margins were undermined to an appropriate distance in all directions utilizing iris scissors. Repair Performed By Another Provider Text (Leave Blank If You Do Not Want): After obtaining clear surgical margins the defect was repaired by another provider. Trilobed Flap Text: The defect edges were debeveled with a #15 scalpel blade.  Given the location of the defect and the proximity to free margins a trilobed flap was deemed most appropriate.  Using a sterile surgical marker, an appropriate trilobed flap drawn around the defect.    The area thus outlined was incised deep to adipose tissue with a #15 scalpel blade.  The skin margins were undermined to an appropriate distance in all directions utilizing iris scissors. Mohs Case Number: [id number] Postop Diagnosis: same Area L Indication Text: Tumors in this location are included in Area L (trunk and extremities).  Mohs surgery is indicated for larger tumors, or tumors with aggressive histologic features, in these anatomic locations. W Plasty Text: The lesion was extirpated to the level of the fat with a #15 scalpel blade.  Given the location of the defect, shape of the defect and the proximity to free margins a W-plasty was deemed most appropriate for repair.  Using a sterile surgical marker, the appropriate transposition arms of the W-plasty were drawn incorporating the defect and placing the expected incisions within the relaxed skin tension lines where possible.    The area thus outlined was incised deep to adipose tissue with a #15 scalpel blade.  The skin margins were undermined to an appropriate distance in all directions utilizing iris scissors.  The opposing transposition arms were then transposed into place in opposite direction and anchored with interrupted buried subcutaneous sutures. Cheiloplasty (Complex) Text: A decision was made to reconstruct the defect with a  cheiloplasty.  The defect was undermined extensively.  Additional obicularis oris muscle was excised with a 15 blade scalpel.  The defect was converted into a full thickness wedge to facilite a better cosmetic result.  Small vessels were then tied off with 5-0 monocyrl. The obicularis oris, superficial fascia, adipose and dermis were then reapproximated.  After the deeper layers were approximated the epidermis was reapproximated with particular care given to realign the vermilion border. Area H Indication Text: Tumors in this location are included in Area H (eyelids, eyebrows, nose, lips, chin, ear, pre-auricular, post-auricular, temple, genitalia, hands, feet, ankles and areola).  Tissue conservation is critical in these anatomic locations. No Repair - Repaired With Adjacent Surgical Defect Text (Leave Blank If You Do Not Want): After obtaining clear surgical margins the defect was repaired concurrently with another surgical defect which was in close approximation. V-Y Plasty Text: The defect edges were debeveled with a #15 scalpel blade.  Given the location of the defect, shape of the defect and the proximity to free margins an V-Y advancement flap was deemed most appropriate.  Using a sterile surgical marker, an appropriate advancement flap was drawn incorporating the defect and placing the expected incisions within the relaxed skin tension lines where possible.    The area thus outlined was incised deep to adipose tissue with a #15 scalpel blade.  The skin margins were undermined to an appropriate distance in all directions utilizing iris scissors. Additional Epidermal Closure (Optional): horizontal mattress Interpolation Flap Text: A decision was made to reconstruct the defect utilizing an interpolation axial flap and a staged reconstruction.  A telfa template was made of the defect.  This telfa template was then used to outline the interpolation flap.  The donor area for the pedicle flap was then injected with anesthesia.  The flap was excised through the skin and subcutaneous tissue down to the layer of the underlying musculature.  The interpolation flap was carefully excised within this deep plane to maintain its blood supply.  The edges of the donor site were undermined.   The donor site was closed in a primary fashion.  The pedicle was then rotated into position and sutured.  Once the tube was sutured into place, adequate blood supply was confirmed with blanching and refill.  The pedicle was then wrapped with xeroform gauze and dressed appropriately with a telfa and gauze bandage to ensure continued blood supply and protect the attached pedicle. Surgeon/Pathologist Verbiage (Will Incorporate Name Of Surgeon From Intro If Not Blank): operated in two distinct and integrated capacities as the surgeon and pathologist. Secondary Intention Text (Leave Blank If You Do Not Want): The defect will heal with secondary intention. Bcc Histology Text: There were numerous aggregates of basaloid cells. Split-Thickness Skin Graft Text: The defect edges were debeveled with a #15 scalpel blade.  Given the location of the defect, shape of the defect and the proximity to free margins a split thickness skin graft was deemed most appropriate.  Using a sterile surgical marker, the primary defect shape was transferred to the donor site. The split thickness graft was then harvested.  The skin graft was then placed in the primary defect and oriented appropriately. Manual Repair Warning Statement: We plan on removing the manually selected variable below in favor of our much easier automatic structured text blocks found in the previous tab. We decided to do this to help make the flow better and give you the full power of structured data. Manual selection is never going to be ideal in our platform and I would encourage you to avoid using manual selection from this point on, especially since I will be sunsetting this feature. It is important that you do one of two things with the customized text below. First, you can save all of the text in a word file so you can have it for future reference. Second, transfer the text to the appropriate area in the Library tab. Lastly, if there is a flap or graft type which we do not have you need to let us know right away so I can add it in before the variable is hidden. No need to panic, we plan to give you roughly 6 months to make the change. Graft Donor Site Bandage (Optional-Leave Blank If You Don't Want In Note): Steri-strips and a pressure bandage were applied to the donor site. Full Thickness Lip Wedge Repair (Flap) Text: Given the location of the defect and the proximity to free margins a full thickness wedge repair was deemed most appropriate.  Using a sterile surgical marker, the appropriate repair was drawn incorporating the defect and placing the expected incisions perpendicular to the vermilion border.  The vermilion border was also meticulously outlined to ensure appropriate reapproximation during the repair.  The area thus outlined was incised through and through with a #15 scalpel blade.  The muscularis and dermis were reaproximated with deep sutures following hemostasis. Care was taken to realign the vermilion border before proceeding with the superficial closure.  Once the vermilion was realigned the superfical and mucosal closure was finished. Tumor Debulked?: curette Skin Substitute Text: The defect edges were debeveled with a #15 scalpel blade.  Given the location of the defect, shape of the defect and the proximity to free margins a skin substitute graft was deemed most appropriate.  The graft material was trimmed to fit the size of the defect. The graft was then placed in the primary defect and oriented appropriately. Bilateral Helical Rim Advancement Flap Text: The defect edges were debeveled with a #15 blade scalpel.  Given the location of the defect and the proximity to free margins (helical rim) a bilateral helical rim advancement flap was deemed most appropriate.  Using a sterile surgical marker, the appropriate advancement flaps were drawn incorporating the defect and placing the expected incisions between the helical rim and antihelix where possible.  The area thus outlined was incised through and through with a #15 scalpel blade.  With a skin hook and iris scissors, the flaps were gently and sharply undermined and freed up. Number Of Stages: 4 O-T Advancement Flap Text: The defect edges were debeveled with a #15 scalpel blade.  Given the location of the defect, shape of the defect and the proximity to free margins an O-T advancement flap was deemed most appropriate.  Using a sterile surgical marker, an appropriate advancement flap was drawn incorporating the defect and placing the expected incisions within the relaxed skin tension lines where possible.    The area thus outlined was incised deep to adipose tissue with a #15 scalpel blade.  The skin margins were undermined to an appropriate distance in all directions utilizing iris scissors. Burow's Advancement Flap Text: The defect edges were debeveled with a #15 scalpel blade.  Given the location of the defect and the proximity to free margins a Burow's advancement flap was deemed most appropriate.  Using a sterile surgical marker, the appropriate advancement flap was drawn incorporating the defect and placing the expected incisions within the relaxed skin tension lines where possible.    The area thus outlined was incised deep to adipose tissue with a #15 scalpel blade.  The skin margins were undermined to an appropriate distance in all directions utilizing iris scissors. Repair Type: Flap Medical Necessity Statement: Based on my medical judgement, Mohs surgery is the most appropriate treatment for this cancer compared to other treatments. Rhombic Flap Text: The defect edges were debeveled with a #15 scalpel blade.  Given the location of the defect and the proximity to free margins a rhombic flap was deemed most appropriate.  Using a sterile surgical marker, an appropriate rhombic flap was drawn incorporating the defect.    The area thus outlined was incised deep to adipose tissue with a #15 scalpel blade.  The skin margins were undermined to an appropriate distance in all directions utilizing iris scissors. Mohs Histo Method Verbiage: Each section was then chromacoded and processed in the Mohs lab using the Mohs protocol and submitted for frozen section. Partial Purse String (Simple) Text: Given the location of the defect and the characteristics of the surrounding skin a simple purse string closure was deemed most appropriate.  Undermining was performed circumfirentially around the surgical defect.  A purse string suture was then placed and tightened. Wound tension only allowed a partial closure of the circular defect. Surgical Defect Length In Cm (Optional): 2.8 Surgical Defect Width In Cm (Optional): 1.5 Body Location Override (Optional - Billing Will Still Be Based On Selected Body Map Location If Applicable): Right Superior Lateral Neck Location Indication Override (Is Already Calculated Based On Selected Body Location): Area M Epidermal Sutures: 5-0 Ethilon Area M Indication Text: Tumors in this location are included in Area M (cheek, forehead, scalp, neck, jawline and pretibial skin).  Mohs surgery is indicated for tumors in these anatomic locations. Consent 1/Introductory Paragraph: The rationale for Mohs was explained to the patient and consent was obtained. The risks, benefits and alternatives to therapy were discussed in detail. Specifically, the risks of infection, scarring, bleeding, prolonged wound healing, incomplete removal, allergy to anesthesia, nerve injury and recurrence were addressed. Prior to the procedure, the treatment site was clearly identified and confirmed by the patient. All components of Universal Protocol/PAUSE Rule completed. Posterior Auricular Interpolation Flap Text: A decision was made to reconstruct the defect utilizing an interpolation axial flap and a staged reconstruction.  A telfa template was made of the defect.  This telfa template was then used to outline the posterior auricular interpolation flap.  The donor area for the pedicle flap was then injected with anesthesia.  The flap was excised through the skin and subcutaneous tissue down to the layer of the underlying musculature.  The pedicle flap was carefully excised within this deep plane to maintain its blood supply.  The edges of the donor site were undermined.   The donor site was closed in a primary fashion.  The pedicle was then rotated into position and sutured.  Once the tube was sutured into place, adequate blood supply was confirmed with blanching and refill.  The pedicle was then wrapped with xeroform gauze and dressed appropriately with a telfa and gauze bandage to ensure continued blood supply and protect the attached pedicle. Island Pedicle Flap With Canthal Suspension Text: The defect edges were debeveled with a #15 scalpel blade.  Given the location of the defect, shape of the defect and the proximity to free margins an island pedicle advancement flap was deemed most appropriate.  Using a sterile surgical marker, an appropriate advancement flap was drawn incorporating the defect, outlining the appropriate donor tissue and placing the expected incisions within the relaxed skin tension lines where possible. The area thus outlined was incised deep to adipose tissue with a #15 scalpel blade.  The skin margins were undermined to an appropriate distance in all directions around the primary defect and laterally outward around the island pedicle utilizing iris scissors.  There was minimal undermining beneath the pedicle flap. A suspension suture was placed in the canthal tendon to prevent tension and prevent ectropion. Helical Rim Advancement Flap Text: The defect edges were debeveled with a #15 blade scalpel.  Given the location of the defect and the proximity to free margins (helical rim) a double helical rim advancement flap was deemed most appropriate.  Using a sterile surgical marker, the appropriate advancement flaps were drawn incorporating the defect and placing the expected incisions between the helical rim and antihelix where possible.  The area thus outlined was incised through and through with a #15 scalpel blade.  With a skin hook and iris scissors, the flaps were gently and sharply undermined and freed up. X Size Of Lesion In Cm (Optional): 0.9 O-Z Plasty Text: The defect edges were debeveled with a #15 scalpel blade.  Given the location of the defect, shape of the defect and the proximity to free margins an O-Z plasty (double transposition flap) was deemed most appropriate.  Using a sterile surgical marker, the appropriate transposition flaps were drawn incorporating the defect and placing the expected incisions within the relaxed skin tension lines where possible.    The area thus outlined was incised deep to adipose tissue with a #15 scalpel blade.  The skin margins were undermined to an appropriate distance in all directions utilizing iris scissors.  Hemostasis was achieved with electrocautery.  The flaps were then transposed into place, one clockwise and the other counterclockwise, and anchored with interrupted buried subcutaneous sutures. Flap Type: Bilobed Flap Initial Size Of Lesion: 0.7 Complex Repair And Graft Additional Text (Will Appearing After The Standard Complex Repair Text): The complex repair was not sufficient to completely close the primary defect. The remaining additional defect was repaired with the graft mentioned below.

## 2018-12-17 NOTE — CONSULT NOTE ADULT - PROBLEM SELECTOR RECOMMENDATION 2
clinically better , continue BB , low dose lasix monitor renal function , orthostatic BP   AICD interrogation , echocardiogram to asses LVEF

## 2018-12-17 NOTE — PROGRESS NOTE ADULT - SUBJECTIVE AND OBJECTIVE BOX
Patient seen and examined, full note to follow    CKD near baseline, chronic hypokalemia on florinef? Need to better understand reasoning for florinef chronic use Patient seen and examined, full note to follow    CKD near baseline, chronic hypokalemia on florinef? Need to better understand reasoning for florinef chronic use in the setting of chronic hypokalemia

## 2018-12-17 NOTE — H&P ADULT - ASSESSMENT
89 y/o M with PMH of CAD s/p PCI / CABG, systolic CHF, h/o NSVT, s/p AICD, HTN, dylsipidemia, h/o orthostatic hypotension, CKD III, a-fib (on coumadin), parkinson's disease, p/w SOB    *SOB 2/2 acute on chronic CHF vs HMPV w/ questionable superimposed bacteria PNA   -CXR has questionable infiltrate -> Chest CT for further evaluation for possible PNA. Will start ceftriaxone / zithromax   -Blood cultures  -Isolation for Hmpv  -Lactate   -S/p IV Lasix in ED, reassess in AM for need for further IV lasix  -Cardio consult  -Duonebs as patient c/o wheezing  -Trops negative x 3  -EP consult for interrogation    *Acute on CKD III  -Last creatinine was 1.36, today it is 1.96  -Nephro consult  -Will need to monitor closely s/p Lasix  -UA  -I/Os     *LLE swelling > RLE  -LE U/S to r/o DVT     *Anemia / thrombocytopenia   -Trend during hospitalization -> if stable, can f/u outpatient for further management     *Hypokalemia  -Replete and recheck     *HTN / dyslipidemia   -C/w home meds -> if continues to remain stable during hospitalization can f/u outpatient for further management     *DVT ppx  -On coumadin 89 y/o M with PMH of CAD s/p PCI / CABG, systolic CHF, h/o NSVT, s/p AICD, HTN, dylsipidemia, h/o orthostatic hypotension, CKD III, a-fib (on coumadin), parkinson's disease, p/w SOB    *SOB 2/2 acute on chronic CHF vs HMPV w/ questionable superimposed bacteria PNA   -CXR has questionable infiltrate -> Chest CT for further evaluation for possible PNA. If chest CT confirms PNA -> start abx  -Blood cultures  -Isolation for Hmpv  -Lactate   -S/p IV Lasix in ED, reassess in AM for need for further IV lasix  -Cardio consult  -Duonebs as patient c/o wheezing  -Trops negative x 3  -EP consult for interrogation    *Acute on CKD III  -Last creatinine was 1.36, today it is 1.96  -Nephro consult  -Will need to monitor closely s/p Lasix  -UA  -I/Os     *LLE swelling > RLE  -LE U/S to r/o DVT     *Anemia / thrombocytopenia   -Trend during hospitalization -> if stable, can f/u outpatient for further management     *Hypokalemia  -Replete and recheck     *HTN / dyslipidemia   -C/w home meds -> if continues to remain stable during hospitalization can f/u outpatient for further management     *DVT ppx  -On coumadin, INR pending

## 2018-12-17 NOTE — CONSULT NOTE ADULT - ASSESSMENT
89 PMH of CAD s/p PCI / CABG, systolic CHF, h/o NSVT, s/p AICD, HTN, dylsipidemia, h/o orthostatic hypotension, CKD III base 1.4 mg/dl - 1.8 mg/dl , a-fib (on coumadin), parkinson's disease, admitted on 12/16 for evaluation of increasing shortness of breath with renal evaluation of CKD III. 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 Likely secondary to lasix, florinef also will lead to potassium wasting  -Would consider d/c of florinef (for orthostasis) and use alternative such as midodrine? Is he mobile at home where orthostasis concern outweighs risks of hypokalemia?  -Outpatient information regarding this    PNA  -IV abx/ID  -Isolation for viral    thanks, will follow with you    d/c with staff at length and Dr. Noel 89 PMH of CAD s/p PCI / CABG, systolic CHF, h/o NSVT, s/p AICD, HTN, dylsipidemia, h/o orthostatic hypotension, CKD III base 1.4 mg/dl - 1.8 mg/dl , a-fib (on coumadin), parkinson's disease, admitted on 12/16 for evaluation of increasing shortness of breath with renal evaluation of CKD III. 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 Likely secondary to lasix, florinef also will lead to potassium wasting  -Would consider d/c of florinef (for orthostasis) and use alternative such as midodrine? Is he mobile at home where orthostasis concern outweighs risks of hypokalemia?  -Outpatient information regarding this  -K repletion ongoing per medicine    PNA  -IV abx/ID  -Isolation for viral    thanks, will follow with you    d/c with staff at length and Dr. Noel

## 2018-12-17 NOTE — CONSULT NOTE ADULT - PROBLEM SELECTOR RECOMMENDATION 9
possibly multifactorial pneumonia lung disease , bronchitis  positive meta pneumovirus , ? acute on chronic systolic heart failure , clinically better now   continue low dose diuresis , BB ,  bronchodilator treatment , pulmonary evaluation   electrolyte supplement

## 2018-12-17 NOTE — CONSULT NOTE ADULT - SUBJECTIVE AND OBJECTIVE BOX
Patient is a 89y Male whom presented to the hospital with  PMH of CAD s/p PCI / CABG, systolic CHF, h/o NSVT, s/p AICD, HTN, dyslipidemia h/o orthostatic hypotension, CKD III baseline 1.4-1.8 mg/dl, a-fib (on coumadin), parkinson's disease, admitted on  for evaluation of increasing shortness of breath  and cough with renal evaluation of elevated renal function. Patient noted with HMPNV and started on IV antibiotics as well. Patient denies seeing nephrology in the past, no other complaints or events.     PAST MEDICAL & SURGICAL HISTORY:  Parkinson's disease  CHF (congestive heart failure)  Hyperlipidemia  HTN (hypertension)  CAD (coronary artery disease)  AICD (automatic cardioverter/defibrillator) present  S/P CABG      MEDICATIONS  (STANDING):  ALBUTerol    90 MICROgram(s) HFA Inhaler 1 Puff(s) Inhalation every 4 hours  ALBUTerol/ipratropium for Nebulization 3 milliLiter(s) Nebulizer every 4 hours  atorvastatin 10 milliGRAM(s) Oral at bedtime  azithromycin   Tablet 500 milliGRAM(s) Oral daily  carbidopa/levodopa  10/100 1 Tablet(s) Oral three times a day  cefTRIAXone Injectable.      clopidogrel Tablet 75 milliGRAM(s) Oral daily  donepezil 5 milliGRAM(s) Oral at bedtime  fludroCORTISONE 0.1 milliGRAM(s) Oral daily  furosemide   Oral Tab/Cap - Peds 40 milliGRAM(s) Oral daily  metoprolol succinate ER 25 milliGRAM(s) Oral daily  multivitamin 1 Tablet(s) Oral daily  pantoprazole    Tablet 40 milliGRAM(s) Oral before breakfast  potassium chloride    Tablet ER 20 milliEquivalent(s) Oral daily  potassium chloride    Tablet ER 40 milliEquivalent(s) Oral once  potassium chloride  10 mEq/100 mL IVPB 10 milliEquivalent(s) IV Intermittent every 1 hour  tiotropium 18 MICROgram(s) Capsule 1 Capsule(s) Inhalation daily  warfarin 3 milliGRAM(s) Oral daily    MEDICATIONS  (PRN):  docusate sodium 100 milliGRAM(s) Oral three times a day PRN Constipation      Allergies    morphine (Headache)    Intolerances        SOCIAL HISTORY:  no etoh/cigg    FAMILY HISTORY:  no renal disease    REVIEW OF SYSTEMS:    CONSTITUTIONAL: No weakness, fevers or chills  EYES/ENT: No visual changes;  No vertigo or throat pain   NECK: No pain or stiffness  RESPIRATORY: No cough, wheezing, hemoptysis; No shortness of breath  CARDIOVASCULAR: No chest pain or palpitations  GASTROINTESTINAL: No abdominal or epigastric pain. No nausea, vomiting, or hematemesis; No diarrhea or constipation. No melena or hematochezia.  GENITOURINARY: No dysuria, frequency or hematuria  NEUROLOGICAL: No numbness or weakness  SKIN: No itching, burning, rashes, or lesions   All other review of systems is negative unless indicated above.      T(C): , Max: 36.6 (18 @ 05:15)  T(F): , Max: 97.8 (18 @ 05:15)  HR: 51 (18 @ 16:29)  BP: 115/40 (18 @ 16:29)  BP(mean): --  RR: 20 (18 @ 16:29)  SpO2: 100% (18 @ 16:29)  Wt(kg): --    PHYSICAL EXAM:    Constitutional: NAD, ill appearing. Poor hygeine  HEENT: PERRLA, EOMI,  MMM  Neck: No LAD, No JVD  Respiratory: ronci b/l  Cardiovascular: S1 and S2, RRR  Gastrointestinal: BS+, soft, NT/ND  Extremities: LE chronic changes  Neurological: Alert, muffeled voice  : No Mancia  Skin: No rashes  Access: Not applicable        LABS:                        11.3   3.84  )-----------( 141      ( 17 Dec 2018 06:00 )             35.0     17 Dec 2018 16:17    x      |  x      |  x      ----------------------------<  x      2.7     |  x      |  x      17 Dec 2018 06:00    139    |  99     |  36     ----------------------------<  110    2.8     |  31     |  1.83   16 Dec 2018 18:11    139    |  100    |  35     ----------------------------<  108    3.4     |  29     |  1.96     Ca    8.3        17 Dec 2018 06:00  Ca    8.3        16 Dec 2018 18:11  Phos  4.1       17 Dec 2018 06:00  Mg     2.1       17 Dec 2018 06:00    TPro  7.3    /  Alb  3.1    /  TBili  0.6    /  DBili  x      /  AST  19     /  ALT  8      /  AlkPhos  110    17 Dec 2018 06:00  TPro  7.5    /  Alb  3.1    /  TBili  0.5    /  DBili  x      /  AST  20     /  ALT  13     /  AlkPhos  102    16 Dec 2018 18:11      Cholesterol, Serum: 105 mg/dL [10 - 199] ( @ 06:00)  Creatine Kinase, Serum: 55 U/L [26 - 308] ( @ 18:11)      Urine Studies:  Urinalysis Basic - ( 16 Dec 2018 18:42 )    Color: Yellow / Appearance: Clear / S.015 / pH: x  Gluc: x / Ketone: Negative  / Bili: Negative / Urobili: Negative mg/dL   Blood: x / Protein: Negative mg/dL / Nitrite: Negative   Leuk Esterase: Negative / RBC: 3-5 /HPF / WBC 0-2   Sq Epi: x / Non Sq Epi: Negative / Bacteria: Negative            RADIOLOGY & ADDITIONAL STUDIES:

## 2018-12-17 NOTE — PROGRESS NOTE ADULT - SUBJECTIVE AND OBJECTIVE BOX
INTERVAL HPI/OVERNIGHT EVENTS:  89 y/o M with PMH of CAD s/p PCI / CABG, systolic CHF, h/o NSVT, s/p AICD, HTN, dylsipidemia, h/o orthostatic hypotension, CKD III, a-fib (on coumadin), parkinson's disease, p/w SOB. Patient provides minimal history, and unable to elaborate. States he developed SOB, and wheezing. Has non-productive cough. Denies fever, chills, nausea, vomiting, abdominal pain, urinary complaints. Presently no family members available. ED documents that patient was also confused, +orthopnea and was sleeping in recliner last 2 nights.     18- Patient seen and examined at bedside. States he feels tired, has been urinating more often. States breathing is improved since he first came to ED.    MEDICATIONS  (STANDING):  ALBUTerol    90 MICROgram(s) HFA Inhaler 1 Puff(s) Inhalation every 4 hours  ALBUTerol/ipratropium for Nebulization 3 milliLiter(s) Nebulizer every 4 hours  atorvastatin 10 milliGRAM(s) Oral at bedtime  azithromycin   Tablet 500 milliGRAM(s) Oral daily  carbidopa/levodopa  10/100 1 Tablet(s) Oral three times a day  cefTRIAXone Injectable. 1000 milliGRAM(s) IV Push once  cefTRIAXone Injectable.      clopidogrel Tablet 75 milliGRAM(s) Oral daily  donepezil 5 milliGRAM(s) Oral at bedtime  fludroCORTISONE 0.1 milliGRAM(s) Oral daily  furosemide   Oral Tab/Cap - Peds 40 milliGRAM(s) Oral daily  metoprolol succinate ER 25 milliGRAM(s) Oral daily  multivitamin 1 Tablet(s) Oral daily  pantoprazole    Tablet 40 milliGRAM(s) Oral before breakfast  potassium chloride    Tablet ER 20 milliEquivalent(s) Oral daily  tiotropium 18 MICROgram(s) Capsule 1 Capsule(s) Inhalation daily  warfarin 3 milliGRAM(s) Oral daily    MEDICATIONS  (PRN):  docusate sodium 100 milliGRAM(s) Oral three times a day PRN Constipation      Allergies    morphine (Headache)    Intolerances      ROS:  CONSTITUTIONAL: +weakness  EYES/ENT: No visual changes;  No vertigo or throat pain   NECK: No pain or stiffness  RESPIRATORY: No cough, +wheezing; + shortness of breath  CARDIOVASCULAR: No chest pain or palpitations  GASTROINTESTINAL: No abdominal or epigastric pain. No nausea, vomiting, or hematemesis;   GENITOURINARY: No dysuria, frequency or hematuria  NEUROLOGICAL: No numbness  All other review of systems is negative unless indicated above.    Vital Signs Last 24 Hrs  T(C): 36.2 (17 Dec 2018 06:16), Max: 36.6 (17 Dec 2018 05:15)  T(F): 97.1 (17 Dec 2018 06:16), Max: 97.8 (17 Dec 2018 05:15)  HR: 62 (17 Dec 2018 11:46) (50 - 64)  BP: 115/48 (17 Dec 2018 06:16) (103/43 - 118/53)  BP(mean): --  RR: 18 (17 Dec 2018 06:16) (18 - 22)  SpO2: 100% (17 Dec 2018 06:16) (95% - 100%)    Physical Exam:  General: WN/WD NAD  Neurology: A&Ox3, nonfocal, WONG x 4  Respiratory: +wheezing throughout  CV: S1S2, +murmur  Abdominal: Soft, NT, ND +BS  Extremities: + pitting edema B/L L>R, + peripheral pulses      LABS:                        11.3   3.84  )-----------( 141      ( 17 Dec 2018 06:00 )             35.0     12-    139  |  99  |  36<H>  ----------------------------<  110<H>  2.8<LL>   |  31  |  1.83<H>    Ca    8.3<L>      17 Dec 2018 06:00  Phos  4.1       Mg     2.1         TPro  7.3  /  Alb  3.1<L>  /  TBili  0.6  /  DBili  x   /  AST  19  /  ALT  8<L>  /  AlkPhos  110  -    PT/INR - ( 17 Dec 2018 06:00 )   PT: 25.0 sec;   INR: 2.20 ratio           Urinalysis Basic - ( 16 Dec 2018 18:42 )    Color: Yellow / Appearance: Clear / S.015 / pH: x  Gluc: x / Ketone: Negative  / Bili: Negative / Urobili: Negative mg/dL   Blood: x / Protein: Negative mg/dL / Nitrite: Negative   Leuk Esterase: Negative / RBC: 3-5 /HPF / WBC 0-2   Sq Epi: x / Non Sq Epi: Negative / Bacteria: Negative        RADIOLOGY & ADDITIONAL TESTS:

## 2018-12-17 NOTE — PATIENT PROFILE ADULT - BRADEN SCORE
Patient: Yeny Anthony    Procedure Summary     Date Anesthesia Start Anesthesia Stop Room / Location    08/25/17 0701 0845  [facility] /  [facility]       Procedure Diagnosis Surgeon Provider    KNEE ARTHROPLASTY UNICOMPARTMENTAL (Left Knee) Chronic pain of left knee  (Chronic pain of left knee [M25.562, G89.29]) MD Ronnie Carty MD          Anesthesia Type: general, regional  Last vitals  BP   141/72 (08/25/17 0905)    Temp        Pulse   80 (08/25/17 0905)   Resp   18 (08/25/17 0905)    SpO2   95 % (08/25/17 0905)      Post Anesthesia Care and Evaluation    Patient location during evaluation: PACU  Patient participation: complete - patient participated  Level of consciousness: awake  Pain score: 2  Pain management: adequate  Airway patency: patent  Anesthetic complications: No anesthetic complications  PONV Status: none  Cardiovascular status: acceptable  Respiratory status: acceptable  Hydration status: acceptable       16

## 2018-12-17 NOTE — CONSULT NOTE ADULT - SUBJECTIVE AND OBJECTIVE BOX
CHIEF COMPLAINT:    HPI:  89 y/o M with PMH of CAD s/p PCI / CABG, systolic CHF, h/o NSVT, s/p AICD, HTN, dylsipidemia, h/o orthostatic hypotension , CKD III, a-fib (on coumadin), parkinson's disease, p/w SOB. Patient provides minimal history, and unable to elaborate. States he developed SOB, and wheezing. Has non-productive cough for last threee days . Denies fever, chills, nausea, vomiting, abdominal pain, urinary complaints. Presently no family members available. ED documents that patient was also confused, +orthopnea and was sleeping in recliner last 2 nights.   Patient CT chest showed bronchiectasis? pneumonia , without significant change from prior C T chest probably chronic lung disease ,    PSH: CABG    Social Hx: Denies x 3, lives with son    Family Hx: Mother - cardiac disease (17 Dec 2018 02:18)  CAD       PAST MEDICAL & SURGICAL HISTORY:  Parkinson's disease  CHF (congestive heart failure)  Hyperlipidemia  HTN (hypertension)  CAD (coronary artery disease)  AICD (automatic cardioverter/defibrillator) present  S/P CABG      Allergies    morphine (Headache)    Intolerances      MEDICATIONS:  MEDICATIONS  (STANDING):  ALBUTerol    90 MICROgram(s) HFA Inhaler 1 Puff(s) Inhalation every 4 hours  ALBUTerol/ipratropium for Nebulization 3 milliLiter(s) Nebulizer every 4 hours  atorvastatin 10 milliGRAM(s) Oral at bedtime  carbidopa/levodopa  10/100 1 Tablet(s) Oral three times a day  clopidogrel Tablet 75 milliGRAM(s) Oral daily  donepezil 5 milliGRAM(s) Oral at bedtime  fludroCORTISONE 0.1 milliGRAM(s) Oral daily  furosemide   Oral Tab/Cap - Peds 40 milliGRAM(s) Oral daily  metoprolol succinate ER 25 milliGRAM(s) Oral daily  multivitamin 1 Tablet(s) Oral daily  pantoprazole    Tablet 40 milliGRAM(s) Oral before breakfast  potassium chloride    Tablet ER 20 milliEquivalent(s) Oral daily  tiotropium 18 MICROgram(s) Capsule 1 Capsule(s) Inhalation daily  warfarin 3 milliGRAM(s) Oral daily    MEDICATIONS  (PRN):  docusate sodium 100 milliGRAM(s) Oral three times a day PRN Constipation      REVIEW OF SYSTEMS:    CONSTITUTIONAL: No weakness, fevers or chills  EYES/ENT: No visual changes;  No vertigo or throat pain   NECK: No pain or stiffness  RESPIRATORY:  husky voice m , wheezing, hemoptysis;  sob with acitivity   CARDIOVASCULAR: No chest pain or palpitations  GASTROINTESTINAL: No abdominal or epigastric pain. No nausea, vomiting, or hematemesis; No diarrhea or constipation. No melena or hematochezia.  GENITOURINARY: No dysuria, frequency or hematuria  NEUROLOGICAL: No numbness or weakness  SKIN: swollen feet , erethema on LE   All other review of systems is negative unless indicated above    Vital Signs Last 24 Hrs  T(C): 36.2 (17 Dec 2018 06:16), Max: 36.6 (17 Dec 2018 05:15)  T(F): 97.1 (17 Dec 2018 06:16), Max: 97.8 (17 Dec 2018 05:15)  HR: 60 (17 Dec 2018 09:30) (50 - 60)  BP: 115/48 (17 Dec 2018 06:16) (103/43 - 118/53)  BP(mean): --  RR: 18 (17 Dec 2018 06:16) (18 - 22)  SpO2: 100% (17 Dec 2018 06:16) (95% - 100%)    I&O's Summary      PHYSICAL EXAM:    Constitutional: NAD, awake and alert, well-developed  HEENT: PERR, EOMI,  No oral cyananosis.  Neck:  supple,  No JVD  Respiratory: Breath sounds are  equal , scattered wheeze rhonchi   Cardiovascular: S1 and S2, regular rate and rhythm,ESM   Gastrointestinal: Bowel Sounds present, soft, nontender.   Extremities: LE edema  peripheral edema l> right  erythema . No clubbing or cyanosis.  Vascular: 2+ peripheral pulses  Neurological: A/O x 3, no focal deficits  Musculoskeletal: no calf tenderness.  Skin: erythema       LABS: All Labs Reviewed:                        11.3   3.84  )-----------( 141      ( 17 Dec 2018 06:00 )             35.0                         11.4   3.91  )-----------( 148      ( 16 Dec 2018 18:11 )             35.5     17 Dec 2018 06:00    139    |  99     |  36     ----------------------------<  110    2.8     |  31     |  1.83   16 Dec 2018 18:11    139    |  100    |  35     ----------------------------<  108    3.4     |  29     |  1.96     Ca    8.3        17 Dec 2018 06:00  Ca    8.3        16 Dec 2018 18:11  Phos  4.1       17 Dec 2018 06:00  Mg     2.1       17 Dec 2018 06:00    TPro  7.3    /  Alb  3.1    /  TBili  0.6    /  DBili  x      /  AST  19     /  ALT  8      /  AlkPhos  110    17 Dec 2018 06:00  TPro  7.5    /  Alb  3.1    /  TBili  0.5    /  DBili  x      /  AST  20     /  ALT  13     /  AlkPhos  102    16 Dec 2018 18:11    PT/INR - ( 17 Dec 2018 06:00 )   PT: 25.0 sec;   INR: 2.20 ratio           CARDIAC MARKERS ( 17 Dec 2018 00:36 )  0.025 ng/mL / x     / x     / x     / x      CARDIAC MARKERS ( 16 Dec 2018 20:54 )  0.027 ng/mL / x     / x     / x     / x      CARDIAC MARKERS ( 16 Dec 2018 18:11 )  0.026 ng/mL / x     / 55 U/L / x     / x          Blood Culture:   12-16 @ 18:11  Pro Bnp [id number]        RADIOLOGY/EKG:  atrial sensed ventricular paced rhythm     < from: CT Chest No Cont (12.17.18 @ 08:49) >  MPRESSION:      Extensive left upper and lower lobe mixed groundglass/consolidated   infiltrates concerning for pneumonia.    Opacification of the left lower segmental bronchi with associated   moderate obstructive left lower lobe subsegmental atelectasis.    Minimal superior right lower lobe bronchiolitis.    Remaining findings are unchanged since chest CT of 12/23/16.         < end of copied text >      < from: Transthoracic Echocardiogram (02.26.18 @ 09:24) >  mmary     The left ventricle is normal in size, wall thickness. Moderately   decreased   left ventricular systolic function with an estimated left ventricular   ejection fraction of 35-40 %. There is paradoxical septal motion   The right atrium appears moderately dilated.   A device wire is seen in the RV and RA.   Normal aortic valve structure and function.   Normal appearing mitral valve structure and function.   Moderate (2+) mitral regurgitation is present.   Mild mitral annular calcification is present.   Normal appearing tricuspid valve structure and function.   Moderate to severe (3+) tricuspid valve regurgitation is present.   There is severe elevation in right ventricular systolic pressure      < end of copied text >

## 2018-12-18 DIAGNOSIS — I48.91 UNSPECIFIED ATRIAL FIBRILLATION: ICD-10-CM

## 2018-12-18 LAB
ANION GAP SERPL CALC-SCNC: 10 MMOL/L — SIGNIFICANT CHANGE UP (ref 5–17)
BUN SERPL-MCNC: 34 MG/DL — HIGH (ref 7–23)
CALCIUM SERPL-MCNC: 8.2 MG/DL — LOW (ref 8.5–10.1)
CHLORIDE SERPL-SCNC: 101 MMOL/L — SIGNIFICANT CHANGE UP (ref 96–108)
CO2 SERPL-SCNC: 31 MMOL/L — SIGNIFICANT CHANGE UP (ref 22–31)
CREAT SERPL-MCNC: 1.62 MG/DL — HIGH (ref 0.5–1.3)
GLUCOSE SERPL-MCNC: 122 MG/DL — HIGH (ref 70–99)
HCT VFR BLD CALC: 32.8 % — LOW (ref 39–50)
HGB BLD-MCNC: 10.6 G/DL — LOW (ref 13–17)
INR BLD: 2.73 RATIO — HIGH (ref 0.88–1.16)
MAGNESIUM SERPL-MCNC: 2.2 MG/DL — SIGNIFICANT CHANGE UP (ref 1.6–2.6)
MCHC RBC-ENTMCNC: 29.2 PG — SIGNIFICANT CHANGE UP (ref 27–34)
MCHC RBC-ENTMCNC: 32.3 GM/DL — SIGNIFICANT CHANGE UP (ref 32–36)
MCV RBC AUTO: 90.4 FL — SIGNIFICANT CHANGE UP (ref 80–100)
NRBC # BLD: 0 /100 WBCS — SIGNIFICANT CHANGE UP (ref 0–0)
PHOSPHATE SERPL-MCNC: 3.1 MG/DL — SIGNIFICANT CHANGE UP (ref 2.5–4.5)
PLATELET # BLD AUTO: 152 K/UL — SIGNIFICANT CHANGE UP (ref 150–400)
POTASSIUM SERPL-MCNC: 2.8 MMOL/L — CRITICAL LOW (ref 3.5–5.3)
POTASSIUM SERPL-MCNC: 3.2 MMOL/L — LOW (ref 3.5–5.3)
POTASSIUM SERPL-MCNC: 3.2 MMOL/L — LOW (ref 3.5–5.3)
POTASSIUM SERPL-SCNC: 2.8 MMOL/L — CRITICAL LOW (ref 3.5–5.3)
POTASSIUM SERPL-SCNC: 3.2 MMOL/L — LOW (ref 3.5–5.3)
POTASSIUM SERPL-SCNC: 3.2 MMOL/L — LOW (ref 3.5–5.3)
PROTHROM AB SERPL-ACNC: 31.2 SEC — HIGH (ref 10–12.9)
RBC # BLD: 3.63 M/UL — LOW (ref 4.2–5.8)
RBC # FLD: 15.8 % — HIGH (ref 10.3–14.5)
SODIUM SERPL-SCNC: 142 MMOL/L — SIGNIFICANT CHANGE UP (ref 135–145)
WBC # BLD: 3.93 K/UL — SIGNIFICANT CHANGE UP (ref 3.8–10.5)
WBC # FLD AUTO: 3.93 K/UL — SIGNIFICANT CHANGE UP (ref 3.8–10.5)

## 2018-12-18 PROCEDURE — 93283 PRGRMG EVAL IMPLANTABLE DFB: CPT | Mod: 26

## 2018-12-18 PROCEDURE — 99232 SBSQ HOSP IP/OBS MODERATE 35: CPT

## 2018-12-18 RX ORDER — POTASSIUM CHLORIDE 20 MEQ
40 PACKET (EA) ORAL EVERY 4 HOURS
Qty: 0 | Refills: 0 | Status: COMPLETED | OUTPATIENT
Start: 2018-12-18 | End: 2018-12-18

## 2018-12-18 RX ORDER — POTASSIUM CHLORIDE 20 MEQ
40 PACKET (EA) ORAL ONCE
Qty: 0 | Refills: 0 | Status: COMPLETED | OUTPATIENT
Start: 2018-12-18 | End: 2018-12-18

## 2018-12-18 RX ORDER — CARBIDOPA AND LEVODOPA 25; 100 MG/1; MG/1
1 TABLET ORAL THREE TIMES A DAY
Qty: 0 | Refills: 0 | Status: DISCONTINUED | OUTPATIENT
Start: 2018-12-18 | End: 2018-12-24

## 2018-12-18 RX ADMIN — CARBIDOPA AND LEVODOPA 1 TABLET(S): 25; 100 TABLET ORAL at 14:06

## 2018-12-18 RX ADMIN — Medication 3 MILLILITER(S): at 23:50

## 2018-12-18 RX ADMIN — ATORVASTATIN CALCIUM 10 MILLIGRAM(S): 80 TABLET, FILM COATED ORAL at 21:06

## 2018-12-18 RX ADMIN — Medication 3 MILLILITER(S): at 20:46

## 2018-12-18 RX ADMIN — Medication 3 MILLILITER(S): at 03:17

## 2018-12-18 RX ADMIN — DONEPEZIL HYDROCHLORIDE 5 MILLIGRAM(S): 10 TABLET, FILM COATED ORAL at 22:00

## 2018-12-18 RX ADMIN — Medication 40 MILLIGRAM(S): at 12:33

## 2018-12-18 RX ADMIN — Medication 40 MILLIEQUIVALENT(S): at 18:07

## 2018-12-18 RX ADMIN — Medication 20 MILLIEQUIVALENT(S): at 12:33

## 2018-12-18 RX ADMIN — Medication 40 MILLIEQUIVALENT(S): at 01:50

## 2018-12-18 RX ADMIN — Medication 25 MILLIGRAM(S): at 06:18

## 2018-12-18 RX ADMIN — CARBIDOPA AND LEVODOPA 1 TABLET(S): 25; 100 TABLET ORAL at 06:18

## 2018-12-18 RX ADMIN — CLOPIDOGREL BISULFATE 75 MILLIGRAM(S): 75 TABLET, FILM COATED ORAL at 12:33

## 2018-12-18 RX ADMIN — AZITHROMYCIN 500 MILLIGRAM(S): 500 TABLET, FILM COATED ORAL at 13:24

## 2018-12-18 RX ADMIN — Medication 3 MILLILITER(S): at 16:14

## 2018-12-18 RX ADMIN — CARBIDOPA AND LEVODOPA 1 TABLET(S): 25; 100 TABLET ORAL at 21:06

## 2018-12-18 RX ADMIN — Medication 100 MILLIEQUIVALENT(S): at 00:53

## 2018-12-18 RX ADMIN — Medication 40 MILLIEQUIVALENT(S): at 21:06

## 2018-12-18 RX ADMIN — CEFTRIAXONE 1000 MILLIGRAM(S): 500 INJECTION, POWDER, FOR SOLUTION INTRAMUSCULAR; INTRAVENOUS at 13:24

## 2018-12-18 RX ADMIN — Medication 1 TABLET(S): at 12:33

## 2018-12-18 RX ADMIN — WARFARIN SODIUM 3 MILLIGRAM(S): 2.5 TABLET ORAL at 21:06

## 2018-12-18 RX ADMIN — PANTOPRAZOLE SODIUM 40 MILLIGRAM(S): 20 TABLET, DELAYED RELEASE ORAL at 06:18

## 2018-12-18 NOTE — PHARMACOTHERAPY INTERVENTION NOTE - COMMENTS
Verbal order ok to change Carbidopa/Levodopa to 25/100 mg strength - confirmed w/ home med list brought in by family this is patients home strength

## 2018-12-18 NOTE — PHYSICAL THERAPY INITIAL EVALUATION ADULT - PRECAUTIONS/LIMITATIONS, REHAB EVAL
fall precautions cardiac precautions/fall precautions cardiac precautions/fall precautions/droplet prec./isolation precautions

## 2018-12-18 NOTE — PHYSICAL THERAPY INITIAL EVALUATION ADULT - PERTINENT HX OF CURRENT PROBLEM, REHAB EVAL
SOB, orthopnea, wheeze. Chest CT showing L lung PNA. doppler + Nonocclusive deep venous thrombosis within the right popliteal vein. pt w/ noteable electrolyte imbalances.

## 2018-12-18 NOTE — PROGRESS NOTE ADULT - SUBJECTIVE AND OBJECTIVE BOX
Patient is a 89y old  Male who presents with a chief complaint of SOB (17 Dec 2018 10:42)    Date of service: 12-18-18 @ 12:30      Patient lying in bed; states he feels better      ROS unable to obtain secondary to patient medical condition     MEDICATIONS  (STANDING):  ALBUTerol    90 MICROgram(s) HFA Inhaler 1 Puff(s) Inhalation every 4 hours  ALBUTerol/ipratropium for Nebulization 3 milliLiter(s) Nebulizer every 4 hours  atorvastatin 10 milliGRAM(s) Oral at bedtime  azithromycin   Tablet 500 milliGRAM(s) Oral daily  carbidopa/levodopa  10/100 1 Tablet(s) Oral three times a day  cefTRIAXone Injectable. 1000 milliGRAM(s) IV Push every 24 hours  cefTRIAXone Injectable.      clopidogrel Tablet 75 milliGRAM(s) Oral daily  donepezil 5 milliGRAM(s) Oral at bedtime  furosemide   Oral Tab/Cap - Peds 40 milliGRAM(s) Oral daily  metoprolol succinate ER 25 milliGRAM(s) Oral daily  multivitamin 1 Tablet(s) Oral daily  pantoprazole    Tablet 40 milliGRAM(s) Oral before breakfast  potassium chloride    Tablet ER 20 milliEquivalent(s) Oral daily  tiotropium 18 MICROgram(s) Capsule 1 Capsule(s) Inhalation daily  warfarin 3 milliGRAM(s) Oral daily    MEDICATIONS  (PRN):  docusate sodium 100 milliGRAM(s) Oral three times a day PRN Constipation      Vital Signs Last 24 Hrs  T(C): 36.3 (18 Dec 2018 10:39), Max: 36.5 (17 Dec 2018 16:29)  T(F): 97.4 (18 Dec 2018 10:39), Max: 97.7 (17 Dec 2018 16:29)  HR: 49 (18 Dec 2018 10:39) (49 - 64)  BP: 100/55 (18 Dec 2018 10:39) (93/45 - 117/58)  BP(mean): --  RR: 17 (18 Dec 2018 10:39) (17 - 20)  SpO2: 96% (18 Dec 2018 10:39) (95% - 100%)    Physical Exam:        PE:    Constitutional: frail looking  HEENT: NC/AT, EOMI, PERRLA, conjunctivae clear; ears and nose atraumatic; pharynx clear  Neck: supple; thyroid not palpable  Back: no tenderness  Respiratory: respiratory effort increased; bilateral rhonchi and wheezing  Cardiovascular: S1S2 regular, no murmurs  Abdomen: soft, not tender, not distended, positive BS; no liver or spleen organomegaly  Genitourinary: no suprapubic tenderness  Musculoskeletal: no muscle tenderness, mild lower extremity edema and mild erythema  Neurological/ Psychiatric:   moving all extremities  Skin: no rashes; no palpable lesions    Labs: all available labs reviewed                          Labs:                        10.6   3.93  )-----------( 152      ( 18 Dec 2018 06:08 )             32.8     12-18    142  |  101  |  34<H>  ----------------------------<  122<H>  3.2<L>   |  31  |  1.62<H>    Ca    8.2<L>      18 Dec 2018 06:08  Phos  3.1     12-18  Mg     2.2     12-18    TPro  7.3  /  Alb  3.1<L>  /  TBili  0.6  /  DBili  x   /  AST  19  /  ALT  8<L>  /  AlkPhos  110  12-17           Cultures:       Culture - Blood (collected 12-17-18 @ 06:00)  Source: .Blood None  Preliminary Report (12-18-18 @ 09:01):    No growth to date.    Culture - Blood (collected 12-16-18 @ 18:10)  Source: .Blood Blood-Venous  Preliminary Report (12-18-18 @ 01:03):    No growth to date.    Culture - Blood (collected 12-16-18 @ 18:10)  Source: .Blood Blood-Peripheral  Preliminary Report (12-18-18 @ 01:03):    No growth to date.              Rapid Respiratory Viral Panel (12.16.18 @ 18:42)    Rapid RVP Result: Detected: This Respiratory Panel uses polymerase chain reaction (PCR) to detect for  adenovirus; coronavirus (HKU1, NL63, 229E, OC43); human metapneumovirus  (hMPV); human enterovirus/rhinovirus (Entero/RV); influenza A; influenza  A/H1; influenza A/H3; influenza A/H1-2009; influenza B; parainfluenza  viruses 1, 2, 3, 4; respiratory syncytial virus; Mycoplasma pneumoniae;  and Chlamydophila pneumoniae.    hMPV (RapRVP): Detected: Test Name:RVP  Called by:mreim  Called to: gavin magana  Read back 2 Pt IDs:y Read back values:y Date/Tm:12/16/18 20:59        < from: CT Chest No Cont (12.17.18 @ 08:49) >    EXAM:  CT CHEST                            PROCEDURE DATE:  12/17/2018          INTERPRETATION:  CT Chest without IV contrast        CLINICAL INFORMATION:  Cough.    TECHNIQUE: Contiguous axial sections were obtained through the chest   using single helical acquisition.  Images were acquired without IV   contrast.  In addition, sagittal and coronal reformatted images were   obtained.  This scan was performed using automatic exposure control   (radiation dose reduction software) to obtain a diagnostic image quality   scan with patient dose as low as reasonably achievable.     COMPARISON: Chest radiograph dated 12/16/2018 and chest CT dated   12/23/2016 are available for review.    FINDINGS:       LINES/TUBES: Single lead left-sided pacemaker/AICD is again noted.    LUNGS, AIRWAYS: Respiratory motion artifact decreasing sensitivity for   full evaluation. Central airways are patent. Opacification of the left   lower subsegmental bronchi.    New extensive mixed groundglass/consolidated infiltrates within the   posterior and inferior left upper lobe and within the superior left lower   lobe, concerning for pneumonia. There is moderate left lower lobe   subsegmental atelectasis which may be obstructive secondary to mucous.   Mild bronchiolitis at the superior right lower lobe (image 52 series 2).    Unchanged biapical paraseptal cysts, greater on right which may represent   emphysematous changes or interstitial lung disease. Chronic mild-moderate   pleural/parenchymal scarring of the left lower lobe. Unchanged lateral   right upper lobe pleural-based 0.5 cm nodule (image 43 series 2).    PLEURA: Chronic unchanged left posterior and basilar pleural thickening   with stable trace loculated left pleural effusion at the posterior left  lower lung.    HEART AND VESSELS: Stable marked cardiomegaly. No pericardial effusion.   Thoracic aorta is of normal caliber. Sternotomy wires and mediastinal   surgical clips consistent with CABG. Severe coronary artery   calcifications and/or stents. Redemonstration of small curvilinear   calcification within the left ventricle which may represent calcified   papillary muscle. Calcification of the left ventricular wall at the apex   likely sequela of remote myocardial infarction.    MEDIASTINUM, SHIRLEY, AXILLAE: Small hiatal hernia. Esophagus is mildly   patulous and air-fluid.    BONES: No acute bony pathology or fracture. Diffuse osteopenia. T12   inferior endplate Schmorl's node, unchanged.    CHEST WALL/SOFT TISSUES: Within normal limits    UPPER ABDOMEN: Unchanged indistinct 1.4 cm hypodensity within the spleen,   statistically likely hemangioma. A few subcentimeter coarse   calcifications within the spleen are again noted, likely sequela of   remote granulomatous disease.    IMPRESSION:      Extensive left upper and lower lobe mixed groundglass/consolidated   infiltrates concerning for pneumonia.    Opacification of the left lower segmental bronchi with associated   moderate obstructive left lower lobe subsegmental atelectasis.    Minimal superior right lower lobe bronchiolitis.    Remaining findings are unchanged since chest CT of 12/23/16.     < end of copied text >            Radiology: all available radiological tests reviewed    Advanced directives addressed: full resuscitation

## 2018-12-18 NOTE — PHYSICAL THERAPY INITIAL EVALUATION ADULT - ACTIVE RANGE OF MOTION EXAMINATION, REHAB EVAL
B DF ~0 deg, R knee flex ~90/bilateral  lower extremity Active ROM was WFL (within functional limits)/bilateral upper extremity Active ROM was WFL (within functional limits)

## 2018-12-18 NOTE — CHART NOTE - NSCHARTNOTEFT_GEN_A_CORE
Notified by RN, pt had a potassium level of 2.8    Pt initially had a potassium level of 2.7 yesterday. Pt received 3 doses of 10meq of potassium. Potassium serum level was drawn during his 2nd dose and was 2.8. Pt received 40meq KCL *2 and 1 additional dose of 10meq. Magnesium and phosphorus levels WNL. Potassium levels will be checked am and supplemented appropriately.     d/w Dr. Parkinson

## 2018-12-18 NOTE — PROGRESS NOTE ADULT - SUBJECTIVE AND OBJECTIVE BOX
CC: SOB    INTERVAL HPI/OVERNIGHT EVENTS:  89 y/o M with PMH of CAD s/p PCI / CABG, systolic CHF, h/o NSVT, s/p AICD, HTN, dylsipidemia, h/o orthostatic hypotension, CKD III, a-fib (on coumadin), parkinson's disease, p/w SOB. Patient provides minimal history, and unable to elaborate. States he developed SOB, and wheezing. Has non-productive cough. Denies fever, chills, nausea, vomiting, abdominal pain, urinary complaints. Presently no family members available. ED documents that patient was also confused, +orthopnea and was sleeping in recliner last 2 nights.     18- Patient seen and examined at bedside. States he feels tired, has been urinating more often. States breathing is improved since he first came to ED.  : Lying in bed, weak, but states feeling a bit better.  Pt with weak voice and unable to understand everything he is saying. States he is comfortable.    REVIEW OF SYSTEMS: All other review of systems is negative unless indicated above.    Vital Signs Last 24 Hrs  T(C): 36.3 (18 Dec 2018 10:39), Max: 36.5 (17 Dec 2018 16:29)  T(F): 97.4 (18 Dec 2018 10:39), Max: 97.7 (17 Dec 2018 16:29)  HR: 49 (18 Dec 2018 10:39) (49 - 64)  BP: 100/55 (18 Dec 2018 10:39) (100/55 - 117/58)  BP(mean): --  RR: 17 (18 Dec 2018 10:39) (17 - 20)  SpO2: 96% (18 Dec 2018 10:39) (95% - 100%)    Physical Exam:  General: NAD, weak, frail, ill appearing  Neurology: A&Ox3, nonfocal, WONG x 4  Respiratory: b/l rales  CV: S1S2, +murmur  Abdominal: Soft, NT, ND +BS  Extremities: + pitting edema B/L L>R, + peripheral pulses, chronic venous changes of legs      MEDICATIONS  (STANDING):  ALBUTerol    90 MICROgram(s) HFA Inhaler 1 Puff(s) Inhalation every 4 hours  ALBUTerol/ipratropium for Nebulization 3 milliLiter(s) Nebulizer every 4 hours  atorvastatin 10 milliGRAM(s) Oral at bedtime  azithromycin   Tablet 500 milliGRAM(s) Oral daily  carbidopa/levodopa  25/100 1 Tablet(s) Oral three times a day  cefTRIAXone Injectable. 1000 milliGRAM(s) IV Push every 24 hours  cefTRIAXone Injectable.      clopidogrel Tablet 75 milliGRAM(s) Oral daily  donepezil 5 milliGRAM(s) Oral at bedtime  furosemide   Oral Tab/Cap - Peds 40 milliGRAM(s) Oral daily  metoprolol succinate ER 25 milliGRAM(s) Oral daily  multivitamin 1 Tablet(s) Oral daily  pantoprazole    Tablet 40 milliGRAM(s) Oral before breakfast  potassium chloride    Tablet ER 20 milliEquivalent(s) Oral daily  potassium chloride    Tablet ER 40 milliEquivalent(s) Oral every 4 hours  tiotropium 18 MICROgram(s) Capsule 1 Capsule(s) Inhalation daily  warfarin 3 milliGRAM(s) Oral daily    MEDICATIONS  (PRN):  docusate sodium 100 milliGRAM(s) Oral three times a day PRN Constipation    CARDIAC MARKERS ( 17 Dec 2018 00:36 )  0.025 ng/mL / x     / x     / x     / x      CARDIAC MARKERS ( 16 Dec 2018 20:54 )  0.027 ng/mL / x     / x     / x     / x      CARDIAC MARKERS ( 16 Dec 2018 18:11 )  0.026 ng/mL / x     / 55 U/L / x     / x                                10.6   3.93  )-----------( 152      ( 18 Dec 2018 06:08 )             32.8     12-18    x   |  x   |  x   ----------------------------<  x   3.2<L>   |  x   |  x     Ca    8.2<L>      18 Dec 2018 06:08  Phos  3.1     12-18  Mg     2.2     12-18    TPro  7.3  /  Alb  3.1<L>  /  TBili  0.6  /  DBili  x   /  AST  19  /  ALT  8<L>  /  AlkPhos  110  12-17    CAPILLARY BLOOD GLUCOSE        LIVER FUNCTIONS - ( 17 Dec 2018 06:00 )  Alb: 3.1 g/dL / Pro: 7.3 gm/dL / ALK PHOS: 110 U/L / ALT: 8 U/L / AST: 19 U/L / GGT: x           PT/INR - ( 18 Dec 2018 06:08 )   PT: 31.2 sec;   INR: 2.73 ratio           Urinalysis Basic - ( 16 Dec 2018 18:42 )    Color: Yellow / Appearance: Clear / S.015 / pH: x  Gluc: x / Ketone: Negative  / Bili: Negative / Urobili: Negative mg/dL   Blood: x / Protein: Negative mg/dL / Nitrite: Negative   Leuk Esterase: Negative / RBC: 3-5 /HPF / WBC 0-2   Sq Epi: x / Non Sq Epi: Negative / Bacteria: Negative      Assessment and Plan:  89 y/o M with PMH of CAD s/p PCI / CABG, systolic CHF, h/o NSVT, s/p AICD, HTN, dylsipidemia, h/o orthostatic hypotension, CKD III, a-fib (on coumadin), parkinson's disease, p/w SOB.    SOB/acute respiratory failure 2/2 acute on chronic CHF / acute viral infection with HMPV/acute bacterial PNA   -CXR has questionable infiltrate -> Chest CT showing L lung PNA  -c/w IV antibiotics for probable gram neg bacteria  -Blood cultures NGTD  -Isolation and supportive care for Hmpv  -lasix per cardio  -home dose metolazone on hold   -Duonebs   -Trops negative x 3  -EP consult for interrogation    MARILYN on CKD III:  -Scr trending down  -home dose metolazone on hold  -on lasix oral  -Nephro consult appreciated    popliteal DVT in R leg:  -seen on ultrasound  -patient on coumadin for Afib  -maintain INR 2-3     Anemia / thrombocytopenia:  -H+H stable  -thrombocytopenia resolved    Hypokalemia:  -replete and monitor  -off florinef    HTN / dyslipidemia   -C/w home meds -> if continues to remain stable during hospitalization can f/u outpatient for further management     DVT ppx  -On coumadin    Dispo:  -once medically stable, will likely need ERICKSON per PT recs.

## 2018-12-18 NOTE — PROGRESS NOTE ADULT - SUBJECTIVE AND OBJECTIVE BOX
CHIEF COMPLAINT: sob    HPI:  89 y/o M with PMH of CAD s/p PCI / CABG, systolic CHF, h/o NSVT, s/p AICD, HTN, dylsipidemia, h/o orthostatic hypotension , CKD III, a-fib (on coumadin), parkinson's disease, p/w SOB. Patient provides minimal history, and unable to elaborate. States he developed SOB, and wheezing. Has non-productive cough for last threee days . Denies fever, chills, nausea, vomiting, abdominal pain, urinary complaints. Presently no family members available. ED documents that patient was also confused, +orthopnea and was sleeping in recliner last 2 nights.   Patient CT chest showed bronchiectasis? pneumonia , without significant change from prior C T chest probably chronic lung disease ,    18 Patient is feeling little better , still coughing with mild wheeze ,  severe electrolyte abnormalities noted     PSH: CABG    Social Hx: Denies x 3, lives with son    Family Hx: Mother - cardiac disease (17 Dec 2018 02:18)  CAD       PAST MEDICAL & SURGICAL HISTORY:  Parkinson's disease  CHF (congestive heart failure)  Hyperlipidemia  HTN (hypertension)  CAD (coronary artery disease)  AICD (automatic cardioverter/defibrillator) present  S/P CABG      Allergies    morphine (Headache)    Intolerances    MEDICATIONS  (STANDING):  ALBUTerol    90 MICROgram(s) HFA Inhaler 1 Puff(s) Inhalation every 4 hours  ALBUTerol/ipratropium for Nebulization 3 milliLiter(s) Nebulizer every 4 hours  atorvastatin 10 milliGRAM(s) Oral at bedtime  azithromycin   Tablet 500 milliGRAM(s) Oral daily  carbidopa/levodopa  10/100 1 Tablet(s) Oral three times a day  cefTRIAXone Injectable. 1000 milliGRAM(s) IV Push every 24 hours  cefTRIAXone Injectable.      clopidogrel Tablet 75 milliGRAM(s) Oral daily  donepezil 5 milliGRAM(s) Oral at bedtime  furosemide   Oral Tab/Cap - Peds 40 milliGRAM(s) Oral daily  metoprolol succinate ER 25 milliGRAM(s) Oral daily  multivitamin 1 Tablet(s) Oral daily  pantoprazole    Tablet 40 milliGRAM(s) Oral before breakfast  potassium chloride    Tablet ER 20 milliEquivalent(s) Oral daily  tiotropium 18 MICROgram(s) Capsule 1 Capsule(s) Inhalation daily  warfarin 3 milliGRAM(s) Oral daily    MEDICATIONS  (PRN):  docusate sodium 100 milliGRAM(s) Oral three times a day PRN Constipation      REVIEW OF SYSTEMS:    as above  All other review of systems is negative unless indicated above    Vital Signs Last 24 Hrs  T(C): 36.4 (18 Dec 2018 05:27), Max: 36.5 (17 Dec 2018 16:29)  T(F): 97.6 (18 Dec 2018 05:27), Max: 97.7 (17 Dec 2018 16:29)  HR: 64 (18 Dec 2018 05:27) (51 - 64)  BP: 117/58 (18 Dec 2018 05:27) (93/45 - 117/58)  BP(mean): --  RR: 19 (18 Dec 2018 05:27) (18 - 20)  SpO2: 95% (18 Dec 2018 05:27) (95% - 100%)    I&O's Summary      PHYSICAL EXAM:    Constitutional: NAD, awake and alert, well-developed  HEENT: PERR, EOMI,  No oral cyananosis.  Neck:  supple,  No JVD  Respiratory: Breath sounds are  equal , scattered wheeze rhonchi   Cardiovascular: S1 and S2, regular rate and rhythm,ESM   Gastrointestinal: Bowel Sounds present, soft, nontender.   Extremities: LE edema  peripheral edema l> right  erythema . No clubbing or cyanosis.  Vascular: 2+ peripheral pulses  Neurological: A/O x 3, no focal deficits  Musculoskeletal: no calf tenderness.  Skin: erythema       LABS: All Labs Reviewed:                                    10.6   3.93  )-----------( 152      ( 18 Dec 2018 06:08 )             32.8     12-18    142  |  101  |  34<H>  ----------------------------<  122<H>  3.2<L>   |  31  |  1.62<H>    Ca    8.2<L>      18 Dec 2018 06:08  Phos  3.1     12-18  Mg     2.2     12-18    TPro  7.3  /  Alb  3.1<L>  /  TBili  0.6  /  DBili  x   /  AST  19  /  ALT  8<L>  /  AlkPhos  110  12-17    CARDIAC MARKERS ( 17 Dec 2018 00:36 )  0.025 ng/mL / x     / x     / x     / x      CARDIAC MARKERS ( 16 Dec 2018 20:54 )  0.027 ng/mL / x     / x     / x     / x      CARDIAC MARKERS ( 16 Dec 2018 18:11 )  0.026 ng/mL / x     / 55 U/L / x     / x          LIVER FUNCTIONS - ( 17 Dec 2018 06:00 )  Alb: 3.1 g/dL / Pro: 7.3 gm/dL / ALK PHOS: 110 U/L / ALT: 8 U/L / AST: 19 U/L / GGT: x           PT/INR - ( 18 Dec 2018 06:08 )   PT: 31.2 sec;   INR: 2.73 ratio           Urinalysis Basic - ( 16 Dec 2018 18:42 )    Color: Yellow / Appearance: Clear / S.015 / pH: x  Gluc: x / Ketone: Negative  / Bili: Negative / Urobili: Negative mg/dL   Blood: x / Protein: Negative mg/dL / Nitrite: Negative   Leuk Esterase: Negative / RBC: 3-5 /HPF / WBC 0-2   Sq Epi: x / Non Sq Epi: Negative / Bacteria: Negative       Chol 105 LDL 44 HDL 38<L> Trig 115      RADIOLOGY/EKG:  atrial sensed ventricular paced rhythm     < from: CT Chest No Cont (18 @ 08:49) >  MPRESSION:      Extensive left upper and lower lobe mixed groundglass/consolidated   infiltrates concerning for pneumonia.    Opacification of the left lower segmental bronchi with associated   moderate obstructive left lower lobe subsegmental atelectasis.    Minimal superior right lower lobe bronchiolitis.    Remaining findings are unchanged since chest CT of 16.         < end of copied text >      < from: Transthoracic Echocardiogram (18 @ 09:24) >  mmary     The left ventricle is normal in size, wall thickness. Moderately   decreased   left ventricular systolic function with an estimated left ventricular   ejection fraction of 35-40 %. There is paradoxical septal motion   The right atrium appears moderately dilated.   A device wire is seen in the RV and RA.   Normal aortic valve structure and function.   Normal appearing mitral valve structure and function.   Moderate (2+) mitral regurgitation is present.   Mild mitral annular calcification is present.   Normal appearing tricuspid valve structure and function.   Moderate to severe (3+) tricuspid valve regurgitation is present.   There is severe elevation in right ventricular systolic pressure      < end of copied text >

## 2018-12-18 NOTE — PHYSICAL THERAPY INITIAL EVALUATION ADULT - GENERAL OBSERVATIONS, REHAB EVAL
pt rec'd supine in bed on 3E, +HM, O2 via NC. cooperative, feeling better. hypophonation w/ raspy voice

## 2018-12-18 NOTE — CDI QUERY NOTE - NSCDIOTHERTXTBX_GEN_ALL_CORE_HH
This patient is documented with Acute on CKD III by Hospitalist. Last creatinine was 1.36, today it is 1.83    Nephrologist documented: CKD III base 1.4 mg/dl - 1.8 mg/dl ,    Can you please clarify a diagnosis.    A) Suspected Acute Kidney Injury on CKD III  B) No indications for Acute Kidney Injury  C) Suspected CKD III  D) Other ( Please specify a condition)     DOCUMENTATION:      Creatinine Trend:  Creatinine, Serum: 1.62 mg/dL <H> [0.50 - 1.30] (12-18-18)  Creatinine, Serum: 1.83 mg/dL <H> [0.50 - 1.30] (12-17-18)  Creatinine, Serum: 1.96 mg/dL <H> [0.50 - 1.30] (12-16-18)

## 2018-12-19 LAB
ANION GAP SERPL CALC-SCNC: 7 MMOL/L — SIGNIFICANT CHANGE UP (ref 5–17)
BUN SERPL-MCNC: 35 MG/DL — HIGH (ref 7–23)
CALCIUM SERPL-MCNC: 8.3 MG/DL — LOW (ref 8.5–10.1)
CHLORIDE SERPL-SCNC: 103 MMOL/L — SIGNIFICANT CHANGE UP (ref 96–108)
CO2 SERPL-SCNC: 31 MMOL/L — SIGNIFICANT CHANGE UP (ref 22–31)
CREAT SERPL-MCNC: 1.69 MG/DL — HIGH (ref 0.5–1.3)
GLUCOSE SERPL-MCNC: 183 MG/DL — HIGH (ref 70–99)
HCT VFR BLD CALC: 32.9 % — LOW (ref 39–50)
HGB BLD-MCNC: 10.4 G/DL — LOW (ref 13–17)
INR BLD: 3.12 RATIO — HIGH (ref 0.88–1.16)
MCHC RBC-ENTMCNC: 29.1 PG — SIGNIFICANT CHANGE UP (ref 27–34)
MCHC RBC-ENTMCNC: 31.6 GM/DL — LOW (ref 32–36)
MCV RBC AUTO: 91.9 FL — SIGNIFICANT CHANGE UP (ref 80–100)
NRBC # BLD: 0 /100 WBCS — SIGNIFICANT CHANGE UP (ref 0–0)
NT-PROBNP SERPL-SCNC: 2278 PG/ML — HIGH (ref 0–450)
PLATELET # BLD AUTO: 154 K/UL — SIGNIFICANT CHANGE UP (ref 150–400)
POTASSIUM SERPL-MCNC: 3.9 MMOL/L — SIGNIFICANT CHANGE UP (ref 3.5–5.3)
POTASSIUM SERPL-SCNC: 3.9 MMOL/L — SIGNIFICANT CHANGE UP (ref 3.5–5.3)
PROTHROM AB SERPL-ACNC: 35.9 SEC — HIGH (ref 10–12.9)
RBC # BLD: 3.58 M/UL — LOW (ref 4.2–5.8)
RBC # FLD: 15.9 % — HIGH (ref 10.3–14.5)
SODIUM SERPL-SCNC: 141 MMOL/L — SIGNIFICANT CHANGE UP (ref 135–145)
WBC # BLD: 4.82 K/UL — SIGNIFICANT CHANGE UP (ref 3.8–10.5)
WBC # FLD AUTO: 4.82 K/UL — SIGNIFICANT CHANGE UP (ref 3.8–10.5)

## 2018-12-19 PROCEDURE — 99232 SBSQ HOSP IP/OBS MODERATE 35: CPT

## 2018-12-19 RX ORDER — MIDODRINE HYDROCHLORIDE 2.5 MG/1
5 TABLET ORAL
Qty: 0 | Refills: 0 | Status: DISCONTINUED | OUTPATIENT
Start: 2018-12-19 | End: 2018-12-24

## 2018-12-19 RX ADMIN — CARBIDOPA AND LEVODOPA 1 TABLET(S): 25; 100 TABLET ORAL at 12:19

## 2018-12-19 RX ADMIN — MIDODRINE HYDROCHLORIDE 5 MILLIGRAM(S): 2.5 TABLET ORAL at 15:36

## 2018-12-19 RX ADMIN — Medication 3 MILLILITER(S): at 11:27

## 2018-12-19 RX ADMIN — PANTOPRAZOLE SODIUM 40 MILLIGRAM(S): 20 TABLET, DELAYED RELEASE ORAL at 05:24

## 2018-12-19 RX ADMIN — Medication 3 MILLILITER(S): at 20:51

## 2018-12-19 RX ADMIN — ATORVASTATIN CALCIUM 10 MILLIGRAM(S): 80 TABLET, FILM COATED ORAL at 21:55

## 2018-12-19 RX ADMIN — Medication 40 MILLIGRAM(S): at 12:19

## 2018-12-19 RX ADMIN — CEFTRIAXONE 1000 MILLIGRAM(S): 500 INJECTION, POWDER, FOR SOLUTION INTRAMUSCULAR; INTRAVENOUS at 12:20

## 2018-12-19 RX ADMIN — CLOPIDOGREL BISULFATE 75 MILLIGRAM(S): 75 TABLET, FILM COATED ORAL at 12:19

## 2018-12-19 RX ADMIN — Medication 3 MILLILITER(S): at 03:16

## 2018-12-19 RX ADMIN — Medication 1 TABLET(S): at 12:19

## 2018-12-19 RX ADMIN — DONEPEZIL HYDROCHLORIDE 5 MILLIGRAM(S): 10 TABLET, FILM COATED ORAL at 21:55

## 2018-12-19 RX ADMIN — Medication 3 MILLILITER(S): at 23:39

## 2018-12-19 RX ADMIN — AZITHROMYCIN 500 MILLIGRAM(S): 500 TABLET, FILM COATED ORAL at 15:36

## 2018-12-19 RX ADMIN — CARBIDOPA AND LEVODOPA 1 TABLET(S): 25; 100 TABLET ORAL at 21:55

## 2018-12-19 RX ADMIN — Medication 20 MILLIEQUIVALENT(S): at 12:20

## 2018-12-19 RX ADMIN — Medication 3 MILLILITER(S): at 07:56

## 2018-12-19 RX ADMIN — CARBIDOPA AND LEVODOPA 1 TABLET(S): 25; 100 TABLET ORAL at 05:24

## 2018-12-19 NOTE — DIETITIAN INITIAL EVALUATION ADULT. - PERTINENT LABORATORY DATA
12-19 Na141 mmol/L Glu 183 mg/dL<H> K+ 3.9 mmol/L Cr  1.69 mg/dL<H> BUN 35 mg/dL<H> Phos n/a   Alb n/a   PAB n/a

## 2018-12-19 NOTE — DIETITIAN INITIAL EVALUATION ADULT. - OTHER INFO
Pt seen for STAR khadijah (CHF education). Pt is 89yo M w/ PMH of Parkinson Disease, CHF, HLD, HTN, CAD, and CKD3. PSH: AICD and CABG. Pt admitted for CHF. Pt presents w/ A-fib (on coumadin), SOB, ? DVT. Upon visit pt appears weak w/ SOB. NFPE reveals no fat/muscle wasting. Pt reports no trouble chewing/swallowing, and not following a diet PTA. Diet recall reveals pt meeting nutritional needs. Unable to obtain more historical data 2/2 SOB. Educated pt on CHF diet and provided written materials for review. Emphasized importance of adequate protein/caloric intake. Skin: Edema (Trace: R foot. Moderate: L foot). Art: 17 w/ no PU PTA. I/O: Last BM recorded on 12/18, on PRN bowel regimen. Recommendations: 1) continue DASH, Consistent Carb diet 2) continue to reinforce CHF diet 3) daily weights

## 2018-12-19 NOTE — DIETITIAN INITIAL EVALUATION ADULT. - PERTINENT MEDS FT
MEDICATIONS  (STANDING):  ALBUTerol    90 MICROgram(s) HFA Inhaler 1 Puff(s) Inhalation every 4 hours  ALBUTerol/ipratropium for Nebulization 3 milliLiter(s) Nebulizer every 4 hours  atorvastatin 10 milliGRAM(s) Oral at bedtime  azithromycin   Tablet 500 milliGRAM(s) Oral daily  carbidopa/levodopa  25/100 1 Tablet(s) Oral three times a day  cefTRIAXone Injectable. 1000 milliGRAM(s) IV Push every 24 hours  cefTRIAXone Injectable.      clopidogrel Tablet 75 milliGRAM(s) Oral daily  donepezil 5 milliGRAM(s) Oral at bedtime  furosemide   Oral Tab/Cap - Peds 40 milliGRAM(s) Oral daily  metoprolol succinate ER 25 milliGRAM(s) Oral daily  midodrine 5 milliGRAM(s) Oral <User Schedule>  multivitamin 1 Tablet(s) Oral daily  pantoprazole    Tablet 40 milliGRAM(s) Oral before breakfast  potassium chloride    Tablet ER 20 milliEquivalent(s) Oral daily  tiotropium 18 MICROgram(s) Capsule 1 Capsule(s) Inhalation daily  warfarin 3 milliGRAM(s) Oral daily    MEDICATIONS  (PRN):  docusate sodium 100 milliGRAM(s) Oral three times a day PRN Constipation

## 2018-12-19 NOTE — PROGRESS NOTE ADULT - SUBJECTIVE AND OBJECTIVE BOX
CC: SOB    INTERVAL HPI/OVERNIGHT EVENTS:  89 y/o M with PMH of CAD s/p PCI / CABG, systolic CHF, h/o NSVT, s/p AICD, HTN, dylsipidemia, h/o orthostatic hypotension, CKD III, a-fib (on coumadin), parkinson's disease, p/w SOB. Patient provides minimal history, and unable to elaborate. States he developed SOB, and wheezing. Has non-productive cough. Denies fever, chills, nausea, vomiting, abdominal pain, urinary complaints. Presently no family members available. ED documents that patient was also confused, +orthopnea and was sleeping in recliner last 2 nights.     12/17/18- Patient seen and examined at bedside. States he feels tired, has been urinating more often. States breathing is improved since he first came to ED.  12/18: Lying in bed, weak, but states feeling a bit better.  Pt with weak voice and unable to understand everything he is saying. States he is comfortable.  12/19/18: Patient seen and examined. No new complaints.     REVIEW OF SYSTEMS: All other review of systems is negative unless indicated above.    Vital Signs Last 24 Hrs  T(C): 36.7 (19 Dec 2018 11:50), Max: 36.7 (19 Dec 2018 11:50)  T(F): 98 (19 Dec 2018 11:50), Max: 98 (19 Dec 2018 11:50)  HR: 50 (19 Dec 2018 11:50) (50 - 81)  BP: 125/44 (19 Dec 2018 11:50) (125/44 - 126/46)  BP(mean): --  RR: 17 (19 Dec 2018 11:50) (17 - 17)  SpO2: 100% (19 Dec 2018 11:50) (95% - 100%)        Physical Exam:    General: NAD, weak, frail, ill appearing  Neurology: A&Ox3, nonfocal, WONG x 4  Respiratory: b/l rales  CV: S1S2, +murmur  Abdominal: Soft, NT, ND +BS  Extremities: + pitting edema B/L L>R, + peripheral pulses, chronic venous changes of legs          Labs:                             10.4   4.82  )-----------( 154      ( 19 Dec 2018 06:22 )             32.9     19 Dec 2018 06:22    141    |  103    |  35     ----------------------------<  183    3.9     |  31     |  1.69     Ca    8.3        19 Dec 2018 06:22  Phos  3.1       18 Dec 2018 06:08  Mg     2.2       18 Dec 2018 06:08        PT/INR - ( 19 Dec 2018 06:22 )   PT: 35.9 sec;   INR: 3.12 ratio           CAPILLARY BLOOD GLUCOSE                    MEDICATIONS:    ALBUTerol    90 MICROgram(s) HFA Inhaler 1 Puff(s) Inhalation every 4 hours  ALBUTerol/ipratropium for Nebulization 3 milliLiter(s) Nebulizer every 4 hours  atorvastatin 10 milliGRAM(s) Oral at bedtime  azithromycin   Tablet 500 milliGRAM(s) Oral daily  carbidopa/levodopa  25/100 1 Tablet(s) Oral three times a day  cefTRIAXone Injectable. 1000 milliGRAM(s) IV Push every 24 hours  cefTRIAXone Injectable.      clopidogrel Tablet 75 milliGRAM(s) Oral daily  donepezil 5 milliGRAM(s) Oral at bedtime  furosemide   Oral Tab/Cap - Peds 40 milliGRAM(s) Oral daily  metoprolol succinate ER 25 milliGRAM(s) Oral daily  midodrine 5 milliGRAM(s) Oral <User Schedule>  multivitamin 1 Tablet(s) Oral daily  pantoprazole    Tablet 40 milliGRAM(s) Oral before breakfast  potassium chloride    Tablet ER 20 milliEquivalent(s) Oral daily  tiotropium 18 MICROgram(s) Capsule 1 Capsule(s) Inhalation daily  warfarin 3 milliGRAM(s) Oral daily    MEDICATIONS  (PRN):  docusate sodium 100 milliGRAM(s) Oral three times a day PRN Constipation                       Assessment and Plan:  89 y/o M with PMH of CAD s/p PCI / CABG, systolic CHF, h/o NSVT, s/p AICD, HTN, dylsipidemia, h/o orthostatic hypotension, CKD III, a-fib (on coumadin), parkinson's disease, p/w SOB.    SOB/acute respiratory failure 2/2 acute on chronic CHF / acute viral infection with HMPV/acute bacterial PNA    L lung PNA  -c/w IV antibiotics for probable gram neg bacteria  -Blood cultures NGTD  -Isolation and supportive care for Hmpv  -lasix per cardio  -home dose metolazone on hold   -Duonebs   -Trops negative x 3  -EP consult for interrogation    MARILYN on CKD III:  -Scr trending down  -home dose metolazone on hold  -on lasix oral  -Nephro consult appreciated    popliteal DVT in R leg:  -seen on ultrasound  -patient on coumadin for Afib, hold coumadin tonight due to high INR  -maintain INR 2-3     Anemia / thrombocytopenia:  -H+H stable  -thrombocytopenia resolved    Hypokalemia:  -repleted and monitor  -off florinef    HTN / dyslipidemia   -C/w home meds -> if continues to remain stable during hospitalization can f/u outpatient for further management     DVT ppx  -On coumadin    Dispo:  -once medically stable, will likely need ERICKSON per PT recs.

## 2018-12-19 NOTE — PROGRESS NOTE ADULT - SUBJECTIVE AND OBJECTIVE BOX
Patient is a 89y Male who reports no complaints overnight.     MEDICATIONS  (STANDING):  ALBUTerol    90 MICROgram(s) HFA Inhaler 1 Puff(s) Inhalation every 4 hours  ALBUTerol/ipratropium for Nebulization 3 milliLiter(s) Nebulizer every 4 hours  atorvastatin 10 milliGRAM(s) Oral at bedtime  azithromycin   Tablet 500 milliGRAM(s) Oral daily  carbidopa/levodopa  25/100 1 Tablet(s) Oral three times a day  cefTRIAXone Injectable. 1000 milliGRAM(s) IV Push every 24 hours  cefTRIAXone Injectable.      clopidogrel Tablet 75 milliGRAM(s) Oral daily  donepezil 5 milliGRAM(s) Oral at bedtime  furosemide   Oral Tab/Cap - Peds 40 milliGRAM(s) Oral daily  metoprolol succinate ER 25 milliGRAM(s) Oral daily  multivitamin 1 Tablet(s) Oral daily  pantoprazole    Tablet 40 milliGRAM(s) Oral before breakfast  potassium chloride    Tablet ER 20 milliEquivalent(s) Oral daily  tiotropium 18 MICROgram(s) Capsule 1 Capsule(s) Inhalation daily  warfarin 3 milliGRAM(s) Oral daily    MEDICATIONS  (PRN):  docusate sodium 100 milliGRAM(s) Oral three times a day PRN Constipation        T(C): , Max: 36.7 (12-18-18 @ 16:32)  T(F): , Max: 98 (12-18-18 @ 16:32)  HR: 50 (12-19-18 @ 11:50)  BP: 125/44 (12-19-18 @ 11:50)  BP(mean): --  RR: 17 (12-19-18 @ 11:50)  SpO2: 100% (12-19-18 @ 11:50)  Wt(kg): --        PHYSICAL EXAM:    Constitutional: frail, cachectic  HEENT: ncat, poor dentition  Neck: No LAD, No JVD  Respiratory: CTAB  Cardiovascular: S1 and S2, RRR  Gastrointestinal: BS+, soft, NT/ND  Extremities: No peripheral edema  Neurological: Alert and covnersant  : No Mancia  Skin: No rashes  Access: Not applicable        LABS:                        10.4   4.82  )-----------( 154      ( 19 Dec 2018 06:22 )             32.9     19 Dec 2018 06:22    141    |  103    |  35     ----------------------------<  183    3.9     |  31     |  1.69   18 Dec 2018 14:05    x      |  x      |  x      ----------------------------<  x      3.2     |  x      |  x      18 Dec 2018 06:08    142    |  101    |  34     ----------------------------<  122    3.2     |  31     |  1.62   17 Dec 2018 23:53    x      |  x      |  x      ----------------------------<  x      2.8     |  x      |  x      17 Dec 2018 16:17    x      |  x      |  x      ----------------------------<  x      2.7     |  x      |  x        Ca    8.3        19 Dec 2018 06:22  Ca    8.2        18 Dec 2018 06:08  Ca    8.3        17 Dec 2018 06:00  Ca    8.3        16 Dec 2018 18:11  Phos  3.1       18 Dec 2018 06:08  Phos  4.1       17 Dec 2018 06:00  Mg     2.2       18 Dec 2018 06:08  Mg     2.1       17 Dec 2018 06:00    TPro  7.3    /  Alb  3.1    /  TBili  0.6    /  DBili  x      /  AST  19     /  ALT  8      /  AlkPhos  110    17 Dec 2018 06:00  TPro  7.5    /  Alb  3.1    /  TBili  0.5    /  DBili  x      /  AST  20     /  ALT  13     /  AlkPhos  102    16 Dec 2018 18:11          Urine Studies:          RADIOLOGY & ADDITIONAL STUDIES:

## 2018-12-19 NOTE — PROGRESS NOTE ADULT - SUBJECTIVE AND OBJECTIVE BOX
PCP:    REQUESTING PHYSICIAN:    REASON FOR CONSULT:    CHIEF COMPLAINT:    HPI:  89 y/o M with PMH of CAD s/p PCI / CABG, systolic CHF, h/o NSVT, s/p AICD, HTN, dylsipidemia, h/o orthostatic hypotension , CKD III, a-fib (on coumadin), parkinson's disease, p/w SOB. Patient provides minimal history, and unable to elaborate. States he developed SOB, and wheezing. Has non-productive cough for last threee days . Denies fever, chills, nausea, vomiting, abdominal pain, urinary complaints. Presently no family members available. ED documents that patient was also confused, +orthopnea and was sleeping in recliner last 2 nights.   Patient CT chest showed bronchiectasis? pneumonia , without significant change from prior C T chest probably chronic lung disease ,    12/18/18 Patient is feeling little better , still coughing with mild wheeze ,  severe electrolyte abnormalities noted   12/19/18: Remains short of breath and lethargic. Paced.     PSH: CABG    Social Hx: Denies x 3, lives with son    Family Hx: Mother - cardiac disease (17 Dec 2018 02:18)      PAST MEDICAL & SURGICAL HISTORY:  Parkinson's disease  CHF (congestive heart failure)  Hyperlipidemia  HTN (hypertension)  CAD (coronary artery disease)  AICD (automatic cardioverter/defibrillator) present  S/P CABG      SOCIAL HISTORY:    FAMILY HISTORY:      ALLERGIES:  Allergies    morphine (Headache)    Intolerances        MEDICATIONS:    MEDICATIONS  (STANDING):  ALBUTerol    90 MICROgram(s) HFA Inhaler 1 Puff(s) Inhalation every 4 hours  ALBUTerol/ipratropium for Nebulization 3 milliLiter(s) Nebulizer every 4 hours  atorvastatin 10 milliGRAM(s) Oral at bedtime  azithromycin   Tablet 500 milliGRAM(s) Oral daily  carbidopa/levodopa  25/100 1 Tablet(s) Oral three times a day  cefTRIAXone Injectable. 1000 milliGRAM(s) IV Push every 24 hours  cefTRIAXone Injectable.      clopidogrel Tablet 75 milliGRAM(s) Oral daily  donepezil 5 milliGRAM(s) Oral at bedtime  furosemide   Oral Tab/Cap - Peds 40 milliGRAM(s) Oral daily  metoprolol succinate ER 25 milliGRAM(s) Oral daily  multivitamin 1 Tablet(s) Oral daily  pantoprazole    Tablet 40 milliGRAM(s) Oral before breakfast  potassium chloride    Tablet ER 20 milliEquivalent(s) Oral daily  tiotropium 18 MICROgram(s) Capsule 1 Capsule(s) Inhalation daily  warfarin 3 milliGRAM(s) Oral daily    MEDICATIONS  (PRN):  docusate sodium 100 milliGRAM(s) Oral three times a day PRN Constipation        Vital Signs Last 24 Hrs  T(C): 36.4 (19 Dec 2018 04:55), Max: 36.7 (18 Dec 2018 16:32)  T(F): 97.6 (19 Dec 2018 04:55), Max: 98 (18 Dec 2018 16:32)  HR: 50 (19 Dec 2018 04:55) (49 - 81)  BP: 126/46 (19 Dec 2018 04:55) (100/55 - 126/46)  BP(mean): --  RR: 17 (19 Dec 2018 04:55) (17 - 18)  SpO2: 95% (19 Dec 2018 04:55) (95% - 98%)Daily     Daily I&O's Summary      PHYSICAL EXAM:    Constitutional: NAD, awake lethargic  HEENT: PERR, EOMI,  No oral cyananosis.  Neck:  supple,  No JVD  Respiratory: Breath sounds are reduced  Cardiovascular: S1 and S2, regular rate and rhythm, no Murmurs, gallops or rubs  Gastrointestinal: Bowel Sounds present, soft, nontender.   Extremities: No peripheral edema. No clubbing or cyanosis.  Vascular: reduced  Neurological: A/O x 3, no focal deficits  Musculoskeletal: no calf tenderness.  Skin: No rashes.      LABS: All Labs Reviewed:                        10.4   4.82  )-----------( 154      ( 19 Dec 2018 06:22 )             32.9                         10.6   3.93  )-----------( 152      ( 18 Dec 2018 06:08 )             32.8                         11.3   3.84  )-----------( 141      ( 17 Dec 2018 06:00 )             35.0     19 Dec 2018 06:22    141    |  103    |  35     ----------------------------<  183    3.9     |  31     |  1.69   18 Dec 2018 14:05    x      |  x      |  x      ----------------------------<  x      3.2     |  x      |  x      18 Dec 2018 06:08    142    |  101    |  34     ----------------------------<  122    3.2     |  31     |  1.62     Ca    8.3        19 Dec 2018 06:22  Ca    8.2        18 Dec 2018 06:08  Ca    8.3        17 Dec 2018 06:00  Phos  3.1       18 Dec 2018 06:08  Phos  4.1       17 Dec 2018 06:00  Mg     2.2       18 Dec 2018 06:08  Mg     2.1       17 Dec 2018 06:00    TPro  7.3    /  Alb  3.1    /  TBili  0.6    /  DBili  x      /  AST  19     /  ALT  8      /  AlkPhos  110    17 Dec 2018 06:00  TPro  7.5    /  Alb  3.1    /  TBili  0.5    /  DBili  x      /  AST  20     /  ALT  13     /  AlkPhos  102    16 Dec 2018 18:11    PT/INR - ( 19 Dec 2018 06:22 )   PT: 35.9 sec;   INR: 3.12 ratio               Blood Culture: Organism --  Gram Stain Blood -- Gram Stain --  Specimen Source .Blood None  Culture-Blood --    Organism --  Gram Stain Blood -- Gram Stain --  Specimen Source .Blood Blood-Peripheral  Culture-Blood --      12-19 @ 06:22  Pro Bnp [id number]  12-16 @ 18:11  Pro Bnp [id number]        RADIOLOGY/EKG: < from: 12 Lead ECG (12.16.18 @ 17:44) >  Diagnosis Line Ventricular-paced rhythm  Abnormal ECG  When compared with ECGof 21-JUL-2018 07:38,  Premature ventricular complexes are no longer Present  Premature atrial complexes are no longer Present  Vent. rate has decreased BY  10 BPM  Confirmed by Palla MD, Kahlil ([id number]) on 12/17/2018 9:45:04 AM    < end of copied text >        ECHO/CARDIAC CATHTERIZATION/STRESS TEST:

## 2018-12-19 NOTE — PROGRESS NOTE ADULT - SUBJECTIVE AND OBJECTIVE BOX
Patient is a 89y old  Male who presents with a chief complaint of SOB (17 Dec 2018 10:42)    Date of service: 12-19-18 @ 13:37      Patient eating breakfast, still with cough      ROS unable to obtain secondary to patient medical condition     MEDICATIONS  (STANDING):  ALBUTerol    90 MICROgram(s) HFA Inhaler 1 Puff(s) Inhalation every 4 hours  ALBUTerol/ipratropium for Nebulization 3 milliLiter(s) Nebulizer every 4 hours  atorvastatin 10 milliGRAM(s) Oral at bedtime  azithromycin   Tablet 500 milliGRAM(s) Oral daily  carbidopa/levodopa  25/100 1 Tablet(s) Oral three times a day  cefTRIAXone Injectable. 1000 milliGRAM(s) IV Push every 24 hours  cefTRIAXone Injectable.      clopidogrel Tablet 75 milliGRAM(s) Oral daily  donepezil 5 milliGRAM(s) Oral at bedtime  furosemide   Oral Tab/Cap - Peds 40 milliGRAM(s) Oral daily  metoprolol succinate ER 25 milliGRAM(s) Oral daily  midodrine 5 milliGRAM(s) Oral <User Schedule>  multivitamin 1 Tablet(s) Oral daily  pantoprazole    Tablet 40 milliGRAM(s) Oral before breakfast  potassium chloride    Tablet ER 20 milliEquivalent(s) Oral daily  tiotropium 18 MICROgram(s) Capsule 1 Capsule(s) Inhalation daily  warfarin 3 milliGRAM(s) Oral daily    MEDICATIONS  (PRN):  docusate sodium 100 milliGRAM(s) Oral three times a day PRN Constipation      Vital Signs Last 24 Hrs  T(C): 36.7 (19 Dec 2018 11:50), Max: 36.7 (18 Dec 2018 16:32)  T(F): 98 (19 Dec 2018 11:50), Max: 98 (18 Dec 2018 16:32)  HR: 50 (19 Dec 2018 11:50) (50 - 81)  BP: 125/44 (19 Dec 2018 11:50) (112/47 - 126/46)  BP(mean): --  RR: 17 (19 Dec 2018 11:50) (17 - 18)  SpO2: 100% (19 Dec 2018 11:50) (95% - 100%)    Physical Exam:        PE:    Constitutional: frail looking  HEENT: NC/AT, EOMI, PERRLA, conjunctivae clear; ears and nose atraumatic; pharynx clear  Neck: supple; thyroid not palpable  Back: no tenderness  Respiratory: respiratory effort increased; bilateral rhonchi and wheezing  Cardiovascular: S1S2 regular, no murmurs  Abdomen: soft, not tender, not distended, positive BS; no liver or spleen organomegaly  Genitourinary: no suprapubic tenderness  Musculoskeletal: no muscle tenderness, mild lower extremity edema and mild erythema  Neurological/ Psychiatric:   moving all extremities  Skin: no rashes; no palpable lesions    Labs: all available labs reviewed                          Labs:              Labs:                        10.4   4.82  )-----------( 154      ( 19 Dec 2018 06:22 )             32.9     12-19    141  |  103  |  35<H>  ----------------------------<  183<H>  3.9   |  31  |  1.69<H>    Ca    8.3<L>      19 Dec 2018 06:22  Phos  3.1     12-18  Mg     2.2     12-18             Cultures:       Culture - Blood (collected 12-17-18 @ 06:00)  Source: .Blood None  Preliminary Report (12-18-18 @ 09:01):    No growth to date.    Culture - Blood (collected 12-16-18 @ 18:10)  Source: .Blood Blood-Venous  Preliminary Report (12-18-18 @ 01:03):    No growth to date.    Culture - Blood (collected 12-16-18 @ 18:10)  Source: .Blood Blood-Peripheral  Preliminary Report (12-18-18 @ 01:03):    No growth to date.                Rapid Respiratory Viral Panel (12.16.18 @ 18:42)    Rapid RVP Result: Detected: This Respiratory Panel uses polymerase chain reaction (PCR) to detect for  adenovirus; coronavirus (HKU1, NL63, 229E, OC43); human metapneumovirus  (hMPV); human enterovirus/rhinovirus (Entero/RV); influenza A; influenza  A/H1; influenza A/H3; influenza A/H1-2009; influenza B; parainfluenza  viruses 1, 2, 3, 4; respiratory syncytial virus; Mycoplasma pneumoniae;  and Chlamydophila pneumoniae.    hMPV (RapRVP): Detected: Test Name:RVP  Called by:mreim  Called to: gavin magana  Read back 2 Pt IDs:y Read back values:y Date/Tm:12/16/18 20:59        < from: CT Chest No Cont (12.17.18 @ 08:49) >    EXAM:  CT CHEST                            PROCEDURE DATE:  12/17/2018          INTERPRETATION:  CT Chest without IV contrast        CLINICAL INFORMATION:  Cough.    TECHNIQUE: Contiguous axial sections were obtained through the chest   using single helical acquisition.  Images were acquired without IV   contrast.  In addition, sagittal and coronal reformatted images were   obtained.  This scan was performed using automatic exposure control   (radiation dose reduction software) to obtain a diagnostic image quality   scan with patient dose as low as reasonably achievable.     COMPARISON: Chest radiograph dated 12/16/2018 and chest CT dated   12/23/2016 are available for review.    FINDINGS:       LINES/TUBES: Single lead left-sided pacemaker/AICD is again noted.    LUNGS, AIRWAYS: Respiratory motion artifact decreasing sensitivity for   full evaluation. Central airways are patent. Opacification of the left   lower subsegmental bronchi.    New extensive mixed groundglass/consolidated infiltrates within the   posterior and inferior left upper lobe and within the superior left lower   lobe, concerning for pneumonia. There is moderate left lower lobe   subsegmental atelectasis which may be obstructive secondary to mucous.   Mild bronchiolitis at the superior right lower lobe (image 52 series 2).    Unchanged biapical paraseptal cysts, greater on right which may represent   emphysematous changes or interstitial lung disease. Chronic mild-moderate   pleural/parenchymal scarring of the left lower lobe. Unchanged lateral   right upper lobe pleural-based 0.5 cm nodule (image 43 series 2).    PLEURA: Chronic unchanged left posterior and basilar pleural thickening   with stable trace loculated left pleural effusion at the posterior left  lower lung.    HEART AND VESSELS: Stable marked cardiomegaly. No pericardial effusion.   Thoracic aorta is of normal caliber. Sternotomy wires and mediastinal   surgical clips consistent with CABG. Severe coronary artery   calcifications and/or stents. Redemonstration of small curvilinear   calcification within the left ventricle which may represent calcified   papillary muscle. Calcification of the left ventricular wall at the apex   likely sequela of remote myocardial infarction.    MEDIASTINUM, SHIRLEY, AXILLAE: Small hiatal hernia. Esophagus is mildly   patulous and air-fluid.    BONES: No acute bony pathology or fracture. Diffuse osteopenia. T12   inferior endplate Schmorl's node, unchanged.    CHEST WALL/SOFT TISSUES: Within normal limits    UPPER ABDOMEN: Unchanged indistinct 1.4 cm hypodensity within the spleen,   statistically likely hemangioma. A few subcentimeter coarse   calcifications within the spleen are again noted, likely sequela of   remote granulomatous disease.    IMPRESSION:      Extensive left upper and lower lobe mixed groundglass/consolidated   infiltrates concerning for pneumonia.    Opacification of the left lower segmental bronchi with associated   moderate obstructive left lower lobe subsegmental atelectasis.    Minimal superior right lower lobe bronchiolitis.    Remaining findings are unchanged since chest CT of 12/23/16.     < end of copied text >            Radiology: all available radiological tests reviewed    Advanced directives addressed: full resuscitation

## 2018-12-20 LAB
ANION GAP SERPL CALC-SCNC: 6 MMOL/L — SIGNIFICANT CHANGE UP (ref 5–17)
BUN SERPL-MCNC: 34 MG/DL — HIGH (ref 7–23)
CALCIUM SERPL-MCNC: 8.6 MG/DL — SIGNIFICANT CHANGE UP (ref 8.5–10.1)
CHLORIDE SERPL-SCNC: 106 MMOL/L — SIGNIFICANT CHANGE UP (ref 96–108)
CO2 SERPL-SCNC: 32 MMOL/L — HIGH (ref 22–31)
CREAT SERPL-MCNC: 1.51 MG/DL — HIGH (ref 0.5–1.3)
GLUCOSE SERPL-MCNC: 121 MG/DL — HIGH (ref 70–99)
INR BLD: 3.16 RATIO — HIGH (ref 0.88–1.16)
POTASSIUM SERPL-MCNC: 3.4 MMOL/L — LOW (ref 3.5–5.3)
POTASSIUM SERPL-SCNC: 3.4 MMOL/L — LOW (ref 3.5–5.3)
PROTHROM AB SERPL-ACNC: 36.3 SEC — HIGH (ref 10–12.9)
SODIUM SERPL-SCNC: 144 MMOL/L — SIGNIFICANT CHANGE UP (ref 135–145)

## 2018-12-20 PROCEDURE — 99232 SBSQ HOSP IP/OBS MODERATE 35: CPT

## 2018-12-20 RX ORDER — POTASSIUM CHLORIDE 20 MEQ
40 PACKET (EA) ORAL ONCE
Qty: 0 | Refills: 0 | Status: COMPLETED | OUTPATIENT
Start: 2018-12-20 | End: 2018-12-20

## 2018-12-20 RX ADMIN — Medication 40 MILLIGRAM(S): at 12:36

## 2018-12-20 RX ADMIN — CLOPIDOGREL BISULFATE 75 MILLIGRAM(S): 75 TABLET, FILM COATED ORAL at 12:36

## 2018-12-20 RX ADMIN — CARBIDOPA AND LEVODOPA 1 TABLET(S): 25; 100 TABLET ORAL at 05:59

## 2018-12-20 RX ADMIN — Medication 3 MILLILITER(S): at 15:48

## 2018-12-20 RX ADMIN — Medication 3 MILLILITER(S): at 19:53

## 2018-12-20 RX ADMIN — AZITHROMYCIN 500 MILLIGRAM(S): 500 TABLET, FILM COATED ORAL at 12:36

## 2018-12-20 RX ADMIN — Medication 40 MILLIEQUIVALENT(S): at 12:36

## 2018-12-20 RX ADMIN — PANTOPRAZOLE SODIUM 40 MILLIGRAM(S): 20 TABLET, DELAYED RELEASE ORAL at 06:00

## 2018-12-20 RX ADMIN — CARBIDOPA AND LEVODOPA 1 TABLET(S): 25; 100 TABLET ORAL at 21:50

## 2018-12-20 RX ADMIN — CEFTRIAXONE 1000 MILLIGRAM(S): 500 INJECTION, POWDER, FOR SOLUTION INTRAMUSCULAR; INTRAVENOUS at 12:36

## 2018-12-20 RX ADMIN — MIDODRINE HYDROCHLORIDE 5 MILLIGRAM(S): 2.5 TABLET ORAL at 09:41

## 2018-12-20 RX ADMIN — CARBIDOPA AND LEVODOPA 1 TABLET(S): 25; 100 TABLET ORAL at 12:36

## 2018-12-20 RX ADMIN — Medication 3 MILLILITER(S): at 23:50

## 2018-12-20 RX ADMIN — Medication 3 MILLILITER(S): at 04:37

## 2018-12-20 RX ADMIN — Medication 3 MILLILITER(S): at 07:42

## 2018-12-20 RX ADMIN — Medication 20 MILLIEQUIVALENT(S): at 12:37

## 2018-12-20 RX ADMIN — Medication 1 TABLET(S): at 12:36

## 2018-12-20 RX ADMIN — Medication 3 MILLILITER(S): at 11:38

## 2018-12-20 RX ADMIN — DONEPEZIL HYDROCHLORIDE 5 MILLIGRAM(S): 10 TABLET, FILM COATED ORAL at 21:50

## 2018-12-20 RX ADMIN — ATORVASTATIN CALCIUM 10 MILLIGRAM(S): 80 TABLET, FILM COATED ORAL at 21:50

## 2018-12-20 NOTE — PROGRESS NOTE ADULT - SUBJECTIVE AND OBJECTIVE BOX
Patient is a 89y old  Male who presents with a chief complaint of SOB (17 Dec 2018 10:42)    Date of service: 12-20-18 @ 12:19      Patient lying in bed; still with cough      ROS: unable to obtain secondary to patient medical condition     MEDICATIONS  (STANDING):  ALBUTerol    90 MICROgram(s) HFA Inhaler 1 Puff(s) Inhalation every 4 hours  ALBUTerol/ipratropium for Nebulization 3 milliLiter(s) Nebulizer every 4 hours  atorvastatin 10 milliGRAM(s) Oral at bedtime  azithromycin   Tablet 500 milliGRAM(s) Oral daily  carbidopa/levodopa  25/100 1 Tablet(s) Oral three times a day  cefTRIAXone Injectable. 1000 milliGRAM(s) IV Push every 24 hours  cefTRIAXone Injectable.      clopidogrel Tablet 75 milliGRAM(s) Oral daily  donepezil 5 milliGRAM(s) Oral at bedtime  furosemide   Oral Tab/Cap - Peds 40 milliGRAM(s) Oral daily  metoprolol succinate ER 25 milliGRAM(s) Oral daily  midodrine 5 milliGRAM(s) Oral <User Schedule>  multivitamin 1 Tablet(s) Oral daily  pantoprazole    Tablet 40 milliGRAM(s) Oral before breakfast  potassium chloride    Tablet ER 20 milliEquivalent(s) Oral daily  potassium chloride    Tablet ER 40 milliEquivalent(s) Oral once  tiotropium 18 MICROgram(s) Capsule 1 Capsule(s) Inhalation daily    MEDICATIONS  (PRN):  docusate sodium 100 milliGRAM(s) Oral three times a day PRN Constipation      Vital Signs Last 24 Hrs  T(C): 36.7 (20 Dec 2018 10:01), Max: 36.8 (19 Dec 2018 17:18)  T(F): 98 (20 Dec 2018 10:01), Max: 98.2 (19 Dec 2018 17:18)  HR: 50 (20 Dec 2018 11:38) (48 - 55)  BP: 115/38 (20 Dec 2018 10:01) (115/38 - 124/48)  BP(mean): --  RR: 18 (20 Dec 2018 10:01) (18 - 18)  SpO2: 96% (20 Dec 2018 10:01) (96% - 100%)    Physical Exam:    PE:    Constitutional: frail looking  HEENT: NC/AT, EOMI, PERRLA, conjunctivae clear; ears and nose atraumatic; pharynx clear  Neck: supple; thyroid not palpable  Back: no tenderness  Respiratory: respiratory effort increased; bilateral rhonchi and wheezing  Cardiovascular: S1S2 regular, no murmurs  Abdomen: soft, not tender, not distended, positive BS; no liver or spleen organomegaly  Genitourinary: no suprapubic tenderness  Musculoskeletal: no muscle tenderness, mild lower extremity edema and mild erythema  Neurological/ Psychiatric:   moving all extremities  Skin: no rashes; no palpable lesions    Labs: all available labs reviewed                          Labs:              Labs:                         Labs:                        10.4   4.82  )-----------( 154      ( 19 Dec 2018 06:22 )             32.9     12-20    144  |  106  |  34<H>  ----------------------------<  121<H>  3.4<L>   |  32<H>  |  1.51<H>    Ca    8.6      20 Dec 2018 06:15             Cultures:       Culture - Blood (collected 12-17-18 @ 06:00)  Source: .Blood None  Preliminary Report (12-18-18 @ 09:01):    No growth to date.    Culture - Blood (collected 12-16-18 @ 18:10)  Source: .Blood Blood-Venous  Preliminary Report (12-18-18 @ 01:03):    No growth to date.    Culture - Blood (collected 12-16-18 @ 18:10)  Source: .Blood Blood-Peripheral  Preliminary Report (12-18-18 @ 01:03):    No growth to date.                  Rapid Respiratory Viral Panel (12.16.18 @ 18:42)    Rapid RVP Result: Detected: This Respiratory Panel uses polymerase chain reaction (PCR) to detect for  adenovirus; coronavirus (HKU1, NL63, 229E, OC43); human metapneumovirus  (hMPV); human enterovirus/rhinovirus (Entero/RV); influenza A; influenza  A/H1; influenza A/H3; influenza A/H1-2009; influenza B; parainfluenza  viruses 1, 2, 3, 4; respiratory syncytial virus; Mycoplasma pneumoniae;  and Chlamydophila pneumoniae.    hMPV (RapRVP): Detected: Test Name:RVP  Called by:mreim  Called to: gavin magana  Read back 2 Pt IDs:y Read back values:y Date/Tm:12/16/18 20:59        < from: CT Chest No Cont (12.17.18 @ 08:49) >    EXAM:  CT CHEST                            PROCEDURE DATE:  12/17/2018          INTERPRETATION:  CT Chest without IV contrast        CLINICAL INFORMATION:  Cough.    TECHNIQUE: Contiguous axial sections were obtained through the chest   using single helical acquisition.  Images were acquired without IV   contrast.  In addition, sagittal and coronal reformatted images were   obtained.  This scan was performed using automatic exposure control   (radiation dose reduction software) to obtain a diagnostic image quality   scan with patient dose as low as reasonably achievable.     COMPARISON: Chest radiograph dated 12/16/2018 and chest CT dated   12/23/2016 are available for review.    FINDINGS:       LINES/TUBES: Single lead left-sided pacemaker/AICD is again noted.    LUNGS, AIRWAYS: Respiratory motion artifact decreasing sensitivity for   full evaluation. Central airways are patent. Opacification of the left   lower subsegmental bronchi.    New extensive mixed groundglass/consolidated infiltrates within the   posterior and inferior left upper lobe and within the superior left lower   lobe, concerning for pneumonia. There is moderate left lower lobe   subsegmental atelectasis which may be obstructive secondary to mucous.   Mild bronchiolitis at the superior right lower lobe (image 52 series 2).    Unchanged biapical paraseptal cysts, greater on right which may represent   emphysematous changes or interstitial lung disease. Chronic mild-moderate   pleural/parenchymal scarring of the left lower lobe. Unchanged lateral   right upper lobe pleural-based 0.5 cm nodule (image 43 series 2).    PLEURA: Chronic unchanged left posterior and basilar pleural thickening   with stable trace loculated left pleural effusion at the posterior left  lower lung.    HEART AND VESSELS: Stable marked cardiomegaly. No pericardial effusion.   Thoracic aorta is of normal caliber. Sternotomy wires and mediastinal   surgical clips consistent with CABG. Severe coronary artery   calcifications and/or stents. Redemonstration of small curvilinear   calcification within the left ventricle which may represent calcified   papillary muscle. Calcification of the left ventricular wall at the apex   likely sequela of remote myocardial infarction.    MEDIASTINUM, SHIRLEY, AXILLAE: Small hiatal hernia. Esophagus is mildly   patulous and air-fluid.    BONES: No acute bony pathology or fracture. Diffuse osteopenia. T12   inferior endplate Schmorl's node, unchanged.    CHEST WALL/SOFT TISSUES: Within normal limits    UPPER ABDOMEN: Unchanged indistinct 1.4 cm hypodensity within the spleen,   statistically likely hemangioma. A few subcentimeter coarse   calcifications within the spleen are again noted, likely sequela of   remote granulomatous disease.    IMPRESSION:      Extensive left upper and lower lobe mixed groundglass/consolidated   infiltrates concerning for pneumonia.    Opacification of the left lower segmental bronchi with associated   moderate obstructive left lower lobe subsegmental atelectasis.    Minimal superior right lower lobe bronchiolitis.    Remaining findings are unchanged since chest CT of 12/23/16.     < end of copied text >            Radiology: all available radiological tests reviewed    Advanced directives addressed: full resuscitation

## 2018-12-20 NOTE — PROGRESS NOTE ADULT - SUBJECTIVE AND OBJECTIVE BOX
CC: SOB    INTERVAL HPI/OVERNIGHT EVENTS:  89 y/o M with PMH of CAD s/p PCI / CABG, systolic CHF, h/o NSVT, s/p AICD, HTN, dylsipidemia, h/o orthostatic hypotension, CKD III, a-fib (on coumadin), parkinson's disease, p/w SOB. Patient provides minimal history, and unable to elaborate. States he developed SOB, and wheezing. Has non-productive cough. Denies fever, chills, nausea, vomiting, abdominal pain, urinary complaints. Presently no family members available. ED documents that patient was also confused, +orthopnea and was sleeping in recliner last 2 nights.     12/17/18- Patient seen and examined at bedside. States he feels tired, has been urinating more often. States breathing is improved since he first came to ED.  12/18: Lying in bed, weak, but states feeling a bit better.  Pt with weak voice and unable to understand everything he is saying. States he is comfortable.  12/19/18: Patient seen and examined. No new complaints.   12/20/18: Patient seen and examined. SOB improving. No new complaints.     REVIEW OF SYSTEMS: All other review of systems is negative unless indicated above.      Vital Signs Last 24 Hrs  T(C): 36.7 (20 Dec 2018 10:01), Max: 36.8 (19 Dec 2018 17:18)  T(F): 98 (20 Dec 2018 10:01), Max: 98.2 (19 Dec 2018 17:18)  HR: 53 (20 Dec 2018 15:45) (48 - 55)  BP: 115/38 (20 Dec 2018 10:01) (115/38 - 124/48)  BP(mean): --  RR: 18 (20 Dec 2018 10:01) (18 - 18)  SpO2: 99% (20 Dec 2018 15:45) (96% - 100%)        Physical Exam:    General: NAD, weak, frail, ill appearing  Neurology: A&Ox3, nonfocal, WONG x 4  Respiratory: b/l rales, rhonchi  CV: S1S2, +murmur  Abdominal: Soft, NT, ND +BS  Extremities: + pitting edema B/L L>R, + peripheral pulses, chronic venous changes of legs          Labs:                                             10.4   4.82  )-----------( 154      ( 19 Dec 2018 06:22 )             32.9     20 Dec 2018 06:15    144    |  106    |  34     ----------------------------<  121    3.4     |  32     |  1.51     Ca    8.6        20 Dec 2018 06:15        PT/INR - ( 20 Dec 2018 06:15 )   PT: 36.3 sec;   INR: 3.16 ratio               MEDICATIONS:    ALBUTerol    90 MICROgram(s) HFA Inhaler 1 Puff(s) Inhalation every 4 hours  ALBUTerol/ipratropium for Nebulization 3 milliLiter(s) Nebulizer every 4 hours  atorvastatin 10 milliGRAM(s) Oral at bedtime  azithromycin   Tablet 500 milliGRAM(s) Oral daily  carbidopa/levodopa  25/100 1 Tablet(s) Oral three times a day  cefTRIAXone Injectable. 1000 milliGRAM(s) IV Push every 24 hours  cefTRIAXone Injectable.      clopidogrel Tablet 75 milliGRAM(s) Oral daily  donepezil 5 milliGRAM(s) Oral at bedtime  furosemide   Oral Tab/Cap - Peds 40 milliGRAM(s) Oral daily  metoprolol succinate ER 25 milliGRAM(s) Oral daily  midodrine 5 milliGRAM(s) Oral <User Schedule>  multivitamin 1 Tablet(s) Oral daily  pantoprazole    Tablet 40 milliGRAM(s) Oral before breakfast  potassium chloride    Tablet ER 20 milliEquivalent(s) Oral daily  tiotropium 18 MICROgram(s) Capsule 1 Capsule(s) Inhalation daily  warfarin 3 milliGRAM(s) Oral daily    MEDICATIONS  (PRN):  docusate sodium 100 milliGRAM(s) Oral three times a day PRN Constipation                       Assessment and Plan:  89 y/o M with PMH of CAD s/p PCI / CABG, systolic CHF, h/o NSVT, s/p AICD, HTN, dylsipidemia, h/o orthostatic hypotension, CKD III, a-fib (on coumadin), parkinson's disease, p/w SOB.    SOB/acute respiratory failure 2/2 acute on chronic CHF / acute viral infection with HMPV/acute bacterial PNA    L lung PNA  -c/w IV antibiotics for probable gram neg bacteria  -Blood cultures NGTD  -Isolation and supportive care for Hmpv  -lasix per cardio  -home dose metolazone on hold   -Duonebs   -Trops negative x 3    MARILYN on CKD III:  -Scr trending down  -home dose metolazone on hold  -on lasix oral  -Nephro consult appreciated    popliteal DVT in R leg:  -seen on ultrasound  -patient on coumadin for Afib, hold coumadin tonight due to high INR  -maintain INR 2-3     Anemia / thrombocytopenia:  -H+H stable  -thrombocytopenia resolved    Hypokalemia:  -repleted and monitor  -off florinef    HTN / dyslipidemia   -C/w home meds -> if continues to remain stable during hospitalization can f/u outpatient for further management     DVT ppx  -On coumadin    Dispo:  -once medically stable, will likely need ERICKSON per PT recs.

## 2018-12-20 NOTE — PROGRESS NOTE ADULT - SUBJECTIVE AND OBJECTIVE BOX
seen earlier today ~ 11: 15 Am   Patient is a 89y Male who reports no complaints overnight.     MEDICATIONS  (STANDING):  ALBUTerol    90 MICROgram(s) HFA Inhaler 1 Puff(s) Inhalation every 4 hours  ALBUTerol/ipratropium for Nebulization 3 milliLiter(s) Nebulizer every 4 hours  atorvastatin 10 milliGRAM(s) Oral at bedtime  azithromycin   Tablet 500 milliGRAM(s) Oral daily  carbidopa/levodopa  25/100 1 Tablet(s) Oral three times a day  cefTRIAXone Injectable. 1000 milliGRAM(s) IV Push every 24 hours  cefTRIAXone Injectable.      clopidogrel Tablet 75 milliGRAM(s) Oral daily  donepezil 5 milliGRAM(s) Oral at bedtime  furosemide   Oral Tab/Cap - Peds 40 milliGRAM(s) Oral daily  metoprolol succinate ER 25 milliGRAM(s) Oral daily  multivitamin 1 Tablet(s) Oral daily  pantoprazole    Tablet 40 milliGRAM(s) Oral before breakfast  potassium chloride    Tablet ER 20 milliEquivalent(s) Oral daily  tiotropium 18 MICROgram(s) Capsule 1 Capsule(s) Inhalation daily  warfarin 3 milliGRAM(s) Oral daily    MEDICATIONS  (PRN):  docusate sodium 100 milliGRAM(s) Oral three times a day PRN Constipation    Vital Signs Last 24 Hrs  T(C): 36.7 (20 Dec 2018 10:01), Max: 36.8 (19 Dec 2018 17:18)  T(F): 98 (20 Dec 2018 10:01), Max: 98.2 (19 Dec 2018 17:18)  HR: 53 (20 Dec 2018 15:45) (48 - 55)  BP: 115/38 (20 Dec 2018 10:01) (115/38 - 124/48)  BP(mean): --  RR: 18 (20 Dec 2018 10:01) (18 - 18)  SpO2: 99% (20 Dec 2018 15:45) (96% - 100%)      PHYSICAL EXAM:    Constitutional: frail, cachectic  HEENT: ncat, poor dentition  Neck: No LAD, No JVD  Respiratory: CTAB  Cardiovascular: S1 and S2, RRR  Gastrointestinal: BS+, soft, NT/ND  Extremities:  peripheral edema  Neurological: lethargic   : No Mancia  Skin: No rashes  Access: Not applicable        LABS:                        10.4   4.82  )-----------( 154      ( 19 Dec 2018 06:22 )             32.9       144    |  106    |  34     ----------------------------<  121       20 Dec 2018 06:15  3.4     |  32     |  1.51     141    |  103    |  35     ----------------------------<  183       19 Dec 2018 06:22  3.9     |  31     |  1.69     x      |  x      |  x      ----------------------------<  x         18 Dec 2018 14:05  3.2     |  x      |  x        Ca    8.6        20 Dec 2018 06:15  Ca    8.3        19 Dec 2018 06:22    Phos  3.1       18 Dec 2018 06:08  Phos  4.1       17 Dec 2018 06:00    Mg     2.2       18 Dec 2018 06:08  Mg     2.1       17 Dec 2018 06:00    TPro  7.3    /  Alb  3.1    /  TBili  0.6    /        17 Dec 2018 06:00  DBili  x      /  AST  19     /  ALT  8      /  AlkPhos  110      TPro  7.5    /  Alb  3.1    /  TBili  0.5    /        16 Dec 2018 18:11  DBili  x      /  AST  20     /  ALT  13     /  AlkPhos  102                Urine Studies:          RADIOLOGY & ADDITIONAL STUDIES:

## 2018-12-20 NOTE — PROGRESS NOTE ADULT - SUBJECTIVE AND OBJECTIVE BOX
PCP:    REQUESTING PHYSICIAN:    REASON FOR CONSULT:    CHIEF COMPLAINT:    HPI:  89 y/o M with PMH of CAD s/p PCI / CABG, systolic CHF, h/o NSVT, s/p AICD, HTN, dylsipidemia, h/o orthostatic hypotension , CKD III, a-fib (on coumadin), parkinson's disease, p/w SOB. Patient provides minimal history, and unable to elaborate. States he developed SOB, and wheezing. Has non-productive cough for last threee days . Denies fever, chills, nausea, vomiting, abdominal pain, urinary complaints. Presently no family members available. ED documents that patient was also confused, +orthopnea and was sleeping in recliner last 2 nights.   Patient CT chest showed bronchiectasis? pneumonia , without significant change from prior C T chest probably chronic lung disease ,    18 Patient is feeling little better , still coughing with mild wheeze ,  severe electrolyte abnormalities noted   18: Remains short of breath and lethargic. Paced.   18: Feels improved.     PSH: CABG    Social Hx: Denies x 3, lives with son    Family Hx: Mother - cardiac disease (17 Dec 2018 02:18)      PAST MEDICAL & SURGICAL HISTORY:  Parkinson's disease  CHF (congestive heart failure)  Hyperlipidemia  HTN (hypertension)  CAD (coronary artery disease)  AICD (automatic cardioverter/defibrillator) present  S/P CABG      SOCIAL HISTORY:    FAMILY HISTORY:      ALLERGIES:  Allergies    morphine (Headache)    Intolerances        MEDICATIONS:    MEDICATIONS  (STANDING):  ALBUTerol    90 MICROgram(s) HFA Inhaler 1 Puff(s) Inhalation every 4 hours  ALBUTerol/ipratropium for Nebulization 3 milliLiter(s) Nebulizer every 4 hours  atorvastatin 10 milliGRAM(s) Oral at bedtime  azithromycin   Tablet 500 milliGRAM(s) Oral daily  carbidopa/levodopa  25/100 1 Tablet(s) Oral three times a day  cefTRIAXone Injectable. 1000 milliGRAM(s) IV Push every 24 hours  cefTRIAXone Injectable.      clopidogrel Tablet 75 milliGRAM(s) Oral daily  donepezil 5 milliGRAM(s) Oral at bedtime  furosemide   Oral Tab/Cap - Peds 40 milliGRAM(s) Oral daily  metoprolol succinate ER 25 milliGRAM(s) Oral daily  midodrine 5 milliGRAM(s) Oral <User Schedule>  multivitamin 1 Tablet(s) Oral daily  pantoprazole    Tablet 40 milliGRAM(s) Oral before breakfast  potassium chloride    Tablet ER 20 milliEquivalent(s) Oral daily  potassium chloride    Tablet ER 40 milliEquivalent(s) Oral once  tiotropium 18 MICROgram(s) Capsule 1 Capsule(s) Inhalation daily    MEDICATIONS  (PRN):  docusate sodium 100 milliGRAM(s) Oral three times a day PRN Constipation        Vital Signs Last 24 Hrs  T(C): 36.7 (20 Dec 2018 05:11), Max: 36.8 (19 Dec 2018 17:18)  T(F): 98 (20 Dec 2018 05:11), Max: 98.2 (19 Dec 2018 17:18)  HR: 52 (20 Dec 2018 07:42) (49 - 55)  BP: 120/45 (20 Dec 2018 05:11) (120/45 - 125/44)  BP(mean): --  RR: 18 (19 Dec 2018 17:18) (17 - 18)  SpO2: 100% (20 Dec 2018 05:11) (97% - 100%)Daily     Daily Weight in k.4 (20 Dec 2018 08:54)I&O's Summary      PHYSICAL EXAM:    Constitutional: NAD, awake and alert, well-developed  HEENT: PERR, EOMI,  No oral cyananosis.  Neck:  supple,  No JVD  Respiratory: Breath sounds are clear bilaterally, No wheezing, rales or rhonchi  Cardiovascular: S1 and S2, regular rate and rhythm, no Murmurs, gallops or rubs  Gastrointestinal: Bowel Sounds present, soft, nontender.   Extremities: No peripheral edema. No clubbing or cyanosis.  Vascular: 2+ peripheral pulses  Neurological: A/O x 3, no focal deficits  Musculoskeletal: no calf tenderness.  Skin: No rashes.      LABS: All Labs Reviewed:                        10.4   4.82  )-----------( 154      ( 19 Dec 2018 06:22 )             32.9                         10.6   3.93  )-----------( 152      ( 18 Dec 2018 06:08 )             32.8     20 Dec 2018 06:15    144    |  106    |  34     ----------------------------<  121    3.4     |  32     |  1.51   19 Dec 2018 06:22    141    |  103    |  35     ----------------------------<  183    3.9     |  31     |  1.69   18 Dec 2018 14:05    x      |  x      |  x      ----------------------------<  x      3.2     |  x      |  x        Ca    8.6        20 Dec 2018 06:15  Ca    8.3        19 Dec 2018 06:22  Ca    8.2        18 Dec 2018 06:08  Phos  3.1       18 Dec 2018 06:08  Mg     2.2       18 Dec 2018 06:08      PT/INR - ( 20 Dec 2018 06:15 )   PT: 36.3 sec;   INR: 3.16 ratio               Blood Culture: Organism --  Gram Stain Blood -- Gram Stain --  Specimen Source .Blood None  Culture-Blood --    Organism --  Gram Stain Blood -- Gram Stain --  Specimen Source .Blood Blood-Peripheral  Culture-Blood --       @ 06:22  Pro Bnp [id number]        RADIOLOGY/EKG: < from: 12 Lead ECG (18 @ 17:44) >  Diagnosis Line Ventricular-paced rhythm  Abnormal ECG  When compared with ECGof 2018 07:38,  Premature ventricular complexes are no longer Present  Premature atrial complexes are no longer Present  Vent. rate has decreased BY  10 BPM  Confirmed by Palla MD, Kahlil ([id number]) on 2018 9:45:04 AM    < end of copied text >        ECHO/CARDIAC CATHTERIZATION/STRESS TEST:

## 2018-12-21 LAB
ANION GAP SERPL CALC-SCNC: 5 MMOL/L — SIGNIFICANT CHANGE UP (ref 5–17)
BUN SERPL-MCNC: 30 MG/DL — HIGH (ref 7–23)
CALCIUM SERPL-MCNC: 8.9 MG/DL — SIGNIFICANT CHANGE UP (ref 8.5–10.1)
CHLORIDE SERPL-SCNC: 107 MMOL/L — SIGNIFICANT CHANGE UP (ref 96–108)
CO2 SERPL-SCNC: 33 MMOL/L — HIGH (ref 22–31)
CREAT SERPL-MCNC: 1.42 MG/DL — HIGH (ref 0.5–1.3)
GLUCOSE SERPL-MCNC: 116 MG/DL — HIGH (ref 70–99)
HCT VFR BLD CALC: 34.7 % — LOW (ref 39–50)
HGB BLD-MCNC: 11 G/DL — LOW (ref 13–17)
INR BLD: 2.78 RATIO — HIGH (ref 0.88–1.16)
MCHC RBC-ENTMCNC: 29.4 PG — SIGNIFICANT CHANGE UP (ref 27–34)
MCHC RBC-ENTMCNC: 31.7 GM/DL — LOW (ref 32–36)
MCV RBC AUTO: 92.8 FL — SIGNIFICANT CHANGE UP (ref 80–100)
NRBC # BLD: 0 /100 WBCS — SIGNIFICANT CHANGE UP (ref 0–0)
PLATELET # BLD AUTO: 181 K/UL — SIGNIFICANT CHANGE UP (ref 150–400)
POTASSIUM SERPL-MCNC: 4 MMOL/L — SIGNIFICANT CHANGE UP (ref 3.5–5.3)
POTASSIUM SERPL-SCNC: 4 MMOL/L — SIGNIFICANT CHANGE UP (ref 3.5–5.3)
PROTHROM AB SERPL-ACNC: 31.9 SEC — HIGH (ref 10–12.9)
RBC # BLD: 3.74 M/UL — LOW (ref 4.2–5.8)
RBC # FLD: 16.5 % — HIGH (ref 10.3–14.5)
SODIUM SERPL-SCNC: 145 MMOL/L — SIGNIFICANT CHANGE UP (ref 135–145)
WBC # BLD: 5.23 K/UL — SIGNIFICANT CHANGE UP (ref 3.8–10.5)
WBC # FLD AUTO: 5.23 K/UL — SIGNIFICANT CHANGE UP (ref 3.8–10.5)

## 2018-12-21 PROCEDURE — 99232 SBSQ HOSP IP/OBS MODERATE 35: CPT

## 2018-12-21 RX ORDER — WARFARIN SODIUM 2.5 MG/1
5 TABLET ORAL ONCE
Qty: 0 | Refills: 0 | Status: COMPLETED | OUTPATIENT
Start: 2018-12-21 | End: 2018-12-21

## 2018-12-21 RX ADMIN — CLOPIDOGREL BISULFATE 75 MILLIGRAM(S): 75 TABLET, FILM COATED ORAL at 12:38

## 2018-12-21 RX ADMIN — Medication 3 MILLILITER(S): at 04:44

## 2018-12-21 RX ADMIN — CARBIDOPA AND LEVODOPA 1 TABLET(S): 25; 100 TABLET ORAL at 12:39

## 2018-12-21 RX ADMIN — ATORVASTATIN CALCIUM 10 MILLIGRAM(S): 80 TABLET, FILM COATED ORAL at 21:25

## 2018-12-21 RX ADMIN — CARBIDOPA AND LEVODOPA 1 TABLET(S): 25; 100 TABLET ORAL at 21:25

## 2018-12-21 RX ADMIN — WARFARIN SODIUM 5 MILLIGRAM(S): 2.5 TABLET ORAL at 21:25

## 2018-12-21 RX ADMIN — Medication 1 TABLET(S): at 12:39

## 2018-12-21 RX ADMIN — Medication 25 MILLIGRAM(S): at 05:19

## 2018-12-21 RX ADMIN — AZITHROMYCIN 500 MILLIGRAM(S): 500 TABLET, FILM COATED ORAL at 12:38

## 2018-12-21 RX ADMIN — Medication 3 MILLILITER(S): at 18:40

## 2018-12-21 RX ADMIN — Medication 40 MILLIGRAM(S): at 12:38

## 2018-12-21 RX ADMIN — Medication 3 MILLILITER(S): at 07:50

## 2018-12-21 RX ADMIN — DONEPEZIL HYDROCHLORIDE 5 MILLIGRAM(S): 10 TABLET, FILM COATED ORAL at 21:25

## 2018-12-21 RX ADMIN — CEFTRIAXONE 1000 MILLIGRAM(S): 500 INJECTION, POWDER, FOR SOLUTION INTRAMUSCULAR; INTRAVENOUS at 10:08

## 2018-12-21 RX ADMIN — Medication 3 MILLILITER(S): at 15:15

## 2018-12-21 RX ADMIN — Medication 3 MILLILITER(S): at 11:18

## 2018-12-21 RX ADMIN — MIDODRINE HYDROCHLORIDE 5 MILLIGRAM(S): 2.5 TABLET ORAL at 10:07

## 2018-12-21 RX ADMIN — Medication 20 MILLIEQUIVALENT(S): at 12:39

## 2018-12-21 RX ADMIN — CARBIDOPA AND LEVODOPA 1 TABLET(S): 25; 100 TABLET ORAL at 05:20

## 2018-12-21 RX ADMIN — PANTOPRAZOLE SODIUM 40 MILLIGRAM(S): 20 TABLET, DELAYED RELEASE ORAL at 05:20

## 2018-12-21 NOTE — PROGRESS NOTE ADULT - SUBJECTIVE AND OBJECTIVE BOX
PCP:    REQUESTING PHYSICIAN:    REASON FOR CONSULT:    CHIEF COMPLAINT:    HPI:  89 y/o M with PMH of CAD s/p PCI / CABG, systolic CHF, h/o NSVT, s/p AICD, HTN, dylsipidemia, h/o orthostatic hypotension , CKD III, a-fib (on coumadin), parkinson's disease, p/w SOB. Patient provides minimal history, and unable to elaborate. States he developed SOB, and wheezing. Has non-productive cough for last threee days . Denies fever, chills, nausea, vomiting, abdominal pain, urinary complaints. Presently no family members available. ED documents that patient was also confused, +orthopnea and was sleeping in recliner last 2 nights.   Patient CT chest showed bronchiectasis? pneumonia , without significant change from prior C T chest probably chronic lung disease ,    12/18/18 Patient is feeling little better , still coughing with mild wheeze ,  severe electrolyte abnormalities noted   12/19/18: Remains short of breath and lethargic. Paced.   12/20/18: Feels improved.   12/21/18 Patient is feeling considerably better , denies any chest pain       MEDICATIONS  (STANDING):  ALBUTerol    90 MICROgram(s) HFA Inhaler 1 Puff(s) Inhalation every 4 hours  ALBUTerol/ipratropium for Nebulization 3 milliLiter(s) Nebulizer every 4 hours  atorvastatin 10 milliGRAM(s) Oral at bedtime  azithromycin   Tablet 500 milliGRAM(s) Oral daily  carbidopa/levodopa  25/100 1 Tablet(s) Oral three times a day  cefTRIAXone Injectable. 1000 milliGRAM(s) IV Push every 24 hours  cefTRIAXone Injectable.      clopidogrel Tablet 75 milliGRAM(s) Oral daily  donepezil 5 milliGRAM(s) Oral at bedtime  furosemide   Oral Tab/Cap - Peds 40 milliGRAM(s) Oral daily  metoprolol succinate ER 25 milliGRAM(s) Oral daily  midodrine 5 milliGRAM(s) Oral <User Schedule>  multivitamin 1 Tablet(s) Oral daily  pantoprazole    Tablet 40 milliGRAM(s) Oral before breakfast  potassium chloride    Tablet ER 20 milliEquivalent(s) Oral daily  tiotropium 18 MICROgram(s) Capsule 1 Capsule(s) Inhalation daily    MEDICATIONS  (PRN):  docusate sodium 100 milliGRAM(s) Oral three times a day PRN Constipation    Vital Signs Last 24 Hrs  T(C): 36.7 (21 Dec 2018 05:12), Max: 36.7 (20 Dec 2018 10:01)  T(F): 98 (21 Dec 2018 05:12), Max: 98.1 (20 Dec 2018 17:17)  HR: 68 (21 Dec 2018 07:50) (48 - 83)  BP: 132/62 (21 Dec 2018 05:12) (115/38 - 138/68)  BP(mean): --  RR: 17 (20 Dec 2018 17:17) (17 - 18)  SpO2: 100% (21 Dec 2018 05:12) (96% - 100%)    I&O's Summary    PHYSICAL EXAM:    Constitutional: NAD, awake and alert, well-developed  HEENT: PERR, EOMI,  No oral cyananosis.  Neck:  supple,  No JVD  Respiratory: Breath sounds are clear bilaterally,   Cardiovascular: S1 and S2, regular rate and rhythm, no Murmurs, gallops or rubs  Gastrointestinal: Bowel Sounds present, soft, nontender.   Extremities: No peripheral edema. No clubbing or cyanosis.  Vascular: 2+ peripheral pulses  Neurological: A/O x 3, no focal deficits  Musculoskeletal: no calf tenderness.  Skin: No rashes.      LABS: All Labs Reviewed:                           11.0   5.23  )-----------( 181      ( 21 Dec 2018 05:34 )             34.7     12-21    145  |  107  |  30<H>  ----------------------------<  116<H>  4.0   |  33<H>  |  1.42<H>    Ca    8.9      21 Dec 2018 05:34            PT/INR - ( 21 Dec 2018 05:34 )   PT: 31.9 sec;   INR: 2.78 ratio             12-17 Chol 105 LDL 44 HDL 38<L> Trig 115  RADIOLOGY/EKG: < from: 12 Lead ECG (12.16.18 @ 17:44) >  Diagnosis Line Ventricular-paced rhythm  Abnormal ECG  When compared with ECGof 21-JUL-2018 07:38,  Premature ventricular complexes are no longer Present  Premature atrial complexes are no longer Present  Vent. rate has decreased BY  10 BPM  Confirmed by Palla MD, Kahlil ([id number]) on 12/17/2018 9:45:04 AM    < end of copied text >        ECHO/CARDIAC CATHTERIZATION/STRESS TEST:    < from: Transthoracic Echocardiogram (02.26.18 @ 09:24) >  Summary     The left ventricle is normal in size, wall thickness. Moderately   decreased   left ventricular systolic function with an estimated left ventricular   ejection fraction of 35-40 %. There is paradoxical septal motion   The right atrium appears moderately dilated.   A device wire is seen in the RV and RA.   Normal aortic valve structure and function.   Normal appearing mitral valve structure and function.   Moderate (2+) mitral regurgitation is present.   Mild mitral annular calcification is present.   Normal appearing tricuspid valve structure and function.   Moderate to severe (3+) tricuspid valve regurgitation is present.   There is severe elevation in right ventricular systolic pressure     Signature      < end of copied text >

## 2018-12-21 NOTE — PROGRESS NOTE ADULT - PROBLEM SELECTOR PLAN 3
negative troponin , continue medication   Low dose BB ,

## 2018-12-21 NOTE — PROGRESS NOTE ADULT - SUBJECTIVE AND OBJECTIVE BOX
CC: SOB    INTERVAL HPI/OVERNIGHT EVENTS:  89 y/o M with PMH of CAD s/p PCI / CABG, systolic CHF, h/o NSVT, s/p AICD, HTN, dylsipidemia, h/o orthostatic hypotension, CKD III, a-fib (on coumadin), parkinson's disease, p/w SOB. Patient provides minimal history, and unable to elaborate. States he developed SOB, and wheezing. Has non-productive cough. Denies fever, chills, nausea, vomiting, abdominal pain, urinary complaints. Presently no family members available. ED documents that patient was also confused, +orthopnea and was sleeping in recliner last 2 nights.     12/17/18- Patient seen and examined at bedside. States he feels tired, has been urinating more often. States breathing is improved since he first came to ED.  12/18: Lying in bed, weak, but states feeling a bit better.  Pt with weak voice and unable to understand everything he is saying. States he is comfortable.  12/19/18: Patient seen and examined. No new complaints.   12/20/18: Patient seen and examined. SOB improving. No new complaints.   12/21/18: Patient seen and examined. No new complaints. SOB improving. Sitting in a chair.     REVIEW OF SYSTEMS: All other review of systems is negative unless indicated above.      Vital Signs Last 24 Hrs  T(C): 36.7 (21 Dec 2018 11:10), Max: 36.7 (20 Dec 2018 17:17)  T(F): 98 (21 Dec 2018 11:10), Max: 98.1 (20 Dec 2018 17:17)  HR: 74 (21 Dec 2018 11:18) (53 - 83)  BP: 118/52 (21 Dec 2018 11:10) (118/52 - 138/68)  BP(mean): --  RR: 18 (21 Dec 2018 11:10) (17 - 18)  SpO2: 100% (21 Dec 2018 11:10) (96% - 100%)        Physical Exam:    General: NAD, weak, frail, ill appearing  Neurology: A&Ox3, nonfocal, WONG x 4  Respiratory: b/l rales, rhonchi  CV: S1S2, +murmur  Abdominal: Soft, NT, ND +BS  Extremities: + pitting edema B/L L>R, + peripheral pulses, chronic venous changes of legs          Labs:                                                        11.0   5.23  )-----------( 181      ( 21 Dec 2018 05:34 )             34.7     21 Dec 2018 05:34    145    |  107    |  30     ----------------------------<  116    4.0     |  33     |  1.42     Ca    8.9        21 Dec 2018 05:34        PT/INR - ( 21 Dec 2018 05:34 )   PT: 31.9 sec;   INR: 2.78 ratio           CAPILLARY BLOOD GLUCOSE            MEDICATIONS:    ALBUTerol    90 MICROgram(s) HFA Inhaler 1 Puff(s) Inhalation every 4 hours  ALBUTerol/ipratropium for Nebulization 3 milliLiter(s) Nebulizer every 4 hours  atorvastatin 10 milliGRAM(s) Oral at bedtime  azithromycin   Tablet 500 milliGRAM(s) Oral daily  carbidopa/levodopa  25/100 1 Tablet(s) Oral three times a day  cefTRIAXone Injectable. 1000 milliGRAM(s) IV Push every 24 hours  cefTRIAXone Injectable.      clopidogrel Tablet 75 milliGRAM(s) Oral daily  donepezil 5 milliGRAM(s) Oral at bedtime  furosemide   Oral Tab/Cap - Peds 40 milliGRAM(s) Oral daily  metoprolol succinate ER 25 milliGRAM(s) Oral daily  midodrine 5 milliGRAM(s) Oral <User Schedule>  multivitamin 1 Tablet(s) Oral daily  pantoprazole    Tablet 40 milliGRAM(s) Oral before breakfast  potassium chloride    Tablet ER 20 milliEquivalent(s) Oral daily  tiotropium 18 MICROgram(s) Capsule 1 Capsule(s) Inhalation daily  warfarin 3 milliGRAM(s) Oral daily    MEDICATIONS  (PRN):  docusate sodium 100 milliGRAM(s) Oral three times a day PRN Constipation                       Assessment and Plan:  89 y/o M with PMH of CAD s/p PCI / CABG, systolic CHF, h/o NSVT, s/p AICD, HTN, dylsipidemia, h/o orthostatic hypotension, CKD III, a-fib (on coumadin), parkinson's disease, p/w SOB.    SOB/acute respiratory failure 2/2 acute on chronic CHF / acute viral infection with HMPV/acute bacterial PNA    L lung PNA  -c/w IV antibiotics for probable gram neg bacteria  -Blood cultures NGTD  -Isolation and supportive care for Hmpv  -lasix per cardio, changed to po  -Duonebs   -Trops negative x 3    MARILYN on CKD III:  -Scr trending down  -on lasix oral  -Nephro consult appreciated    popliteal DVT in R leg:  -seen on ultrasound  -patient on coumadin for Afib,   Restart coumadin, INR thepeutic  -maintain INR 2-3     Anemia / thrombocytopenia:  -H+H stable  -thrombocytopenia resolved    Hypokalemia:  -repleted and monitor  -off florinef    HTN / dyslipidemia   -C/w home meds -> if continues to remain stable during hospitalization can f/u outpatient for further management     DVT ppx  -On coumadin    Dispo:  -once medically stable, will likely need ERICKSON per PT recs.

## 2018-12-21 NOTE — PROGRESS NOTE ADULT - PROBLEM SELECTOR PLAN 2
clinically better , continue BB , low dose lasix monitor renal function. Echo pending
clinically better , continue BB , low dose lasix
clinically better , continue BB , low dose lasix
clinically better , continue BB , low dose lasix monitor renal function , orthostatic BP   AICD interrogation , echocardiogram to asses LVEF.

## 2018-12-21 NOTE — PROGRESS NOTE ADULT - SUBJECTIVE AND OBJECTIVE BOX
Patient is a 89y old  Male who presents with a chief complaint of SOB (17 Dec 2018 10:42)    Date of service: 12-21-18 @ 15:00      Patient sitting in chair, awake, alert      ROS: unable to obtain secondary to patient medical condition     MEDICATIONS  (STANDING):  ALBUTerol    90 MICROgram(s) HFA Inhaler 1 Puff(s) Inhalation every 4 hours  ALBUTerol/ipratropium for Nebulization 3 milliLiter(s) Nebulizer every 4 hours  atorvastatin 10 milliGRAM(s) Oral at bedtime  azithromycin   Tablet 500 milliGRAM(s) Oral daily  carbidopa/levodopa  25/100 1 Tablet(s) Oral three times a day  cefTRIAXone Injectable. 1000 milliGRAM(s) IV Push every 24 hours  cefTRIAXone Injectable.      clopidogrel Tablet 75 milliGRAM(s) Oral daily  donepezil 5 milliGRAM(s) Oral at bedtime  furosemide   Oral Tab/Cap - Peds 40 milliGRAM(s) Oral daily  metoprolol succinate ER 25 milliGRAM(s) Oral daily  midodrine 5 milliGRAM(s) Oral <User Schedule>  multivitamin 1 Tablet(s) Oral daily  pantoprazole    Tablet 40 milliGRAM(s) Oral before breakfast  potassium chloride    Tablet ER 20 milliEquivalent(s) Oral daily  tiotropium 18 MICROgram(s) Capsule 1 Capsule(s) Inhalation daily    MEDICATIONS  (PRN):  docusate sodium 100 milliGRAM(s) Oral three times a day PRN Constipation      Vital Signs Last 24 Hrs  T(C): 36.7 (21 Dec 2018 11:10), Max: 36.7 (20 Dec 2018 17:17)  T(F): 98 (21 Dec 2018 11:10), Max: 98.1 (20 Dec 2018 17:17)  HR: 74 (21 Dec 2018 11:18) (53 - 83)  BP: 118/52 (21 Dec 2018 11:10) (118/52 - 138/68)  BP(mean): --  RR: 18 (21 Dec 2018 11:10) (17 - 18)  SpO2: 100% (21 Dec 2018 11:10) (96% - 100%)    Physical Exam:    PE:    Constitutional: frail looking  HEENT: NC/AT, EOMI, PERRLA, conjunctivae clear; ears and nose atraumatic; pharynx clear  Neck: supple; thyroid not palpable  Back: no tenderness  Respiratory: respiratory effort increased; bilateral rhonchi and wheezing  Cardiovascular: S1S2 regular, no murmurs  Abdomen: soft, not tender, not distended, positive BS; no liver or spleen organomegaly  Genitourinary: no suprapubic tenderness  Musculoskeletal: no muscle tenderness, mild lower extremity edema and mild erythema  Neurological/ Psychiatric:   moving all extremities  Skin: no rashes; no palpable lesions    Labs: all available labs reviewed                          Labs:              Labs:                         Labs:         Labs:                        11.0   5.23  )-----------( 181      ( 21 Dec 2018 05:34 )             34.7     12-21    145  |  107  |  30<H>  ----------------------------<  116<H>  4.0   |  33<H>  |  1.42<H>    Ca    8.9      21 Dec 2018 05:34             Cultures:       Culture - Blood (collected 12-17-18 @ 06:00)  Source: .Blood None  Preliminary Report (12-18-18 @ 09:01):    No growth to date.    Culture - Blood (collected 12-16-18 @ 18:10)  Source: .Blood Blood-Venous  Preliminary Report (12-18-18 @ 01:03):    No growth to date.    Culture - Blood (collected 12-16-18 @ 18:10)  Source: .Blood Blood-Peripheral  Preliminary Report (12-18-18 @ 01:03):    No growth to date.                    Rapid Respiratory Viral Panel (12.16.18 @ 18:42)    Rapid RVP Result: Detected: This Respiratory Panel uses polymerase chain reaction (PCR) to detect for  adenovirus; coronavirus (HKU1, NL63, 229E, OC43); human metapneumovirus  (hMPV); human enterovirus/rhinovirus (Entero/RV); influenza A; influenza  A/H1; influenza A/H3; influenza A/H1-2009; influenza B; parainfluenza  viruses 1, 2, 3, 4; respiratory syncytial virus; Mycoplasma pneumoniae;  and Chlamydophila pneumoniae.    hMPV (RapRVP): Detected: Test Name:RVP  Called by:mreim  Called to: gavin magana  Read back 2 Pt IDs:y Read back values:y Date/Tm:12/16/18 20:59        < from: CT Chest No Cont (12.17.18 @ 08:49) >    EXAM:  CT CHEST                            PROCEDURE DATE:  12/17/2018          INTERPRETATION:  CT Chest without IV contrast        CLINICAL INFORMATION:  Cough.    TECHNIQUE: Contiguous axial sections were obtained through the chest   using single helical acquisition.  Images were acquired without IV   contrast.  In addition, sagittal and coronal reformatted images were   obtained.  This scan was performed using automatic exposure control   (radiation dose reduction software) to obtain a diagnostic image quality   scan with patient dose as low as reasonably achievable.     COMPARISON: Chest radiograph dated 12/16/2018 and chest CT dated   12/23/2016 are available for review.    FINDINGS:       LINES/TUBES: Single lead left-sided pacemaker/AICD is again noted.    LUNGS, AIRWAYS: Respiratory motion artifact decreasing sensitivity for   full evaluation. Central airways are patent. Opacification of the left   lower subsegmental bronchi.    New extensive mixed groundglass/consolidated infiltrates within the   posterior and inferior left upper lobe and within the superior left lower   lobe, concerning for pneumonia. There is moderate left lower lobe   subsegmental atelectasis which may be obstructive secondary to mucous.   Mild bronchiolitis at the superior right lower lobe (image 52 series 2).    Unchanged biapical paraseptal cysts, greater on right which may represent   emphysematous changes or interstitial lung disease. Chronic mild-moderate   pleural/parenchymal scarring of the left lower lobe. Unchanged lateral   right upper lobe pleural-based 0.5 cm nodule (image 43 series 2).    PLEURA: Chronic unchanged left posterior and basilar pleural thickening   with stable trace loculated left pleural effusion at the posterior left  lower lung.    HEART AND VESSELS: Stable marked cardiomegaly. No pericardial effusion.   Thoracic aorta is of normal caliber. Sternotomy wires and mediastinal   surgical clips consistent with CABG. Severe coronary artery   calcifications and/or stents. Redemonstration of small curvilinear   calcification within the left ventricle which may represent calcified   papillary muscle. Calcification of the left ventricular wall at the apex   likely sequela of remote myocardial infarction.    MEDIASTINUM, SHIRLEY, AXILLAE: Small hiatal hernia. Esophagus is mildly   patulous and air-fluid.    BONES: No acute bony pathology or fracture. Diffuse osteopenia. T12   inferior endplate Schmorl's node, unchanged.    CHEST WALL/SOFT TISSUES: Within normal limits    UPPER ABDOMEN: Unchanged indistinct 1.4 cm hypodensity within the spleen,   statistically likely hemangioma. A few subcentimeter coarse   calcifications within the spleen are again noted, likely sequela of   remote granulomatous disease.    IMPRESSION:      Extensive left upper and lower lobe mixed groundglass/consolidated   infiltrates concerning for pneumonia.    Opacification of the left lower segmental bronchi with associated   moderate obstructive left lower lobe subsegmental atelectasis.    Minimal superior right lower lobe bronchiolitis.    Remaining findings are unchanged since chest CT of 12/23/16.     < end of copied text >            Radiology: all available radiological tests reviewed    Advanced directives addressed: full resuscitation

## 2018-12-21 NOTE — PROGRESS NOTE ADULT - SUBJECTIVE AND OBJECTIVE BOX
Patient is a 89y Male who reports no complaints overnight. in chair    REVIEW OF SYSTEMS:    CONSTITUTIONAL: stable weakness, no fevers or chills  RESPIRATORY: No cough, wheezing, hemoptysis; No shortness of breath  CARDIOVASCULAR: No chest pain or palpitations  GENITOURINARY: No dysuria, frequency or hematuria  All other review of systems is negative unless indicated above.    MEDICATIONS  (STANDING):  ALBUTerol    90 MICROgram(s) HFA Inhaler 1 Puff(s) Inhalation every 4 hours  ALBUTerol/ipratropium for Nebulization 3 milliLiter(s) Nebulizer every 4 hours  atorvastatin 10 milliGRAM(s) Oral at bedtime  carbidopa/levodopa  25/100 1 Tablet(s) Oral three times a day  cefTRIAXone Injectable. 1000 milliGRAM(s) IV Push every 24 hours  cefTRIAXone Injectable.      clopidogrel Tablet 75 milliGRAM(s) Oral daily  donepezil 5 milliGRAM(s) Oral at bedtime  furosemide   Oral Tab/Cap - Peds 40 milliGRAM(s) Oral daily  metoprolol succinate ER 25 milliGRAM(s) Oral daily  midodrine 5 milliGRAM(s) Oral <User Schedule>  multivitamin 1 Tablet(s) Oral daily  pantoprazole    Tablet 40 milliGRAM(s) Oral before breakfast  potassium chloride    Tablet ER 20 milliEquivalent(s) Oral daily  tiotropium 18 MICROgram(s) Capsule 1 Capsule(s) Inhalation daily  warfarin 5 milliGRAM(s) Oral once    MEDICATIONS  (PRN):  docusate sodium 100 milliGRAM(s) Oral three times a day PRN Constipation        T(C): , Max: 36.7 (12-20-18 @ 17:17)  T(F): , Max: 98.1 (12-20-18 @ 17:17)  HR: 77 (12-21-18 @ 16:27)  BP: 143/63 (12-21-18 @ 16:27)  BP(mean): --  RR: 18 (12-21-18 @ 16:27)  SpO2: 100% (12-21-18 @ 16:27)  Wt(kg): --        PHYSICAL EXAM:    Constitutional: frail  HEENT: PERRLA, EOMI,  MMM  Neck: No LAD, No JVD  Respiratory: dist BS  Cardiovascular: S1 and S2, RRR  Gastrointestinal: BS+, soft, NT/ND  Extremities: tr peripheral edema, chronic changes  Neurological: Alert, pleasent  : No Blanche  Skin: No rashes  Access: Not applicable        LABS:                        11.0   5.23  )-----------( 181      ( 21 Dec 2018 05:34 )             34.7     21 Dec 2018 05:34    145    |  107    |  30     ----------------------------<  116    4.0     |  33     |  1.42   20 Dec 2018 06:15    144    |  106    |  34     ----------------------------<  121    3.4     |  32     |  1.51   19 Dec 2018 06:22    141    |  103    |  35     ----------------------------<  183    3.9     |  31     |  1.69   18 Dec 2018 14:05    x      |  x      |  x      ----------------------------<  x      3.2     |  x      |  x      18 Dec 2018 06:08    142    |  101    |  34     ----------------------------<  122    3.2     |  31     |  1.62     Ca    8.9        21 Dec 2018 05:34  Ca    8.6        20 Dec 2018 06:15  Ca    8.3        19 Dec 2018 06:22  Ca    8.2        18 Dec 2018 06:08  Phos  3.1       18 Dec 2018 06:08  Mg     2.2       18 Dec 2018 06:08            Urine Studies:          RADIOLOGY & ADDITIONAL STUDIES:

## 2018-12-21 NOTE — PROGRESS NOTE ADULT - PROBLEM SELECTOR PLAN 5
controlled rate , paced rhythm ,  continue warfarin , AICD interrogation
controlled rate , paced rhythm ,  continue warfarin , AICD interrogation
controlled rate , paced rhythm ,  continue warfarin , AICD interrogation done normal function
controlled rate , paced rhythm ,  continue warfarin , AICD interrogation

## 2018-12-22 LAB
ANION GAP SERPL CALC-SCNC: 7 MMOL/L — SIGNIFICANT CHANGE UP (ref 5–17)
BUN SERPL-MCNC: 30 MG/DL — HIGH (ref 7–23)
CALCIUM SERPL-MCNC: 9 MG/DL — SIGNIFICANT CHANGE UP (ref 8.5–10.1)
CHLORIDE SERPL-SCNC: 106 MMOL/L — SIGNIFICANT CHANGE UP (ref 96–108)
CO2 SERPL-SCNC: 31 MMOL/L — SIGNIFICANT CHANGE UP (ref 22–31)
CREAT SERPL-MCNC: 1.35 MG/DL — HIGH (ref 0.5–1.3)
CULTURE RESULTS: SIGNIFICANT CHANGE UP
GLUCOSE SERPL-MCNC: 114 MG/DL — HIGH (ref 70–99)
HCT VFR BLD CALC: 34.5 % — LOW (ref 39–50)
HGB BLD-MCNC: 10.9 G/DL — LOW (ref 13–17)
INR BLD: 2.5 RATIO — HIGH (ref 0.88–1.16)
MCHC RBC-ENTMCNC: 29.4 PG — SIGNIFICANT CHANGE UP (ref 27–34)
MCHC RBC-ENTMCNC: 31.6 GM/DL — LOW (ref 32–36)
MCV RBC AUTO: 93 FL — SIGNIFICANT CHANGE UP (ref 80–100)
NRBC # BLD: 0 /100 WBCS — SIGNIFICANT CHANGE UP (ref 0–0)
PLATELET # BLD AUTO: 195 K/UL — SIGNIFICANT CHANGE UP (ref 150–400)
POTASSIUM SERPL-MCNC: 3.9 MMOL/L — SIGNIFICANT CHANGE UP (ref 3.5–5.3)
POTASSIUM SERPL-SCNC: 3.9 MMOL/L — SIGNIFICANT CHANGE UP (ref 3.5–5.3)
PROTHROM AB SERPL-ACNC: 28.6 SEC — HIGH (ref 10–12.9)
RBC # BLD: 3.71 M/UL — LOW (ref 4.2–5.8)
RBC # FLD: 16.1 % — HIGH (ref 10.3–14.5)
SODIUM SERPL-SCNC: 144 MMOL/L — SIGNIFICANT CHANGE UP (ref 135–145)
SPECIMEN SOURCE: SIGNIFICANT CHANGE UP
WBC # BLD: 6.24 K/UL — SIGNIFICANT CHANGE UP (ref 3.8–10.5)
WBC # FLD AUTO: 6.24 K/UL — SIGNIFICANT CHANGE UP (ref 3.8–10.5)

## 2018-12-22 RX ORDER — CEFUROXIME AXETIL 250 MG
250 TABLET ORAL EVERY 12 HOURS
Qty: 0 | Refills: 0 | Status: DISCONTINUED | OUTPATIENT
Start: 2018-12-22 | End: 2018-12-24

## 2018-12-22 RX ORDER — WARFARIN SODIUM 2.5 MG/1
3 TABLET ORAL ONCE
Qty: 0 | Refills: 0 | Status: COMPLETED | OUTPATIENT
Start: 2018-12-22 | End: 2018-12-22

## 2018-12-22 RX ADMIN — MIDODRINE HYDROCHLORIDE 5 MILLIGRAM(S): 2.5 TABLET ORAL at 08:41

## 2018-12-22 RX ADMIN — Medication 40 MILLIGRAM(S): at 11:19

## 2018-12-22 RX ADMIN — Medication 3 MILLILITER(S): at 12:41

## 2018-12-22 RX ADMIN — WARFARIN SODIUM 3 MILLIGRAM(S): 2.5 TABLET ORAL at 21:49

## 2018-12-22 RX ADMIN — Medication 3 MILLILITER(S): at 00:03

## 2018-12-22 RX ADMIN — Medication 3 MILLILITER(S): at 20:49

## 2018-12-22 RX ADMIN — CARBIDOPA AND LEVODOPA 1 TABLET(S): 25; 100 TABLET ORAL at 06:26

## 2018-12-22 RX ADMIN — Medication 3 MILLILITER(S): at 16:33

## 2018-12-22 RX ADMIN — PANTOPRAZOLE SODIUM 40 MILLIGRAM(S): 20 TABLET, DELAYED RELEASE ORAL at 06:26

## 2018-12-22 RX ADMIN — ATORVASTATIN CALCIUM 10 MILLIGRAM(S): 80 TABLET, FILM COATED ORAL at 21:49

## 2018-12-22 RX ADMIN — CEFTRIAXONE 1000 MILLIGRAM(S): 500 INJECTION, POWDER, FOR SOLUTION INTRAMUSCULAR; INTRAVENOUS at 11:17

## 2018-12-22 RX ADMIN — DONEPEZIL HYDROCHLORIDE 5 MILLIGRAM(S): 10 TABLET, FILM COATED ORAL at 21:49

## 2018-12-22 RX ADMIN — Medication 3 MILLILITER(S): at 04:31

## 2018-12-22 RX ADMIN — Medication 20 MILLIEQUIVALENT(S): at 11:18

## 2018-12-22 RX ADMIN — CARBIDOPA AND LEVODOPA 1 TABLET(S): 25; 100 TABLET ORAL at 21:49

## 2018-12-22 RX ADMIN — Medication 1 TABLET(S): at 11:18

## 2018-12-22 RX ADMIN — Medication 3 MILLILITER(S): at 08:46

## 2018-12-22 RX ADMIN — CARBIDOPA AND LEVODOPA 1 TABLET(S): 25; 100 TABLET ORAL at 11:18

## 2018-12-22 RX ADMIN — Medication 250 MILLIGRAM(S): at 16:30

## 2018-12-22 RX ADMIN — CLOPIDOGREL BISULFATE 75 MILLIGRAM(S): 75 TABLET, FILM COATED ORAL at 11:17

## 2018-12-22 NOTE — PROGRESS NOTE ADULT - SUBJECTIVE AND OBJECTIVE BOX
COVERING FOR DR. ACHARYA     Patient is a 89y Male who reports no complaints overnight. Poor appetite. Sitting in chair.    REVIEW OF SYSTEMS:    CONSTITUTIONAL: stable weakness, no fevers or chills  RESPIRATORY: No cough, wheezing, hemoptysis; No shortness of breath  CARDIOVASCULAR: No chest pain or palpitations  GENITOURINARY: No dysuria, frequency or hematuria  All other review of systems is negative unless indicated above.    MEDICATIONS  (STANDING):  ALBUTerol    90 MICROgram(s) HFA Inhaler 1 Puff(s) Inhalation every 4 hours  ALBUTerol/ipratropium for Nebulization 3 milliLiter(s) Nebulizer every 4 hours  atorvastatin 10 milliGRAM(s) Oral at bedtime  carbidopa/levodopa  25/100 1 Tablet(s) Oral three times a day  cefTRIAXone Injectable. 1000 milliGRAM(s) IV Push every 24 hours  cefTRIAXone Injectable.      clopidogrel Tablet 75 milliGRAM(s) Oral daily  donepezil 5 milliGRAM(s) Oral at bedtime  furosemide   Oral Tab/Cap - Peds 40 milliGRAM(s) Oral daily  metoprolol succinate ER 25 milliGRAM(s) Oral daily  midodrine 5 milliGRAM(s) Oral <User Schedule>  multivitamin 1 Tablet(s) Oral daily  pantoprazole    Tablet 40 milliGRAM(s) Oral before breakfast  potassium chloride    Tablet ER 20 milliEquivalent(s) Oral daily  tiotropium 18 MICROgram(s) Capsule 1 Capsule(s) Inhalation daily      Vital Signs Last 24 Hrs  T(C): 36.8 (22 Dec 2018 11:16), Max: 36.9 (22 Dec 2018 05:20)  T(F): 98.2 (22 Dec 2018 11:16), Max: 98.4 (22 Dec 2018 05:20)  HR: 50 (22 Dec 2018 11:16) (50 - 77)  BP: 136/54 (22 Dec 2018 11:16) (116/48 - 143/63)  BP(mean): --  RR: 18 (22 Dec 2018 11:16) (18 - 18)  SpO2: 97% (22 Dec 2018 11:16) (97% - 100%)  Daily     Daily Weight in k.2 (22 Dec 2018 07:57)  I&O's Summary    PHYSICAL EXAM:    Constitutional: frail  HEENT: PERRLA, EOMI,  MMM  Neck: No LAD, No JVD  Respiratory: dist BS  Cardiovascular: S1 and S2, RRR  Gastrointestinal: BS+, soft, NT/ND  Extremities: tr peripheral edema, chronic changes  Neurological: Alert, pleasent  : No Mancia  Skin: No rashes  Access: Not applicable        LABS:                        11.0   5.23  )-----------( 181      ( 21 Dec 2018 05:34 )             34.7     12-    144  |  106  |  30<H>  ----------------------------<  114<H>  3.9   |  31  |  1.35<H>    Ca    9.0      22 Dec 2018 06:00    21 Dec 2018 05:34    145    |  107    |  30     ----------------------------<  116    4.0     |  33     |  1.42   20 Dec 2018 06:15    144    |  106    |  34     ----------------------------<  121    3.4     |  32     |  1.51   19 Dec 2018 06:22    141    |  103    |  35     ----------------------------<  183    3.9     |  31     |  1.69   18 Dec 2018 14:05    x      |  x      |  x      ----------------------------<  x      3.2     |  x      |  x      18 Dec 2018 06:08    142    |  101    |  34     ----------------------------<  122    3.2     |  31     |  1.62     Ca    8.9        21 Dec 2018 05:34  Ca    8.6        20 Dec 2018 06:15  Ca    8.3        19 Dec 2018 06:22  Ca    8.2        18 Dec 2018 06:08  Phos  3.1       18 Dec 2018 06:08  Mg     2.2       18 Dec 2018 06:08            Urine Studies:          RADIOLOGY & ADDITIONAL STUDIES:

## 2018-12-22 NOTE — PROGRESS NOTE ADULT - SUBJECTIVE AND OBJECTIVE BOX
CC: SOB    INTERVAL HPI/OVERNIGHT EVENTS:  89 y/o M with PMH of CAD s/p PCI / CABG, systolic CHF, h/o NSVT, s/p AICD, HTN, dylsipidemia, h/o orthostatic hypotension, CKD III, a-fib (on coumadin), parkinson's disease, p/w SOB. Patient provides minimal history, and unable to elaborate. States he developed SOB, and wheezing. Has non-productive cough. Denies fever, chills, nausea, vomiting, abdominal pain, urinary complaints. Presently no family members available. ED documents that patient was also confused, +orthopnea and was sleeping in recliner last 2 nights.     12/17/18- Patient seen and examined at bedside. States he feels tired, has been urinating more often. States breathing is improved since he first came to ED.  12/18: Lying in bed, weak, but states feeling a bit better.  Pt with weak voice and unable to understand everything he is saying. States he is comfortable.  12/19/18: Patient seen and examined. No new complaints.   12/20/18: Patient seen and examined. SOB improving. No new complaints.   12/21/18: Patient seen and examined. No new complaints. SOB improving. Sitting in a chair.   12/22/18: Patient seen and examined. Feels better today, sob improving. Discussed with patient regarding management and d/c plan. Discussed with Dr Boyer.     REVIEW OF SYSTEMS: All other review of systems is negative unless indicated above.      Vital Signs Last 24 Hrs  T(C): 36.8 (22 Dec 2018 11:16), Max: 36.9 (22 Dec 2018 05:20)  T(F): 98.2 (22 Dec 2018 11:16), Max: 98.4 (22 Dec 2018 05:20)  HR: 50 (22 Dec 2018 11:16) (50 - 77)  BP: 136/54 (22 Dec 2018 11:16) (116/48 - 143/63)  BP(mean): --  RR: 18 (22 Dec 2018 11:16) (18 - 18)  SpO2: 97% (22 Dec 2018 11:16) (97% - 100%)        Physical Exam:    General: NAD, weak, frail, ill appearing  Neurology: A&Ox3, nonfocal, WONG x 4  Respiratory: b/l rales, rhonchi  CV: S1S2, +murmur  Abdominal: Soft, NT, ND +BS  Extremities: + pitting edema B/L L>R, + peripheral pulses, chronic venous changes of legs          Labs:                                               10.9   6.24  )-----------( 195      ( 22 Dec 2018 06:00 )             34.5     22 Dec 2018 06:00    144    |  106    |  30     ----------------------------<  114    3.9     |  31     |  1.35     Ca    9.0        22 Dec 2018 06:00        PT/INR - ( 22 Dec 2018 06:00 )   PT: 28.6 sec;   INR: 2.50 ratio             MEDICATIONS:        MEDICATIONS  (STANDING):  ALBUTerol    90 MICROgram(s) HFA Inhaler 1 Puff(s) Inhalation every 4 hours  ALBUTerol/ipratropium for Nebulization 3 milliLiter(s) Nebulizer every 4 hours  atorvastatin 10 milliGRAM(s) Oral at bedtime  carbidopa/levodopa  25/100 1 Tablet(s) Oral three times a day  cefTRIAXone Injectable. 1000 milliGRAM(s) IV Push every 24 hours  cefTRIAXone Injectable.      clopidogrel Tablet 75 milliGRAM(s) Oral daily  donepezil 5 milliGRAM(s) Oral at bedtime  furosemide   Oral Tab/Cap - Peds 40 milliGRAM(s) Oral daily  metoprolol succinate ER 25 milliGRAM(s) Oral daily  midodrine 5 milliGRAM(s) Oral <User Schedule>  multivitamin 1 Tablet(s) Oral daily  pantoprazole    Tablet 40 milliGRAM(s) Oral before breakfast  potassium chloride    Tablet ER 20 milliEquivalent(s) Oral daily  tiotropium 18 MICROgram(s) Capsule 1 Capsule(s) Inhalation daily    MEDICATIONS  (PRN):  docusate sodium 100 milliGRAM(s) Oral three times a day PRN Constipation                       Assessment and Plan:  89 y/o M with PMH of CAD s/p PCI / CABG, systolic CHF, h/o NSVT, s/p AICD, HTN, dylsipidemia, h/o orthostatic hypotension, CKD III, a-fib (on coumadin), parkinson's disease, p/w SOB.    SOB/acute respiratory failure 2/2 acute on chronic CHF / acute viral infection with HMPV/acute bacterial PNA    L lung PNA  -c/w IV antibiotics for probable gram neg bacteria  -Blood cultures NGTD  -Isolation and supportive care for Hmpv  -lasix per cardio, changed to po  -Duonebs   -Trops negative x 3    MARILYN on CKD III:  -Scr trending down, stable  -on lasix oral  -Nephro follow up appreciated    popliteal DVT in R leg:  -seen on ultrasound  -patient on coumadin for Afib,   Restarted coumadin, INR therapeutic, was on hold   -maintain INR 2-3     Anemia / thrombocytopenia:  -H+H stable  -thrombocytopenia resolved    Hypokalemia:  -repleted and monitor  -off florinef    HTN / dyslipidemia   -C/w home meds -> if continues to remain stable during hospitalization can f/u outpatient for further management     DVT ppx  -On coumadin    Dispo:  -once medically stable, will likely need [facility] on Monday

## 2018-12-22 NOTE — PROGRESS NOTE ADULT - SUBJECTIVE AND OBJECTIVE BOX
Patient is a 89y old  Male who presents with a chief complaint of SOB (17 Dec 2018 10:42)    Date of service: 12-22-18 @ 14:19    Patient sitting in chair, more alert        ROS unable to obtain secondary to patient medical condition     MEDICATIONS  (STANDING):  ALBUTerol    90 MICROgram(s) HFA Inhaler 1 Puff(s) Inhalation every 4 hours  ALBUTerol/ipratropium for Nebulization 3 milliLiter(s) Nebulizer every 4 hours  atorvastatin 10 milliGRAM(s) Oral at bedtime  carbidopa/levodopa  25/100 1 Tablet(s) Oral three times a day  cefTRIAXone Injectable. 1000 milliGRAM(s) IV Push every 24 hours  cefTRIAXone Injectable.      clopidogrel Tablet 75 milliGRAM(s) Oral daily  donepezil 5 milliGRAM(s) Oral at bedtime  furosemide   Oral Tab/Cap - Peds 40 milliGRAM(s) Oral daily  metoprolol succinate ER 25 milliGRAM(s) Oral daily  midodrine 5 milliGRAM(s) Oral <User Schedule>  multivitamin 1 Tablet(s) Oral daily  pantoprazole    Tablet 40 milliGRAM(s) Oral before breakfast  potassium chloride    Tablet ER 20 milliEquivalent(s) Oral daily  tiotropium 18 MICROgram(s) Capsule 1 Capsule(s) Inhalation daily  warfarin 3 milliGRAM(s) Oral once    MEDICATIONS  (PRN):  docusate sodium 100 milliGRAM(s) Oral three times a day PRN Constipation      Vital Signs Last 24 Hrs  T(C): 36.8 (22 Dec 2018 11:16), Max: 36.9 (22 Dec 2018 05:20)  T(F): 98.2 (22 Dec 2018 11:16), Max: 98.4 (22 Dec 2018 05:20)  HR: 50 (22 Dec 2018 11:16) (50 - 77)  BP: 136/54 (22 Dec 2018 11:16) (116/48 - 143/63)  BP(mean): --  RR: 18 (22 Dec 2018 11:16) (18 - 18)  SpO2: 97% (22 Dec 2018 11:16) (97% - 100%)    Physical Exam:      PE:    Constitutional: frail looking  HEENT: NC/AT, EOMI, PERRLA, conjunctivae clear; ears and nose atraumatic; pharynx clear  Neck: supple; thyroid not palpable  Back: no tenderness  Respiratory: respiratory effort increased; clear  Cardiovascular: S1S2 regular, no murmurs  Abdomen: soft, not tender, not distended, positive BS; no liver or spleen organomegaly  Genitourinary: no suprapubic tenderness  Musculoskeletal: no muscle tenderness, mild lower extremity edema and mild erythema  Neurological/ Psychiatric:   moving all extremities  Skin: no rashes; no palpable lesions    Labs: all available labs reviewed              Labs:                        10.9   6.24  )-----------( 195      ( 22 Dec 2018 06:00 )             34.5     12-22    144  |  106  |  30<H>  ----------------------------<  114<H>  3.9   |  31  |  1.35<H>    Ca    9.0      22 Dec 2018 06:00             Cultures:       Culture - Blood (collected 12-17-18 @ 06:00)  Source: .Blood None  Final Report (12-22-18 @ 09:01):    No growth at 5 days.    Culture - Blood (collected 12-16-18 @ 18:10)  Source: .Blood Blood-Venous  Final Report (12-22-18 @ 01:01):    No growth at 5 days.    Culture - Blood (collected 12-16-18 @ 18:10)  Source: .Blood Blood-Peripheral  Final Report (12-22-18 @ 01:01):    No growth at 5 days.                      Rapid Respiratory Viral Panel (12.16.18 @ 18:42)    Rapid RVP Result: Detected: This Respiratory Panel uses polymerase chain reaction (PCR) to detect for  adenovirus; coronavirus (HKU1, NL63, 229E, OC43); human metapneumovirus  (hMPV); human enterovirus/rhinovirus (Entero/RV); influenza A; influenza  A/H1; influenza A/H3; influenza A/H1-2009; influenza B; parainfluenza  viruses 1, 2, 3, 4; respiratory syncytial virus; Mycoplasma pneumoniae;  and Chlamydophila pneumoniae.    hMPV (RapRVP): Detected: Test Name:RVP  Called by:ramana  Called to: gavin magana  Read back 2 Pt IDs:y Read back values:y Date/Tm:12/16/18 20:59        < from: CT Chest No Cont (12.17.18 @ 08:49) >    EXAM:  CT CHEST                            PROCEDURE DATE:  12/17/2018          INTERPRETATION:  CT Chest without IV contrast        CLINICAL INFORMATION:  Cough.    TECHNIQUE: Contiguous axial sections were obtained through the chest   using single helical acquisition.  Images were acquired without IV   contrast.  In addition, sagittal and coronal reformatted images were   obtained.  This scan was performed using automatic exposure control   (radiation dose reduction software) to obtain a diagnostic image quality   scan with patient dose as low as reasonably achievable.     COMPARISON: Chest radiograph dated 12/16/2018 and chest CT dated   12/23/2016 are available for review.    FINDINGS:       LINES/TUBES: Single lead left-sided pacemaker/AICD is again noted.    LUNGS, AIRWAYS: Respiratory motion artifact decreasing sensitivity for   full evaluation. Central airways are patent. Opacification of the left   lower subsegmental bronchi.    New extensive mixed groundglass/consolidated infiltrates within the   posterior and inferior left upper lobe and within the superior left lower   lobe, concerning for pneumonia. There is moderate left lower lobe   subsegmental atelectasis which may be obstructive secondary to mucous.   Mild bronchiolitis at the superior right lower lobe (image 52 series 2).    Unchanged biapical paraseptal cysts, greater on right which may represent   emphysematous changes or interstitial lung disease. Chronic mild-moderate   pleural/parenchymal scarring of the left lower lobe. Unchanged lateral   right upper lobe pleural-based 0.5 cm nodule (image 43 series 2).    PLEURA: Chronic unchanged left posterior and basilar pleural thickening   with stable trace loculated left pleural effusion at the posterior left  lower lung.    HEART AND VESSELS: Stable marked cardiomegaly. No pericardial effusion.   Thoracic aorta is of normal caliber. Sternotomy wires and mediastinal   surgical clips consistent with CABG. Severe coronary artery   calcifications and/or stents. Redemonstration of small curvilinear   calcification within the left ventricle which may represent calcified   papillary muscle. Calcification of the left ventricular wall at the apex   likely sequela of remote myocardial infarction.    MEDIASTINUM, SHIRLEY, AXILLAE: Small hiatal hernia. Esophagus is mildly   patulous and air-fluid.    BONES: No acute bony pathology or fracture. Diffuse osteopenia. T12   inferior endplate Schmorl's node, unchanged.    CHEST WALL/SOFT TISSUES: Within normal limits    UPPER ABDOMEN: Unchanged indistinct 1.4 cm hypodensity within the spleen,   statistically likely hemangioma. A few subcentimeter coarse   calcifications within the spleen are again noted, likely sequela of   remote granulomatous disease.    IMPRESSION:      Extensive left upper and lower lobe mixed groundglass/consolidated   infiltrates concerning for pneumonia.    Opacification of the left lower segmental bronchi with associated   moderate obstructive left lower lobe subsegmental atelectasis.    Minimal superior right lower lobe bronchiolitis.    Remaining findings are unchanged since chest CT of 12/23/16.     < end of copied text >            Radiology: all available radiological tests reviewed    Advanced directives addressed: full resuscitation

## 2018-12-23 LAB
ANION GAP SERPL CALC-SCNC: 6 MMOL/L — SIGNIFICANT CHANGE UP (ref 5–17)
BUN SERPL-MCNC: 32 MG/DL — HIGH (ref 7–23)
C DIFF BY PCR RESULT: SIGNIFICANT CHANGE UP
C DIFF TOX GENS STL QL NAA+PROBE: SIGNIFICANT CHANGE UP
CALCIUM SERPL-MCNC: 8.7 MG/DL — SIGNIFICANT CHANGE UP (ref 8.5–10.1)
CHLORIDE SERPL-SCNC: 107 MMOL/L — SIGNIFICANT CHANGE UP (ref 96–108)
CO2 SERPL-SCNC: 31 MMOL/L — SIGNIFICANT CHANGE UP (ref 22–31)
CREAT SERPL-MCNC: 1.46 MG/DL — HIGH (ref 0.5–1.3)
GLUCOSE SERPL-MCNC: 121 MG/DL — HIGH (ref 70–99)
HCT VFR BLD CALC: 34 % — LOW (ref 39–50)
HGB BLD-MCNC: 10.7 G/DL — LOW (ref 13–17)
INR BLD: 2.52 RATIO — HIGH (ref 0.88–1.16)
MCHC RBC-ENTMCNC: 28.5 PG — SIGNIFICANT CHANGE UP (ref 27–34)
MCHC RBC-ENTMCNC: 31.5 GM/DL — LOW (ref 32–36)
MCV RBC AUTO: 90.7 FL — SIGNIFICANT CHANGE UP (ref 80–100)
NRBC # BLD: 0 /100 WBCS — SIGNIFICANT CHANGE UP (ref 0–0)
PLATELET # BLD AUTO: 210 K/UL — SIGNIFICANT CHANGE UP (ref 150–400)
POTASSIUM SERPL-MCNC: 4.5 MMOL/L — SIGNIFICANT CHANGE UP (ref 3.5–5.3)
POTASSIUM SERPL-SCNC: 4.5 MMOL/L — SIGNIFICANT CHANGE UP (ref 3.5–5.3)
PROTHROM AB SERPL-ACNC: 28.8 SEC — HIGH (ref 10–12.9)
RBC # BLD: 3.75 M/UL — LOW (ref 4.2–5.8)
RBC # FLD: 16.2 % — HIGH (ref 10.3–14.5)
SODIUM SERPL-SCNC: 144 MMOL/L — SIGNIFICANT CHANGE UP (ref 135–145)
WBC # BLD: 5.83 K/UL — SIGNIFICANT CHANGE UP (ref 3.8–10.5)
WBC # FLD AUTO: 5.83 K/UL — SIGNIFICANT CHANGE UP (ref 3.8–10.5)

## 2018-12-23 PROCEDURE — 93010 ELECTROCARDIOGRAM REPORT: CPT

## 2018-12-23 RX ORDER — WARFARIN SODIUM 2.5 MG/1
3 TABLET ORAL ONCE
Qty: 0 | Refills: 0 | Status: COMPLETED | OUTPATIENT
Start: 2018-12-23 | End: 2018-12-23

## 2018-12-23 RX ORDER — POTASSIUM CHLORIDE 20 MEQ
10 PACKET (EA) ORAL DAILY
Qty: 0 | Refills: 0 | Status: DISCONTINUED | OUTPATIENT
Start: 2018-12-23 | End: 2018-12-24

## 2018-12-23 RX ADMIN — Medication 250 MILLIGRAM(S): at 17:39

## 2018-12-23 RX ADMIN — CARBIDOPA AND LEVODOPA 1 TABLET(S): 25; 100 TABLET ORAL at 22:28

## 2018-12-23 RX ADMIN — Medication 25 MILLIGRAM(S): at 05:58

## 2018-12-23 RX ADMIN — DONEPEZIL HYDROCHLORIDE 5 MILLIGRAM(S): 10 TABLET, FILM COATED ORAL at 22:28

## 2018-12-23 RX ADMIN — CARBIDOPA AND LEVODOPA 1 TABLET(S): 25; 100 TABLET ORAL at 12:29

## 2018-12-23 RX ADMIN — CARBIDOPA AND LEVODOPA 1 TABLET(S): 25; 100 TABLET ORAL at 05:58

## 2018-12-23 RX ADMIN — Medication 3 MILLILITER(S): at 05:08

## 2018-12-23 RX ADMIN — Medication 40 MILLIGRAM(S): at 12:29

## 2018-12-23 RX ADMIN — PANTOPRAZOLE SODIUM 40 MILLIGRAM(S): 20 TABLET, DELAYED RELEASE ORAL at 05:58

## 2018-12-23 RX ADMIN — Medication 3 MILLILITER(S): at 17:47

## 2018-12-23 RX ADMIN — Medication 250 MILLIGRAM(S): at 05:58

## 2018-12-23 RX ADMIN — Medication 3 MILLILITER(S): at 13:39

## 2018-12-23 RX ADMIN — Medication 3 MILLILITER(S): at 20:36

## 2018-12-23 RX ADMIN — MIDODRINE HYDROCHLORIDE 5 MILLIGRAM(S): 2.5 TABLET ORAL at 09:42

## 2018-12-23 RX ADMIN — Medication 1 TABLET(S): at 12:29

## 2018-12-23 RX ADMIN — Medication 10 MILLIEQUIVALENT(S): at 12:29

## 2018-12-23 RX ADMIN — ATORVASTATIN CALCIUM 10 MILLIGRAM(S): 80 TABLET, FILM COATED ORAL at 22:28

## 2018-12-23 RX ADMIN — WARFARIN SODIUM 3 MILLIGRAM(S): 2.5 TABLET ORAL at 22:28

## 2018-12-23 RX ADMIN — Medication 3 MILLILITER(S): at 00:20

## 2018-12-23 RX ADMIN — Medication 3 MILLILITER(S): at 23:00

## 2018-12-23 RX ADMIN — CLOPIDOGREL BISULFATE 75 MILLIGRAM(S): 75 TABLET, FILM COATED ORAL at 12:29

## 2018-12-23 RX ADMIN — Medication 3 MILLILITER(S): at 00:17

## 2018-12-23 RX ADMIN — Medication 3 MILLILITER(S): at 09:27

## 2018-12-23 NOTE — PROGRESS NOTE ADULT - SUBJECTIVE AND OBJECTIVE BOX
CC: SOB    INTERVAL HPI/OVERNIGHT EVENTS:  89 y/o M with PMH of CAD s/p PCI / CABG, systolic CHF, h/o NSVT, s/p AICD, HTN, dylsipidemia, h/o orthostatic hypotension, CKD III, a-fib (on coumadin), parkinson's disease, p/w SOB. Patient provides minimal history, and unable to elaborate. States he developed SOB, and wheezing. Has non-productive cough. Denies fever, chills, nausea, vomiting, abdominal pain, urinary complaints. Presently no family members available. ED documents that patient was also confused, +orthopnea and was sleeping in recliner last 2 nights.     12/17/18- Patient seen and examined at bedside. States he feels tired, has been urinating more often. States breathing is improved since he first came to ED.  12/18: Lying in bed, weak, but states feeling a bit better.  Pt with weak voice and unable to understand everything he is saying. States he is comfortable.  12/19/18: Patient seen and examined. No new complaints.   12/20/18: Patient seen and examined. SOB improving. No new complaints.   12/21/18: Patient seen and examined. No new complaints. SOB improving. Sitting in a chair.   12/22/18: Patient seen and examined. Feels better today, sob improving. Discussed with patient regarding management and d/c plan. Discussed with Dr Boyer.   12/23/18: Had diarrhea yesterday, C diff pending.     REVIEW OF SYSTEMS: All other review of systems is negative unless indicated above.      Vital Signs Last 24 Hrs  T(C): 36.4 (23 Dec 2018 10:17), Max: 36.7 (22 Dec 2018 16:14)  T(F): 97.5 (23 Dec 2018 10:17), Max: 98 (22 Dec 2018 16:14)  HR: 57 (23 Dec 2018 10:17) (52 - 70)  BP: 163/81 (23 Dec 2018 10:17) (144/53 - 163/81)  BP(mean): --  RR: 16 (23 Dec 2018 10:17) (16 - 19)  SpO2: 100% (23 Dec 2018 10:17) (98% - 100%)        Physical Exam:    General: NAD, weak, frail, ill appearing  Neurology: A&Ox3, nonfocal, WONG x 4  Respiratory: b/l rales, rhonchi  CV: S1S2, +murmur  Abdominal: Soft, NT, ND +BS  Extremities: + pitting edema B/L L>R, + peripheral pulses, chronic venous changes of legs          Labs:                                        10.7   5.83  )-----------( 210      ( 23 Dec 2018 04:25 )             34.0     23 Dec 2018 04:25    144    |  107    |  32     ----------------------------<  121    4.5     |  31     |  1.46     Ca    8.7        23 Dec 2018 04:25        PT/INR - ( 23 Dec 2018 04:25 )   PT: 28.8 sec;   INR: 2.52 ratio           CAPILLARY BLOOD GLUCOSE           MEDICATIONS:        MEDICATIONS  (STANDING):  ALBUTerol    90 MICROgram(s) HFA Inhaler 1 Puff(s) Inhalation every 4 hours  ALBUTerol/ipratropium for Nebulization 3 milliLiter(s) Nebulizer every 4 hours  atorvastatin 10 milliGRAM(s) Oral at bedtime  carbidopa/levodopa  25/100 1 Tablet(s) Oral three times a day  cefuroxime   Tablet 250 milliGRAM(s) Oral every 12 hours  clopidogrel Tablet 75 milliGRAM(s) Oral daily  donepezil 5 milliGRAM(s) Oral at bedtime  furosemide   Oral Tab/Cap - Peds 40 milliGRAM(s) Oral daily  metoprolol succinate ER 25 milliGRAM(s) Oral daily  midodrine 5 milliGRAM(s) Oral <User Schedule>  multivitamin 1 Tablet(s) Oral daily  pantoprazole    Tablet 40 milliGRAM(s) Oral before breakfast  potassium chloride    Tablet ER 10 milliEquivalent(s) Oral daily  tiotropium 18 MICROgram(s) Capsule 1 Capsule(s) Inhalation daily    MEDICATIONS  (PRN):  docusate sodium 100 milliGRAM(s) Oral three times a day PRN Constipation                       Assessment and Plan:  89 y/o M with PMH of CAD s/p PCI / CABG, systolic CHF, h/o NSVT, s/p AICD, HTN, dylsipidemia, h/o orthostatic hypotension, CKD III, a-fib (on coumadin), parkinson's disease, p/w SOB.    SOB/acute respiratory failure 2/2 acute on chronic CHF / acute viral infection with HMPV/acute bacterial PNA    L lung PNA  -S/P IV antibiotics for probable gram neg bacteria, changed to ceftin  -Blood cultures NGTD  -Isolation and supportive care for Hmpv  -lasix per cardio, changed to po  -Duonebs   -Trops negative x 3    MARILYN on CKD III:  -stable  -on lasix oral  -Nephro follow up appreciated    popliteal DVT in R leg:  -seen on ultrasound  -patient on coumadin for Afib,   Restarted coumadin, INR therapeutic, was on hold   -maintain INR 2-3     Anemia / thrombocytopenia:  -H+H stable  -thrombocytopenia resolved    Hypokalemia:  -repleted and monitor  -off florinef    HTN / dyslipidemia   -C/w home meds -> if continues to remain stable during hospitalization can f/u outpatient for further management     DVT ppx  -On coumadin    Dispo:  Rehab possibly tomorrow.

## 2018-12-23 NOTE — PROGRESS NOTE ADULT - SUBJECTIVE AND OBJECTIVE BOX
COVERING FOR DR. ACHARYA     Patient is a 89y Male who reports no complaints overnight. Feels slightly better today. Poor appetite. +thirsty.    REVIEW OF SYSTEMS:    CONSTITUTIONAL: stable weakness, no fevers or chills  RESPIRATORY: No cough, wheezing, hemoptysis; No shortness of breath  CARDIOVASCULAR: No chest pain or palpitations  GENITOURINARY: No dysuria, frequency or hematuria  All other review of systems is negative unless indicated above.    MEDICATIONS  (STANDING):  ALBUTerol    90 MICROgram(s) HFA Inhaler 1 Puff(s) Inhalation every 4 hours  ALBUTerol/ipratropium for Nebulization 3 milliLiter(s) Nebulizer every 4 hours  atorvastatin 10 milliGRAM(s) Oral at bedtime  carbidopa/levodopa  25/100 1 Tablet(s) Oral three times a day  cefuroxime   Tablet 250 milliGRAM(s) Oral every 12 hours  clopidogrel Tablet 75 milliGRAM(s) Oral daily  donepezil 5 milliGRAM(s) Oral at bedtime  furosemide   Oral Tab/Cap - Peds 40 milliGRAM(s) Oral daily  metoprolol succinate ER 25 milliGRAM(s) Oral daily  midodrine 5 milliGRAM(s) Oral <User Schedule>  multivitamin 1 Tablet(s) Oral daily  pantoprazole    Tablet 40 milliGRAM(s) Oral before breakfast  potassium chloride    Tablet ER 10 milliEquivalent(s) Oral daily  tiotropium 18 MICROgram(s) Capsule 1 Capsule(s) Inhalation daily      Vital Signs Last 24 Hrs  T(C): 36.4 (23 Dec 2018 10:17), Max: 36.7 (22 Dec 2018 16:14)  T(F): 97.5 (23 Dec 2018 10:17), Max: 98 (22 Dec 2018 16:14)  HR: 57 (23 Dec 2018 10:17) (52 - 72)  BP: 163/81 (23 Dec 2018 10:17) (144/53 - 163/81)  BP(mean): --  RR: 16 (23 Dec 2018 10:17) (16 - 19)  SpO2: 100% (23 Dec 2018 10:17) (98% - 100%)  Daily     Daily Weight in k.3 (23 Dec 2018 10:17)  I&O's Summary          PHYSICAL EXAM:    Constitutional: frail  HEENT: PERRLA, EOMI,  MMM  Neck: No LAD, No JVD  Respiratory: dist BS  Cardiovascular: S1 and S2, RRR  Gastrointestinal: BS+, soft, NT/ND  Extremities: tr peripheral edema, chronic changes  Neurological: Alert, pleasant  : No Mancia  Skin: No rashes  Access: Not applicable        LABS:                        11.0   5.23  )-----------( 181      ( 21 Dec 2018 05:34 )             34.7     -    144  |  107  |  32<H>  ----------------------------<  121<H>  4.5   |  31  |  1.46<H>    Ca    8.7      23 Dec 2018 04:25      12-    144  |  106  |  30<H>  ----------------------------<  114<H>  3.9   |  31  |  1.35<H>    Ca    9.0      22 Dec 2018 06:00    21 Dec 2018 05:34    145    |  107    |  30     ----------------------------<  116    4.0     |  33     |  1.42   20 Dec 2018 06:15    144    |  106    |  34     ----------------------------<  121    3.4     |  32     |  1.51           Urine Studies:          RADIOLOGY & ADDITIONAL STUDIES:

## 2018-12-24 ENCOUNTER — TRANSCRIPTION ENCOUNTER (OUTPATIENT)
Age: 83
End: 2018-12-24

## 2018-12-24 VITALS
RESPIRATION RATE: 18 BRPM | HEART RATE: 50 BPM | TEMPERATURE: 98 F | OXYGEN SATURATION: 96 % | SYSTOLIC BLOOD PRESSURE: 113 MMHG | DIASTOLIC BLOOD PRESSURE: 50 MMHG

## 2018-12-24 DIAGNOSIS — I50.33 ACUTE ON CHRONIC DIASTOLIC (CONGESTIVE) HEART FAILURE: ICD-10-CM

## 2018-12-24 LAB
ANION GAP SERPL CALC-SCNC: 8 MMOL/L — SIGNIFICANT CHANGE UP (ref 5–17)
BUN SERPL-MCNC: 32 MG/DL — HIGH (ref 7–23)
CALCIUM SERPL-MCNC: 8.6 MG/DL — SIGNIFICANT CHANGE UP (ref 8.5–10.1)
CHLORIDE SERPL-SCNC: 105 MMOL/L — SIGNIFICANT CHANGE UP (ref 96–108)
CO2 SERPL-SCNC: 29 MMOL/L — SIGNIFICANT CHANGE UP (ref 22–31)
CREAT SERPL-MCNC: 1.59 MG/DL — HIGH (ref 0.5–1.3)
GLUCOSE SERPL-MCNC: 125 MG/DL — HIGH (ref 70–99)
INR BLD: 2.26 RATIO — HIGH (ref 0.88–1.16)
POTASSIUM SERPL-MCNC: 3.7 MMOL/L — SIGNIFICANT CHANGE UP (ref 3.5–5.3)
POTASSIUM SERPL-SCNC: 3.7 MMOL/L — SIGNIFICANT CHANGE UP (ref 3.5–5.3)
PROTHROM AB SERPL-ACNC: 25.7 SEC — HIGH (ref 10–12.9)
SODIUM SERPL-SCNC: 142 MMOL/L — SIGNIFICANT CHANGE UP (ref 135–145)

## 2018-12-24 RX ORDER — WARFARIN SODIUM 2.5 MG/1
3 TABLET ORAL DAILY
Qty: 0 | Refills: 0 | Status: DISCONTINUED | OUTPATIENT
Start: 2018-12-24 | End: 2018-12-24

## 2018-12-24 RX ORDER — IPRATROPIUM/ALBUTEROL SULFATE 18-103MCG
3 AEROSOL WITH ADAPTER (GRAM) INHALATION
Qty: 0 | Refills: 0 | COMMUNITY
Start: 2018-12-24

## 2018-12-24 RX ORDER — CEFUROXIME AXETIL 250 MG
1 TABLET ORAL
Qty: 0 | Refills: 0 | COMMUNITY
Start: 2018-12-24

## 2018-12-24 RX ORDER — DOCUSATE SODIUM 100 MG
1 CAPSULE ORAL
Qty: 0 | Refills: 0 | COMMUNITY
Start: 2018-12-24

## 2018-12-24 RX ORDER — IPRATROPIUM/ALBUTEROL SULFATE 18-103MCG
3 AEROSOL WITH ADAPTER (GRAM) INHALATION
Qty: 0 | Refills: 0 | DISCHARGE
Start: 2018-12-24

## 2018-12-24 RX ORDER — MIDODRINE HYDROCHLORIDE 2.5 MG/1
1 TABLET ORAL
Qty: 0 | Refills: 0 | DISCHARGE
Start: 2018-12-24

## 2018-12-24 RX ORDER — WARFARIN SODIUM 2.5 MG/1
1 TABLET ORAL
Qty: 0 | Refills: 0 | COMMUNITY

## 2018-12-24 RX ORDER — DOCUSATE SODIUM 100 MG
1 CAPSULE ORAL
Qty: 0 | Refills: 0 | DISCHARGE
Start: 2018-12-24

## 2018-12-24 RX ADMIN — Medication 3 MILLILITER(S): at 03:19

## 2018-12-24 RX ADMIN — CLOPIDOGREL BISULFATE 75 MILLIGRAM(S): 75 TABLET, FILM COATED ORAL at 12:59

## 2018-12-24 RX ADMIN — Medication 1 TABLET(S): at 12:59

## 2018-12-24 RX ADMIN — CARBIDOPA AND LEVODOPA 1 TABLET(S): 25; 100 TABLET ORAL at 05:51

## 2018-12-24 RX ADMIN — Medication 3 MILLILITER(S): at 07:20

## 2018-12-24 RX ADMIN — PANTOPRAZOLE SODIUM 40 MILLIGRAM(S): 20 TABLET, DELAYED RELEASE ORAL at 05:51

## 2018-12-24 RX ADMIN — CARBIDOPA AND LEVODOPA 1 TABLET(S): 25; 100 TABLET ORAL at 13:27

## 2018-12-24 RX ADMIN — Medication 25 MILLIGRAM(S): at 05:51

## 2018-12-24 RX ADMIN — Medication 40 MILLIGRAM(S): at 12:59

## 2018-12-24 RX ADMIN — Medication 250 MILLIGRAM(S): at 05:51

## 2018-12-24 RX ADMIN — Medication 10 MILLIEQUIVALENT(S): at 12:59

## 2018-12-24 RX ADMIN — MIDODRINE HYDROCHLORIDE 5 MILLIGRAM(S): 2.5 TABLET ORAL at 09:37

## 2018-12-24 RX ADMIN — Medication 3 MILLILITER(S): at 11:02

## 2018-12-24 NOTE — DISCHARGE NOTE ADULT - CARE PLAN
Principal Discharge DX:	Acute on chronic congestive heart failure, unspecified heart failure type  Goal:	prevent further admission  Assessment and plan of treatment:	Follow up with PMD  INR in 3 days and adjust coumadin dose

## 2018-12-24 NOTE — PROGRESS NOTE ADULT - PROVIDER SPECIALTY LIST ADULT
Cardiology
Heart Failure
Hospitalist
Infectious Disease
Nephrology
Infectious Disease
Nephrology
Nephrology
Cardiology

## 2018-12-24 NOTE — DISCHARGE NOTE ADULT - CARE PROVIDER_API CALL
Tyler Wong), Internal Medicine  [address] [place] [address]  Phone: [phone]  Fax: [phone]

## 2018-12-24 NOTE — DISCHARGE NOTE ADULT - MEDICATION SUMMARY - MEDICATIONS TO STOP TAKING
I will STOP taking the medications listed below when I get home from the hospital:    metOLazone 2.5 mg oral tablet  -- 1 tab(s) by mouth every other day    Coumadin 3 mg oral tablet  -- 1 tab(s) by mouth once a day

## 2018-12-24 NOTE — DISCHARGE NOTE ADULT - HOSPITAL COURSE
Hospital course:       89 y/o M with PMH of CAD s/p PCI / CABG, systolic CHF, h/o NSVT, s/p AICD, HTN, dylsipidemia, h/o orthostatic hypotension, CKD III, a-fib (on coumadin), parkinson's disease, p/w SOB. Patient provides minimal history, and unable to elaborate. States he developed SOB, and wheezing. Has non-productive cough. Denies fever, chills, nausea, vomiting, abdominal pain, urinary complaints. Presently no family members available. ED documents that patient was also confused, +orthopnea and was sleeping in recliner last 2 nights.     12/17/18- Patient seen and examined at bedside. States he feels tired, has been urinating more often. States breathing is improved since he first came to ED.  12/18: Lying in bed, weak, but states feeling a bit better.  Pt with weak voice and unable to understand everything he is saying. States he is comfortable.  12/19/18: Patient seen and examined. No new complaints.   12/20/18: Patient seen and examined. SOB improving. No new complaints.   12/21/18: Patient seen and examined. No new complaints. SOB improving. Sitting in a chair.   12/22/18: Patient seen and examined. Feels better today, sob improving. Discussed with patient regarding management and d/c plan. Discussed with Dr Boyer.   12/23/18: Had diarrhea yesterday, C diff negative  12/24/18: Patient denies  any new complaints. Medically stable      Vital Signs Last 24 Hrs  T(C): 36.6 (24 Dec 2018 09:52), Max: 36.9 (23 Dec 2018 16:50)  T(F): 97.8 (24 Dec 2018 09:52), Max: 98.4 (23 Dec 2018 16:50)  HR: 58 (24 Dec 2018 11:16) (50 - 88)  BP: 116/78 (24 Dec 2018 09:52) (107/48 - 132/49)  BP(mean): --  RR: 18 (24 Dec 2018 09:52) (16 - 18)  SpO2: 96% (24 Dec 2018 09:52) (93% - 100%)      Physical Exam:    General: NAD, weak, frail, ill appearing  Neurology: A&Ox3, nonfocal, WONG x 4  Respiratory: b/l rales, rhonchi  CV: S1S2, +murmur  Abdominal: Soft, NT, ND +BS  Extremities: + pitting edema B/L L>R, + peripheral pulses, chronic venous changes of legs        Assessment and Plan:  89 y/o M with PMH of CAD s/p PCI / CABG, systolic CHF, h/o NSVT, s/p AICD, HTN, dylsipidemia, h/o orthostatic hypotension, CKD III, a-fib (on coumadin), parkinson's disease, p/w SOB.    SOB/acute respiratory failure 2/2 acute on chronic systolic CHF / acute viral infection with HMPV/acute bacterial PNA    L lung PNA  -S/P IV antibiotics for probable gram neg bacteria, changed to ceftin  -Blood cultures NGTD  -lasix per cardio, changed to po  -No ACE/ARB due to CKD    MARILYN on CKD III:  -stable  -on lasix oral  -Nephro follow up appreciated    popliteal DVT in R leg:  -patient on coumadin for Afib,   -maintain INR 2-3     Anemia / thrombocytopenia:  -H+H stable  -thrombocytopenia resolved    Hypokalemia:  -repleted and monitor    HTN / dyslipidemia   -C/w home meds     D/C to rehab  PMD aware of discharge  Spent more than 30 min to prepare the discharge.

## 2018-12-24 NOTE — PROGRESS NOTE ADULT - PROBLEM SELECTOR PLAN 1
possibly multifactorial pneumonia lung disease , bronchitis  positive meta pneumovirus , ? acute on chronic systolic heart failure , clinically better now   continue low dose diuresis , BB ,  bronchodilator treatment , pulmonary evaluation   electrolyte supplement.  ( florinef held ) , electrolyte supplement
possibly multifactorial pneumonia lung disease , bronchitis  positive meta pneumovirus , improved
possibly multifactorial pneumonia lung disease , bronchitis  positive meta pneumovirus , improved    clinically optimal for his condition ,     will sign off , call us if necessary
CHF education done with patient   Education provided including:  Signs and symptoms of CHF exacerbation   Daily Weights  What to do if symptoms worsen  How, when, and why to take medication  lifestyle changes  smoking cessation- quit 40 yrs ago  limiting sodium intake to 1500mg-2000mg daily  when to contact HCP  Ejection Fraction normal LV function      Post d/c follow up appointment to be made by unit clerk when medically stable
possibly multifactorial pneumonia lung disease , bronchitis  positive meta pneumovirus , ? acute on chronic systolic heart failure , clinically better now   continue low dose diuresis , BB ,  bronchodilator treatment , pulmonary follow up  electrolyte supplement.  ( florinef held ) , electrolyte supplement

## 2018-12-24 NOTE — DISCHARGE NOTE ADULT - MEDICATION SUMMARY - MEDICATIONS TO TAKE
I will START or STAY ON the medications listed below when I get home from the hospital:    fludrocortisone 0.1 mg oral tablet  -- 1 tab(s) by mouth once a day  -- Indication: For Home med    warfarin 3 mg oral tablet  -- 1 tab(s) by mouth once a day (at bedtime)  -- Indication: For A Fib    atorvastatin 10 mg oral tablet  -- 1 tab(s) by mouth once a day (at bedtime)  -- Indication: For HLD    carbidopa-levodopa 25 mg-100 mg oral tablet  -- 1 tab(s) by mouth 3 times a day    AM, noon, bedtime   -- Indication: For PD    clopidogrel 75 mg oral tablet  -- 1 tab(s) by mouth once a day  -- Indication: For CAD (coronary artery disease)    metoprolol succinate 25 mg oral tablet, extended release  -- 1 tab(s) by mouth once a day  -- Indication: For Afib    ipratropium-albuterol 0.5 mg-2.5 mg/3 mLinhalation solution  -- 3 milliliter(s) inhaled every 4 hours  -- Indication: For SOB (shortness of breath)    cefuroxime 250 mg oral tablet  -- 1 tab(s) by mouth every 12 hours 3 days  -- Indication: For pneumonia    donepezil 5 mg oral tablet  -- 1 tab(s) by mouth once a day (at bedtime)  -- Indication: For dementia    Lasix 40 mg oral tablet  -- 1  by mouth once a day  -- Indication: For CONGESTIVE HEART FAILURE    docusate sodium 100 mg oral capsule  -- 1 cap(s) by mouth 3 times a day, As needed, Constipation  -- Indication: For CONstipation    potassium chloride 20 mEq oral tablet, extended release  -- 1 tab(s) by mouth 3 times a day  -- Indication: For Supplement    midodrine 5 mg oral tablet  -- 1 tab(s) by mouth   -- Indication: For postural hypotension    pantoprazole 40 mg oral delayed release tablet  -- 1 tab(s) by mouth once a day (before a meal)  -- Indication: For GI    Multiple Vitamins oral tablet  -- 1 tab(s) by mouth once a day  -- Indication: For Supplement

## 2018-12-24 NOTE — PROGRESS NOTE ADULT - ASSESSMENT
89 y/o M with PMH of CAD s/p PCI / CABG, systolic CHF, h/o NSVT, s/p AICD, HTN, dylsipidemia, h/o orthostatic hypotension, CKD III, a-fib (on coumadin), parkinson's disease, admitted on 12/16 for evaluation of increasing shortness of breath and wheezing associated nonproductive cough. The patient was receiving breathing treatment and was speaking in single words, as so short of breath. Unable to provide a history, history per medical record.   1. Patient admitted with pneumonia superimposed on infection with Human Metapneumovirus; also noted with leukopenia, most likely reactive to infection either viral or bacterial  - follow up cultures   - iv hydration and supportive care   - serial cbc and monitor temperature   - oxygen and nebs as needed   - reviewed prior medical records to evaluate for resistant or atypical pathogens   - day #4 ceftriaxone and zithromax  - tolerating antibiotics without rashes or side effects   - continue isolation  2. other issues: CAD s/p PCI / CABG, systolic CHF, h/o NSVT, s/p AICD, HTN, dylsipidemia, h/o orthostatic hypotension, CKD III, a-fib (on coumadin), parkinson's disease  - per medicine
89 PMH of CAD s/p PCI / CABG, systolic CHF, h/o NSVT, s/p AICD, HTN, dylsipidemia, h/o orthostatic hypotension, CKD III base 1.4 mg/dl - 1.8 mg/dl , a-fib (on coumadin), parkinson's disease, admitted on 12/16 for evaluation of increasing shortness of breath with renal evaluation of CKD III. 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 Likely secondary to lasix, florinef also will lead to potassium wasting  - Maintain hold of florinef  - may continue Midodrine as needed   - K for goal near 4.0 meq/l - extra dose today = may need increased standing dose while on po lasix - reasses in Am     PNA  -IV abx/ID  -Isolation for viral
89 PMH of CAD s/p PCI / CABG, systolic CHF, h/o NSVT, s/p AICD, HTN, dylsipidemia, h/o orthostatic hypotension, CKD III base 1.4 mg/dl - 1.8 mg/dl , a-fib (on coumadin), parkinson's disease, admitted on 12/16 for evaluation of increasing shortness of breath with renal evaluation of CKD III. 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 Likely secondary to lasix, florinef also will lead to potassium wasting  - Maintain hold of florinef,wout not resume with discharge  - may continue Midodrine as needed   - K for goal near 4.0 meq/l - improving and stable    PNA  -IV abx/ID    d/c with RN staff  Dr. Morfin to resume care 12/28.
89 y/o M with PMH of CAD s/p PCI / CABG, systolic CHF, h/o NSVT, s/p AICD, HTN, dylsipidemia, h/o orthostatic hypotension, CKD III, a-fib (on coumadin), parkinson's disease, admitted on 12/16 for evaluation of increasing shortness of breath and wheezing associated nonproductive cough. The patient was receiving breathing treatment and was speaking in single words, as so short of breath. Unable to provide a history, history per medical record.   1. Patient admitted with pneumonia superimposed on infection with Human Metapneumovirus; also noted with leukopenia, most likely reactive to infection either viral or bacterial  - follow up cultures   - iv hydration and supportive care   - serial cbc and monitor temperature   - oxygen and nebs as needed   - reviewed prior medical records to evaluate for resistant or atypical pathogens   - day #3 ceftriaxone and zithromax  - tolerating antibiotics without rashes or side effects   - continue isolation  2. other issues: CAD s/p PCI / CABG, systolic CHF, h/o NSVT, s/p AICD, HTN, dylsipidemia, h/o orthostatic hypotension, CKD III, a-fib (on coumadin), parkinson's disease  - per medicine
89 y/o M with PMH of CAD s/p PCI / CABG, systolic CHF, h/o NSVT, s/p AICD, HTN, dylsipidemia, h/o orthostatic hypotension, CKD III, a-fib (on coumadin), parkinson's disease, p/w SOB    *SOB 2/2 acute on chronic CHF vs HMPV w/ possible superimposed bacteria PNA   -CXR has questionable infiltrate -> Chest CT showing L lung PNA- started on IV abx for CAP- ID consulted  -Blood cultures  -Isolation for Hmpv  -S/p IV Lasix in ED, continue PO  -Cardio consult  -Duonebs as patient c/o wheezing  -Trops negative x 3  -EP consult for interrogation    *Acute on CKD III  -Last creatinine was 1.36, today it is 1.83  -Nephro consult  -Will need to monitor closely s/p Lasix  -UA  -I/Os     *LLE swelling > RLE  -LE U/S to r/o DVT- showed nonocclusive popliteal DVT in R leg- patient on coumadin for Afib, therapeutic INR, continue to observe for now- Vascular consulted     *Anemia / thrombocytopenia   -Trend during hospitalization -> if stable, can f/u outpatient for further management     *Hypokalemia  -Replete and recheck     *HTN / dyslipidemia   -C/w home meds -> if continues to remain stable during hospitalization can f/u outpatient for further management     *DVT ppx  -On coumadin- continue coumadine, F/U PT/INR in AM
89 PMH of CAD s/p PCI / CABG, systolic CHF, h/o NSVT, s/p AICD, HTN, dylsipidemia, h/o orthostatic hypotension, CKD III base 1.4 mg/dl - 1.8 mg/dl , a-fib (on coumadin), parkinson's disease, admitted on 12/16 for evaluation of increasing shortness of breath with renal evaluation of CKD III. 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 Likely secondary to lasix, florinef also will lead to potassium wasting  - Maintain hold of florinef - will not restart during admission  - may continue Midodrine as needed   - K for goal near 4.0 meq/l - improving and stable - decrease K supplement to 10mEq daily (down from 20)    PNA  -IV abx/ID    d/c with RN staff  Dr. Morfin to resume care 12/28.
89 PMH of CAD s/p PCI / CABG, systolic CHF, h/o NSVT, s/p AICD, HTN, dylsipidemia, h/o orthostatic hypotension, CKD III base 1.4 mg/dl - 1.8 mg/dl , a-fib (on coumadin), parkinson's disease, admitted on 12/16 for evaluation of increasing shortness of breath with renal evaluation of CKD III. 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 Likely secondary to lasix, florinef also will lead to potassium wasting  - Maintain hold of florinef - will not restart during admission  - may continue Midodrine as needed   - K for goal near 4.0 meq/l - improving and stable - decrease K supplement to 10mEq daily (down from 20) - monitor level.    PNA  -IV abx/ID    d/c with RN staff  Dr. Morfin to resume care 12/28.
89 PMH of CAD s/p PCI / CABG, systolic CHF, h/o NSVT, s/p AICD, HTN, dylsipidemia, h/o orthostatic hypotension, CKD III base 1.4 mg/dl - 1.8 mg/dl , a-fib (on coumadin), parkinson's disease, admitted on 12/16 for evaluation of increasing shortness of breath with renal evaluation of CKD III. 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 Likely secondary to lasix, florinef also will lead to potassium wasting  -Maintain hold of florinef, maybe consider midodrine as more tolerable option  -K repletion ongoing per medicine    PNA  -IV abx/ID  -Isolation for viral    d/c with staff at length
89 PMH of CAD s/p PCI / CABG, systolic CHF, h/o NSVT, s/p AICD, HTN, dylsipidemia, h/o orthostatic hypotension, CKD III base 1.4 mg/dl - 1.8 mg/dl , a-fib (on coumadin), parkinson's disease, admitted on 12/16 for evaluation of increasing shortness of breath with renal evaluation of CKD III. 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 Likely secondary to lasix, florinef also will lead to potassium wasting  - Maintain hold of florinef,wout not resume with discharge  - may continue Midodrine as needed   - K for goal near 4.0 meq/l     PNA  -IV abx/ID    d/c with RN staff
89 PMH of CAD s/p PCI / CABG, systolic CHF, h/o NSVT, s/p AICD, HTN, dylsipidemia, h/o orthostatic hypotension, CKD III base 1.4 mg/dl - 1.8 mg/dl , a-fib (on coumadin), parkinson's disease, admitted on 12/16 for evaluation of increasing shortness of breath with renal evaluation of CKD III. 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 Likely secondary to lasix, florinef also will lead to potassium wasting  -Maintain hold of florinef, will add midodrine 5 qAM in its place.   -K for goal near 4.0 meq/l    PNA  -IV abx/ID  -Isolation for viral    d/c with RN sstaff at length
89 y/o M with PMH of CAD s/p PCI / CABG, systolic CHF, h/o NSVT, s/p AICD, HTN, dylsipidemia, h/o orthostatic hypotension, CKD III, a-fib (on coumadin), parkinson's disease, admitted on 12/16 for evaluation of increasing shortness of breath and wheezing associated nonproductive cough. The patient was receiving breathing treatment and was speaking in single words, as so short of breath. Unable to provide a history, history per medical record.   1. Patient admitted with pneumonia superimposed on infection with Human Metapneumovirus; also noted with leukopenia, most likely reactive to infection either viral or bacterial  - follow up cultures   - iv hydration and supportive care   - serial cbc and monitor temperature   - oxygen and nebs as needed   - reviewed prior medical records to evaluate for resistant or atypical pathogens   - day #2 ceftriaxone and zithromax  - tolerating antibiotics without rashes or side effects   - continue isolation  2. other issues: CAD s/p PCI / CABG, systolic CHF, h/o NSVT, s/p AICD, HTN, dylsipidemia, h/o orthostatic hypotension, CKD III, a-fib (on coumadin), parkinson's disease  - per medicine
89 y/o M with PMH of CAD s/p PCI / CABG, systolic CHF, h/o NSVT, s/p AICD, HTN, dylsipidemia, h/o orthostatic hypotension, CKD III, a-fib (on coumadin), parkinson's disease, admitted on 12/16 for evaluation of increasing shortness of breath and wheezing associated nonproductive cough. The patient was receiving breathing treatment and was speaking in single words, as so short of breath. Unable to provide a history, history per medical record.   1. Patient admitted with pneumonia superimposed on infection with Human Metapneumovirus; also noted with leukopenia, most likely reactive to infection either viral or bacterial  - follow up cultures   - iv hydration and supportive care   - serial cbc and monitor temperature   - oxygen and nebs as needed   - reviewed prior medical records to evaluate for resistant or atypical pathogens   - day #5 ceftriaxone and zithromax  - tolerating antibiotics without rashes or side effects   - continue isolation  2. other issues: CAD s/p PCI / CABG, systolic CHF, h/o NSVT, s/p AICD, HTN, dylsipidemia, h/o orthostatic hypotension, CKD III, a-fib (on coumadin), parkinson's disease  - per medicine
89 y/o M with PMH of CAD s/p PCI / CABG, systolic CHF, h/o NSVT, s/p AICD, HTN, dylsipidemia, h/o orthostatic hypotension, CKD III, a-fib (on coumadin), parkinson's disease, admitted on 12/16 for evaluation of increasing shortness of breath and wheezing associated nonproductive cough. The patient was receiving breathing treatment and was speaking in single words, as so short of breath. Unable to provide a history, history per medical record.   1. Patient admitted with pneumonia superimposed on infection with Human Metapneumovirus; also noted with leukopenia, most likely reactive to infection either viral or bacterial  - follow up cultures   - iv hydration and supportive care   - serial cbc and monitor temperature   - oxygen and nebs as needed   - reviewed prior medical records to evaluate for resistant or atypical pathogens   - day #6 ceftriaxone ; completed 5 days zithromax  - will change to ceftin 250 mg po q 12 hours for 4 more days  - tolerating antibiotics without rashes or side effects   - continue isolation  2. other issues: CAD s/p PCI / CABG, systolic CHF, h/o NSVT, s/p AICD, HTN, dylsipidemia, h/o orthostatic hypotension, CKD III, a-fib (on coumadin), parkinson's disease  - per medicine  If further ID issues please reconsult
PHYSICAL EXAM:  GENERAL: NAD, well-developed  HEAD:  Atraumatic, Normocephalic  EYES: EOMI, PERRLA, conjunctiva and sclera clear  NECK: Supple, No JVD  CHEST/LUNG: left upper- clear, left lower- clear; right upper- rhonchi, right lower-rhonchi  HEART: Regular rate and rhythm; Murmurs heard in mitral and tricuspid landmarks  ABDOMEN: Soft, Nontender, Nondistended; Bowel sounds present  EXTREMITIES:  2+ Peripheral Pulses, BLLE 2+ pitting edema  PSYCH: AAOx3  NEUROLOGY: non-focal  SKIN: systemic scattered ecchymosis and BLLE venous insufficiency discoloration

## 2018-12-24 NOTE — DISCHARGE NOTE ADULT - PATIENT PORTAL LINK FT
You can access the [facility] Patient Portal, offered by [facility], by registering with the following website: [email or web address]

## 2018-12-24 NOTE — PROGRESS NOTE ADULT - SUBJECTIVE AND OBJECTIVE BOX
COVERING FOR DR. ACHARYA     Patient is a 89y Male who reports no complaints overnight. Drinking more. Denies pain.    REVIEW OF SYSTEMS:    CONSTITUTIONAL: stable weakness, no fevers or chills  RESPIRATORY: No cough, wheezing, hemoptysis; No shortness of breath  CARDIOVASCULAR: No chest pain or palpitations  GENITOURINARY: No dysuria, frequency or hematuria  All other review of systems is negative unless indicated above.    MEDICATIONS  (STANDING):  ALBUTerol    90 MICROgram(s) HFA Inhaler 1 Puff(s) Inhalation every 4 hours  ALBUTerol/ipratropium for Nebulization 3 milliLiter(s) Nebulizer every 4 hours  atorvastatin 10 milliGRAM(s) Oral at bedtime  carbidopa/levodopa  25/100 1 Tablet(s) Oral three times a day  cefuroxime   Tablet 250 milliGRAM(s) Oral every 12 hours  clopidogrel Tablet 75 milliGRAM(s) Oral daily  donepezil 5 milliGRAM(s) Oral at bedtime  furosemide   Oral Tab/Cap - Peds 40 milliGRAM(s) Oral daily  metoprolol succinate ER 25 milliGRAM(s) Oral daily  midodrine 5 milliGRAM(s) Oral <User Schedule>  multivitamin 1 Tablet(s) Oral daily  pantoprazole    Tablet 40 milliGRAM(s) Oral before breakfast  potassium chloride    Tablet ER 10 milliEquivalent(s) Oral daily  tiotropium 18 MICROgram(s) Capsule 1 Capsule(s) Inhalation daily      Vital Signs Last 24 Hrs  T(C): 36.7 (24 Dec 2018 04:50), Max: 36.9 (23 Dec 2018 16:50)  T(F): 98 (24 Dec 2018 04:50), Max: 98.4 (23 Dec 2018 16:50)  HR: 56 (24 Dec 2018 07:21) (50 - 88)  BP: 132/49 (24 Dec 2018 04:50) (107/48 - 163/81)  BP(mean): --  RR: 16 (24 Dec 2018 04:50) (16 - 18)  SpO2: 95% (24 Dec 2018 04:50) (93% - 100%)  Daily     Daily Weight in k.3 (23 Dec 2018 10:17)  I&O's Summary                    PHYSICAL EXAM:    Constitutional: frail, awake, drinking coffee  HEENT: PERRLA, EOMI,  MMM  Neck: No LAD, No JVD  Respiratory: dist BS  Cardiovascular: S1 and S2, RRR  Gastrointestinal: BS+, soft, NT/ND  Extremities: tr peripheral edema, chronic changes  Neurological: Alert, pleasant  : No Mancia  Skin: No rashes  Access: Not applicable        LABS:                        11.0   5.23  )-----------( 181      ( 21 Dec 2018 05:34 )             34.7           142  |  105  |  32<H>  ----------------------------<  125<H>  3.7   |  29  |  1.59<H>    Ca    8.6      24 Dec 2018 05:47          144  |  107  |  32<H>  ----------------------------<  121<H>  4.5   |  31  |  1.46<H>    Ca    8.7      23 Dec 2018 04:25      12-    144  |  106  |  30<H>  ----------------------------<  114<H>  3.9   |  31  |  1.35<H>    Ca    9.0      22 Dec 2018 06:00    21 Dec 2018 05:34    145    |  107    |  30     ----------------------------<  116    4.0     |  33     |  1.42   20 Dec 2018 06:15    144    |  106    |  34     ----------------------------<  121    3.4     |  32     |  1.51           Urine Studies:          RADIOLOGY & ADDITIONAL STUDIES:

## 2018-12-24 NOTE — PROGRESS NOTE ADULT - PROBLEM SELECTOR PROBLEM 1
SOB (shortness of breath)
Acute on chronic diastolic heart failure
SOB (shortness of breath)
No

## 2018-12-24 NOTE — PROGRESS NOTE ADULT - SUBJECTIVE AND OBJECTIVE BOX
Patient is a 89y old  Male who presents with a chief complaint of SOB (24 Dec 2018 10:58)    HPI:  89 y/o M with PMH of CAD s/p PCI / CABG, systolic CHF, h/o NSVT, s/p AICD, HTN, dylsipidemia, h/o orthostatic hypotension, CKD III, a-fib (on coumadin), parkinson's disease, p/w SOB. Patient provides minimal history, and unable to elaborate. States he developed SOB, and wheezing. Has non-productive cough. Denies fever, chills, nausea, vomiting, abdominal pain, urinary complaints. Presently no family members available. ED documents that patient was also confused, +orthopnea and was sleeping in recliner last 2 nights.     12/24/18- pt denies any CP/SOB, states he feels much better today.  Low spoken hoarse voice, [place].  dc'd to go to [facility], CHF education done with patient, difficult teachback but states he does not use salt in food and if he gains 3 lbs he calls his cardiologist's office.  Lives with son and daughter in law who assist with ADL's.      INTERPRETATION:  Transthoracic Echocardiography Report (TTE)     Demographics     Patient name        GABRIEL ZUNIGA   Age           89 year(s)     Med Rec #           [id number]         Gender        Male     Account #           [id number]           Date of Birth 09/02/1928     Interpreting        Bgig Beyer     Room Number   0017   Physician     Referring Physician Rebekah Jewell        Sonographer   Nighat Fernando                                                 [facility]     Date of study       02/26/2018 08:41                       AM     Height              67.72 in          Weight        169.76 pounds    Type of Study:     TTE procedure: 2D echocardiogram     BP:126/56 mmHg     Study Location: 5ETechnical Quality: Fair    Indications   1) I50.9 - Heart failure    M-Mode Measurements (cm)     LVEDd: 5.4 cm             LVESd: 4.23 cm   IVSEd: 0.95 cm   LVPWd: 0.91 cm            AO Root Dimension: 3.2 cm                       ACS: 1.7 cm                             LA: 4.8 cm    Doppler Measurements:                                    MV Peak E-Wave: 141 cm/s   TR Velocity:381 cm/s           MV Peak A-Wave: 87.4 cm/s   TR Gradient:58.0644 mmHg       MV E/A Ratio: 1.61 %   Estimated RAP:10 mmHg          MV Peak Gradient: 7.95 mmHg   RVSP:57 mmHg     Findings     Mitral Valve   Normal appearing mitral valve structure and function.   Moderate (2+) mitral regurgitation is present.   Mild mitral annular calcification is present.     Aortic Valve   Normal aortic valve structure and function.     Tricuspid Valve   Normal appearing tricuspid valve structure and function.   Moderate to severe (3+) tricuspid valve regurgitation is present.   There is severe elevation in right ventricular systolic pressure     Pulmonic Valve   Normal appearing pulmonic valve structure and function.   Mild pulmonic valvular regurgitation (1+) is present.     Left Atrium   The left atrium is mildly dilated.     Left Ventricle   The left ventricle is normal in size, wall thickness. Moderately   decreased   left ventricular systolic function with an estimated left ventricular   ejection fraction of 35-40 %. There is paradoxical septal motion     Right Atrium   The right atrium appears moderately dilated.   A device wire is seen in the RV and RA.     Right Ventricle   Normal appearing right ventricle structure and function.     Pericardial Effusion   No evidence of pericardial effusion.     Pleural Effusion   No evidence of pleural effusion.     Miscellaneous   All visualized extra cardiac structures appears to be normal.     Impression     Summary     The left ventricle is normal in size, wall thickness. Moderately   decreased   left ventricular systolic function with an estimated left ventricular   ejection fraction of 35-40 %. There is paradoxical septal motion   The right atrium appears moderately dilated.   A device wire is seen in the RV and RA.   Normal aortic valve structure and function.   Normal appearing mitral valve structure and function.   Moderate (2+) mitral regurgitation is present.   Mild mitral annular calcification is present.   Normal appearing tricuspid valve structure and function.   Moderate to severe (3+) tricuspid valve regurgitation is present.   There is severe elevation in right ventricular systolic pressure     Signature     ----------------------------------------------------------------   Electronically signed by Jason BeyerInterpreting physician)   on 02/26/2018 11:51 AM   ----------------------------------------------------------------    Valves     Mitral Valve     Peak E-Wave: 141 cm/s   Peak A-Wave: 87.4 cm/s   Peak Gradient: 7.95 mmHg                                                 E/A Ratio: 1.61     Aortic Valve     Cusp Separation: 1.7 cm     Tricuspid Valve     TR Velocity: 381 cm/s                  Estimated RAP: 10 mmHg   TR Gradient: 58.0644 mmHg              Estimated RVSP: 57 mmHg     Pulmonic Valve              Estimated PASP: 68.06 mmHg    Structures     Left Atrium     LA Dimension: 4.8 cm          LA Area: 27 cm^2   LA/Aorta: 1.5                 LA Volume/Index: 84 ml /44m^2     Left Ventricle     Diastolic Dimension: 5.4 cm          Systolic Dimension: 4.23 cm   Septum Diastolic: 0.95 cm   PW Diastolic: 0.91 cm     FS: 21.67 %     Right Atrium     RA Systolic Pressure: 10 mmHg     Right Ventricle              RV Systolic Pressure: 68.06 mmHg     Miscellaneous     Aorta     Aortic Root: 3.2 cm                    BIGG BEYER M.D., ATTENDING CARDIOLOGIST  This document has been electronically signed. Feb 26 2018 11:52AM             (02-26-18 @ 09:24)

## 2018-12-24 NOTE — DISCHARGE NOTE ADULT - CLICK TO LAUNCH ORM
[facility] Note: 
Arrived - 0830 Visit Vitals /80 (BP 1 Location: Left arm, BP Patient Position: Sitting) Pulse 84 Temp 98.6 °F (37 °C) Resp 18 Recent Results (from the past 12 hour(s)) CBC WITH AUTOMATED DIFF Collection Time: 11/16/18  8:34 AM  
Result Value Ref Range WBC 3.2 (L) 4.1 - 11.1 K/uL  
 RBC 3.62 (L) 4.10 - 5.70 M/uL  
 HGB 10.8 (L) 12.1 - 17.0 g/dL HCT 33.3 (L) 36.6 - 50.3 % MCV 92.0 80.0 - 99.0 FL  
 MCH 29.8 26.0 - 34.0 PG  
 MCHC 32.4 30.0 - 36.5 g/dL  
 RDW 15.5 (H) 11.5 - 14.5 % PLATELET 774 109 - 917 K/uL MPV 10.2 8.9 - 12.9 FL  
 NRBC 0.0 0  WBC ABSOLUTE NRBC 0.00 0.00 - 0.01 K/uL NEUTROPHILS 55 32 - 75 % LYMPHOCYTES 29 12 - 49 % MONOCYTES 15 (H) 5 - 13 % EOSINOPHILS 0 0 - 7 % BASOPHILS 0 0 - 1 % IMMATURE GRANULOCYTES 0 0.0 - 0.5 % ABS. NEUTROPHILS 1.8 1.8 - 8.0 K/UL  
 ABS. LYMPHOCYTES 0.9 0.8 - 3.5 K/UL  
 ABS. MONOCYTES 0.5 0.0 - 1.0 K/UL  
 ABS. EOSINOPHILS 0.0 0.0 - 0.4 K/UL  
 ABS. BASOPHILS 0.0 0.0 - 0.1 K/UL  
 ABS. IMM. GRANS. 0.0 0.00 - 0.04 K/UL  
 DF AUTOMATED Several labs pending at time of note. See [facility] for results. Port accessed, labs drawn, & flushed per protocol w/o difficulty. Scott needle removed. 0845 - Tolerated well. Pt denies any acute problems/changes. Discharged from [place] ambulatory. No distress. Next appt: 11/28/18 @ 6601.
.

## 2018-12-27 RX ORDER — BISACODYL 10 MG/1
10 SUPPOSITORY RECTAL
Refills: 0 | Status: ACTIVE | COMMUNITY

## 2018-12-27 RX ORDER — FUROSEMIDE 40 MG/1
40 TABLET ORAL
Refills: 0 | Status: ACTIVE | COMMUNITY

## 2018-12-27 RX ORDER — SENNOSIDES 8.6 MG TABLETS 8.6 MG/1
8.6 TABLET ORAL DAILY
Refills: 0 | Status: ACTIVE | COMMUNITY
Start: 2018-12-27

## 2018-12-27 RX ORDER — LISINOPRIL 5 MG/1
5 TABLET ORAL
Refills: 0 | Status: DISCONTINUED | COMMUNITY
End: 2018-12-27

## 2018-12-27 RX ORDER — POTASSIUM CHLORIDE 2.6 MEQ/ML
SOLUTION ORAL
Refills: 0 | Status: DISCONTINUED | COMMUNITY
End: 2018-12-27

## 2018-12-27 RX ORDER — METOPROLOL TARTRATE 25 MG/1
25 TABLET, FILM COATED ORAL TWICE DAILY
Refills: 0 | Status: ACTIVE | COMMUNITY
Start: 2018-12-27

## 2018-12-27 RX ORDER — CARBIDOPA AND LEVODOPA 25; 100 MG/1; MG/1
25-100 TABLET ORAL 3 TIMES DAILY
Refills: 0 | Status: ACTIVE | COMMUNITY
Start: 2018-12-27

## 2018-12-27 RX ORDER — CARBIDOPA AND LEVODOPA 10; 100 MG/1; MG/1
10-100 TABLET ORAL
Refills: 0 | Status: DISCONTINUED | COMMUNITY
End: 2018-12-27

## 2018-12-27 RX ORDER — FLUDROCORTISONE ACETATE 0.1 MG/1
0.1 TABLET ORAL
Refills: 0 | Status: DISCONTINUED | COMMUNITY
End: 2018-12-27

## 2018-12-27 RX ORDER — ALBUTEROL SULFATE 2.5 MG/3ML
(2.5 MG/3ML) SOLUTION RESPIRATORY (INHALATION)
Refills: 0 | Status: ACTIVE | COMMUNITY
Start: 2018-12-27

## 2018-12-27 RX ORDER — DOCUSATE SODIUM 100 MG/1
100 CAPSULE ORAL TWICE DAILY
Refills: 0 | Status: ACTIVE | COMMUNITY
Start: 2018-12-27

## 2018-12-27 RX ORDER — METOPROLOL SUCCINATE 25 MG/1
25 TABLET, EXTENDED RELEASE ORAL DAILY
Qty: 90 | Refills: 3 | Status: DISCONTINUED | COMMUNITY
Start: 2018-08-22 | End: 2018-12-27

## 2018-12-27 RX ORDER — MIDODRINE HYDROCHLORIDE 5 MG/1
5 TABLET ORAL DAILY
Refills: 0 | Status: ACTIVE | COMMUNITY

## 2018-12-27 RX ORDER — IPRATROPIUM BROMIDE AND ALBUTEROL SULFATE 2.5; .5 MG/3ML; MG/3ML
0.5-2.5 (3) SOLUTION RESPIRATORY (INHALATION) EVERY 6 HOURS
Refills: 0 | Status: ACTIVE | COMMUNITY
Start: 2018-12-27

## 2018-12-27 RX ORDER — DONEPEZIL HYDROCHLORIDE 5 MG/1
5 TABLET ORAL
Refills: 0 | Status: ACTIVE | COMMUNITY

## 2018-12-27 RX ORDER — POTASSIUM CHLORIDE 750 MG/1
10 TABLET, FILM COATED, EXTENDED RELEASE ORAL DAILY
Refills: 0 | Status: ACTIVE | COMMUNITY
Start: 2018-12-27

## 2018-12-27 RX ORDER — MAGNESIUM HYDROXIDE 400 MG/5ML
400 SUSPENSION ORAL DAILY
Refills: 0 | Status: ACTIVE | COMMUNITY
Start: 2018-12-27

## 2018-12-27 RX ORDER — MULTIVITAMIN
TABLET ORAL DAILY
Refills: 0 | Status: ACTIVE | COMMUNITY
Start: 2018-12-27

## 2018-12-27 RX ORDER — DIBASIC SODIUM PHOSPHATE, MONOBASIC SODIUM PHOSPHATE 7; 19 G/118ML; G/118ML
7-19 ENEMA RECTAL DAILY
Refills: 0 | Status: ACTIVE | COMMUNITY
Start: 2018-12-27

## 2018-12-28 DIAGNOSIS — J96.00 ACUTE RESPIRATORY FAILURE, UNSPECIFIED WHETHER WITH HYPOXIA OR HYPERCAPNIA: ICD-10-CM

## 2018-12-28 DIAGNOSIS — I25.10 ATHEROSCLEROTIC HEART DISEASE OF NATIVE CORONARY ARTERY WITHOUT ANGINA PECTORIS: ICD-10-CM

## 2018-12-28 DIAGNOSIS — E87.6 HYPOKALEMIA: ICD-10-CM

## 2018-12-28 DIAGNOSIS — D69.6 THROMBOCYTOPENIA, UNSPECIFIED: ICD-10-CM

## 2018-12-28 DIAGNOSIS — Z95.810 PRESENCE OF AUTOMATIC (IMPLANTABLE) CARDIAC DEFIBRILLATOR: ICD-10-CM

## 2018-12-28 DIAGNOSIS — I48.91 UNSPECIFIED ATRIAL FIBRILLATION: ICD-10-CM

## 2018-12-28 DIAGNOSIS — N17.9 ACUTE KIDNEY FAILURE, UNSPECIFIED: ICD-10-CM

## 2018-12-28 DIAGNOSIS — N18.3 CHRONIC KIDNEY DISEASE, STAGE 3 (MODERATE): ICD-10-CM

## 2018-12-28 DIAGNOSIS — J12.3 HUMAN METAPNEUMOVIRUS PNEUMONIA: ICD-10-CM

## 2018-12-28 DIAGNOSIS — E78.5 HYPERLIPIDEMIA, UNSPECIFIED: ICD-10-CM

## 2018-12-28 DIAGNOSIS — Z82.49 FAMILY HISTORY OF ISCHEMIC HEART DISEASE AND OTHER DISEASES OF THE CIRCULATORY SYSTEM: ICD-10-CM

## 2018-12-28 DIAGNOSIS — I50.23 ACUTE ON CHRONIC SYSTOLIC (CONGESTIVE) HEART FAILURE: ICD-10-CM

## 2018-12-28 DIAGNOSIS — Z88.5 ALLERGY STATUS TO NARCOTIC AGENT: ICD-10-CM

## 2018-12-28 DIAGNOSIS — G20 PARKINSON'S DISEASE: ICD-10-CM

## 2018-12-28 DIAGNOSIS — I13.0 HYPERTENSIVE HEART AND CHRONIC KIDNEY DISEASE WITH HEART FAILURE AND STAGE 1 THROUGH STAGE 4 CHRONIC KIDNEY DISEASE, OR UNSPECIFIED CHRONIC KIDNEY DISEASE: ICD-10-CM

## 2018-12-28 DIAGNOSIS — Z79.01 LONG TERM (CURRENT) USE OF ANTICOAGULANTS: ICD-10-CM

## 2018-12-28 DIAGNOSIS — D64.9 ANEMIA, UNSPECIFIED: ICD-10-CM

## 2018-12-28 DIAGNOSIS — Z95.1 PRESENCE OF AORTOCORONARY BYPASS GRAFT: ICD-10-CM

## 2018-12-28 DIAGNOSIS — I82.431 ACUTE EMBOLISM AND THROMBOSIS OF RIGHT POPLITEAL VEIN: ICD-10-CM

## 2019-02-04 ENCOUNTER — INPATIENT (INPATIENT)
Facility: HOSPITAL | Age: 84
LOS: 7 days | Discharge: HOME HEALTH | End: 2019-02-12
Attending: FAMILY MEDICINE | Admitting: FAMILY MEDICINE
Payer: MEDICARE

## 2019-02-04 VITALS
RESPIRATION RATE: 18 BRPM | HEIGHT: 66 IN | WEIGHT: 160.06 LBS | SYSTOLIC BLOOD PRESSURE: 122 MMHG | TEMPERATURE: 98 F | DIASTOLIC BLOOD PRESSURE: 52 MMHG | HEART RATE: 93 BPM | OXYGEN SATURATION: 100 %

## 2019-02-04 DIAGNOSIS — I50.22 CHRONIC SYSTOLIC (CONGESTIVE) HEART FAILURE: ICD-10-CM

## 2019-02-04 DIAGNOSIS — G93.41 METABOLIC ENCEPHALOPATHY: ICD-10-CM

## 2019-02-04 DIAGNOSIS — Z86.73 PERSONAL HISTORY OF TRANSIENT ISCHEMIC ATTACK (TIA), AND CEREBRAL INFARCTION WITHOUT RESIDUAL DEFICITS: ICD-10-CM

## 2019-02-04 DIAGNOSIS — Z95.1 PRESENCE OF AORTOCORONARY BYPASS GRAFT: ICD-10-CM

## 2019-02-04 DIAGNOSIS — Z95.810 PRESENCE OF AUTOMATIC (IMPLANTABLE) CARDIAC DEFIBRILLATOR: ICD-10-CM

## 2019-02-04 DIAGNOSIS — Z66 DO NOT RESUSCITATE: ICD-10-CM

## 2019-02-04 DIAGNOSIS — Z87.891 PERSONAL HISTORY OF NICOTINE DEPENDENCE: ICD-10-CM

## 2019-02-04 DIAGNOSIS — R29.810 FACIAL WEAKNESS: ICD-10-CM

## 2019-02-04 DIAGNOSIS — I95.9 HYPOTENSION, UNSPECIFIED: ICD-10-CM

## 2019-02-04 DIAGNOSIS — F02.80 DEMENTIA IN OTHER DISEASES CLASSIFIED ELSEWHERE, UNSPECIFIED SEVERITY, WITHOUT BEHAVIORAL DISTURBANCE, PSYCHOTIC DISTURBANCE, MOOD DISTURBANCE, AND ANXIETY: ICD-10-CM

## 2019-02-04 DIAGNOSIS — L03.116 CELLULITIS OF LEFT LOWER LIMB: ICD-10-CM

## 2019-02-04 DIAGNOSIS — I48.91 UNSPECIFIED ATRIAL FIBRILLATION: ICD-10-CM

## 2019-02-04 DIAGNOSIS — I49.3 VENTRICULAR PREMATURE DEPOLARIZATION: ICD-10-CM

## 2019-02-04 DIAGNOSIS — I25.10 ATHEROSCLEROTIC HEART DISEASE OF NATIVE CORONARY ARTERY WITHOUT ANGINA PECTORIS: ICD-10-CM

## 2019-02-04 DIAGNOSIS — G20 PARKINSON'S DISEASE: ICD-10-CM

## 2019-02-04 DIAGNOSIS — E86.0 DEHYDRATION: ICD-10-CM

## 2019-02-04 DIAGNOSIS — E87.6 HYPOKALEMIA: ICD-10-CM

## 2019-02-04 DIAGNOSIS — E83.39 OTHER DISORDERS OF PHOSPHORUS METABOLISM: ICD-10-CM

## 2019-02-04 DIAGNOSIS — E43 UNSPECIFIED SEVERE PROTEIN-CALORIE MALNUTRITION: ICD-10-CM

## 2019-02-04 DIAGNOSIS — L03.115 CELLULITIS OF RIGHT LOWER LIMB: ICD-10-CM

## 2019-02-04 DIAGNOSIS — Z79.01 LONG TERM (CURRENT) USE OF ANTICOAGULANTS: ICD-10-CM

## 2019-02-04 DIAGNOSIS — R33.9 RETENTION OF URINE, UNSPECIFIED: ICD-10-CM

## 2019-02-04 DIAGNOSIS — R53.1 WEAKNESS: ICD-10-CM

## 2019-02-04 DIAGNOSIS — N18.3 CHRONIC KIDNEY DISEASE, STAGE 3 (MODERATE): ICD-10-CM

## 2019-02-04 DIAGNOSIS — T50.1X5A ADVERSE EFFECT OF LOOP [HIGH-CEILING] DIURETICS, INITIAL ENCOUNTER: ICD-10-CM

## 2019-02-04 DIAGNOSIS — Z88.5 ALLERGY STATUS TO NARCOTIC AGENT: ICD-10-CM

## 2019-02-04 DIAGNOSIS — N17.9 ACUTE KIDNEY FAILURE, UNSPECIFIED: ICD-10-CM

## 2019-02-04 DIAGNOSIS — R79.1 ABNORMAL COAGULATION PROFILE: ICD-10-CM

## 2019-02-04 DIAGNOSIS — I13.0 HYPERTENSIVE HEART AND CHRONIC KIDNEY DISEASE WITH HEART FAILURE AND STAGE 1 THROUGH STAGE 4 CHRONIC KIDNEY DISEASE, OR UNSPECIFIED CHRONIC KIDNEY DISEASE: ICD-10-CM

## 2019-02-04 LAB
ADD ON TEST-SPECIMEN IN LAB: SIGNIFICANT CHANGE UP
ALBUMIN SERPL ELPH-MCNC: 3.4 G/DL — SIGNIFICANT CHANGE UP (ref 3.3–5)
ALP SERPL-CCNC: 129 U/L — HIGH (ref 40–120)
ALT FLD-CCNC: 8 U/L — LOW (ref 12–78)
ANION GAP SERPL CALC-SCNC: 7 MMOL/L — SIGNIFICANT CHANGE UP (ref 5–17)
APPEARANCE UR: CLEAR — SIGNIFICANT CHANGE UP
APTT BLD: 45.4 SEC — HIGH (ref 27.5–36.3)
AST SERPL-CCNC: 22 U/L — SIGNIFICANT CHANGE UP (ref 15–37)
BACTERIA # UR AUTO: ABNORMAL
BASOPHILS # BLD AUTO: 0.05 K/UL — SIGNIFICANT CHANGE UP (ref 0–0.2)
BASOPHILS NFR BLD AUTO: 0.9 % — SIGNIFICANT CHANGE UP (ref 0–2)
BILIRUB SERPL-MCNC: 0.8 MG/DL — SIGNIFICANT CHANGE UP (ref 0.2–1.2)
BILIRUB UR-MCNC: NEGATIVE — SIGNIFICANT CHANGE UP
BUN SERPL-MCNC: 44 MG/DL — HIGH (ref 7–23)
CALCIUM SERPL-MCNC: 8.9 MG/DL — SIGNIFICANT CHANGE UP (ref 8.5–10.1)
CHLORIDE SERPL-SCNC: 98 MMOL/L — SIGNIFICANT CHANGE UP (ref 96–108)
CO2 SERPL-SCNC: 36 MMOL/L — HIGH (ref 22–31)
COLOR SPEC: YELLOW — SIGNIFICANT CHANGE UP
CREAT SERPL-MCNC: 2.35 MG/DL — HIGH (ref 0.5–1.3)
DIFF PNL FLD: ABNORMAL
EOSINOPHIL # BLD AUTO: 0.03 K/UL — SIGNIFICANT CHANGE UP (ref 0–0.5)
EOSINOPHIL NFR BLD AUTO: 0.5 % — SIGNIFICANT CHANGE UP (ref 0–6)
EPI CELLS # UR: SIGNIFICANT CHANGE UP
GLUCOSE SERPL-MCNC: 135 MG/DL — HIGH (ref 70–99)
GLUCOSE UR QL: NEGATIVE MG/DL — SIGNIFICANT CHANGE UP
GRAN CASTS # UR COMP ASSIST: ABNORMAL /LPF
HCT VFR BLD CALC: 33.9 % — LOW (ref 39–50)
HGB BLD-MCNC: 10.6 G/DL — LOW (ref 13–17)
HYALINE CASTS # UR AUTO: ABNORMAL /LPF
IMM GRANULOCYTES NFR BLD AUTO: 0.5 % — SIGNIFICANT CHANGE UP (ref 0–1.5)
INR BLD: 3.27 RATIO — HIGH (ref 0.88–1.16)
KETONES UR-MCNC: NEGATIVE — SIGNIFICANT CHANGE UP
LACTATE SERPL-SCNC: 1.4 MMOL/L — SIGNIFICANT CHANGE UP (ref 0.7–2)
LACTATE SERPL-SCNC: 2.4 MMOL/L — HIGH (ref 0.7–2)
LEUKOCYTE ESTERASE UR-ACNC: NEGATIVE — SIGNIFICANT CHANGE UP
LYMPHOCYTES # BLD AUTO: 1.23 K/UL — SIGNIFICANT CHANGE UP (ref 1–3.3)
LYMPHOCYTES # BLD AUTO: 21.3 % — SIGNIFICANT CHANGE UP (ref 13–44)
MCHC RBC-ENTMCNC: 27.7 PG — SIGNIFICANT CHANGE UP (ref 27–34)
MCHC RBC-ENTMCNC: 31.3 GM/DL — LOW (ref 32–36)
MCV RBC AUTO: 88.7 FL — SIGNIFICANT CHANGE UP (ref 80–100)
MONOCYTES # BLD AUTO: 0.69 K/UL — SIGNIFICANT CHANGE UP (ref 0–0.9)
MONOCYTES NFR BLD AUTO: 11.9 % — SIGNIFICANT CHANGE UP (ref 2–14)
NEUTROPHILS # BLD AUTO: 3.75 K/UL — SIGNIFICANT CHANGE UP (ref 1.8–7.4)
NEUTROPHILS NFR BLD AUTO: 64.9 % — SIGNIFICANT CHANGE UP (ref 43–77)
NITRITE UR-MCNC: NEGATIVE — SIGNIFICANT CHANGE UP
NRBC # BLD: 0 /100 WBCS — SIGNIFICANT CHANGE UP (ref 0–0)
NT-PROBNP SERPL-SCNC: 1933 PG/ML — HIGH (ref 0–450)
PH UR: 5 — SIGNIFICANT CHANGE UP (ref 5–8)
PLATELET # BLD AUTO: 168 K/UL — SIGNIFICANT CHANGE UP (ref 150–400)
POTASSIUM SERPL-MCNC: 3.3 MMOL/L — LOW (ref 3.5–5.3)
POTASSIUM SERPL-SCNC: 3.3 MMOL/L — LOW (ref 3.5–5.3)
PROT SERPL-MCNC: 7.9 GM/DL — SIGNIFICANT CHANGE UP (ref 6–8.3)
PROT UR-MCNC: 15 MG/DL
PROTHROM AB SERPL-ACNC: 37.7 SEC — HIGH (ref 10–12.9)
RBC # BLD: 3.82 M/UL — LOW (ref 4.2–5.8)
RBC # FLD: 16.9 % — HIGH (ref 10.3–14.5)
RBC CASTS # UR COMP ASSIST: ABNORMAL /HPF (ref 0–4)
SODIUM SERPL-SCNC: 141 MMOL/L — SIGNIFICANT CHANGE UP (ref 135–145)
SP GR SPEC: 1.01 — SIGNIFICANT CHANGE UP (ref 1.01–1.02)
TROPONIN I SERPL-MCNC: 0.04 NG/ML — SIGNIFICANT CHANGE UP (ref 0.01–0.04)
UROBILINOGEN FLD QL: NEGATIVE MG/DL — SIGNIFICANT CHANGE UP
WBC # BLD: 5.78 K/UL — SIGNIFICANT CHANGE UP (ref 3.8–10.5)
WBC # FLD AUTO: 5.78 K/UL — SIGNIFICANT CHANGE UP (ref 3.8–10.5)
WBC UR QL: SIGNIFICANT CHANGE UP

## 2019-02-04 PROCEDURE — 99285 EMERGENCY DEPT VISIT HI MDM: CPT | Mod: 25

## 2019-02-04 PROCEDURE — 93010 ELECTROCARDIOGRAM REPORT: CPT

## 2019-02-04 PROCEDURE — 71045 X-RAY EXAM CHEST 1 VIEW: CPT | Mod: 26

## 2019-02-04 PROCEDURE — 72170 X-RAY EXAM OF PELVIS: CPT | Mod: 26

## 2019-02-04 PROCEDURE — 72125 CT NECK SPINE W/O DYE: CPT | Mod: 26

## 2019-02-04 PROCEDURE — 73060 X-RAY EXAM OF HUMERUS: CPT | Mod: 26,RT

## 2019-02-04 PROCEDURE — 70450 CT HEAD/BRAIN W/O DYE: CPT | Mod: 26

## 2019-02-04 PROCEDURE — 93970 EXTREMITY STUDY: CPT | Mod: 26

## 2019-02-04 PROCEDURE — 73030 X-RAY EXAM OF SHOULDER: CPT | Mod: 26,RT

## 2019-02-04 RX ORDER — SENNA PLUS 8.6 MG/1
2 TABLET ORAL AT BEDTIME
Qty: 0 | Refills: 0 | Status: DISCONTINUED | OUTPATIENT
Start: 2019-02-04 | End: 2019-02-12

## 2019-02-04 RX ORDER — IPRATROPIUM/ALBUTEROL SULFATE 18-103MCG
3 AEROSOL WITH ADAPTER (GRAM) INHALATION EVERY 6 HOURS
Qty: 0 | Refills: 0 | Status: DISCONTINUED | OUTPATIENT
Start: 2019-02-04 | End: 2019-02-12

## 2019-02-04 RX ORDER — PANTOPRAZOLE SODIUM 20 MG/1
40 TABLET, DELAYED RELEASE ORAL
Qty: 0 | Refills: 0 | Status: DISCONTINUED | OUTPATIENT
Start: 2019-02-04 | End: 2019-02-12

## 2019-02-04 RX ORDER — WARFARIN SODIUM 2.5 MG/1
1 TABLET ORAL DAILY
Qty: 0 | Refills: 0 | Status: COMPLETED | OUTPATIENT
Start: 2019-02-04 | End: 2019-02-07

## 2019-02-04 RX ORDER — ATORVASTATIN CALCIUM 80 MG/1
10 TABLET, FILM COATED ORAL AT BEDTIME
Qty: 0 | Refills: 0 | Status: DISCONTINUED | OUTPATIENT
Start: 2019-02-04 | End: 2019-02-12

## 2019-02-04 RX ORDER — METOPROLOL TARTRATE 50 MG
12.5 TABLET ORAL
Qty: 0 | Refills: 0 | Status: DISCONTINUED | OUTPATIENT
Start: 2019-02-04 | End: 2019-02-07

## 2019-02-04 RX ORDER — CLOPIDOGREL BISULFATE 75 MG/1
75 TABLET, FILM COATED ORAL DAILY
Qty: 0 | Refills: 0 | Status: DISCONTINUED | OUTPATIENT
Start: 2019-02-04 | End: 2019-02-12

## 2019-02-04 RX ORDER — HEPARIN SODIUM 5000 [USP'U]/ML
5000 INJECTION INTRAVENOUS; SUBCUTANEOUS EVERY 8 HOURS
Qty: 0 | Refills: 0 | Status: DISCONTINUED | OUTPATIENT
Start: 2019-02-04 | End: 2019-02-07

## 2019-02-04 RX ORDER — DOCUSATE SODIUM 100 MG
100 CAPSULE ORAL
Qty: 0 | Refills: 0 | Status: DISCONTINUED | OUTPATIENT
Start: 2019-02-04 | End: 2019-02-12

## 2019-02-04 RX ORDER — DONEPEZIL HYDROCHLORIDE 10 MG/1
5 TABLET, FILM COATED ORAL AT BEDTIME
Qty: 0 | Refills: 0 | Status: DISCONTINUED | OUTPATIENT
Start: 2019-02-04 | End: 2019-02-12

## 2019-02-04 RX ORDER — MIDODRINE HYDROCHLORIDE 2.5 MG/1
5 TABLET ORAL ONCE
Qty: 0 | Refills: 0 | Status: DISCONTINUED | OUTPATIENT
Start: 2019-02-04 | End: 2019-02-06

## 2019-02-04 RX ORDER — CARBIDOPA AND LEVODOPA 25; 100 MG/1; MG/1
1 TABLET ORAL
Qty: 0 | Refills: 0 | Status: DISCONTINUED | OUTPATIENT
Start: 2019-02-04 | End: 2019-02-12

## 2019-02-04 NOTE — ED PROVIDER NOTE - MEDICAL DECISION MAKING DETAILS
Mahin Palomino (Resident): 89 y/o history obtained from documention - no family at bedside, unable to reach w/ telephone - patient reportedly here for hypotension, normotensive here - unsure of any new events at home, patient not cooperative w/ exam, answers few yes or no questions - does have marked LE swelling, L slightly larger, low concern for PE given no tachycardia - will obtain a broad workup and dispo accordingly.

## 2019-02-04 NOTE — ED ADULT TRIAGE NOTE - CHIEF COMPLAINT QUOTE
Patient presents with EMS reports diagnosed with recent pneumonia, visiting nurse reports she called EMS to bring patient to ER for evaluation of low blood pressure, EMS reports BP at home was 95/58.

## 2019-02-04 NOTE — ED PROVIDER NOTE - MUSCULOSKELETAL, MLM
Marked swelling of LE b/l, LLE larger than R and LLE cold relative to R, w/ swelling tracking all the way into feet.

## 2019-02-04 NOTE — ED PROVIDER NOTE - CARE PLAN
Principal Discharge DX:	Fall  Secondary Diagnosis:	MARILYN (acute kidney injury) Principal Discharge DX:	Fall  Secondary Diagnosis:	MARILYN (acute kidney injury)  Secondary Diagnosis:	Dehydration

## 2019-02-04 NOTE — ED PROVIDER NOTE - PROGRESS NOTE DETAILS
Madi CRUZ for ED attending, Dr. Matt: 89 m multiple cardiac and medical hx hypotension, CHF, Parkinsons, A-fib on Coumadin BIBA from home regarding 1-2 days general weakness, lethargy, found to have low BP upon ED arrival. Pt's son notes that pt was d/c from [place] 9 days ago for viral PNA with CHF exacerbation. Per son, pt had a 3lb weight loss between over 2 days and was very dehydrated. Pt can normally ambulate on his own with assistance. Pt fell out of bed this morning, did not hit his head and denies LOC. +weak +run down. +decreased BP this morning. PE- Elderly frail white M slightly lethargic. +arousal to verbal stimuli. Acute upon chronically ill. Dry mucus membranes. Regular rate and rhythm, nml radial pulse. Mild expiratory wheezing. Abd soft, nontender. Old skin tear with dressing in place on right forearm. 3+ pitting edema bilateral pretibial. No obvious cellulitis noted. No tactile warmth, no rash. Madi CRUZ for ED attending, Dr. Matt: 89y/o M multiple cardiac and medical history hypotension, CHF, Parkinson's, A-Fib on Coumadin presents to ED BIBA with son from home regarding 1-2 days general weakness, lethargy. Pt found to have low blood pressure upon ED arrival. Pt's son notes that pt was discharged from [place] 9 days ago for viral PNA with CHF exacerbation. Per son, pt had a 3lb weight loss over 2 days and was severely dehydrated. Pt can normally ambulate on his own with assistance, but due to progressing weakness, pt is having trouble ambulating. Pt fell out of bed this morning trying to get up, did not hit his head and denies LOC. +Weak +Run down. +Decreased blood pressure this morning. PE- Elderly frail white M slightly lethargic. +Arousal to verbal stimuli. Acute upon chronically ill. Dry mucus membranes. Regular rate and rhythm, normal radial pulse. Mild expiratory wheezing. Abdomen soft, non-tender. Old skin tear with dressing in place on right forearm. 3+ pitting edema bilateral pretibial. No obvious cellulitis noted. No tactile warmth, no rash.

## 2019-02-04 NOTE — ED PROVIDER NOTE - ATTENDING CONTRIBUTION TO CARE
Dr. Matt: I have personally performed a face to face bedside history and physical examination of this patient. I have discussed the history, examination, review of systems, assessment and plan of management with the resident. I have reviewed the electronic medical record and amended it to reflect my history, review of systems, physical exam, assessment and plan.

## 2019-02-04 NOTE — ED PROVIDER NOTE - CONSTITUTIONAL, MLM
normal... well appearing, well nourished, awake, alert, oriented to person, place not time. Answers questions occasionally.

## 2019-02-04 NOTE — ED PROVIDER NOTE - OBJECTIVE STATEMENT
89 y/o male PMH CAD s/p PCI/CABG, systolic CHF s/p AICD, HTN, dyslipidemia, h/o orthostatic hypotension, CKD III, A fib on Coumadin, parkinson's, BIBA w/ 89 y/o male PMH CAD s/p PCI/CABG, systolic CHF s/p AICD, HTN, dyslipidemia, h/o orthostatic hypotension, CKD III, A fib on Coumadin, parkinson's, BIBA w/ hypotension. Per triage note, patient visiting nurse called b/c patient recent dx w/ a PNA and was hypotensive at home, SBP in the mid 90's. Patient is A+Ox2, no family at bedside. Patient answers few questions. Denies being in any pain. 89 y/o male PMH CAD s/p PCI/CABG, systolic CHF s/p AICD, HTN, dyslipidemia, h/o orthostatic hypotension, CKD III, A fib on Coumadin, parkinson's, BIBA w/ hypotension. Per triage note, patient visiting nurse called b/c patient recent dx w/ a PNA and was hypotensive at home, SBP in the mid 90's. Patient is A+Ox2, no family at bedside. Patient answers few questions. Denies being in any pain.  Patient daughter showed up. States in December was being treated for a pna, but the reason he came in today was b/c of the hypotension, lethargy. Visiting nurse thought patient was dehydrated. From Friday to Sat, lost 3lb. Patient does take lasix, recently increased. No fevers at home. Normal mental status is A+Ox2, but patient more confused today, not as conversive. Also states he felt lightheaded today, and fell bed to floor. Patient does take coumadin.   PMD: Bentley

## 2019-02-04 NOTE — ED ADULT NURSE REASSESSMENT NOTE - NS ED NURSE REASSESS COMMENT FT1
straight cath for urine specimen, sent to lab, dark robert urine noted, 750 ml urine noted, MD Sousa made aware or vital signs, previous lactate level of 2.4 and urine output, MD sousa to place new orders for IV fluids.

## 2019-02-04 NOTE — ED ADULT NURSE NOTE - OBJECTIVE STATEMENT
pt is a 89 y.o. male c/o hypotension to systolic 90s. pt is normally A&Ox2 according to the daughter. pt was treated in December for PNA. increasing weakness.

## 2019-02-04 NOTE — H&P ADULT - NSHPPHYSICALEXAM_GEN_ALL_CORE
ICU Vital Signs Last 24 Hrs  T(C): 36.4 (04 Feb 2019 16:17), Max: 37.2 (04 Feb 2019 12:10)  T(F): 97.6 (04 Feb 2019 16:17), Max: 99 (04 Feb 2019 12:10)  HR: 69 (04 Feb 2019 16:17) (69 - 93)  BP: 102/45 (04 Feb 2019 16:17) (102/45 - 122/52)    RR: 16 (04 Feb 2019 16:17) (16 - 18)  SpO2: 97% (04 Feb 2019 16:17) (97% - 100%)

## 2019-02-04 NOTE — H&P ADULT - HISTORY OF PRESENT ILLNESS
89 y/o male PMH CAD s/p PCI/CABG, systolic CHF s/p AICD, HTN, dyslipidemia, h/o orthostatic hypotension, CKD III, A fib on Coumadin, parkinson's, BIBA w/ hypotension. Per triage note, patient visiting nurse called b/c patient recent dx w/ a PNA and was hypotensive at home, SBP in the mid 90's. Patient is A+Ox2, no family at bedside. Patient answers few questions. Denies being in any pain.  	Patient daughter showed up. States in December was being treated for a pna, but the reason he came in today was b/c of the hypotension, lethargy. Visiting nurse thought patient was dehydrated. From Friday to Sat, lost 3lb. Patient does take lasix, recently increased. No fevers at home. Normal mental status is A+Ox2, but patient more confused today, not as conversive. Also states he felt lightheaded today, and fell bed to floor. Patient does take coumadin. Pt is a 89 y/o gentleman with  PMH CAD s/p PCI/CABG, systolic CHF s/p AICD, HTN, dyslipidemia, CVA, h/o orthostatic hypotension, CKD III, A fib on Coumadin, parkinson's, who presents via EMS  w/ hypotension and lethargy,  apparently less conversant.     Pt was hospitalized  recently for PNA .  A visiting nurse reported pt was hypotensive at home, with systolic blood pressure in the mid 90's and thought pt was dehydrated.  He had lost 3 lb over 2 days after an increase in lasix dosing.  No fever, chest pain or SOB.  No urinary pain or resp complaints.   Patient is A+Ox2 at baseline. Patient denies being in any pain.  He is a poor historian and is unable to give any recent history.  History obtained from a daughter and later I spoke with pts son at bedside. Pts son reports the pt is incontinent of urine.   	 Pt c/o feeling lightheaded today, and fell from bed to floor.     Fam Hx:  Mother  in her 30s from strangulated hernia  Father  in middle age  Brother  of malaria/Tb

## 2019-02-04 NOTE — H&P ADULT - NSHPLABSRESULTS_GEN_ALL_CORE
EKG: V paced 58bpm, qs in II, III, aVF, v4-v6, pvcs          < from: Transthoracic Echocardiogram (02.26.18 @ 09:24) >     Impression     Summary     The left ventricle is normal in size, wall thickness. Moderately   decreased   left ventricular systolic function with an estimated left ventricular   ejection fraction of 35-40 %. There is paradoxical septal motion   The right atrium appears moderately dilated.   A device wire is seen in the RV and RA.   Normal aortic valve structure and function.   Normal appearing mitral valve structure and function.   Moderate (2+) mitral regurgitation is present.   Mild mitral annular calcification is present.   Normal appearing tricuspid valve structure and function.   Moderate to severe (3+) tricuspid valve regurgitation is present.   There is severe elevation in right ventricular systolic pressure     Signature     ----------------------------------------------------------------   Electronically signed by Bigg Beyer(Interpreting physician)   on 02/26/2018 11:51 AM    < end of copied text >

## 2019-02-04 NOTE — H&P ADULT - ASSESSMENT
Pt is admitte with     I. MARILYN, DDX ATN, DEhydration,   - IVF  - bedside bladder scan, eval for retention  - follow Bun.cr     II. Hypotension,   - blood pressure improved after IVF    III. Metabolic encephalopathy, DDX uremia, infection, dehydration  - check ua , u cx    IV. Fall  - CT head shows no bleed  - P therapy consult    VI. CHF   - hold lasix due to ARF    VII. Afib  - on coumadin  - daily INR    VIII. DVT prophylaxis  - on coumadin    IV. IMPROVE VTE Individual Risk Assessment    RISK                                                                Points    [  ] Previous VTE                                                  3    [  ] Thrombophilia                                               2    [  ] Lower limb paralysis                                      2        (unable to hold up >15 seconds)      [  ] Current Cancer                                              2         (within 6 months)    [  ] Immobilization > 24 hrs                                1    [  ] ICU/CCU stay > 24 hours                              1    [ X ] Age > 60                                                      1    IMPROVE VTE Score _______1__ Pt is a 89 y/o gentleman with  PMH CAD s/p PCI/CABG, systolic CHF s/p AICD, HTN, dyslipidemia, CVA, h/o orthostatic hypotension, CKD III, A fib on Coumadin, parkinson's, who presents via EMS  w/ hypotension and lethargy,  apparently less conversant.     Pt was hospitalized  recently for PNA .  A visiting nurse reported pt was hypotensive at home, with systolic blood pressure in the mid 90's and thought pt was dehydrated.  He had lost 3 lb over 2 days after an increase in lasix dosing.  No fever, chest pain or SOB.  No urinary pain or resp complaints.   Patient is A+Ox2 at baseline. Patient denies being in any pain.  He is a poor historian and is unable to give any recent history.  History obtained from a daughter and later I spoke with pts son at bedside. Pts son reports the pt is incontinent of urine.   	   Pt c/o feeling lightheaded today, and fell from bed to floor.     Pt is admitted with     I.   Acute Renal failure, MARILYN, DDX ATN, DEhydration, due to overdiuresis,  electrolyte abnormality  - IVF  - pt had 750 ml of retained urine on straight cath by RN in the ED  - bedside bladder scan Q shift to eval for retention, straight cath as needed  - potassium repl given  - follow Bun.cr  - nephrology consult Dr. Hawkins, contacted by Dr. Moreau     II. Hypotension,   - blood pressure improved after IVF    III. Metabolic encephalopathy, DDX uremia, infection, dehydration  - check ua , u cx    IV. Fall  - CT head shows no bleed  - P therapy consult    VI. CHF   - hold lasix due to ARF    VII. Afib  - on coumadin  - daily INR    VIII. DVT prophylaxis  - on coumadin    IV. IMPROVE VTE Individual Risk Assessment    RISK                                                                Points    [  ] Previous VTE                                                  3    [  ] Thrombophilia                                               2    [  ] Lower limb paralysis                                      2        (unable to hold up >15 seconds)      [  ] Current Cancer                                              2         (within 6 months)    [  ] Immobilization > 24 hrs                                1    [  ] ICU/CCU stay > 24 hours                              1    [ X ] Age > 60                                                      1    IMPROVE VTE Score _______1__
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
## 2019-02-05 DIAGNOSIS — G20 PARKINSON'S DISEASE: ICD-10-CM

## 2019-02-05 DIAGNOSIS — I25.10 ATHEROSCLEROTIC HEART DISEASE OF NATIVE CORONARY ARTERY WITHOUT ANGINA PECTORIS: ICD-10-CM

## 2019-02-05 DIAGNOSIS — I48.91 UNSPECIFIED ATRIAL FIBRILLATION: ICD-10-CM

## 2019-02-05 DIAGNOSIS — I50.22 CHRONIC SYSTOLIC (CONGESTIVE) HEART FAILURE: ICD-10-CM

## 2019-02-05 LAB
ANION GAP SERPL CALC-SCNC: 8 MMOL/L — SIGNIFICANT CHANGE UP (ref 5–17)
BUN SERPL-MCNC: 46 MG/DL — HIGH (ref 7–23)
CALCIUM SERPL-MCNC: 8.6 MG/DL — SIGNIFICANT CHANGE UP (ref 8.5–10.1)
CHLORIDE SERPL-SCNC: 100 MMOL/L — SIGNIFICANT CHANGE UP (ref 96–108)
CO2 SERPL-SCNC: 33 MMOL/L — HIGH (ref 22–31)
CREAT SERPL-MCNC: 1.99 MG/DL — HIGH (ref 0.5–1.3)
GLUCOSE SERPL-MCNC: 106 MG/DL — HIGH (ref 70–99)
INR BLD: 3.22 RATIO — HIGH (ref 0.88–1.16)
POTASSIUM SERPL-MCNC: 3.3 MMOL/L — LOW (ref 3.5–5.3)
POTASSIUM SERPL-SCNC: 3.3 MMOL/L — LOW (ref 3.5–5.3)
PROTHROM AB SERPL-ACNC: 37 SEC — HIGH (ref 10–12.9)
SODIUM SERPL-SCNC: 141 MMOL/L — SIGNIFICANT CHANGE UP (ref 135–145)

## 2019-02-05 PROCEDURE — 99223 1ST HOSP IP/OBS HIGH 75: CPT

## 2019-02-05 RX ORDER — SODIUM CHLORIDE 9 MG/ML
1000 INJECTION INTRAMUSCULAR; INTRAVENOUS; SUBCUTANEOUS
Qty: 0 | Refills: 0 | Status: DISCONTINUED | OUTPATIENT
Start: 2019-02-05 | End: 2019-02-06

## 2019-02-05 RX ORDER — POTASSIUM CHLORIDE 20 MEQ
40 PACKET (EA) ORAL ONCE
Qty: 0 | Refills: 0 | Status: COMPLETED | OUTPATIENT
Start: 2019-02-05 | End: 2019-02-05

## 2019-02-05 RX ADMIN — Medication 40 MILLIEQUIVALENT(S): at 17:15

## 2019-02-05 RX ADMIN — HEPARIN SODIUM 5000 UNIT(S): 5000 INJECTION INTRAVENOUS; SUBCUTANEOUS at 13:03

## 2019-02-05 RX ADMIN — Medication 1 TABLET(S): at 11:41

## 2019-02-05 RX ADMIN — ATORVASTATIN CALCIUM 10 MILLIGRAM(S): 80 TABLET, FILM COATED ORAL at 00:05

## 2019-02-05 RX ADMIN — CARBIDOPA AND LEVODOPA 1 TABLET(S): 25; 100 TABLET ORAL at 00:05

## 2019-02-05 RX ADMIN — DONEPEZIL HYDROCHLORIDE 5 MILLIGRAM(S): 10 TABLET, FILM COATED ORAL at 21:56

## 2019-02-05 RX ADMIN — SODIUM CHLORIDE 75 MILLILITER(S): 9 INJECTION INTRAMUSCULAR; INTRAVENOUS; SUBCUTANEOUS at 16:13

## 2019-02-05 RX ADMIN — HEPARIN SODIUM 5000 UNIT(S): 5000 INJECTION INTRAVENOUS; SUBCUTANEOUS at 00:06

## 2019-02-05 RX ADMIN — Medication 3 MILLILITER(S): at 20:08

## 2019-02-05 RX ADMIN — HEPARIN SODIUM 5000 UNIT(S): 5000 INJECTION INTRAVENOUS; SUBCUTANEOUS at 21:57

## 2019-02-05 RX ADMIN — CARBIDOPA AND LEVODOPA 1 TABLET(S): 25; 100 TABLET ORAL at 06:53

## 2019-02-05 RX ADMIN — ATORVASTATIN CALCIUM 10 MILLIGRAM(S): 80 TABLET, FILM COATED ORAL at 21:56

## 2019-02-05 RX ADMIN — CARBIDOPA AND LEVODOPA 1 TABLET(S): 25; 100 TABLET ORAL at 11:41

## 2019-02-05 RX ADMIN — HEPARIN SODIUM 5000 UNIT(S): 5000 INJECTION INTRAVENOUS; SUBCUTANEOUS at 06:53

## 2019-02-05 RX ADMIN — CLOPIDOGREL BISULFATE 75 MILLIGRAM(S): 75 TABLET, FILM COATED ORAL at 11:41

## 2019-02-05 RX ADMIN — PANTOPRAZOLE SODIUM 40 MILLIGRAM(S): 20 TABLET, DELAYED RELEASE ORAL at 06:53

## 2019-02-05 RX ADMIN — DONEPEZIL HYDROCHLORIDE 5 MILLIGRAM(S): 10 TABLET, FILM COATED ORAL at 00:06

## 2019-02-05 RX ADMIN — CARBIDOPA AND LEVODOPA 1 TABLET(S): 25; 100 TABLET ORAL at 21:56

## 2019-02-05 RX ADMIN — Medication 3 MILLILITER(S): at 01:46

## 2019-02-05 RX ADMIN — Medication 3 MILLILITER(S): at 13:21

## 2019-02-05 RX ADMIN — Medication 3 MILLILITER(S): at 07:57

## 2019-02-05 RX ADMIN — Medication 40 MILLIEQUIVALENT(S): at 03:41

## 2019-02-05 NOTE — CONSULT NOTE ADULT - PROBLEM SELECTOR RECOMMENDATION 2
clinically appears dry , will resume diuretic once his renal function slightly improves  low dose BB , can not add ACE or increase BB due to orthostatic hypotension , parkinson's disease ,  AICD interrogated in dec 2018 ,  rate set at 50/mt

## 2019-02-05 NOTE — CONSULT NOTE ADULT - PROBLEM SELECTOR RECOMMENDATION 9
due to Altered mental status , dehydration , dementia , ( out of bed ) stable hemoglobin , high risk for stroke

## 2019-02-05 NOTE — PROGRESS NOTE ADULT - SUBJECTIVE AND OBJECTIVE BOX
HPI:  Pt is a 89 y/o gentleman with  PMH CAD s/p PCI/CABG, systolic CHF s/p AICD, HTN, dyslipidemia, CVA, h/o orthostatic hypotension, CKD III, A fib on Coumadin, parkinson's, who presents via EMS  w/ hypotension and lethargy,  apparently less conversant.   Pt was hospitalized  recently for PNA .  A visiting nurse reported pt was hypotensive at home, with systolic blood pressure in the mid 90's and thought pt was dehydrated.  He had lost 3 lb over 2 days after an increase in lasix dosing.  No fever, chest pain or SOB.  No urinary pain or resp complaints.         Review of Systems:  CONSTITUTIONAL: No weakness, fevers or chills  EYES/ENT: No visual changes;  No vertigo or throat pain   NECK: No pain or stiffness  RESPIRATORY: No cough, wheezing, hemoptysis; No shortness of breath,   CARDIOVASCULAR: No chest pain or palpitations  GASTROINTESTINAL: No abdominal or epigastric pain. No nausea, vomiting, or hematemesis; No diarrhea or constipation.   GENITOURINARY: No dysuria, frequency or hematuria  NEUROLOGICAL: No numbness or weakness  SKIN: No itching, burning, rashes, or lesions   All other review of systems is negative unless indicated above    PHYSICAL EXAM:    Vital Signs Last 24 Hrs  T(C): 37 (2019 11:40), Max: 37 (2019 11:40)  T(F): 98.6 (2019 11:40), Max: 98.6 (2019 11:40)  HR: 53 (2019 11:40) (50 - 69)  BP: 97/41 (2019 11:40) (97/41 - 120/42)  BP(mean): --  RR: 19 (2019 11:40) (15 - 19)  SpO2: 95% (2019 11:40) (93% - 98%)    GENERAL: comfortable   HEAD:  Atraumatic, Normocephalic  EYES: EOMI, PERRLA, conjunctiva and sclera clear  HEENT: Moist mucous membranes  NECK: Supple, No JVD  NERVOUS SYSTEM:  Alert & Oriented X3, Motor Strength 5/5 B/L upper and lower extremities; DTRs 2+ intact and symmetric  CHEST/LUNG: Clear to auscultation bilaterally; No rales, rhonchi, wheezing, or rubs  HEART:S1S2 normal, no murmer  ABDOMEN: Soft, Nontender, Nondistended; Bowel sounds present  GENITOURINARY- Voiding, no palpable bladder  EXTREMITIES:  2+ Peripheral Pulses, No clubbing, cyanosis, or edema  MUSCULOSKELTAL- No muscle tenderness, Muscle tone normal, No joint tenderness, no Joint swelling, Joint range of motion-normal  SKIN-no rash, no lesion  PSYCH- Mood stable  LYMPH Node- No palpable lymph node    LABS:                        10.6   5.78  )-----------( 168      ( 2019 12:18 )             33.9     02-    141  |  100  |  46<H>  ----------------------------<  106<H>  3.3<L>   |  33<H>  |  1.99<H>    Ca    8.6      2019 07:41    TPro  7.9  /  Alb  3.4  /  TBili  0.8  /  DBili  x   /  AST  22  /  ALT  8<L>  /  AlkPhos  129<H>  02    PT/INR - ( 2019 07:41 )   PT: 37.0 sec;   INR: 3.22 ratio         PTT - ( 2019 12:18 )  PTT:45.4 sec  Urinalysis Basic - ( 2019 20:51 )    Color: Yellow / Appearance: Clear / S.015 / pH: x  Gluc: x / Ketone: Negative  / Bili: Negative / Urobili: Negative mg/dL   Blood: x / Protein: 15 mg/dL / Nitrite: Negative   Leuk Esterase: Negative / RBC: 3-5 /HPF / WBC 0-2   Sq Epi: x / Non Sq Epi: Occasional / Bacteria: Occasional        CAPILLARY BLOOD GLUCOSE          CARDIAC MARKERS ( 2019 12:18 )  0.035 ng/mL / x     / x     / x     / x            Standing medicine  ALBUTerol/ipratropium for Nebulization 3 milliLiter(s) Nebulizer every 6 hours  atorvastatin 10 milliGRAM(s) Oral at bedtime  carbidopa/levodopa  25/100 1 Tablet(s) Oral <User Schedule>  clopidogrel Tablet 75 milliGRAM(s) Oral daily  docusate sodium 100 milliGRAM(s) Oral two times a day PRN  donepezil 5 milliGRAM(s) Oral at bedtime  heparin  Injectable 5000 Unit(s) SubCutaneous every 8 hours  metoprolol tartrate 12.5 milliGRAM(s) Oral two times a day  midodrine 5 milliGRAM(s) Oral once PRN  multivitamin 1 Tablet(s) Oral daily  pantoprazole    Tablet 40 milliGRAM(s) Oral before breakfast  senna 2 Tablet(s) Oral at bedtime PRN  warfarin 1 milliGRAM(s) Oral daily HPI:  Pt is a 89 y/o gentleman with  PMH CAD s/p PCI/CABG, systolic CHF s/p AICD, HTN, dyslipidemia, CVA, h/o orthostatic hypotension, CKD III, A fib on Coumadin, parkinson's, who presents via EMS  w/ hypotension and lethargy,  apparently less conversant.   Pt was hospitalized  recently for PNA .  A visiting nurse reported pt was hypotensive at home, with systolic blood pressure in the mid 90's and thought pt was dehydrated.  He had lost 3 lb over 2 days after an increase in lasix dosing.  No fever, chest pain or SOB.  No urinary pain or resp complaints.         #RSO- Unable to obtain- pt is confuse     PHYSICAL EXAM:    Vital Signs Last 24 Hrs  T(C): 37 (2019 11:40), Max: 37 (2019 11:40)  T(F): 98.6 (2019 11:40), Max: 98.6 (2019 11:40)  HR: 53 (2019 11:40) (50 - 69)  BP: 97/41 (2019 11:40) (97/41 - 120/42)  BP(mean): --  RR: 19 (2019 11:40) (15 - 19)  SpO2: 95% (2019 11:40) (93% - 98%)    GENERAL: comfortable   HEAD:  Atraumatic, Normocephalic  EYES: EOMI, PERRLA, conjunctiva and sclera clear  HEENT: Moist mucous membranes  NECK: Supple, No JVD  NERVOUS SYSTEM:  alert, follows command   CHEST/LUNG: Clear to auscultation bilaterally; No rales, rhonchi, wheezing, or rubs  HEART:S1S2 normal, no murmer  ABDOMEN: Soft, Nontender, Nondistended; Bowel sounds present  GENITOURINARY- Voiding, no palpable bladder  EXTREMITIES:  2+ Peripheral Pulses, No clubbing, cyanosis, or edema  MUSCULOSKELETAL No muscle tenderness, Muscle tone normal, No joint tenderness, no Joint swelling, Joint range of motion-normal  SKIN-redness, swelling noted on left leg   PSYCH- Mood stable  LYMPH Node- No palpable lymph node    LABS:                        10.6   5.78  )-----------( 168      ( 2019 12:18 )             33.9     02-05    141  |  100  |  46<H>  ----------------------------<  106<H>  3.3<L>   |  33<H>  |  1.99<H>    Ca    8.6      2019 07:41    TPro  7.9  /  Alb  3.4  /  TBili  0.8  /  DBili  x   /  AST  22  /  ALT  8<L>  /  AlkPhos  129<H>  02    PT/INR - ( 2019 07:41 )   PT: 37.0 sec;   INR: 3.22 ratio         PTT - ( 2019 12:18 )  PTT:45.4 sec  Urinalysis Basic - ( 2019 20:51 )    Color: Yellow / Appearance: Clear / S.015 / pH: x  Gluc: x / Ketone: Negative  / Bili: Negative / Urobili: Negative mg/dL   Blood: x / Protein: 15 mg/dL / Nitrite: Negative   Leuk Esterase: Negative / RBC: 3-5 /HPF / WBC 0-2   Sq Epi: x / Non Sq Epi: Occasional / Bacteria: Occasional        CAPILLARY BLOOD GLUCOSE          CARDIAC MARKERS ( 2019 12:18 )  0.035 ng/mL / x     / x     / x     / x            Standing medicine  ALBUTerol/ipratropium for Nebulization 3 milliLiter(s) Nebulizer every 6 hours  atorvastatin 10 milliGRAM(s) Oral at bedtime  carbidopa/levodopa  25/100 1 Tablet(s) Oral <User Schedule>  clopidogrel Tablet 75 milliGRAM(s) Oral daily  docusate sodium 100 milliGRAM(s) Oral two times a day PRN  donepezil 5 milliGRAM(s) Oral at bedtime  heparin  Injectable 5000 Unit(s) SubCutaneous every 8 hours  metoprolol tartrate 12.5 milliGRAM(s) Oral two times a day  midodrine 5 milliGRAM(s) Oral once PRN  multivitamin 1 Tablet(s) Oral daily  pantoprazole    Tablet 40 milliGRAM(s) Oral before breakfast  senna 2 Tablet(s) Oral at bedtime PRN  warfarin 1 milliGRAM(s) Oral daily

## 2019-02-05 NOTE — PHARMACOTHERAPY INTERVENTION NOTE - COMMENTS
timing of carbidopa/levodopa changed to (6 am/12pm/9pm).
Spoke to patient's daughter-in-law and confirmed Sinemet timing (6am, 12pm, 9pm)

## 2019-02-05 NOTE — CONSULT NOTE ADULT - SUBJECTIVE AND OBJECTIVE BOX
history obtained from chart , patient is limited historian   CHIEF COMPLAINT:    HPI:  Pt is a 89 y/o gentleman with  PMH CAD s/p PCI/CABG, systolic CHF s/p AICD, HTN, dyslipidemia, CVA, h/o orthostatic hypotension, CKD III, A fib on Coumadin, parkinson's, who presents via EMS  w/ hypotension and lethargy,  apparently less conversant.     Pt was hospitalized  recently for PNA .  A visiting nurse reported pt was hypotensive at home, with systolic blood pressure in the mid 90's and thought pt was dehydrated.  He had lost 3 lb over 2 days after an increase in lasix dosing.  No fever, chest pain or SOB.  No urinary pain or resp complaints.   Patient is A+Ox2 at baseline. Patient denies being in any pain.  He is a poor historian and is unable to give any recent history.     patient today feels ok , denies sob at rest , does not remember why he came here ,    patient AICD interrogated 2019    patient CT chest showed bronchiectasis             Fam Hx:  Mother  in her 30s from strangulated hernia  Father  in middle age  Brother  of malaria/Tb (2019 18:03)      PAST MEDICAL & SURGICAL HISTORY:  Parkinson's disease  CHF (congestive heart failure)  Hyperlipidemia  HTN (hypertension)  CAD (coronary artery disease)  AICD (automatic cardioverter/defibrillator) present  S/P CABG      Allergies    morphine (Headache)    Intolerances        SOCIAL HISTORY: ormer smoker           MEDICATIONS:    Home Medications:  atorvastatin 10 mg oral tablet: 1 tab(s) orally once a day (at bedtime) (2019 22:26)  carbidopa-levodopa 25 mg-100 mg oral tablet: 1 tab(s) orally 3 times a day (6am, 12pm, 9pm) (2019 22:26)  clopidogrel 75 mg oral tablet: 1 tab(s) orally once a day (2019 22:26)  docusate sodium 100 mg oral capsule: 1 cap(s) orally 2 times a day, As Needed (2019 22:26)  donepezil 5 mg oral tablet: 1 tab(s) orally once a day (at bedtime) (2019 22:26)  ipratropium-albuterol 0.5 mg-2.5 mg/3 mLinhalation solution: 3 milliliter(s) inhaled every 4 hours, As Needed - for shortness of breath and/or wheezing (2019 22:26)  Lasix 40 mg oral tablet: 1  orally once a day (2019 22:26)  Metoprolol Tartrate 25 mg oral tablet: 0.5 tab(s) orally 2 times a day (2019 22:26)  midodrine 5 mg oral tablet: 1 tab(s) orally  (2019 22:26)  Multiple Vitamins oral tablet: 1 tab(s) orally once a day (2019 22:26)  pantoprazole 40 mg oral delayed release tablet: 1 tab(s) orally once a day (before a meal) (2019 22:26)  potassium chloride 20 mEq oral tablet, extended release: 1 tab(s) orally once a day (2019 22:26)  Senna Lax 8.6 mg oral tablet: 2 tab(s) orally once a day (at bedtime), As Needed - for constipation (2019 22:26)  warfarin 1 mg oral tablet: 1 tab(s) orally once a day (2019 22:26)    MEDICATIONS  (STANDING):  ALBUTerol/ipratropium for Nebulization 3 milliLiter(s) Nebulizer every 6 hours  atorvastatin 10 milliGRAM(s) Oral at bedtime  carbidopa/levodopa  25/100 1 Tablet(s) Oral <User Schedule>  clopidogrel Tablet 75 milliGRAM(s) Oral daily  donepezil 5 milliGRAM(s) Oral at bedtime  heparin  Injectable 5000 Unit(s) SubCutaneous every 8 hours  metoprolol tartrate 12.5 milliGRAM(s) Oral two times a day  multivitamin 1 Tablet(s) Oral daily  pantoprazole    Tablet 40 milliGRAM(s) Oral before breakfast  warfarin 1 milliGRAM(s) Oral daily    MEDICATIONS  (PRN):  docusate sodium 100 milliGRAM(s) Oral two times a day PRN Constipation  midodrine 5 milliGRAM(s) Oral once PRN for blood press <90  senna 2 Tablet(s) Oral at bedtime PRN Constipation      REVIEW OF SYSTEMS:  limited due to forgetfulness   denies sob or chest pain ,       Vital Signs Last 24 Hrs  T(C): 37 (2019 11:40), Max: 37.2 (2019 12:10)  T(F): 98.6 (2019 11:40), Max: 99 (2019 12:10)  HR: 53 (2019 11:40) (50 - 69)  BP: 97/41 (2019 11:40) (97/41 - 120/42)  BP(mean): --  RR: 19 (2019 11:40) (15 - 19)  SpO2: 95% (2019 11:40) (93% - 98%)    I&O's Summary    2019 07:01  -  2019 07:00  --------------------------------------------------------  IN: 0 mL / OUT: 750 mL / NET: -750 mL        PHYSICAL EXAM:    Constitutional: NAD, awake and alert, chronically ill looking   HEENT: PERR, EOMI,  No oral cyananosis.  Neck:  supple,  No JVD  Respiratory: Breath sounds are clear bilaterally, No wheezing, rales or rhonchi  Cardiovascular: S1 and S2, IR IR ESM   Gastrointestinal: Bowel Sounds present, soft, nontender.   Extremities: 2 Plus peripheral edema. .  Vascular: 1+ peripheral pulses  Neurological: A/O x2, no focal deficits  Musculoskeletal: no calf tenderness.  Skin: No rashes.      LABS: All Labs Reviewed:                        10.6   5.78  )-----------( 168      ( 2019 12:18 )             33.9     2019 07:41    141    |  100    |  46     ----------------------------<  106    3.3     |  33     |  1.99   2019 12:18    141    |  98     |  44     ----------------------------<  135    3.3     |  36     |  2.35     Ca    8.6        2019 07:41  Ca    8.9        2019 12:18    TPro  7.9    /  Alb  3.4    /  TBili  0.8    /  DBili  x      /  AST  22     /  ALT  8      /  AlkPhos  129    2019 12:18    PT/INR - ( 2019 07:41 )   PT: 37.0 sec;   INR: 3.22 ratio         PTT - ( 2019 12:18 )  PTT:45.4 sec  CARDIAC MARKERS ( 2019 12:18 )  0.035 ng/mL / x     / x     / x     / x          Blood Culture:    @ 12:18  Pro Bnp 1933        RADIOLOGY/EKG: demand ventricular paced rhythm   < from: Transthoracic Echocardiogram (18 @ 09:24) >  The left ventricle is normal in size, wall thickness. Moderately   decreased   left ventricular systolic function with an estimated left ventricular   ejection fraction of 35-40 %. There is paradoxical septal motion   The right atrium appears moderately dilated.   A device wire is seen in the RV and RA.   Normal aortic valve structure and function.   Normal appearing mitral valve structure and function.   Moderate (2+) mitral regurgitation is present.   Mild mitral annular calcification is present.   Normal appearing tricuspid valve structure and function.   Moderate to severe (3+) tricuspid valve regurgitation is present.   There is severe elevation in right ventricular systolic pressure     Signature    < end of copied text >

## 2019-02-05 NOTE — PROGRESS NOTE ADULT - ASSESSMENT
#ARF on CKD III  -gentle iv fluids and monitoring   -monitor for urinary rentention - found to have urinary retention in er as per documentation   -probably due to poor oral intake and lasix on top of it      #Hypotension,   -due to above   -rule out infectious etiology   -chest xray, culture to follow   -hold off on abx     #Metabolic encephalopathy  -due to possibly dehydration, lasix   -monitor her   -better now     # Fall  - CT head shows no bleed  - P therapy consult    # CHF -chronic systolic -stable for now  - hold lasix due to ARF    #Afib  - on coumadin  - daily INR    #Above discussed with pt and all questions have been answered     #Discussed with Dr, Palla

## 2019-02-06 ENCOUNTER — APPOINTMENT (OUTPATIENT)
Dept: CARDIOLOGY | Facility: CLINIC | Age: 84
End: 2019-02-06

## 2019-02-06 LAB
ALBUMIN SERPL ELPH-MCNC: 3.1 G/DL — LOW (ref 3.3–5)
ANION GAP SERPL CALC-SCNC: 8 MMOL/L — SIGNIFICANT CHANGE UP (ref 5–17)
BUN SERPL-MCNC: 42 MG/DL — HIGH (ref 7–23)
CALCIUM SERPL-MCNC: 8.3 MG/DL — LOW (ref 8.5–10.1)
CHLORIDE SERPL-SCNC: 103 MMOL/L — SIGNIFICANT CHANGE UP (ref 96–108)
CO2 SERPL-SCNC: 33 MMOL/L — HIGH (ref 22–31)
CREAT SERPL-MCNC: 1.92 MG/DL — HIGH (ref 0.5–1.3)
CULTURE RESULTS: NO GROWTH — SIGNIFICANT CHANGE UP
GLUCOSE SERPL-MCNC: 144 MG/DL — HIGH (ref 70–99)
INR BLD: 3.19 RATIO — HIGH (ref 0.88–1.16)
PHOSPHATE SERPL-MCNC: 2.7 MG/DL — SIGNIFICANT CHANGE UP (ref 2.5–4.5)
POTASSIUM SERPL-MCNC: 3.4 MMOL/L — LOW (ref 3.5–5.3)
POTASSIUM SERPL-SCNC: 3.4 MMOL/L — LOW (ref 3.5–5.3)
PROTHROM AB SERPL-ACNC: 36.7 SEC — HIGH (ref 10–12.9)
SODIUM SERPL-SCNC: 144 MMOL/L — SIGNIFICANT CHANGE UP (ref 135–145)
SPECIMEN SOURCE: SIGNIFICANT CHANGE UP

## 2019-02-06 PROCEDURE — 71045 X-RAY EXAM CHEST 1 VIEW: CPT | Mod: 26

## 2019-02-06 RX ORDER — POTASSIUM CHLORIDE 20 MEQ
40 PACKET (EA) ORAL ONCE
Qty: 0 | Refills: 0 | Status: COMPLETED | OUTPATIENT
Start: 2019-02-06 | End: 2019-02-06

## 2019-02-06 RX ORDER — CEFTRIAXONE 500 MG/1
1000 INJECTION, POWDER, FOR SOLUTION INTRAMUSCULAR; INTRAVENOUS EVERY 24 HOURS
Qty: 0 | Refills: 0 | Status: DISCONTINUED | OUTPATIENT
Start: 2019-02-07 | End: 2019-02-10

## 2019-02-06 RX ORDER — CEFTRIAXONE 500 MG/1
INJECTION, POWDER, FOR SOLUTION INTRAMUSCULAR; INTRAVENOUS
Qty: 0 | Refills: 0 | Status: DISCONTINUED | OUTPATIENT
Start: 2019-02-06 | End: 2019-02-06

## 2019-02-06 RX ORDER — MIDODRINE HYDROCHLORIDE 2.5 MG/1
5 TABLET ORAL
Qty: 0 | Refills: 0 | Status: DISCONTINUED | OUTPATIENT
Start: 2019-02-06 | End: 2019-02-12

## 2019-02-06 RX ORDER — CEFTRIAXONE 500 MG/1
1000 INJECTION, POWDER, FOR SOLUTION INTRAMUSCULAR; INTRAVENOUS ONCE
Qty: 0 | Refills: 0 | Status: COMPLETED | OUTPATIENT
Start: 2019-02-06 | End: 2019-02-06

## 2019-02-06 RX ORDER — CEFTRIAXONE 500 MG/1
INJECTION, POWDER, FOR SOLUTION INTRAMUSCULAR; INTRAVENOUS
Qty: 0 | Refills: 0 | Status: DISCONTINUED | OUTPATIENT
Start: 2019-02-06 | End: 2019-02-10

## 2019-02-06 RX ORDER — FUROSEMIDE 40 MG
40 TABLET ORAL DAILY
Qty: 0 | Refills: 0 | Status: DISCONTINUED | OUTPATIENT
Start: 2019-02-06 | End: 2019-02-10

## 2019-02-06 RX ADMIN — SODIUM CHLORIDE 75 MILLILITER(S): 9 INJECTION INTRAMUSCULAR; INTRAVENOUS; SUBCUTANEOUS at 05:58

## 2019-02-06 RX ADMIN — CEFTRIAXONE 1000 MILLIGRAM(S): 500 INJECTION, POWDER, FOR SOLUTION INTRAMUSCULAR; INTRAVENOUS at 18:22

## 2019-02-06 RX ADMIN — Medication 40 MILLIGRAM(S): at 15:16

## 2019-02-06 RX ADMIN — DONEPEZIL HYDROCHLORIDE 5 MILLIGRAM(S): 10 TABLET, FILM COATED ORAL at 22:46

## 2019-02-06 RX ADMIN — CARBIDOPA AND LEVODOPA 1 TABLET(S): 25; 100 TABLET ORAL at 12:21

## 2019-02-06 RX ADMIN — HEPARIN SODIUM 5000 UNIT(S): 5000 INJECTION INTRAVENOUS; SUBCUTANEOUS at 06:26

## 2019-02-06 RX ADMIN — Medication 3 MILLILITER(S): at 01:50

## 2019-02-06 RX ADMIN — Medication 3 MILLILITER(S): at 13:51

## 2019-02-06 RX ADMIN — CLOPIDOGREL BISULFATE 75 MILLIGRAM(S): 75 TABLET, FILM COATED ORAL at 12:26

## 2019-02-06 RX ADMIN — Medication 1 TABLET(S): at 12:26

## 2019-02-06 RX ADMIN — Medication 12.5 MILLIGRAM(S): at 18:17

## 2019-02-06 RX ADMIN — PANTOPRAZOLE SODIUM 40 MILLIGRAM(S): 20 TABLET, DELAYED RELEASE ORAL at 06:26

## 2019-02-06 RX ADMIN — MIDODRINE HYDROCHLORIDE 5 MILLIGRAM(S): 2.5 TABLET ORAL at 12:26

## 2019-02-06 RX ADMIN — HEPARIN SODIUM 5000 UNIT(S): 5000 INJECTION INTRAVENOUS; SUBCUTANEOUS at 14:07

## 2019-02-06 RX ADMIN — HEPARIN SODIUM 5000 UNIT(S): 5000 INJECTION INTRAVENOUS; SUBCUTANEOUS at 22:46

## 2019-02-06 RX ADMIN — Medication 40 MILLIEQUIVALENT(S): at 12:26

## 2019-02-06 RX ADMIN — ATORVASTATIN CALCIUM 10 MILLIGRAM(S): 80 TABLET, FILM COATED ORAL at 22:46

## 2019-02-06 RX ADMIN — Medication 3 MILLILITER(S): at 20:48

## 2019-02-06 RX ADMIN — CARBIDOPA AND LEVODOPA 1 TABLET(S): 25; 100 TABLET ORAL at 22:46

## 2019-02-06 RX ADMIN — Medication 12.5 MILLIGRAM(S): at 06:26

## 2019-02-06 RX ADMIN — CARBIDOPA AND LEVODOPA 1 TABLET(S): 25; 100 TABLET ORAL at 06:26

## 2019-02-06 RX ADMIN — Medication 3 MILLILITER(S): at 08:36

## 2019-02-06 NOTE — DIETITIAN INITIAL EVALUATION ADULT. - OTHER INFO
Nutrition consult for Pressure ulcer stage 2 or >. Pt is a 89yo male admitted with s/p fall, MARILYN. Pt had 3# weight loss x 2 days after increasing lasix. +Dehydration. History of S/P CABG, Parkinson's dz, HLD, HTN, CAD, CHF. Currently edentulous and unable to chew hard solid foods as per Aide (total assist with meals). Unable to obtain information from pt due to cognitive status. Skin: B/L heel stage 1, Ankle stage 2 w/ +1/+2/+3/+4 edema documented. Art score= 11 (high risk of further skin breakdown). Last hospitalization weight indicates +wt gain x 2 months (~4% possibly due to fluid accumulation). BMI 31.4 NFPE indicates upper body Severe muscle wasting: Clavicle, temporal, scapula. Severe fat loss: Triceps, buccal, and Ocular. Pt meets criteria for severe protein/calorie malnutrition in context of acute illness secondary to severe fluid accumulation and severe muscle/fat wasting. Recommendations: 1) Liberalize diet to low sodium w/mechanical soft consistency due to edentulous status. 2) additional protein supplement to assist with wound healing Gelatein Plus po BID (40gm protein). 3) monitor daily weights. 4) monitor labs/electrolytes.

## 2019-02-06 NOTE — CONSULT NOTE ADULT - ASSESSMENT
89 PMH of CAD s/p PCI / CABG, systolic CHF, h/o NSVT, s/p AICD, HTN, dylsipidemia, h/o orthostatic hypotension, CKD III base 1.4 mg/dl - 1.8 mg/dl , a-fib (on coumadin), parkinson's disease, admitted on 12/16 for evaluation of increasing shortness of breath with renal evaluation of CKD III. 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 to HTN nephrosclerosis, likely requires a pre-renal state as well to maintain euvolemia    MARILYN, was likely pre-renal  -Renal function stabilized with hydration, keep net even for now  -can stop IVF now, will need lasix resumed soon.  -Will need eventual lasix resumption, had CHF with recent admit for PNA  -Dose meds crcl ~ 35 ml/min  -Monitor UOP closely    Hypokalemia  -Chronic issue, partly from lasix also from florinef with last admit  -Follow values with repletion, trend mg    Orthostasis  -Converted from florinef to midodrine during last admission  -resumption of midodrine as standing order    d/c with staff

## 2019-02-06 NOTE — PHYSICAL THERAPY INITIAL EVALUATION ADULT - ADDITIONAL COMMENTS
CT head shows no bleed CT head shows no bleed.    Lives in house with son and dtr in law. 11 DEE with HR. bedroom and bathroom on first floor. Sttaes has an aide a couple times a week. Son also helps assist with ADL's.

## 2019-02-06 NOTE — PHYSICAL THERAPY INITIAL EVALUATION ADULT - PERTINENT HX OF CURRENT PROBLEM, REHAB EVAL
presents via EMS  w/ hypotension and lethargy,  apparently less conversant.     Pt was hospitalized  recently for PNA .  A visiting nurse reported pt was hypotensive at home, with systolic blood pressure in the mid 90's and thought pt was dehydrated.  He had lost 3 lb over 2 days after an increase in lasix dosing.

## 2019-02-06 NOTE — CONSULT NOTE ADULT - SUBJECTIVE AND OBJECTIVE BOX
Patient is a 89y Male whom presented to the hospital with CKD baseline near 1.8 m g/dl, CAD s/p PCI/CABG, systolic CHF s/p AICD, HTN, dyslipidemia, CVA, h/o orthostatic hypotension, CKD III, A fib on Coumadin, parkinson's, who presents via EMS w/ hypotension and lethargy renal evaluation of MARILYN.     Pt was hospitalized  recently for PNA, patient with apparent soft bp at home and recent increase in lasix dosing as well. Admitted and started on IVF.     PAST MEDICAL & SURGICAL HISTORY:  Parkinson's disease  CHF (congestive heart failure)  Hyperlipidemia  HTN (hypertension)  CAD (coronary artery disease)  AICD (automatic cardioverter/defibrillator) present  S/P CABG      MEDICATIONS  (STANDING):  ALBUTerol/ipratropium for Nebulization 3 milliLiter(s) Nebulizer every 6 hours  atorvastatin 10 milliGRAM(s) Oral at bedtime  carbidopa/levodopa  25/100 1 Tablet(s) Oral <User Schedule>  clopidogrel Tablet 75 milliGRAM(s) Oral daily  donepezil 5 milliGRAM(s) Oral at bedtime  heparin  Injectable 5000 Unit(s) SubCutaneous every 8 hours  metoprolol tartrate 12.5 milliGRAM(s) Oral two times a day  multivitamin 1 Tablet(s) Oral daily  pantoprazole    Tablet 40 milliGRAM(s) Oral before breakfast  potassium chloride    Tablet ER 40 milliEquivalent(s) Oral once  sodium chloride 0.9%. 1000 milliLiter(s) (75 mL/Hr) IV Continuous <Continuous>  warfarin 1 milliGRAM(s) Oral daily    MEDICATIONS  (PRN):  docusate sodium 100 milliGRAM(s) Oral two times a day PRN Constipation  midodrine 5 milliGRAM(s) Oral once PRN for blood press <90  senna 2 Tablet(s) Oral at bedtime PRN Constipation      Allergies    morphine (Headache)    Intolerances        SOCIAL HISTORY:  no active cigg/etoh  FAMILY HISTORY:  no renal disease konwn    REVIEW OF SYSTEMS:    UTO    T(C): , Max: 37.2 (19 @ 05:04)  T(F): , Max: 98.9 (19 @ 05:04)  HR: 80 (19 @ 10:44)  BP: 108/55 (19 @ 10:44)  BP(mean): --  RR: 18 (19 @ 10:44)  SpO2: 99% (19 @ 10:44)  Wt(kg): --     @ 07:01  -   @ 07:00  --------------------------------------------------------  IN: 1159 mL / OUT: 450 mL / NET: 709 mL          PHYSICAL EXAM:    Constitutional: frail, cachectic, chrnically ill appearing  HEENT: PERRLA, EOMI,  MMM  Neck: No LAD, No JVD  Respiratory: good aeration, b/l  Cardiovascular: S1 and S2, RRR  Gastrointestinal: BS+, soft, NT/ND  Extremities: No peripheral edema  Neurological: arousable  Psychiatric: Normal mood, normal affect  : No Mancia  Skin: No rashes  Access: Not applicable    LABS:                        10.6   5.78  )-----------( 168      ( 2019 12:18 )             33.9     2019 07:41    141    |  100    |  46     ----------------------------<  106    3.3     |  33     |  1.99   2019 12:18    141    |  98     |  44     ----------------------------<  135    3.3     |  36     |  2.35     Ca    8.6        2019 07:41  Ca    8.9        2019 12:18    TPro  7.9    /  Alb  3.4    /  TBili  0.8    /  DBili  x      /  AST  22     /  ALT  8      /  AlkPhos  129    2019 12:18          Urine Studies:  Urinalysis Basic - ( 2019 20:51 )    Color: Yellow / Appearance: Clear / S.015 / pH: x  Gluc: x / Ketone: Negative  / Bili: Negative / Urobili: Negative mg/dL   Blood: x / Protein: 15 mg/dL / Nitrite: Negative   Leuk Esterase: Negative / RBC: 3-5 /HPF / WBC 0-2   Sq Epi: x / Non Sq Epi: Occasional / Bacteria: Occasional            RADIOLOGY & ADDITIONAL STUDIES:

## 2019-02-06 NOTE — PHYSICAL THERAPY INITIAL EVALUATION ADULT - GENERAL OBSERVATIONS, REHAB EVAL
pre and post session: supine in bed with bed alarm on. call bell and phone in reach.  IV in place. No c/o pain. L foot swelling observed. +wheezing. Spoke with nsg and she will speak with MD regarding O2 order and lasix order. Tolerated fair and would benefit from further skilled PT for functional mobility training.

## 2019-02-06 NOTE — DIETITIAN INITIAL EVALUATION ADULT. - ENERGY NEEDS
Ht.  66    "        Wt.  194.6  #              BMI 31.4                  IBW   148 #               Pt is at   131 %  IBW  #

## 2019-02-06 NOTE — PROGRESS NOTE ADULT - SUBJECTIVE AND OBJECTIVE BOX
HPI:  Pt is a 89 y/o gentleman with  PMH CAD s/p PCI/CABG, systolic CHF s/p AICD, HTN, dyslipidemia, CVA, h/o orthostatic hypotension, CKD III, A fib on Coumadin, parkinson's, who presents via EMS  w/ hypotension and lethargy,  apparently less conversant.   Pt was hospitalized  recently for PNA .  A visiting nurse reported pt was hypotensive at home, with systolic blood pressure in the mid 90's and thought pt was dehydrated.  He had lost 3 lb over 2 days after an increase in lasix dosing.  No fever, chest pain or SOB.  No urinary pain or resp complaints.         #RSO- Unable to obtain- pt is confuse     PHYSICAL EXAM:    Vital Signs Last 24 Hrs  T(C): 36.3 (2019 10:44), Max: 37.2 (2019 05:04)  T(F): 97.3 (2019 10:44), Max: 98.9 (2019 05:04)  HR: 74 (2019 14:55) (50 - 80)  BP: 110/50 (2019 14:56) (91/30 - 135/35)  BP(mean): --  RR: 19 (2019 14:55) (18 - 53)  SpO2: 98% (2019 14:55) (92% - 99%)    GENERAL: comfortable   HEAD:  Atraumatic, Normocephalic  EYES: EOMI, PERRLA, conjunctiva and sclera clear  HEENT: Moist mucous membranes  NECK: Supple, No JVD  NERVOUS SYSTEM:  alert, follows command   CHEST/LUNG: aeeb, wheeze present b/l, rales also noted   HEART:S1S2 normal, no murmer  ABDOMEN: Soft, Nontender, Nondistended; Bowel sounds present  GENITOURINARY- Voiding, no palpable bladder  EXTREMITIES:  2+ Peripheral Pulses, No clubbing, cyanosis, or edema  MUSCULOSKELETAL No muscle tenderness, Muscle tone normal, No joint tenderness, no Joint swelling, Joint range of motion-normal  SKIN-redness, swelling noted on left leg   PSYCH- Mood stable  LYMPH Node- No palpable lymph node      -    144  |  103  |  42<H>  ----------------------------<  144<H>  3.4<L>   |  33<H>  |  1.92<H>    Ca    8.3<L>      2019 14:59  Phos  2.7     02-06    TPro  x   /  Alb  3.1<L>  /  TBili  x   /  DBili  x   /  AST  x   /  ALT  x   /  AlkPhos  x   02-06    LIVER FUNCTIONS - ( 2019 14:59 )  Alb: 3.1 g/dL / Pro: x     / ALK PHOS: x     / ALT: x     / AST: x     / GGT: x             Culture - Urine (collected 2019 20:51)  Source: .Urine None  Final Report (2019 02:13):    No growth    Culture - Blood (collected 2019 15:53)  Source: .Blood only aerobic bottle received.  Preliminary Report (2019 23:01):    No growth to date.    Culture - Blood (collected 2019 12:18)  Source: .Blood Blood-Venous  Preliminary Report (2019 21:01):    No growth to date.      PT/INR - ( 2019 06:59 )   PT: 36.7 sec;   INR: 3.19 ratio           Urinalysis Basic - ( 2019 20:51 )    Color: Yellow / Appearance: Clear / S.015 / pH: x  Gluc: x / Ketone: Negative  / Bili: Negative / Urobili: Negative mg/dL   Blood: x / Protein: 15 mg/dL / Nitrite: Negative   Leuk Esterase: Negative / RBC: 3-5 /HPF / WBC 0-2   Sq Epi: x / Non Sq Epi: Occasional / Bacteria: Occasional        PT/INR - ( 2019 06:59 )   PT: 36.7 sec;   INR: 3.19 ratio           CAPILLARY BLOOD GLUCOSE          Culture - Urine (collected 2019 20:51)  Source: .Urine None  Final Report (2019 02:13):    No growth    Culture - Blood (collected 2019 15:53)  Source: .Blood only aerobic bottle received.  Preliminary Report (2019 23:01):    No growth to date.    Culture - Blood (collected 2019 12:18)  Source: .Blood Blood-Venous  Preliminary Report (2019 21:01):    No growth to date.      MEDICATIONS  (STANDING):  ALBUTerol/ipratropium for Nebulization 3 milliLiter(s) Nebulizer every 6 hours  atorvastatin 10 milliGRAM(s) Oral at bedtime  carbidopa/levodopa  25/100 1 Tablet(s) Oral <User Schedule>  cefTRIAXone Injectable. 1000 milliGRAM(s) IV Push once  cefTRIAXone Injectable.      clopidogrel Tablet 75 milliGRAM(s) Oral daily  donepezil 5 milliGRAM(s) Oral at bedtime  furosemide   Injectable 40 milliGRAM(s) IV Push daily  heparin  Injectable 5000 Unit(s) SubCutaneous every 8 hours  metoprolol tartrate 12.5 milliGRAM(s) Oral two times a day  midodrine 5 milliGRAM(s) Oral <User Schedule>  multivitamin 1 Tablet(s) Oral daily  pantoprazole    Tablet 40 milliGRAM(s) Oral before breakfast  warfarin 1 milliGRAM(s) Oral daily    MEDICATIONS  (PRN):  docusate sodium 100 milliGRAM(s) Oral two times a day PRN Constipation  senna 2 Tablet(s) Oral at bedtime PRN Constipation

## 2019-02-06 NOTE — DIETITIAN INITIAL EVALUATION ADULT. - PHYSICAL APPEARANCE
BMI 31.4 NFPE indicates upper body Severe muscle wasting: Clavicle, temporal, scapula. Severe fat loss: Triceps, buccal, and Ocular.

## 2019-02-06 NOTE — PROGRESS NOTE ADULT - ASSESSMENT
----- Message from Jaleesa Mayes sent at 4/12/2018 10:57 AM CDT -----  Contact: Patient  Patient states she was speaking to nurse and needs to speak to her again, please call her back at [phone]   A/P    #Metabolic encephalopathy  -due to possibly cellulitis of his leg   -monitor her   -start iv abx, today he appears more down and worse clinically       #Cellulitis of leg- iv abx and monitoring     #ARF on CKD III  -resume lasix for now iv as his exam showing rales     #Hypotension,   -monitor it     # Fall  - CT head shows no bleed  - P therapy consult    # CHF -chronic systolic -stable for now  -resume lasix     #Afib  - on coumadin  - daily INR    #Discussed with Dr. Morfin

## 2019-02-06 NOTE — CHART NOTE - NSCHARTNOTEFT_GEN_A_CORE
Upon Nutritional Assessment by the Registered Dietitian your patient was determined to meet criteria / has evidence of the following diagnosis/diagnoses:          [ ]  Mild Protein Calorie Malnutrition        [ ]  Moderate Protein Calorie Malnutrition        [x ] Severe Protein Calorie Malnutrition        [ ] Unspecified Protein Calorie Malnutrition        [ ] Underweight / BMI <19        [ ] Morbid Obesity / BMI > 40      Findings as based on:  •  Comprehensive nutrition assessment and consultation  •  Calorie counts (nutrient intake analysis)  •  Food acceptance and intake status from observations by staff  •  Follow up  •  Patient education  •  Intervention secondary to interdisciplinary rounds  •   concerns      Treatment:    1) Liberalize diet to low sodium w/mechanical soft consistency due to edentulous status.   2) additional protein supplement to assist with wound healing Gelatein Plus po BID (40gm protein).   3) monitor daily weights.   4) monitor labs/electrolytes.       The following diet has been recommended:    Liberalize to low sodium mechanical soft     PROVIDER Section:     By signing this assessment you are acknowledging and agree with the diagnosis/diagnoses assigned by the Registered Dietitian    Comments:

## 2019-02-06 NOTE — DIETITIAN INITIAL EVALUATION ADULT. - PERTINENT MEDS FT
MEDICATIONS  (STANDING):  ALBUTerol/ipratropium for Nebulization 3 milliLiter(s) Nebulizer every 6 hours  atorvastatin 10 milliGRAM(s) Oral at bedtime  carbidopa/levodopa  25/100 1 Tablet(s) Oral <User Schedule>  clopidogrel Tablet 75 milliGRAM(s) Oral daily  donepezil 5 milliGRAM(s) Oral at bedtime  heparin  Injectable 5000 Unit(s) SubCutaneous every 8 hours  metoprolol tartrate 12.5 milliGRAM(s) Oral two times a day  midodrine 5 milliGRAM(s) Oral <User Schedule>  multivitamin 1 Tablet(s) Oral daily  pantoprazole    Tablet 40 milliGRAM(s) Oral before breakfast  potassium chloride    Tablet ER 40 milliEquivalent(s) Oral once  warfarin 1 milliGRAM(s) Oral daily    MEDICATIONS  (PRN):  docusate sodium 100 milliGRAM(s) Oral two times a day PRN Constipation  senna 2 Tablet(s) Oral at bedtime PRN Constipation

## 2019-02-06 NOTE — DIETITIAN INITIAL EVALUATION ADULT. - NS AS NUTRI DX NUTRIENT
Pt meets criteria for severe protein/calorie malnutrition in context of acute illness secondary to severe fluid accumulation and severe muscle/fat wasting./Malnutrition

## 2019-02-07 LAB
ANION GAP SERPL CALC-SCNC: 6 MMOL/L — SIGNIFICANT CHANGE UP (ref 5–17)
BUN SERPL-MCNC: 45 MG/DL — HIGH (ref 7–23)
CALCIUM SERPL-MCNC: 8.6 MG/DL — SIGNIFICANT CHANGE UP (ref 8.5–10.1)
CHLORIDE SERPL-SCNC: 104 MMOL/L — SIGNIFICANT CHANGE UP (ref 96–108)
CO2 SERPL-SCNC: 32 MMOL/L — HIGH (ref 22–31)
CREAT SERPL-MCNC: 1.83 MG/DL — HIGH (ref 0.5–1.3)
GLUCOSE SERPL-MCNC: 127 MG/DL — HIGH (ref 70–99)
INR BLD: 3.55 RATIO — HIGH (ref 0.88–1.16)
MAGNESIUM SERPL-MCNC: 2 MG/DL — SIGNIFICANT CHANGE UP (ref 1.6–2.6)
PHOSPHATE SERPL-MCNC: 2.1 MG/DL — LOW (ref 2.5–4.5)
POTASSIUM SERPL-MCNC: 3.5 MMOL/L — SIGNIFICANT CHANGE UP (ref 3.5–5.3)
POTASSIUM SERPL-SCNC: 3.5 MMOL/L — SIGNIFICANT CHANGE UP (ref 3.5–5.3)
PROTHROM AB SERPL-ACNC: 41 SEC — HIGH (ref 10–12.9)
SODIUM SERPL-SCNC: 142 MMOL/L — SIGNIFICANT CHANGE UP (ref 135–145)

## 2019-02-07 RX ORDER — CARVEDILOL PHOSPHATE 80 MG/1
3.12 CAPSULE, EXTENDED RELEASE ORAL EVERY 12 HOURS
Qty: 0 | Refills: 0 | Status: DISCONTINUED | OUTPATIENT
Start: 2019-02-07 | End: 2019-02-10

## 2019-02-07 RX ORDER — POTASSIUM CHLORIDE 20 MEQ
20 PACKET (EA) ORAL ONCE
Qty: 0 | Refills: 0 | Status: COMPLETED | OUTPATIENT
Start: 2019-02-07 | End: 2019-02-07

## 2019-02-07 RX ORDER — SODIUM,POTASSIUM PHOSPHATES 278-250MG
1 POWDER IN PACKET (EA) ORAL THREE TIMES A DAY
Qty: 0 | Refills: 0 | Status: COMPLETED | OUTPATIENT
Start: 2019-02-07 | End: 2019-02-08

## 2019-02-07 RX ADMIN — MIDODRINE HYDROCHLORIDE 5 MILLIGRAM(S): 2.5 TABLET ORAL at 11:56

## 2019-02-07 RX ADMIN — CARBIDOPA AND LEVODOPA 1 TABLET(S): 25; 100 TABLET ORAL at 22:44

## 2019-02-07 RX ADMIN — Medication 1 PACKET(S): at 22:45

## 2019-02-07 RX ADMIN — Medication 1 PACKET(S): at 17:34

## 2019-02-07 RX ADMIN — ATORVASTATIN CALCIUM 10 MILLIGRAM(S): 80 TABLET, FILM COATED ORAL at 22:45

## 2019-02-07 RX ADMIN — PANTOPRAZOLE SODIUM 40 MILLIGRAM(S): 20 TABLET, DELAYED RELEASE ORAL at 06:17

## 2019-02-07 RX ADMIN — CLOPIDOGREL BISULFATE 75 MILLIGRAM(S): 75 TABLET, FILM COATED ORAL at 11:56

## 2019-02-07 RX ADMIN — DONEPEZIL HYDROCHLORIDE 5 MILLIGRAM(S): 10 TABLET, FILM COATED ORAL at 22:45

## 2019-02-07 RX ADMIN — CARVEDILOL PHOSPHATE 3.12 MILLIGRAM(S): 80 CAPSULE, EXTENDED RELEASE ORAL at 17:34

## 2019-02-07 RX ADMIN — Medication 40 MILLIGRAM(S): at 11:57

## 2019-02-07 RX ADMIN — Medication 3 MILLILITER(S): at 03:08

## 2019-02-07 RX ADMIN — CEFTRIAXONE 1000 MILLIGRAM(S): 500 INJECTION, POWDER, FOR SOLUTION INTRAMUSCULAR; INTRAVENOUS at 11:57

## 2019-02-07 RX ADMIN — Medication 3 MILLILITER(S): at 07:46

## 2019-02-07 RX ADMIN — CARBIDOPA AND LEVODOPA 1 TABLET(S): 25; 100 TABLET ORAL at 06:17

## 2019-02-07 RX ADMIN — Medication 3 MILLILITER(S): at 14:05

## 2019-02-07 RX ADMIN — Medication 20 MILLIEQUIVALENT(S): at 12:04

## 2019-02-07 RX ADMIN — HEPARIN SODIUM 5000 UNIT(S): 5000 INJECTION INTRAVENOUS; SUBCUTANEOUS at 13:50

## 2019-02-07 RX ADMIN — Medication 12.5 MILLIGRAM(S): at 06:17

## 2019-02-07 RX ADMIN — CARBIDOPA AND LEVODOPA 1 TABLET(S): 25; 100 TABLET ORAL at 11:56

## 2019-02-07 RX ADMIN — HEPARIN SODIUM 5000 UNIT(S): 5000 INJECTION INTRAVENOUS; SUBCUTANEOUS at 06:17

## 2019-02-07 RX ADMIN — Medication 1 TABLET(S): at 11:56

## 2019-02-07 NOTE — PROGRESS NOTE ADULT - SUBJECTIVE AND OBJECTIVE BOX
HPI:  Pt is a 89 y/o gentleman with  PMH CAD s/p PCI/CABG, systolic CHF s/p AICD, HTN, dyslipidemia, CVA, h/o orthostatic hypotension, CKD III, A fib on Coumadin, parkinson's, who presents via EMS  w/ hypotension and lethargy,  apparently less conversant.   Pt was hospitalized  recently for PNA .  A visiting nurse reported pt was hypotensive at home, with systolic blood pressure in the mid 90's and thought pt was dehydrated.  He had lost 3 lb over 2 days after an increase in lasix dosing.  No fever, chest pain or SOB.  No urinary pain or resp complaints.         #RSO- Unable to obtain- pt is confuse     PHYSICAL EXAM:    Vital Signs Last 24 Hrs  T(C): 36.3 (06 Feb 2019 10:44), Max: 37.2 (06 Feb 2019 05:04)  T(F): 97.3 (06 Feb 2019 10:44), Max: 98.9 (06 Feb 2019 05:04)  HR: 74 (06 Feb 2019 14:55) (50 - 80)  BP: 110/50 (06 Feb 2019 14:56) (91/30 - 135/35)  BP(mean): --  RR: 19 (06 Feb 2019 14:55) (18 - 53)  SpO2: 98% (06 Feb 2019 14:55) (92% - 99%)    GENERAL: comfortable   HEAD:  Atraumatic, Normocephalic  EYES: EOMI, PERRLA, conjunctiva and sclera clear  HEENT: Moist mucous membranes  NECK: Supple, No JVD  NERVOUS SYSTEM:  alert, follows command   CHEST/LUNG: aeeb, wheeze present b/l, rales also noted   HEART:S1S2 normal, no murmer  ABDOMEN: Soft, Nontender, Nondistended; Bowel sounds present  GENITOURINARY- Voiding, no palpable bladder  EXTREMITIES:  2+ Peripheral Pulses, No clubbing, cyanosis, or edema  MUSCULOSKELETAL No muscle tenderness, Muscle tone normal, No joint tenderness, no Joint swelling, Joint range of motion-normal  SKIN-redness, swelling noted on left leg -much better today   PSYCH- Mood stable  LYMPH Node- No palpable lymph node      02-07    142  |  104  |  45<H>  ----------------------------<  127<H>  3.5   |  32<H>  |  1.83<H>    Ca    8.6      07 Feb 2019 06:56  Phos  2.1     02-07  Mg     2.0     02-07    TPro  x   /  Alb  3.1<L>  /  TBili  x   /  DBili  x   /  AST  x   /  ALT  x   /  AlkPhos  x   02-06    LIVER FUNCTIONS - ( 06 Feb 2019 14:59 )  Alb: 3.1 g/dL / Pro: x     / ALK PHOS: x     / ALT: x     / AST: x     / GGT: x             Culture - Blood (collected 05 Feb 2019 17:24)  Source: .Blood None  Preliminary Report (07 Feb 2019 01:03):    No growth to date.    Culture - Urine (collected 04 Feb 2019 20:51)  Source: .Urine None  Final Report (06 Feb 2019 02:13):    No growth    Culture - Blood (collected 04 Feb 2019 15:53)  Source: .Blood only aerobic bottle received.  Preliminary Report (05 Feb 2019 23:01):    No growth to date.      PT/INR - ( 07 Feb 2019 06:56 )   PT: 41.0 sec;   INR: 3.55 ratio               PT/INR - ( 07 Feb 2019 06:56 )   PT: 41.0 sec;   INR: 3.55 ratio           CAPILLARY BLOOD GLUCOSE          Culture - Blood (collected 05 Feb 2019 17:24)  Source: .Blood None  Preliminary Report (07 Feb 2019 01:03):    No growth to date.    Culture - Urine (collected 04 Feb 2019 20:51)  Source: .Urine None  Final Report (06 Feb 2019 02:13):    No growth    Culture - Blood (collected 04 Feb 2019 15:53)  Source: .Blood only aerobic bottle received.  Preliminary Report (05 Feb 2019 23:01):    No growth to date.      MEDICATIONS  (STANDING):  ALBUTerol/ipratropium for Nebulization 3 milliLiter(s) Nebulizer every 6 hours  atorvastatin 10 milliGRAM(s) Oral at bedtime  carbidopa/levodopa  25/100 1 Tablet(s) Oral <User Schedule>  carvedilol 3.125 milliGRAM(s) Oral every 12 hours  cefTRIAXone Injectable.      cefTRIAXone Injectable. 1000 milliGRAM(s) IV Push every 24 hours  clopidogrel Tablet 75 milliGRAM(s) Oral daily  donepezil 5 milliGRAM(s) Oral at bedtime  furosemide   Injectable 40 milliGRAM(s) IV Push daily  midodrine 5 milliGRAM(s) Oral <User Schedule>  multivitamin 1 Tablet(s) Oral daily  pantoprazole    Tablet 40 milliGRAM(s) Oral before breakfast  warfarin 1 milliGRAM(s) Oral daily    MEDICATIONS  (PRN):  docusate sodium 100 milliGRAM(s) Oral two times a day PRN Constipation  senna 2 Tablet(s) Oral at bedtime PRN Constipation

## 2019-02-07 NOTE — PROGRESS NOTE ADULT - ASSESSMENT
89 PMH of CAD s/p PCI / CABG, systolic CHF, h/o NSVT, s/p AICD, HTN, dylsipidemia, h/o orthostatic hypotension, CKD III base 1.4 mg/dl - 1.8 mg/dl , a-fib (on coumadin), parkinson's disease, admitted on 12/16 for evaluation of increasing shortness of breath with renal evaluation of CKD III. 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

## 2019-02-07 NOTE — PROGRESS NOTE ADULT - ASSESSMENT
A/P    #Metabolic encephalopathy  -due to possibly cellulitis of his leg   -improving and getting better     #Cellulitis of leg- iv abx and monitoring     #ARF on CKD III  -ct lasix   -stable     #Hypotension,   -monitor it     # Fall  - CT head shows no bleed  - P therapy consult    # CHF -chronic systolic -stable for now  -ct lasix     #Afib  - on coumadin  - daily INR    #Discussed with Dr. Morfin     #Discharge plan

## 2019-02-07 NOTE — PROGRESS NOTE ADULT - SUBJECTIVE AND OBJECTIVE BOX
Patient is a 89y Male who had no reported new events. Started on IV lasix      MEDICATIONS  (STANDING):  ALBUTerol/ipratropium for Nebulization 3 milliLiter(s) Nebulizer every 6 hours  atorvastatin 10 milliGRAM(s) Oral at bedtime  carbidopa/levodopa  25/100 1 Tablet(s) Oral <User Schedule>  cefTRIAXone Injectable. 1000 milliGRAM(s) IV Push every 24 hours  cefTRIAXone Injectable.      clopidogrel Tablet 75 milliGRAM(s) Oral daily  donepezil 5 milliGRAM(s) Oral at bedtime  furosemide   Injectable 40 milliGRAM(s) IV Push daily  heparin  Injectable 5000 Unit(s) SubCutaneous every 8 hours  metoprolol tartrate 12.5 milliGRAM(s) Oral two times a day  midodrine 5 milliGRAM(s) Oral <User Schedule>  multivitamin 1 Tablet(s) Oral daily  pantoprazole    Tablet 40 milliGRAM(s) Oral before breakfast  warfarin 1 milliGRAM(s) Oral daily    MEDICATIONS  (PRN):  docusate sodium 100 milliGRAM(s) Oral two times a day PRN Constipation  senna 2 Tablet(s) Oral at bedtime PRN Constipation        T(C): , Max: 36.9 (02-07-19 @ 11:31)  T(F): , Max: 98.4 (02-07-19 @ 11:31)  HR: 49 (02-07-19 @ 11:31)  BP: 123/48 (02-07-19 @ 11:31)  BP(mean): --  RR: 20 (02-07-19 @ 11:31)  SpO2: 97% (02-07-19 @ 11:31)  Wt(kg): --    02-06 @ 07:01  -  02-07 @ 07:00  --------------------------------------------------------  IN: 0 mL / OUT: 550 mL / NET: -550 mL    02-07 @ 07:01  -  02-07 @ 11:42  --------------------------------------------------------  IN: 120 mL / OUT: 0 mL / NET: 120 mL          PHYSICAL EXAM:    Constitutional: NAD, frail/cachectic  HEENT: WIN, EOMI,  MMM  Neck: No LAD, No JVD  Respiratory: scatt curt  Cardiovascular: S1 and S2  Extremities: No peripheral edema  Neurological: Arouasable  : No Mancia  Skin: No rashes  Access: Not applicable    LABS:    07 Feb 2019 06:56    142    |  104    |  45     ----------------------------<  127    3.5     |  32     |  1.83   06 Feb 2019 14:59    144    |  103    |  42     ----------------------------<  144    3.4     |  33     |  1.92   05 Feb 2019 07:41    141    |  100    |  46     ----------------------------<  106    3.3     |  33     |  1.99   04 Feb 2019 12:18    141    |  98     |  44     ----------------------------<  135    3.3     |  36     |  2.35     Ca    8.6        07 Feb 2019 06:56  Ca    8.3        06 Feb 2019 14:59  Ca    8.6        05 Feb 2019 07:41  Ca    8.9        04 Feb 2019 12:18  Phos  2.1       07 Feb 2019 06:56  Phos  2.7       06 Feb 2019 14:59  Mg     2.0       07 Feb 2019 06:56    TPro  x      /  Alb  3.1    /  TBili  x      /  DBili  x      /  AST  x      /  ALT  x      /  AlkPhos  x      06 Feb 2019 14:59  TPro  7.9    /  Alb  3.4    /  TBili  0.8    /  DBili  x      /  AST  22     /  ALT  8      /  AlkPhos  129    04 Feb 2019 12:18          Urine Studies:          RADIOLOGY & ADDITIONAL STUDIES:

## 2019-02-08 LAB
ADD ON TEST-SPECIMEN IN LAB: SIGNIFICANT CHANGE UP
ALBUMIN SERPL ELPH-MCNC: 2.9 G/DL — LOW (ref 3.3–5)
ALP SERPL-CCNC: 114 U/L — SIGNIFICANT CHANGE UP (ref 40–120)
ALT FLD-CCNC: 6 U/L — LOW (ref 12–78)
ANION GAP SERPL CALC-SCNC: 4 MMOL/L — LOW (ref 5–17)
APTT BLD: 41.1 SEC — HIGH (ref 27.5–36.3)
AST SERPL-CCNC: 20 U/L — SIGNIFICANT CHANGE UP (ref 15–37)
BILIRUB SERPL-MCNC: 0.7 MG/DL — SIGNIFICANT CHANGE UP (ref 0.2–1.2)
BUN SERPL-MCNC: 39 MG/DL — HIGH (ref 7–23)
CALCIUM SERPL-MCNC: 8.3 MG/DL — LOW (ref 8.5–10.1)
CHLORIDE SERPL-SCNC: 103 MMOL/L — SIGNIFICANT CHANGE UP (ref 96–108)
CO2 SERPL-SCNC: 35 MMOL/L — HIGH (ref 22–31)
CREAT SERPL-MCNC: 1.65 MG/DL — HIGH (ref 0.5–1.3)
GLUCOSE SERPL-MCNC: 103 MG/DL — HIGH (ref 70–99)
INR BLD: 2.1 RATIO — HIGH (ref 0.88–1.16)
MAGNESIUM SERPL-MCNC: 2.2 MG/DL — SIGNIFICANT CHANGE UP (ref 1.6–2.6)
PHOSPHATE SERPL-MCNC: 3.4 MG/DL — SIGNIFICANT CHANGE UP (ref 2.5–4.5)
POTASSIUM SERPL-MCNC: 3.6 MMOL/L — SIGNIFICANT CHANGE UP (ref 3.5–5.3)
POTASSIUM SERPL-SCNC: 3.6 MMOL/L — SIGNIFICANT CHANGE UP (ref 3.5–5.3)
PROT SERPL-MCNC: 7.1 GM/DL — SIGNIFICANT CHANGE UP (ref 6–8.3)
PROTHROM AB SERPL-ACNC: 23.8 SEC — HIGH (ref 10–12.9)
SODIUM SERPL-SCNC: 142 MMOL/L — SIGNIFICANT CHANGE UP (ref 135–145)

## 2019-02-08 RX ORDER — POTASSIUM CHLORIDE 20 MEQ
20 PACKET (EA) ORAL ONCE
Qty: 0 | Refills: 0 | Status: COMPLETED | OUTPATIENT
Start: 2019-02-08 | End: 2019-02-08

## 2019-02-08 RX ORDER — POTASSIUM CHLORIDE 20 MEQ
20 PACKET (EA) ORAL DAILY
Qty: 0 | Refills: 0 | Status: DISCONTINUED | OUTPATIENT
Start: 2019-02-09 | End: 2019-02-10

## 2019-02-08 RX ORDER — WARFARIN SODIUM 2.5 MG/1
1 TABLET ORAL ONCE
Qty: 0 | Refills: 0 | Status: COMPLETED | OUTPATIENT
Start: 2019-02-08 | End: 2019-02-08

## 2019-02-08 RX ORDER — TAMSULOSIN HYDROCHLORIDE 0.4 MG/1
0.4 CAPSULE ORAL ONCE
Qty: 0 | Refills: 0 | Status: COMPLETED | OUTPATIENT
Start: 2019-02-08 | End: 2019-02-08

## 2019-02-08 RX ADMIN — Medication 1 TABLET(S): at 11:32

## 2019-02-08 RX ADMIN — Medication 1 PACKET(S): at 06:10

## 2019-02-08 RX ADMIN — CARBIDOPA AND LEVODOPA 1 TABLET(S): 25; 100 TABLET ORAL at 11:33

## 2019-02-08 RX ADMIN — CARBIDOPA AND LEVODOPA 1 TABLET(S): 25; 100 TABLET ORAL at 21:37

## 2019-02-08 RX ADMIN — CEFTRIAXONE 1000 MILLIGRAM(S): 500 INJECTION, POWDER, FOR SOLUTION INTRAMUSCULAR; INTRAVENOUS at 16:06

## 2019-02-08 RX ADMIN — MIDODRINE HYDROCHLORIDE 5 MILLIGRAM(S): 2.5 TABLET ORAL at 11:33

## 2019-02-08 RX ADMIN — Medication 3 MILLILITER(S): at 20:02

## 2019-02-08 RX ADMIN — Medication 3 MILLILITER(S): at 13:43

## 2019-02-08 RX ADMIN — Medication 20 MILLIEQUIVALENT(S): at 16:06

## 2019-02-08 RX ADMIN — DONEPEZIL HYDROCHLORIDE 5 MILLIGRAM(S): 10 TABLET, FILM COATED ORAL at 21:38

## 2019-02-08 RX ADMIN — CARBIDOPA AND LEVODOPA 1 TABLET(S): 25; 100 TABLET ORAL at 06:10

## 2019-02-08 RX ADMIN — Medication 40 MILLIGRAM(S): at 13:02

## 2019-02-08 RX ADMIN — TAMSULOSIN HYDROCHLORIDE 0.4 MILLIGRAM(S): 0.4 CAPSULE ORAL at 13:02

## 2019-02-08 RX ADMIN — ATORVASTATIN CALCIUM 10 MILLIGRAM(S): 80 TABLET, FILM COATED ORAL at 21:38

## 2019-02-08 RX ADMIN — PANTOPRAZOLE SODIUM 40 MILLIGRAM(S): 20 TABLET, DELAYED RELEASE ORAL at 06:10

## 2019-02-08 RX ADMIN — CARVEDILOL PHOSPHATE 3.12 MILLIGRAM(S): 80 CAPSULE, EXTENDED RELEASE ORAL at 17:30

## 2019-02-08 RX ADMIN — CLOPIDOGREL BISULFATE 75 MILLIGRAM(S): 75 TABLET, FILM COATED ORAL at 11:32

## 2019-02-08 NOTE — PROGRESS NOTE ADULT - ATTENDING COMMENTS
pt has been seen and examined with NP student Sujata, supervised, agree with a/p   -on exam- much better and comfortable today   -RS- aeeb, wheeze persent     #ct iv lasix, abx, supportive care     #replace hypophophatemia    #Discussed with Dr. Morfin

## 2019-02-08 NOTE — PROGRESS NOTE ADULT - SUBJECTIVE AND OBJECTIVE BOX
Patient is a 89y Male who had no reported events,     REVIEW OF SYSTEMS:    UTO    MEDICATIONS  (STANDING):  ALBUTerol/ipratropium for Nebulization 3 milliLiter(s) Nebulizer every 6 hours  atorvastatin 10 milliGRAM(s) Oral at bedtime  carbidopa/levodopa  25/100 1 Tablet(s) Oral <User Schedule>  carvedilol 3.125 milliGRAM(s) Oral every 12 hours  cefTRIAXone Injectable. 1000 milliGRAM(s) IV Push every 24 hours  cefTRIAXone Injectable.      clopidogrel Tablet 75 milliGRAM(s) Oral daily  donepezil 5 milliGRAM(s) Oral at bedtime  furosemide   Injectable 40 milliGRAM(s) IV Push daily  midodrine 5 milliGRAM(s) Oral <User Schedule>  multivitamin 1 Tablet(s) Oral daily  pantoprazole    Tablet 40 milliGRAM(s) Oral before breakfast    MEDICATIONS  (PRN):  docusate sodium 100 milliGRAM(s) Oral two times a day PRN Constipation  senna 2 Tablet(s) Oral at bedtime PRN Constipation        T(C): , Max: 37.1 (02-07-19 @ 20:46)  T(F): , Max: 98.7 (02-07-19 @ 20:46)  HR: 60 (02-08-19 @ 13:43)  BP: 105/43 (02-08-19 @ 11:56)  BP(mean): --  RR: 26 (02-08-19 @ 11:56)  SpO2: 97% (02-08-19 @ 11:56)  Wt(kg): --    02-07 @ 07:01  -  02-08 @ 07:00  --------------------------------------------------------  IN: 360 mL / OUT: 1300 mL / NET: -940 mL      PHYSICAL EXAM:    Constitutional: frail, cachectic  HEENT: PERRLA, EOMI,  MMM  Neck: No LAD, No JVD  Respiratory: good aeration  Cardiovascular: S1 and S2, RRR  Gastrointestinal: BS+, soft, NT/ND  Extremities: chronic chagnes,   Neurological: Arousable  : No Mancia  Skin: No rashes  Access: Not applicable        LABS:    08 Feb 2019 09:52    142    |  103    |  39     ----------------------------<  103    3.6     |  35     |  1.65   07 Feb 2019 06:56    142    |  104    |  45     ----------------------------<  127    3.5     |  32     |  1.83   06 Feb 2019 14:59    144    |  103    |  42     ----------------------------<  144    3.4     |  33     |  1.92   05 Feb 2019 07:41    141    |  100    |  46     ----------------------------<  106    3.3     |  33     |  1.99     Ca    8.3        08 Feb 2019 09:52  Ca    8.6        07 Feb 2019 06:56  Ca    8.3        06 Feb 2019 14:59  Ca    8.6        05 Feb 2019 07:41  Phos  3.4       08 Feb 2019 09:52  Phos  2.1       07 Feb 2019 06:56  Phos  2.7       06 Feb 2019 14:59  Mg     2.2       08 Feb 2019 09:52  Mg     2.0       07 Feb 2019 06:56    TPro  7.1    /  Alb  2.9    /  TBili  0.7    /  DBili  x      /  AST  20     /  ALT  6      /  AlkPhos  114    08 Feb 2019 09:52  TPro  x      /  Alb  3.1    /  TBili  x      /  DBili  x      /  AST  x      /  ALT  x      /  AlkPhos  x      06 Feb 2019 14:59          Urine Studies:          RADIOLOGY & ADDITIONAL STUDIES:

## 2019-02-08 NOTE — PROGRESS NOTE ADULT - SUBJECTIVE AND OBJECTIVE BOX
HPI:  89 y/o male with PMH CAD s/p PCI/CABG, systolic CHF s/p AICD, HTN, dyslipidemia, CVA, orthostatic hypotension, CKD III, A fib on Coumadin, Parkinson's who presented via EMS s/p fall at home w/ hypotension and lethargy, and apparently less conversant on . Visiting nurse reported pt was hypotensive at home, with systolic blood pressure in the mid 90's and thought pt was dehydrated. Visiting nurse stated, that he had lost 3 lb over 2 days after an increase Lasix dosing. At that time pt reported, no fever, chest pain, urinary pain or SOB.  Patient is A+Ox2 at baseline and is a poor historian.  History obtained from a daughter and son who reported that the pt is incontinent of urine. Of note, pt recently hospitalized for PNA. On admission pt found to have, sCr 2.35 (baseline 1.4-1.8), INR 3.27, CXR with small left pleural effusion, Fx w/up negative.   	  Interval HPI : Pt resting in bed comfortable, no c/o of pain of SOB.  BLE with edema noted L>R.        Family Hx:  Mother  in her 30s from strangulated hernia  Father  in middle age  Brother  of malaria/Tb (2019 18:03)      ROS: Unable to obtain ROS 2/2 confusion.     PHYSICAL EXAM:    Vital Signs Last 24 Hrs  T(C): 36.4 (2019 05:13), Max: 37.1 (2019 20:46)  T(F): 97.5 (2019 05:13), Max: 98.7 (2019 20:46)  HR: 55 (2019 06:08) (49 - 100)  BP: 102/48 (2019 05:13) (102/48 - 123/48)  BP(mean): --  RR: 18 (2019 05:13) (18 - 20)  SpO2: 96% (2019 05:13) (96% - 98%)    GENERAL: Resting comfortable in bed. No s/s of acute distress.    HEAD:  Atraumatic, Normocephalic  EYES: Conjunctiva and sclera clear. Unable to follow commands with pupil assessment.   HEENT: Moist mucous membranes  NECK: Supple, No JVD  NERVOUS SYSTEM:  Alert & Oriented x2 to person/place. Follows simple commands.   CHEST/LUNG: Rhonchi and slight expiratory wheeze to LLL. No cough noted. B/L pedal edema present L>R  HEART: S1 S2 irregular, no murmur noted.   ABDOMEN: Soft and nondistended: Bowel sounds present in all 4 quadrants.  GENITOURINARY: Mancia in place to bedside drainage.    SKIN: Redness present in BLE. Left arm skin tear.   PSYCH: Mood stable  LYMPH Node: Nonpalpable and nontender.      LABS:        142  |  104  |  45<H>  ----------------------------<  127<H>  3.5   |  32<H>  |  1.83<H>    Ca    8.6      2019 06:56  Phos  2.1       Mg     2.0         TPro  x   /  Alb  3.1<L>  /  TBili  x   /  DBili  x   /  AST  x   /  ALT  x   /  AlkPhos  x       PT/INR - ( 2019 06:56 )   PT: 41.0 sec;   INR: 3.55 ratio               CAPILLARY BLOOD GLUCOSE                Standing medicine  ALBUTerol/ipratropium for Nebulization 3 milliLiter(s) Nebulizer every 6 hours  atorvastatin 10 milliGRAM(s) Oral at bedtime  carbidopa/levodopa  25/100 1 Tablet(s) Oral <User Schedule>  carvedilol 3.125 milliGRAM(s) Oral every 12 hours  cefTRIAXone Injectable. 1000 milliGRAM(s) IV Push every 24 hours  cefTRIAXone Injectable.      clopidogrel Tablet 75 milliGRAM(s) Oral daily  docusate sodium 100 milliGRAM(s) Oral two times a day PRN  donepezil 5 milliGRAM(s) Oral at bedtime  furosemide   Injectable 40 milliGRAM(s) IV Push daily  midodrine 5 milliGRAM(s) Oral <User Schedule>  multivitamin 1 Tablet(s) Oral daily  pantoprazole    Tablet 40 milliGRAM(s) Oral before breakfast  senna 2 Tablet(s) Oral at bedtime PRN HPI:  89 y/o male with PMH CAD s/p PCI/CABG, systolic CHF s/p AICD, HTN, dyslipidemia, CVA, orthostatic hypotension, CKD III, A fib on Coumadin, Parkinson's who presented via EMS s/p fall at home w/ hypotension and lethargy, and apparently less conversant on . Visiting nurse reported pt was hypotensive at home, with systolic blood pressure in the mid 90's and thought pt was dehydrated. Visiting nurse stated, that he had lost 3 lb over 2 days after an increase Lasix dosing. At that time pt reported, no fever, chest pain, urinary pain or SOB.  Patient is A+Ox2 at baseline and is a poor historian.  History obtained from a daughter and son who reported that the pt is incontinent of urine. Of note, pt recently hospitalized for PNA. On admission pt found to have, sCr 2.35 (baseline 1.4-1.8), INR 3.27, CXR with small left pleural effusion, Fx w/up negative.   	  Interval HPI : Pt resting in bed comfortable, no c/o of pain of SOB.  BLE with edema noted L>R.        Family Hx:  Mother  in her 30s from strangulated hernia  Father  in middle age  Brother  of malaria/Tb (2019 18:03)      ROS: Unable to obtain ROS 2/2 confusion.     PHYSICAL EXAM:    Vital Signs Last 24 Hrs  T(C): 36.4 (2019 05:13), Max: 37.1 (2019 20:46)  T(F): 97.5 (2019 05:13), Max: 98.7 (2019 20:46)  HR: 55 (2019 06:08) (49 - 100)  BP: 102/48 (2019 05:13) (102/48 - 123/48)  BP(mean): --  RR: 18 (2019 05:13) (18 - 20)  SpO2: 96% (2019 05:13) (96% - 98%)    GENERAL: Resting comfortable in bed. No s/s of acute distress.    HEAD:  Atraumatic, Normocephalic  EYES: Conjunctiva and sclera clear. Unable to follow commands with pupil assessment.   HEENT: Moist mucous membranes  NECK: Supple, No JVD  NERVOUS SYSTEM:  Alert & Oriented x2 to person/place. Follows simple commands.   CHEST/LUNG: Rhonchi and slight expiratory wheeze to LLL. No cough noted. B/L pedal edema present L>R  HEART: S1 S2 RRR, no murmur noted.   ABDOMEN: Soft and nondistended: Bowel sounds present in all 4 quadrants.  GENITOURINARY: Mancai in place to bedside drainage.    SKIN: Redness present in BLE. Left arm skin tear.   PSYCH: Mood stable  LYMPH Node: Nonpalpable and nontender.      LABS:        142  |  104  |  45<H>  ----------------------------<  127<H>  3.5   |  32<H>  |  1.83<H>    Ca    8.6      2019 06:56  Phos  2.1       Mg     2.0         TPro  x   /  Alb  3.1<L>  /  TBili  x   /  DBili  x   /  AST  x   /  ALT  x   /  AlkPhos  x       PT/INR - ( 2019 06:56 )   PT: 41.0 sec;   INR: 3.55 ratio               CAPILLARY BLOOD GLUCOSE                Standing medicine  ALBUTerol/ipratropium for Nebulization 3 milliLiter(s) Nebulizer every 6 hours  atorvastatin 10 milliGRAM(s) Oral at bedtime  carbidopa/levodopa  25/100 1 Tablet(s) Oral <User Schedule>  carvedilol 3.125 milliGRAM(s) Oral every 12 hours  cefTRIAXone Injectable. 1000 milliGRAM(s) IV Push every 24 hours  cefTRIAXone Injectable.      clopidogrel Tablet 75 milliGRAM(s) Oral daily  docusate sodium 100 milliGRAM(s) Oral two times a day PRN  donepezil 5 milliGRAM(s) Oral at bedtime  furosemide   Injectable 40 milliGRAM(s) IV Push daily  midodrine 5 milliGRAM(s) Oral <User Schedule>  multivitamin 1 Tablet(s) Oral daily  pantoprazole    Tablet 40 milliGRAM(s) Oral before breakfast  senna 2 Tablet(s) Oral at bedtime PRN HPI:  89 y/o male with PMH CAD s/p PCI/CABG, systolic CHF s/p AICD, HTN, dyslipidemia, CVA, orthostatic hypotension, CKD III, A fib on Coumadin, Parkinson's who presented via EMS s/p fall at home w/ hypotension and lethargy, and apparently less conversant on . Visiting nurse reported pt was hypotensive at home, with systolic blood pressure in the mid 90's and thought pt was dehydrated. Visiting nurse stated, that he had lost 3 lb over 2 days after an increase Lasix dosing. At that time pt reported, no fever, chest pain, urinary pain or SOB.  Patient is A+Ox2 at baseline and is a poor historian.  History obtained from a daughter and son who reported that the pt is incontinent of urine. Of note, pt recently hospitalized for PNA. On admission pt found to have, sCr 2.35 (baseline 1.4-1.8), INR 3.27, CXR with small left pleural effusion, Fx w/up negative.   	  Interval HPI : Pt resting in bed comfortable, no c/o of pain or SOB.  BLE with edema noted L>R.        Family Hx:  Mother  in her 30s from strangulated hernia  Father  in middle age  Brother  of malaria/Tb (2019 18:03)      ROS: Unable to obtain ROS 2/2 confusion.     PHYSICAL EXAM:    Vital Signs Last 24 Hrs  T(C): 36.4 (2019 05:13), Max: 37.1 (2019 20:46)  T(F): 97.5 (2019 05:13), Max: 98.7 (2019 20:46)  HR: 55 (2019 06:08) (49 - 100)  BP: 102/48 (2019 05:13) (102/48 - 123/48)  BP(mean): --  RR: 18 (2019 05:13) (18 - 20)  SpO2: 96% (2019 05:13) (96% - 98%)    GENERAL: Resting comfortable in bed. No s/s of acute distress.    HEAD:  Atraumatic, Normocephalic  EYES: Conjunctiva and sclera clear. Unable to follow commands with pupil assessment.   HEENT: Moist mucous membranes  NECK: Supple, No JVD  NERVOUS SYSTEM:  Alert & Oriented x2 to person/place. Follows simple commands.   CHEST/LUNG: Rhonchi and slight expiratory wheeze to LLL. No cough noted. B/L pedal edema present L>R  HEART: S1 S2 RRR, no murmur noted.   ABDOMEN: Soft and nondistended: Bowel sounds present in all 4 quadrants.  GENITOURINARY: Mancia in place to bedside drainage.    SKIN: Redness present in BLE. Left arm skin tear.   PSYCH: Mood stable  LYMPH Node: Nonpalpable and nontender.      LABS:        142  |  104  |  45<H>  ----------------------------<  127<H>  3.5   |  32<H>  |  1.83<H>    Ca    8.6      2019 06:56  Phos  2.1       Mg     2.0         TPro  x   /  Alb  3.1<L>  /  TBili  x   /  DBili  x   /  AST  x   /  ALT  x   /  AlkPhos  x       PT/INR - ( 2019 06:56 )   PT: 41.0 sec;   INR: 3.55 ratio               CAPILLARY BLOOD GLUCOSE                Standing medicine  ALBUTerol/ipratropium for Nebulization 3 milliLiter(s) Nebulizer every 6 hours  atorvastatin 10 milliGRAM(s) Oral at bedtime  carbidopa/levodopa  25/100 1 Tablet(s) Oral <User Schedule>  carvedilol 3.125 milliGRAM(s) Oral every 12 hours  cefTRIAXone Injectable. 1000 milliGRAM(s) IV Push every 24 hours  cefTRIAXone Injectable.      clopidogrel Tablet 75 milliGRAM(s) Oral daily  docusate sodium 100 milliGRAM(s) Oral two times a day PRN  donepezil 5 milliGRAM(s) Oral at bedtime  furosemide   Injectable 40 milliGRAM(s) IV Push daily  midodrine 5 milliGRAM(s) Oral <User Schedule>  multivitamin 1 Tablet(s) Oral daily  pantoprazole    Tablet 40 milliGRAM(s) Oral before breakfast  senna 2 Tablet(s) Oral at bedtime PRN HPI:  89 y/o male with PMH CAD s/p PCI/CABG, systolic CHF s/p AICD, HTN, dyslipidemia, CVA, orthostatic hypotension, CKD III, A fib on Coumadin, Parkinson's who presented via EMS s/p fall at home w/ hypotension and lethargy, and apparently less conversant on . Visiting nurse reported pt was hypotensive at home, with systolic blood pressure in the mid 90's and thought pt was dehydrated. Visiting nurse stated, that he had lost 3 lb over 2 days after an increase Lasix dosing. At that time pt reported, no fever, chest pain, urinary pain or SOB.  Patient is A+Ox2 at baseline and is a poor historian.  History obtained from a daughter and son who reported that the pt is incontinent of urine. Of note, pt recently hospitalized for PNA. On admission pt found to have, sCr 2.35 (baseline 1.4-1.8), INR 3.27, CXR with small left pleural effusion, Fx w/up negative.   	  Interval HPI : Pt resting in bed comfortable, no c/o of pain or SOB.  BLE with edema noted L>R.        Family Hx:  Mother  in her 30s from strangulated hernia  Father  in middle age  Brother  of malaria/Tb (2019 18:03)      ROS: Unable to obtain ROS 2/2 confusion.     PHYSICAL EXAM:    Vital Signs Last 24 Hrs  T(C): 36.4 (2019 05:13), Max: 37.1 (2019 20:46)  T(F): 97.5 (2019 05:13), Max: 98.7 (2019 20:46)  HR: 55 (2019 06:08) (49 - 100)  BP: 102/48 (2019 05:13) (102/48 - 123/48)  BP(mean): --  RR: 18 (2019 05:13) (18 - 20)  SpO2: 96% (2019 05:13) (96% - 98%)    GENERAL: Resting comfortable in bed. No s/s of acute distress.    HEAD:  Atraumatic, Normocephalic  EYES: Conjunctiva and sclera clear. Unable to follow commands with pupil assessment.   HEENT: Moist mucous membranes  NECK: Supple, No JVD  NERVOUS SYSTEM:  Alert & Oriented x2 to person/place. Follows simple commands.   CHEST/LUNG: Rhonchi and slight expiratory wheeze to LLL. No cough noted. B/L pedal edema present L>R  HEART: S1 S2 RRR, no murmur noted.   ABDOMEN: Soft and nondistended: Bowel sounds present in all 4 quadrants.  GENITOURINARY: Mancia in place to bedside drainage.    SKIN: Redness present in BLE. Left arm skin tear.   PSYCH: Mood stable  LYMPH Node: Nonpalpable and nontender.      LABS:        142  |  104  |  45<H>  ----------------------------<  127<H>  3.5   |  32<H>  |  1.83<H>    Ca    8.6      2019 06:56  Phos  2.1       Mg     2.0         TPro  x   /  Alb  3.1<L>  /  TBili  x   /  DBili  x   /  AST  x   /  ALT  x   /  AlkPhos  x       PT/INR - ( 2019 06:56 )   PT: 41.0 sec;   INR: 3.55 ratio         Standing medicine  ALBUTerol/ipratropium for Nebulization 3 milliLiter(s) Nebulizer every 6 hours  atorvastatin 10 milliGRAM(s) Oral at bedtime  carbidopa/levodopa  25/100 1 Tablet(s) Oral <User Schedule>  carvedilol 3.125 milliGRAM(s) Oral every 12 hours  cefTRIAXone Injectable. 1000 milliGRAM(s) IV Push every 24 hours  cefTRIAXone Injectable.      clopidogrel Tablet 75 milliGRAM(s) Oral daily  docusate sodium 100 milliGRAM(s) Oral two times a day PRN  donepezil 5 milliGRAM(s) Oral at bedtime  furosemide   Injectable 40 milliGRAM(s) IV Push daily  midodrine 5 milliGRAM(s) Oral <User Schedule>  multivitamin 1 Tablet(s) Oral daily  pantoprazole    Tablet 40 milliGRAM(s) Oral before breakfast  senna 2 Tablet(s) Oral at bedtime PRN

## 2019-02-08 NOTE — PROGRESS NOTE ADULT - ASSESSMENT
89 y/o male with PMH CAD s/p PCI/CABG, systolic CHF s/p AICD, HTN, dyslipidemia, CVA, orthostatic hypotension, CKD III, A fib on Coumadin, Parkinson's who presented via EMS s/p fall at home w/ hypotension with reported (SBP's in 90s), lethargy, weight loss and apparently less conversant on 2/4. Found to have BLE cellulitis, Pre-renal MARILYN on CKD III and supratherapeutic INR (3.27); Fx workup negative, CT head negative.     Pre-Renal MARILYN on CKD Stage III (base 1.4 - 1.8) 2/2 possibly poor oral intake and Lasix  - Nephrology following   - Mancia in place 2/2 to retention - Consider voiding trial   - sCr downtrending 2/7 1.83 --->     Hypotension 2/2 possible infectious etiology vs MARILYN on CKD  - Cellulitis BLE - Continue Ceftriaxone daily  - BLE doppler negative DVT  - BC/UCx NTD  - CXR - small left pleural effusion - repeat CXR 2/6 unchanged   - Continue midodrine daily    Metabolic encephalopathy 2/2 possibly dehydration, Lasix   - clinically improving  - At baseline pt A&Ox2    Fall   - CT head shows no bleed  - Fracture workup negative   - PT therapy consult - recommendations for ERICKSON    Chronic systolic CHF  - Stable for now  - Continue IV Lasix 40 daily  - Continue Daily weight  - Monitor I&O's   - Repeat Mag/ Phos - will continue to monitor  - Fluid restriction 1500     Afib  - On coumadin  - daily INR  - 2/7 INR 3.55 --> will reassess    Parkinson Disease/ Dementia  - Continue Sinemet   - Continue Aricept 89 y/o male with PMH CAD s/p PCI/CABG, systolic CHF s/p AICD, HTN, dyslipidemia, CVA, orthostatic hypotension, CKD III, A fib on Coumadin, Parkinson's who presented via EMS s/p fall at home w/ hypotension with reported (SBP's in 90s), lethargy, weight loss and apparently less conversant on 2/4. Found to have BLE cellulitis, Pre-renal MARILYN on CKD III and supratherapeutic INR (3.27); Fx workup negative, CT head negative.     Pre-Renal MARILYN on CKD Stage III (base 1.4 - 1.8) 2/2 possibly poor oral intake and Lasix  - Nephrology following   - Mancia in place 2/2 to retention - Consider voiding trial   - sCr downtrending 2/7 1.83 --->     Hypotension 2/2 possible infectious etiology vs MARILYN on CKD  - Cellulitis BLE - Continue Ceftriaxone daily  - BLE doppler negative DVT  - BC/UCx NTD  - CXR - small left pleural effusion - repeat CXR 2/6 unchanged   - Continue midodrine daily    Fall   - CT head shows no bleed  - Fracture workup negative   - PT therapy consult - recommendations for ERICKSON    Metabolic encephalopathy 2/2 possibly dehydration, Lasix   - clinically improving  - At baseline pt A&Ox2    Chronic systolic CHF  - Stable for now  - Continue IV Lasix 40 daily  - Continue Daily weight  - Monitor I&O's   - Repeat Mag/ Phos - will continue to monitor  - Fluid restriction 1500     Afib  - On coumadin  - daily INR  - 2/7 INR 3.55 --> will reassess  - Continue coreg for rate control     Parkinson Disease/ Dementia  - Continue Sinemet   - Continue Aricept     CAD  - Continue Plavix  - Continue Lipitor 89 y/o male with PMH CAD s/p PCI/CABG, systolic CHF s/p AICD, HTN, dyslipidemia, CVA, orthostatic hypotension, CKD III, A fib on Coumadin, Parkinson's who presented via EMS s/p fall at home w/ hypotension with reported (SBP's in 90s), lethargy, weight loss and apparently less conversant on 2/4. Found to have BLE cellulitis, Pre-renal MARILYN on CKD III and supratherapeutic INR (3.27); Fx workup negative, CT head negative.     Pre-Renal MARILYN on CKD Stage III (base 1.4 - 1.8) 2/2 possibly poor oral intake and Lasix  - Nephrology following   - Mancia in place 2/2 to retention - Consider voiding trial   - sCr downtrending 2/7 1.83 --->     Hypotension 2/2 possible infectious etiology vs MARILYN on CKD  - Cellulitis BLE - Continue Ceftriaxone daily  - BLE doppler negative DVT  - BC/UCx NTD  - CXR - small left pleural effusion - repeat CXR 2/6 unchanged   - Continue midodrine daily    Fall   - CT head shows no bleed  - Fracture workup negative   - PT therapy consult - recommendations for ERICKSON    Metabolic encephalopathy 2/2 possibly dehydration, Lasix   - clinically improving  - At baseline pt A&Ox2    Chronic systolic CHF  - Stable for now  - Continue IV Lasix 40 daily  - Continue Daily weight  - Monitor I&O's   - Repeat Mag/ Phos - will continue to monitor  - Fluid restriction 1500     Afib  - On coumadin  - daily INR  - 2/7 INR 3.55 --> will reassess  - Continue coreg for rate control     Parkinson Disease/ Dementia  - Continue Sinemet   - Continue Aricept     CAD s/p PCI and CABG  - Continue Plavix  - Continue Lipitor 89 y/o male with PMH CAD s/p PCI/CABG, systolic CHF s/p AICD, HTN, dyslipidemia, CVA, orthostatic hypotension, CKD III, A fib on Coumadin, Parkinson's who presented via EMS s/p fall at home w/ hypotension with reported (SBP's in 90s), lethargy, weight loss and apparently less conversant on 2/4. Found to have BLE cellulitis, Pre-renal MARILYN on CKD III and supratherapeutic INR (3.27); Fx workup negative, CT head negative.     Pre-Renal MARILYN on CKD Stage III (base 1.4 - 1.8) 2/2 possibly poor oral intake and Lasix  - Nephrology following   - Mancia in place 2/2 to retention - Consider voiding trial   - sCr downtrending 2/7 1.83 ---> 1.65    Hypotension 2/2 possible infectious etiology vs MARILYN on CKD  - Cellulitis BLE - Continue Ceftriaxone daily  - BLE doppler negative DVT  - BC/UCx NTD  - CXR - small left pleural effusion - repeat CXR 2/6 unchanged   - Continue midodrine daily    Fall   - CT head shows no bleed  - Fracture workup negative   - PT therapy consult - recommendations for ERICKSON    Metabolic encephalopathy 2/2 possibly dehydration, Lasix   - clinically improving  - At baseline pt A&Ox2    Chronic systolic CHF  - Stable for now  - Continue IV Lasix 40 daily  - Continue Daily weight  - Monitor I&O's   - Repeat Mag/ Phos - will continue to monitor  - Fluid restriction 1500     Afib  - On coumadin  - daily INR  - 2/7 INR 3.55 --> 2.10  - Continue coreg for rate control     Parkinson Disease/ Dementia  - Continue Sinemet   - Continue Aricept     CAD s/p PCI and CABG  - Continue Plavix  - Continue Lipitor 89 y/o male with PMH CAD s/p PCI/CABG, systolic CHF s/p AICD, HTN, dyslipidemia, CVA, orthostatic hypotension, CKD III, A fib on Coumadin, Parkinson's who presented via EMS s/p fall at home w/ hypotension with reported (SBP's in 90s), lethargy, weight loss and apparently less conversant on 2/4. Found to have BLE cellulitis, Pre-renal MARILYN on CKD III and supratherapeutic INR (3.27); Fx workup negative, CT head negative.     Pre-Renal MARILYN on CKD Stage III (base 1.4 - 1.8) 2/2 possibly poor oral intake and Lasix  - Nephrology following   - Mancia in place 2/2 to retention - Consider voiding trial   - sCr downtrending 2/7 1.83 ---> 1.65    Hypotension 2/2 possible infectious etiology vs MARILYN on CKD  - Cellulitis BLE - Continue Ceftriaxone daily  - BLE doppler negative DVT  - BC/UCx NTD  - CXR - small left pleural effusion - repeat CXR 2/6 unchanged   - Continue midodrine daily    Fall   - CT head shows no bleed  - Fracture workup negative   - PT therapy consult - recommendations for ERICKSON    Metabolic encephalopathy 2/2 possibly dehydration, Lasix   - clinically improving  - At baseline pt A&Ox2    Chronic systolic CHF  - Stable for now  - Continue IV Lasix 40 daily  - Continue Daily weight  - Monitor I&O's   - Repeat K/Mag/ Phos - will continue to monitor  - Fluid restriction 1500     Afib  - On coumadin  - daily INR  - 2/7 INR 3.55 --> 2.10  - Continue coreg for rate control     Parkinson Disease/ Dementia  - Continue Sinemet   - Continue Aricept     CAD s/p PCI and CABG  - Continue Plavix  - Continue Lipitor 89 y/o male with PMH CAD s/p PCI/CABG, systolic CHF s/p AICD, HTN, dyslipidemia, CVA, orthostatic hypotension, CKD III, A fib on Coumadin, Parkinson's who presented via EMS s/p fall at home w/ hypotension with reported (SBP's in 90s), lethargy, weight loss and apparently less conversant on 2/4. Found to have BLE cellulitis, Pre-renal MARILYN on CKD III and supratherapeutic INR (3.27); Fx workup negative, CT head negative.     Pre-Renal MARILYN on CKD Stage III (base 1.4 - 1.8) 2/2 possibly poor oral intake and Lasix  - Nephrology following   - Mancia in place 2/2 to retention - D/C Mancia  - Voiding Trial - Bladder Scan q8  - 1x Flomax   - sCr downtrending 2/7 1.83 ---> 1.65    Hypotension 2/2 possible infectious etiology vs MARILYN on CKD  - Cellulitis BLE - Continue Ceftriaxone daily  - BLE doppler negative DVT  - BC/UCx NTD  - CXR - small left pleural effusion - repeat CXR 2/6 unchanged   - Continue midodrine daily    Fall   - CT head shows no bleed  - Fracture workup negative   - PT therapy consult - recommendations for ERICKSON    Metabolic encephalopathy 2/2 possibly dehydration, Lasix   - clinically improving  - At baseline pt A&Ox2    Chronic systolic CHF  - Stable for now  - Continue IV Lasix 40 daily  - Continue Daily weight  - Monitor I&O's   - Repeat K/Mag/ Phos - will continue to monitor  - Fluid restriction 1500     Afib  - On coumadin  - daily INR  - 2/7 INR 3.55 --> 2.10  - Continue coreg for rate control     Parkinson Disease/ Dementia  - Continue Sinemet   - Continue Aricept     CAD s/p PCI and CABG  - Continue Plavix  - Continue Lipitor 89 y/o male with PMH CAD s/p PCI/CABG, systolic CHF s/p AICD, HTN, dyslipidemia, CVA, orthostatic hypotension, CKD III, A fib on Coumadin, Parkinson's who presented via EMS s/p fall at home w/ hypotension with reported (SBP's in 90s), lethargy, weight loss and apparently less conversant on 2/4. Found to have BLE cellulitis, Pre-renal MARILYN on CKD III and supratherapeutic INR (3.27); Fx workup negative, CT head negative.     Pre-Renal MARILYN on CKD Stage III (base 1.4 - 1.8) 2/2 possibly poor oral intake and Lasix  - Nephrology following   - Mancia in place 2/2 to retention - D/C Mancia  - Voiding Trial - Bladder Scan q8  - 1x Flomax   - sCr downtrending 2/7 1.83 ---> 1.65    Hypotension 2/2 possible infectious etiology vs MARILYN on CKD  - Cellulitis BLE - Continue Ceftriaxone daily  - BLE doppler negative DVT  - BC/UCx NTD  - CXR - small left pleural effusion - repeat CXR 2/6 unchanged   - Continue midodrine daily    Fall   - CT head shows no bleed  - Fracture workup negative   - PT therapy consult - recommendations for ERICKSON    Metabolic encephalopathy 2/2 possibly dehydration, Lasix   - clinically improving  - At baseline pt A&Ox2    Chronic systolic CHF  - Stable for now  - Continue IV Lasix 40 daily  - Continue Daily weight  - Monitor I&O's   - Repeat K/Mag/ Phos - will continue to monitor  - Fluid restriction 1500     Afib  - On coumadin  - daily INR  - 2/7 INR 3.55 --> 2.10  - Continue coreg for rate control     Parkinson Disease/ Dementia  - Continue Sinemet   - Continue Aricept     CAD s/p PCI and CABG  - Continue Plavix  - Continue Lipitor     Advanced Directives:   - DNR   - HCP - David Mckeon ([facility]) [phone] 89 y/o male with PMH CAD s/p PCI/CABG, systolic CHF s/p AICD, HTN, dyslipidemia, CVA, orthostatic hypotension, CKD III, A fib on Coumadin, Parkinson's who presented via EMS s/p fall at home w/ hypotension with reported (SBP's in 90s), lethargy, weight loss and apparently less conversant on 2/4. Found to have BLE cellulitis, Pre-renal MARILYN on CKD III and supratherapeutic INR (3.27); Fx workup negative, CT head negative.     Pre-Renal MARILYN on CKD Stage III (base 1.4 - 1.8) 2/2 possibly poor oral intake and Lasix  - Nephrology following   - Mancia in place 2/2 to retention - D/C Mancia  - Voiding Trial - Bladder Scan q8  - 1x Flomax   - sCr downtrending 2/7 1.83 ---> 1.65    Hypotension 2/2 possible infectious etiology vs MARILYN on CKD  - Cellulitis BLE - Continue Ceftriaxone daily  - BLE doppler negative DVT  - BC/UCx NTD  - CXR - small left pleural effusion - repeat CXR 2/6 unchanged   - Continue midodrine daily    Fall   - CT head shows no bleed  - Fracture workup negative   - PT therapy consult - recommendations for ERICKSON    Metabolic encephalopathy 2/2 possibly dehydration, Lasix and infection   - clinically improving  - At baseline pt A&Ox2    Chronic systolic CHF  - Stable for now  - Continue IV Lasix 40 daily  - Continue Daily weight  - Monitor I&O's   - Repeat K/Mag/ Phos - will continue to monitor  - Fluid restriction 1500     Afib  - On coumadin  - daily INR  - 2/7 INR 3.55 --> 2.10  - Continue coreg for rate control     Parkinson Disease/ Dementia  - Continue Sinemet   - Continue Aricept     CAD s/p PCI and CABG  - Continue Plavix  - Continue Lipitor     Advanced Directives:   - DNR   - HCP - David Mckeon (Cell) [phone]

## 2019-02-08 NOTE — PROGRESS NOTE ADULT - ASSESSMENT
89 PMH of CAD s/p PCI / CABG, systolic CHF, h/o NSVT, s/p AICD, HTN, dylsipidemia, h/o orthostatic hypotension, CKD III base 1.4 mg/dl - 1.8 mg/dl , a-fib (on coumadin), parkinson's disease, admitted on 12/16 for evaluation of increasing shortness of breath with renal evaluation of CKD III. 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

## 2019-02-09 LAB
ANION GAP SERPL CALC-SCNC: 8 MMOL/L — SIGNIFICANT CHANGE UP (ref 5–17)
APTT BLD: 39.6 SEC — HIGH (ref 27.5–36.3)
BUN SERPL-MCNC: 39 MG/DL — HIGH (ref 7–23)
CALCIUM SERPL-MCNC: 8.3 MG/DL — LOW (ref 8.5–10.1)
CHLORIDE SERPL-SCNC: 102 MMOL/L — SIGNIFICANT CHANGE UP (ref 96–108)
CO2 SERPL-SCNC: 34 MMOL/L — HIGH (ref 22–31)
CREAT SERPL-MCNC: 1.44 MG/DL — HIGH (ref 0.5–1.3)
CULTURE RESULTS: SIGNIFICANT CHANGE UP
CULTURE RESULTS: SIGNIFICANT CHANGE UP
GLUCOSE SERPL-MCNC: 93 MG/DL — SIGNIFICANT CHANGE UP (ref 70–99)
HCT VFR BLD CALC: 30.5 % — LOW (ref 39–50)
HGB BLD-MCNC: 9.3 G/DL — LOW (ref 13–17)
INR BLD: 2.04 RATIO — HIGH (ref 0.88–1.16)
MCHC RBC-ENTMCNC: 27.3 PG — SIGNIFICANT CHANGE UP (ref 27–34)
MCHC RBC-ENTMCNC: 30.5 GM/DL — LOW (ref 32–36)
MCV RBC AUTO: 89.4 FL — SIGNIFICANT CHANGE UP (ref 80–100)
NRBC # BLD: 0 /100 WBCS — SIGNIFICANT CHANGE UP (ref 0–0)
PLATELET # BLD AUTO: 169 K/UL — SIGNIFICANT CHANGE UP (ref 150–400)
POTASSIUM SERPL-MCNC: 3.6 MMOL/L — SIGNIFICANT CHANGE UP (ref 3.5–5.3)
POTASSIUM SERPL-SCNC: 3.6 MMOL/L — SIGNIFICANT CHANGE UP (ref 3.5–5.3)
PROTHROM AB SERPL-ACNC: 23.1 SEC — HIGH (ref 10–12.9)
RBC # BLD: 3.41 M/UL — LOW (ref 4.2–5.8)
RBC # FLD: 17 % — HIGH (ref 10.3–14.5)
SODIUM SERPL-SCNC: 144 MMOL/L — SIGNIFICANT CHANGE UP (ref 135–145)
SPECIMEN SOURCE: SIGNIFICANT CHANGE UP
SPECIMEN SOURCE: SIGNIFICANT CHANGE UP
WBC # BLD: 4.87 K/UL — SIGNIFICANT CHANGE UP (ref 3.8–10.5)
WBC # FLD AUTO: 4.87 K/UL — SIGNIFICANT CHANGE UP (ref 3.8–10.5)

## 2019-02-09 RX ORDER — TAMSULOSIN HYDROCHLORIDE 0.4 MG/1
0.4 CAPSULE ORAL AT BEDTIME
Qty: 0 | Refills: 0 | Status: DISCONTINUED | OUTPATIENT
Start: 2019-02-09 | End: 2019-02-12

## 2019-02-09 RX ORDER — WARFARIN SODIUM 2.5 MG/1
1 TABLET ORAL DAILY
Qty: 0 | Refills: 0 | Status: DISCONTINUED | OUTPATIENT
Start: 2019-02-09 | End: 2019-02-12

## 2019-02-09 RX ORDER — BUDESONIDE AND FORMOTEROL FUMARATE DIHYDRATE 160; 4.5 UG/1; UG/1
2 AEROSOL RESPIRATORY (INHALATION)
Qty: 0 | Refills: 0 | Status: DISCONTINUED | OUTPATIENT
Start: 2019-02-09 | End: 2019-02-12

## 2019-02-09 RX ADMIN — PANTOPRAZOLE SODIUM 40 MILLIGRAM(S): 20 TABLET, DELAYED RELEASE ORAL at 06:21

## 2019-02-09 RX ADMIN — WARFARIN SODIUM 1 MILLIGRAM(S): 2.5 TABLET ORAL at 21:41

## 2019-02-09 RX ADMIN — WARFARIN SODIUM 1 MILLIGRAM(S): 2.5 TABLET ORAL at 00:03

## 2019-02-09 RX ADMIN — ATORVASTATIN CALCIUM 10 MILLIGRAM(S): 80 TABLET, FILM COATED ORAL at 21:41

## 2019-02-09 RX ADMIN — CARVEDILOL PHOSPHATE 3.12 MILLIGRAM(S): 80 CAPSULE, EXTENDED RELEASE ORAL at 06:21

## 2019-02-09 RX ADMIN — CARBIDOPA AND LEVODOPA 1 TABLET(S): 25; 100 TABLET ORAL at 21:41

## 2019-02-09 RX ADMIN — Medication 40 MILLIGRAM(S): at 11:26

## 2019-02-09 RX ADMIN — CEFTRIAXONE 1000 MILLIGRAM(S): 500 INJECTION, POWDER, FOR SOLUTION INTRAMUSCULAR; INTRAVENOUS at 15:44

## 2019-02-09 RX ADMIN — CARBIDOPA AND LEVODOPA 1 TABLET(S): 25; 100 TABLET ORAL at 06:21

## 2019-02-09 RX ADMIN — CLOPIDOGREL BISULFATE 75 MILLIGRAM(S): 75 TABLET, FILM COATED ORAL at 11:23

## 2019-02-09 RX ADMIN — Medication 3 MILLILITER(S): at 09:05

## 2019-02-09 RX ADMIN — DONEPEZIL HYDROCHLORIDE 5 MILLIGRAM(S): 10 TABLET, FILM COATED ORAL at 21:41

## 2019-02-09 RX ADMIN — Medication 1 TABLET(S): at 11:23

## 2019-02-09 RX ADMIN — TAMSULOSIN HYDROCHLORIDE 0.4 MILLIGRAM(S): 0.4 CAPSULE ORAL at 21:41

## 2019-02-09 RX ADMIN — Medication 3 MILLILITER(S): at 02:30

## 2019-02-09 RX ADMIN — Medication 20 MILLIEQUIVALENT(S): at 11:23

## 2019-02-09 RX ADMIN — CARBIDOPA AND LEVODOPA 1 TABLET(S): 25; 100 TABLET ORAL at 11:24

## 2019-02-09 RX ADMIN — MIDODRINE HYDROCHLORIDE 5 MILLIGRAM(S): 2.5 TABLET ORAL at 11:24

## 2019-02-09 RX ADMIN — Medication 3 MILLILITER(S): at 20:16

## 2019-02-09 RX ADMIN — Medication 3 MILLILITER(S): at 13:58

## 2019-02-09 NOTE — PROGRESS NOTE ADULT - ASSESSMENT
89 PMH of CAD s/p PCI / CABG, systolic CHF, h/o NSVT, s/p AICD, HTN, dylsipidemia, h/o orthostatic hypotension, CKD III base 1.4 mg/dl - 1.8 mg/dl , a-fib (on coumadin), parkinson's disease, admitted on 12/16 for evaluation of increasing shortness of breath with renal evaluation of CKD III. 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 to HTN nephrosclerosis, likely requires a pre-renal state as well to maintain euvolemia    MARILYN/CKD III  -Renal function stabilized, and near basline, monitor closely with diuretic on board  -Can likely convert to oral lasix  -Will add standing oral K, can stop when IV diuretic stopped  -Dose meds crcl ~ 35 ml/min    Hypokalemia  -Chronic issue, partly from lasix also from florinef with last admit  -oral Km standing as above while on IV diuresis    Orthostasis  -Converted from florinef to midodrine during last admission  -resumption of midodrine as standing order    d/c with staff    2/9 SY  --MARILYN/CKD : renal parameters improved   Continue diuretics.  --Replete K as indicated.  --Orthostatic Hypotension : continue Midodrine.

## 2019-02-09 NOTE — PROGRESS NOTE ADULT - SUBJECTIVE AND OBJECTIVE BOX
NEPHROLOGY INTERVAL HPI/OVERNIGHT EVENTS:    Date of Service: 19 @ 18:01   SY. Covering for Dr. Morfin.  No acute events overnight.  no new complaints.    HPI   Patient is a 89y Male whom presented to the hospital with CKD baseline near 1.8 m g/dl, CAD s/p PCI/CABG, systolic CHF s/p AICD, HTN, dyslipidemia, CVA, h/o orthostatic hypotension, CKD III, A fib on Coumadin, parkinson's, who presents via EMS w/ hypotension and lethargy renal evaluation of MARILYN.     Pt was hospitalized  recently for PNA, patient with apparent soft bp at home and recent increase in lasix dosing as well. Admitted and started on IVF.     PAST MEDICAL & SURGICAL HISTORY:  Parkinson's disease  CHF (congestive heart failure)  Hyperlipidemia  HTN (hypertension)  CAD (coronary artery disease)  AICD (automatic cardioverter/defibrillator) present  S/P CABG    MEDICATIONS  (STANDING):  ALBUTerol/ipratropium for Nebulization 3 milliLiter(s) Nebulizer every 6 hours  atorvastatin 10 milliGRAM(s) Oral at bedtime  buDESOnide 160 MICROgram(s)/formoterol 4.5 MICROgram(s) Inhaler 2 Puff(s) Inhalation two times a day  carbidopa/levodopa  25/100 1 Tablet(s) Oral <User Schedule>  carvedilol 3.125 milliGRAM(s) Oral every 12 hours  cefTRIAXone Injectable. 1000 milliGRAM(s) IV Push every 24 hours  cefTRIAXone Injectable.      clopidogrel Tablet 75 milliGRAM(s) Oral daily  donepezil 5 milliGRAM(s) Oral at bedtime  furosemide   Injectable 40 milliGRAM(s) IV Push daily  midodrine 5 milliGRAM(s) Oral <User Schedule>  multivitamin 1 Tablet(s) Oral daily  pantoprazole    Tablet 40 milliGRAM(s) Oral before breakfast  potassium chloride    Tablet ER 20 milliEquivalent(s) Oral daily  tamsulosin 0.4 milliGRAM(s) Oral at bedtime  warfarin 1 milliGRAM(s) Oral daily    MEDICATIONS  (PRN):  docusate sodium 100 milliGRAM(s) Oral two times a day PRN Constipation  senna 2 Tablet(s) Oral at bedtime PRN Constipation    Vital Signs Last 24 Hrs  T(C): 36.8 (2019 11:19), Max: 36.8 (2019 21:20)  T(F): 98.2 (2019 11:19), Max: 98.3 (2019 21:20)  HR: 68 (2019 13:58) (58 - 83)  BP: 104/54 (2019 11:37) (103/42 - 114/53)  BP(mean): --  RR: 19 (2019 11:19) (19 - 20)  SpO2: 97% (2019 11:19) (94% - 97%)  Daily     Daily Weight in k.8 (2019 05:10)     @ 07:01  -   @ 07:00  --------------------------------------------------------  IN: 210 mL / OUT: 1250 mL / NET: -1040 mL     @ 07:  -   @ 18:01  --------------------------------------------------------  IN: 0 mL / OUT: 1000 mL / NET: -1000 mL        PHYSICAL EXAM:  Alert and comfortable  GENERAL: stable  CHEST/LUNG: scattered rhonchi  HEART: S1S2 RRR  ABDOMEN: soft  EXTREMITIES: positive edema  SKIN:     LABS:                        9.3    4.87  )-----------( 169      ( 2019 07:51 )             30.5     02    144  |  102  |  39<H>  ----------------------------<  93  3.6   |  34<H>  |  1.44<H>    Ca    8.3<L>      2019 07:51  Phos  3.4     02-08  Mg     2.2     02-08    TPro  7.1  /  Alb  2.9<L>  /  TBili  0.7  /  DBili  x   /  AST  20  /  ALT  6<L>  /  AlkPhos  114      PT/INR - ( 2019 07:51 )   PT: 23.1 sec;   INR: 2.04 ratio         PTT - ( 2019 07:51 )  PTT:39.6 sec            RADIOLOGY & ADDITIONAL TESTS:

## 2019-02-09 NOTE — PROGRESS NOTE ADULT - ASSESSMENT
89 y/o male with PMH CAD s/p PCI/CABG, systolic CHF s/p AICD, HTN, dyslipidemia, CVA, orthostatic hypotension, CKD III, A fib on Coumadin, Parkinson's who presented via EMS s/p fall at home w/ hypotension with reported (SBP's in 90s), lethargy, weight loss and apparently less conversant on 2/4. Found to have BLE cellulitis, Pre-renal MARILYN on CKD III and supratherapeutic INR (3.27); Fx workup negative, CT head negative.     Pre-Renal MARILYN on CKD Stage III (base 1.4 - 1.8) 2/2 possibly poor oral intake and Lasix  - Nephrology following   - Failed Voiding Trial   - Continue Flomax HS   - sCr downtrending 2/7 1.83 ---> 1.65 ---> 1.44    Hypotension 2/2 possible infectious etiology vs MARILYN on CKD  - Cellulitis BLE - Continue Ceftriaxone daily  - BLE doppler negative DVT  - BC/UCx NTD  - CXR - small left pleural effusion - repeat CXR 2/6 unchanged   - Continue midodrine daily    Fall   - CT head shows no bleed  - Fracture workup negative   - PT therapy consult - recommendations for ERICKSON    Metabolic encephalopathy 2/2 possibly dehydration, Lasix and infection   - Clinically improving  - At baseline pt A&Ox2    Chronic decompensated systolic HF rEF  - Echo 2/2018 - EF 35-40 %  - Stable for now  - Continue IV Lasix 40 daily  - Continue Daily weight  - Monitor I&O's   - Repeat K/Mag/ Phos - will continue to monitor  - Fluid restriction 1500     Afib  - Restart coumadin 1mg   - daily INR  - Continue coreg for rate control     Parkinson Disease/ Dementia  - Continue Sinemet   - Continue Aricept     CAD s/p PCI and CABG  - Continue Plavix  - Continue Lipitor     Advanced Directives:   - DNR   - HCP - David Mckeon (Cell) [phone]    Dispo Planning  - ERICKSON  - D/C with [facility] - f/u otpt.

## 2019-02-09 NOTE — PROGRESS NOTE ADULT - ATTENDING COMMENTS
pt has been seen and examined with NP student Sujata, supervised, agree with a/p   -on exam- much better and comfortable today   -RS- aeeb, wheeze persent     #ct iv lasix, abx, supportive care     #replace hypophophatemia    #Discussed with Dr. Morfin pt has been seen and examined with NP student Sujata, supervised, agree with a/p   -on exam- much better and comfortable today   -RS- aeeb, wheeze persent     #ct iv lasix, abx, supportive care     #clinically much improved, possible d/c on monday

## 2019-02-09 NOTE — PROGRESS NOTE ADULT - SUBJECTIVE AND OBJECTIVE BOX
HPI:  89 y/o male with PMH CAD s/p PCI/CABG, systolic CHF s/p AICD, HTN, dyslipidemia, CVA, orthostatic hypotension, CKD III, A fib on Coumadin, Parkinson's who presented via EMS s/p fall at home w/ hypotension and lethargy, and apparently less conversant on 2/4. At that time visiting nurse reported pt was hypotensive at home, with systolic blood pressure in the mid 90's and thought pt was dehydrated. Visiting nurse stated, that he had lost 3 lb over 2 days after an increase Lasix dosing. Pt reported, no fever, chest pain, urinary pain or SOB.  Patient is A+Ox2 at baseline and is a poor historian.  History obtained from a daughter and son who reported that the pt is incontinent of urine. Of note, pt recently hospitalized for PNA. On admission pt found to have, sCr 2.35 (baseline 1.4-1.8), INR 3.27, CXR with small left pleural effusion, Fx w/up negative.   	  Interval HPI 2/8: Pt resting in bed comfortable, no c/o of pain or SOB.  BLE with edema noted L>R.     ROS: Unable to obtain ROS 2/2 confusion.     PHYSICAL EXAM:  Vital Signs Last 24 Hrs  T(C): 36.8 (09 Feb 2019 11:19), Max: 36.8 (08 Feb 2019 11:56)  T(F): 98.2 (09 Feb 2019 11:19), Max: 98.3 (08 Feb 2019 21:20)  HR: 58 (09 Feb 2019 11:19) (58 - 83)  BP: 104/54 (09 Feb 2019 11:37) (103/42 - 128/62)  BP(mean): --  RR: 19 (09 Feb 2019 11:19) (19 - 26)  SpO2: 97% (09 Feb 2019 11:19) (94% - 97%)    GENERAL: Resting comfortable in bed. No s/s of acute distress. Disheveled appearance    HEAD:  Atraumatic, Normocephalic  EYES: Conjunctiva and sclera clear. Unable to follow commands with pupil assessment.   HEENT: Moist mucous membranes  NECK: Supple, No JVD  NERVOUS SYSTEM:  Alert & Oriented x2 to person/place. Follows simple commands.   CHEST/LUNG: Expiratory wheeze to LLL. No cough noted. B/L pedal edema present L>R  HEART: S1 S2 RRR, no murmur noted.   ABDOMEN: Soft and nondistended: Bowel sounds present in all 4 quadrants.  GENITOURINARY: Mancia in place to bedside drainage.    SKIN: Redness present in BLE. Left arm skin tear.   PSYCH: Mood stable      LABS:                        9.3    4.87  )-----------( 169      ( 09 Feb 2019 07:51 )             30.5   02-09    144  |  102  |  39<H>  ----------------------------<  93  3.6   |  34<H>  |  1.44<H>    Ca    8.3<L>      09 Feb 2019 07:51  Phos  3.4     02-08  Mg     2.2     02-08    TPro  7.1  /  Alb  2.9<L>  /  TBili  0.7  /  DBili  x   /  AST  20  /  ALT  6<L>  /  AlkPhos  114  02-08    PT/INR - ( 09 Feb 2019 07:51 )   PT: 23.1 sec;   INR: 2.04 ratio       PTT - ( 09 Feb 2019 07:51 )  PTT:39.6 sec      MEDICATIONS  (STANDING):  ALBUTerol/ipratropium for Nebulization 3 milliLiter(s) Nebulizer every 6 hours  atorvastatin 10 milliGRAM(s) Oral at bedtime  carbidopa/levodopa  25/100 1 Tablet(s) Oral <User Schedule>  carvedilol 3.125 milliGRAM(s) Oral every 12 hours  cefTRIAXone Injectable. 1000 milliGRAM(s) IV Push every 24 hours  cefTRIAXone Injectable.      clopidogrel Tablet 75 milliGRAM(s) Oral daily  donepezil 5 milliGRAM(s) Oral at bedtime  furosemide   Injectable 40 milliGRAM(s) IV Push daily  midodrine 5 milliGRAM(s) Oral <User Schedule>  multivitamin 1 Tablet(s) Oral daily  pantoprazole    Tablet 40 milliGRAM(s) Oral before breakfast  potassium chloride    Tablet ER 20 milliEquivalent(s) Oral daily    MEDICATIONS  (PRN):  docusate sodium 100 milliGRAM(s) Oral two times a day PRN Constipation  senna 2 Tablet(s) Oral at bedtime PRN Constipation

## 2019-02-10 LAB
ANION GAP SERPL CALC-SCNC: 5 MMOL/L — SIGNIFICANT CHANGE UP (ref 5–17)
APTT BLD: 39.1 SEC — HIGH (ref 27.5–36.3)
BUN SERPL-MCNC: 34 MG/DL — HIGH (ref 7–23)
CALCIUM SERPL-MCNC: 8.1 MG/DL — LOW (ref 8.5–10.1)
CHLORIDE SERPL-SCNC: 102 MMOL/L — SIGNIFICANT CHANGE UP (ref 96–108)
CO2 SERPL-SCNC: 33 MMOL/L — HIGH (ref 22–31)
CREAT SERPL-MCNC: 1.36 MG/DL — HIGH (ref 0.5–1.3)
GLUCOSE SERPL-MCNC: 100 MG/DL — HIGH (ref 70–99)
INR BLD: 1.64 RATIO — HIGH (ref 0.88–1.16)
POTASSIUM SERPL-MCNC: 3.6 MMOL/L — SIGNIFICANT CHANGE UP (ref 3.5–5.3)
POTASSIUM SERPL-SCNC: 3.6 MMOL/L — SIGNIFICANT CHANGE UP (ref 3.5–5.3)
PROTHROM AB SERPL-ACNC: 18.5 SEC — HIGH (ref 10–12.9)
SODIUM SERPL-SCNC: 140 MMOL/L — SIGNIFICANT CHANGE UP (ref 135–145)

## 2019-02-10 RX ORDER — METOPROLOL TARTRATE 50 MG
25 TABLET ORAL ONCE
Qty: 0 | Refills: 0 | Status: COMPLETED | OUTPATIENT
Start: 2019-02-10 | End: 2019-02-10

## 2019-02-10 RX ORDER — METOPROLOL TARTRATE 50 MG
25 TABLET ORAL
Qty: 0 | Refills: 0 | Status: DISCONTINUED | OUTPATIENT
Start: 2019-02-11 | End: 2019-02-12

## 2019-02-10 RX ORDER — FUROSEMIDE 40 MG
40 TABLET ORAL DAILY
Qty: 0 | Refills: 0 | Status: DISCONTINUED | OUTPATIENT
Start: 2019-02-11 | End: 2019-02-12

## 2019-02-10 RX ORDER — BETHANECHOL CHLORIDE 25 MG
10 TABLET ORAL
Qty: 0 | Refills: 0 | Status: DISCONTINUED | OUTPATIENT
Start: 2019-02-10 | End: 2019-02-12

## 2019-02-10 RX ORDER — CEFUROXIME AXETIL 250 MG
500 TABLET ORAL EVERY 12 HOURS
Qty: 0 | Refills: 0 | Status: DISCONTINUED | OUTPATIENT
Start: 2019-02-11 | End: 2019-02-12

## 2019-02-10 RX ADMIN — Medication 3 MILLILITER(S): at 19:55

## 2019-02-10 RX ADMIN — CLOPIDOGREL BISULFATE 75 MILLIGRAM(S): 75 TABLET, FILM COATED ORAL at 11:58

## 2019-02-10 RX ADMIN — CARBIDOPA AND LEVODOPA 1 TABLET(S): 25; 100 TABLET ORAL at 11:58

## 2019-02-10 RX ADMIN — Medication 20 MILLIEQUIVALENT(S): at 11:58

## 2019-02-10 RX ADMIN — CARBIDOPA AND LEVODOPA 1 TABLET(S): 25; 100 TABLET ORAL at 05:22

## 2019-02-10 RX ADMIN — WARFARIN SODIUM 1 MILLIGRAM(S): 2.5 TABLET ORAL at 21:16

## 2019-02-10 RX ADMIN — Medication 10 MILLIGRAM(S): at 16:02

## 2019-02-10 RX ADMIN — PANTOPRAZOLE SODIUM 40 MILLIGRAM(S): 20 TABLET, DELAYED RELEASE ORAL at 05:22

## 2019-02-10 RX ADMIN — MIDODRINE HYDROCHLORIDE 5 MILLIGRAM(S): 2.5 TABLET ORAL at 11:58

## 2019-02-10 RX ADMIN — DONEPEZIL HYDROCHLORIDE 5 MILLIGRAM(S): 10 TABLET, FILM COATED ORAL at 21:16

## 2019-02-10 RX ADMIN — ATORVASTATIN CALCIUM 10 MILLIGRAM(S): 80 TABLET, FILM COATED ORAL at 21:16

## 2019-02-10 RX ADMIN — BUDESONIDE AND FORMOTEROL FUMARATE DIHYDRATE 2 PUFF(S): 160; 4.5 AEROSOL RESPIRATORY (INHALATION) at 19:55

## 2019-02-10 RX ADMIN — TAMSULOSIN HYDROCHLORIDE 0.4 MILLIGRAM(S): 0.4 CAPSULE ORAL at 21:16

## 2019-02-10 RX ADMIN — Medication 25 MILLIGRAM(S): at 19:06

## 2019-02-10 RX ADMIN — CARBIDOPA AND LEVODOPA 1 TABLET(S): 25; 100 TABLET ORAL at 21:16

## 2019-02-10 RX ADMIN — Medication 3 MILLILITER(S): at 12:54

## 2019-02-10 RX ADMIN — Medication 1 TABLET(S): at 11:58

## 2019-02-10 RX ADMIN — Medication 3 MILLILITER(S): at 01:15

## 2019-02-10 RX ADMIN — Medication 40 MILLIGRAM(S): at 11:58

## 2019-02-10 NOTE — PROGRESS NOTE ADULT - ASSESSMENT
89 y/o male with PMH CAD s/p PCI/CABG, systolic CHF s/p AICD, HTN, dyslipidemia, CVA, orthostatic hypotension, CKD III, A fib on Coumadin, Parkinson's who presented via EMS s/p fall at home w/ hypotension with reported (SBP's in 90s), lethargy, weight loss and apparently less conversant on 2/4. Found to have BLE cellulitis, Pre-renal MARILYN on CKD III and supratherapeutic INR (3.27); Fx workup negative, CT head negative.     Pre-Renal MARILYN on CKD Stage III (base 1.4 - 1.8) 2/2 possibly poor oral intake and Lasix  - Nephrology following   - Failed Voiding Trial   - Continue Flomax HS   - sCr downtrending  1.44 ---> 1.36    Hypotension 2/2 possible infectious etiology vs MARILYN on CKD  - Cellulitis BLE - Continue Ceftriaxone daily  - BLE doppler negative DVT  - BC/UCx NTD  - CXR - small left pleural effusion - repeat CXR 2/6 unchanged   - Continue midodrine daily    Fall   - CT head shows no bleed  - Fracture workup negative   - PT therapy consult - recommendations for [facility]    Metabolic encephalopathy 2/2 possibly dehydration, Lasix and infection   - Clinically improving  - At baseline pt A&Ox2    Chronic decompensated systolic HF rEF  - Echo 2/2018 - EF 35-40 %  - Stable for now  - Continue Daily weight  - Fluid restriction 1500     Afib  - Restart coumadin 1mg   - daily INR  - Continue coreg for rate control     Parkinson Disease/ Dementia  - Continue Sinemet   - Continue Aricept     CAD s/p PCI and CABG  - Continue Plavix  - Continue Lipitor     Advanced Directives:   - DNR   - HCP - David Mckeon (Cell) [phone]    Dispo Planning  - [facility]  - D/C with [facility] - f/u otpt. 89 y/o male with PMH CAD s/p PCI/CABG, systolic CHF s/p AICD, HTN, dyslipidemia, CVA, orthostatic hypotension, CKD III, A fib on Coumadin, Parkinson's who presented via EMS s/p fall at home w/ hypotension with reported (SBP's in 90s), lethargy, weight loss and apparently less conversant on 2/4. Found to have BLE cellulitis, Pre-renal MARILYN on CKD III and supratherapeutic INR (3.27); Fx workup negative, CT head negative.     Pre-Renal MARILYN on CKD Stage III (base 1.4 - 1.8) 2/2 possibly poor oral intake and Lasix  - Nephrology following   - Failed Voiding Trial x1 --> repeat voiding trial as per family request  - Started bethanechol BID  - Continue Flomax HS   - sCr downtrending  1.44 ---> 1.36 - Changed Lasix to PO, D/C potassium     Hypotension 2/2 possible infectious etiology vs MARILYN on CKD  - Cellulitis BLE - Changed to ceftin PO BID  - BLE doppler negative DVT  - BC/UCx NTD  - CXR - small left pleural effusion - repeat CXR 2/6 unchanged   - Continue midodrine daily    Fall   - CT head shows no bleed  - Fracture workup negative   - PT therapy consult - recommendations for [facility]ICKSON    Metabolic encephalopathy 2/2 possibly dehydration, Lasix and infection   - Clinically improving  - At baseline pt A&Ox2    Chronic decompensated systolic HF rEF  - Echo 2/2018 - EF 35-40 %  - Stable for now  - Continue Daily weight  - Fluid restriction 1500     Afib  - Restart coumadin 1mg   - daily INR  - Continue coreg for rate control     Parkinson Disease/ Dementia  - Continue Sinemet   - Continue Aricept     CAD s/p PCI and CABG  - Continue Plavix  - Continue Lipitor     Advanced Directives:   - DNR   - HCP - David Mckeon (Cell) [phone]    Dispo Planning  - [facility]  -- D/C TM

## 2019-02-10 NOTE — PROGRESS NOTE ADULT - SUBJECTIVE AND OBJECTIVE BOX
HPI:  89 y/o male with PMH CAD s/p PCI/CABG, systolic CHF s/p AICD, HTN, dyslipidemia, CVA, orthostatic hypotension, CKD III, A fib on Coumadin, Parkinson's who presented via EMS s/p fall at home w/ hypotension and lethargy, and apparently less conversant on 2/4. At that time visiting nurse reported pt was hypotensive at home, with systolic blood pressure in the mid 90's and thought pt was dehydrated. Visiting nurse stated, that he had lost 3 lb over 2 days after an increase Lasix dosing. Pt reported, no fever, chest pain, urinary pain or SOB.  Patient is A+Ox2 at baseline and is a poor historian.  History obtained from a daughter and son who reported that the pt is incontinent of urine. Of note, pt recently hospitalized for PNA. On admission pt found to have, sCr 2.35 (baseline 1.4-1.8), INR 3.27, CXR with small left pleural effusion, Fx w/up negative.   	  Interval HPI 2/10: Pt resting in bed comfortable, no c/o of pain or SOB.  BLE with edema noted L>R - appears unchanged.     ROS: Unable to obtain ROS 2/2 confusion.     PHYSICAL EXAM:  Vital Signs Last 24 Hrs  T(C): 36.9 (10 Feb 2019 11:03), Max: 36.9 (09 Feb 2019 21:57)  T(F): 98.4 (10 Feb 2019 11:03), Max: 98.5 (09 Feb 2019 21:57)  HR: 67 (10 Feb 2019 11:03) (62 - 100)  BP: 105/38 (10 Feb 2019 11:03) (104/46 - 116/54)  BP(mean): --  RR: 18 (10 Feb 2019 11:03) (18 - 19)  SpO2: 100% (10 Feb 2019 11:03) (93% - 100%)    GENERAL: Resting comfortable in bed. No s/s of acute distress   HEAD:  Atraumatic, Normocephalic  EYES: Conjunctiva and sclera clear. Unable to follow commands with pupil assessment.   HEENT: Moist mucous membranes  NECK: Supple, No JVD  NERVOUS SYSTEM:  Alert & Oriented x2 to person/place. Follows simple commands.   CHEST/LUNG: Slight expiratory wheeze and rhonchi. No cough noted. B/L pedal edema present L>R  HEART: S1 S2 RRR, no murmur noted.   ABDOMEN: Soft and nondistended: Bowel sounds present in all 4 quadrants.  GENITOURINARY: Mancia in place to bedside drainage.    SKIN: Redness present in BLE. Left arm skin tear.   PSYCH: Mood stable      LABS:                        9.3    4.87  )-----------( 169      ( 09 Feb 2019 07:51 )             30.5   02-09    144  |  102  |  39<H>  ----------------------------<  93  3.6   |  34<H>  |  1.44<H>    Ca    8.3<L>      09 Feb 2019 07:51  Phos  3.4     02-08  Mg     2.2     02-08    TPro  7.1  /  Alb  2.9<L>  /  TBili  0.7  /  DBili  x   /  AST  20  /  ALT  6<L>  /  AlkPhos  114  02-08    PT/INR - ( 09 Feb 2019 07:51 )   PT: 23.1 sec;   INR: 2.04 ratio       PTT - ( 09 Feb 2019 07:51 )  PTT:39.6 sec      MEDICATIONS  (STANDING):  ALBUTerol/ipratropium for Nebulization 3 milliLiter(s) Nebulizer every 6 hours  atorvastatin 10 milliGRAM(s) Oral at bedtime  buDESOnide 160 MICROgram(s)/formoterol 4.5 MICROgram(s) Inhaler 2 Puff(s) Inhalation two times a day  carbidopa/levodopa  25/100 1 Tablet(s) Oral <User Schedule>  carvedilol 3.125 milliGRAM(s) Oral every 12 hours  cefTRIAXone Injectable. 1000 milliGRAM(s) IV Push every 24 hours  cefTRIAXone Injectable.      clopidogrel Tablet 75 milliGRAM(s) Oral daily  donepezil 5 milliGRAM(s) Oral at bedtime  furosemide   Injectable 40 milliGRAM(s) IV Push daily  midodrine 5 milliGRAM(s) Oral <User Schedule>  multivitamin 1 Tablet(s) Oral daily  pantoprazole    Tablet 40 milliGRAM(s) Oral before breakfast  potassium chloride    Tablet ER 20 milliEquivalent(s) Oral daily  tamsulosin 0.4 milliGRAM(s) Oral at bedtime  warfarin 1 milliGRAM(s) Oral daily    MEDICATIONS  (PRN):  docusate sodium 100 milliGRAM(s) Oral two times a day PRN Constipation  senna 2 Tablet(s) Oral at bedtime PRN Constipation

## 2019-02-10 NOTE — PROGRESS NOTE ADULT - ATTENDING COMMENTS
on exam- better appearance with fluctuating mental status   -rs- aeeb, less wheeze today     #ct lasix, monitor for retention of urine. Has failed trial of void before. If retain again then might need to d/c with [facility]     #discharge plan

## 2019-02-11 LAB
ANION GAP SERPL CALC-SCNC: 6 MMOL/L — SIGNIFICANT CHANGE UP (ref 5–17)
APTT BLD: 38.2 SEC — HIGH (ref 27.5–36.3)
BUN SERPL-MCNC: 32 MG/DL — HIGH (ref 7–23)
CALCIUM SERPL-MCNC: 8.3 MG/DL — LOW (ref 8.5–10.1)
CHLORIDE SERPL-SCNC: 102 MMOL/L — SIGNIFICANT CHANGE UP (ref 96–108)
CO2 SERPL-SCNC: 30 MMOL/L — SIGNIFICANT CHANGE UP (ref 22–31)
CREAT SERPL-MCNC: 1.39 MG/DL — HIGH (ref 0.5–1.3)
CULTURE RESULTS: SIGNIFICANT CHANGE UP
GLUCOSE SERPL-MCNC: 115 MG/DL — HIGH (ref 70–99)
INR BLD: 1.51 RATIO — HIGH (ref 0.88–1.16)
POTASSIUM SERPL-MCNC: 3.7 MMOL/L — SIGNIFICANT CHANGE UP (ref 3.5–5.3)
POTASSIUM SERPL-SCNC: 3.7 MMOL/L — SIGNIFICANT CHANGE UP (ref 3.5–5.3)
PROTHROM AB SERPL-ACNC: 17 SEC — HIGH (ref 10–12.9)
SODIUM SERPL-SCNC: 138 MMOL/L — SIGNIFICANT CHANGE UP (ref 135–145)
SPECIMEN SOURCE: SIGNIFICANT CHANGE UP

## 2019-02-11 RX ADMIN — CARBIDOPA AND LEVODOPA 1 TABLET(S): 25; 100 TABLET ORAL at 22:27

## 2019-02-11 RX ADMIN — Medication 3 MILLILITER(S): at 01:32

## 2019-02-11 RX ADMIN — Medication 3 MILLILITER(S): at 19:31

## 2019-02-11 RX ADMIN — Medication 10 MILLIGRAM(S): at 18:21

## 2019-02-11 RX ADMIN — BUDESONIDE AND FORMOTEROL FUMARATE DIHYDRATE 2 PUFF(S): 160; 4.5 AEROSOL RESPIRATORY (INHALATION) at 09:33

## 2019-02-11 RX ADMIN — DONEPEZIL HYDROCHLORIDE 5 MILLIGRAM(S): 10 TABLET, FILM COATED ORAL at 22:27

## 2019-02-11 RX ADMIN — PANTOPRAZOLE SODIUM 40 MILLIGRAM(S): 20 TABLET, DELAYED RELEASE ORAL at 05:56

## 2019-02-11 RX ADMIN — BUDESONIDE AND FORMOTEROL FUMARATE DIHYDRATE 2 PUFF(S): 160; 4.5 AEROSOL RESPIRATORY (INHALATION) at 19:30

## 2019-02-11 RX ADMIN — ATORVASTATIN CALCIUM 10 MILLIGRAM(S): 80 TABLET, FILM COATED ORAL at 22:27

## 2019-02-11 RX ADMIN — Medication 500 MILLIGRAM(S): at 18:21

## 2019-02-11 RX ADMIN — MIDODRINE HYDROCHLORIDE 5 MILLIGRAM(S): 2.5 TABLET ORAL at 11:29

## 2019-02-11 RX ADMIN — CARBIDOPA AND LEVODOPA 1 TABLET(S): 25; 100 TABLET ORAL at 05:55

## 2019-02-11 RX ADMIN — CLOPIDOGREL BISULFATE 75 MILLIGRAM(S): 75 TABLET, FILM COATED ORAL at 11:29

## 2019-02-11 RX ADMIN — Medication 5 MILLIGRAM(S): at 02:08

## 2019-02-11 RX ADMIN — CARBIDOPA AND LEVODOPA 1 TABLET(S): 25; 100 TABLET ORAL at 11:29

## 2019-02-11 RX ADMIN — Medication 3 MILLILITER(S): at 13:51

## 2019-02-11 RX ADMIN — SENNA PLUS 2 TABLET(S): 8.6 TABLET ORAL at 22:28

## 2019-02-11 RX ADMIN — Medication 1 TABLET(S): at 11:29

## 2019-02-11 RX ADMIN — WARFARIN SODIUM 1 MILLIGRAM(S): 2.5 TABLET ORAL at 22:27

## 2019-02-11 RX ADMIN — Medication 3 MILLILITER(S): at 09:31

## 2019-02-11 RX ADMIN — Medication 500 MILLIGRAM(S): at 05:55

## 2019-02-11 RX ADMIN — Medication 10 MILLIGRAM(S): at 05:55

## 2019-02-11 RX ADMIN — Medication 40 MILLIGRAM(S): at 11:29

## 2019-02-11 RX ADMIN — TAMSULOSIN HYDROCHLORIDE 0.4 MILLIGRAM(S): 0.4 CAPSULE ORAL at 22:27

## 2019-02-11 NOTE — PROGRESS NOTE ADULT - SUBJECTIVE AND OBJECTIVE BOX
Patient is a 89y Male who is sitting in bed.     MEDICATIONS  (STANDING):  ALBUTerol/ipratropium for Nebulization 3 milliLiter(s) Nebulizer every 6 hours  atorvastatin 10 milliGRAM(s) Oral at bedtime  bethanechol 10 milliGRAM(s) Oral two times a day  buDESOnide 160 MICROgram(s)/formoterol 4.5 MICROgram(s) Inhaler 2 Puff(s) Inhalation two times a day  carbidopa/levodopa  25/100 1 Tablet(s) Oral <User Schedule>  cefuroxime   Tablet 500 milliGRAM(s) Oral every 12 hours  clopidogrel Tablet 75 milliGRAM(s) Oral daily  donepezil 5 milliGRAM(s) Oral at bedtime  furosemide    Tablet 40 milliGRAM(s) Oral daily  metoprolol tartrate 25 milliGRAM(s) Oral two times a day  midodrine 5 milliGRAM(s) Oral <User Schedule>  multivitamin 1 Tablet(s) Oral daily  pantoprazole    Tablet 40 milliGRAM(s) Oral before breakfast  tamsulosin 0.4 milliGRAM(s) Oral at bedtime  warfarin 1 milliGRAM(s) Oral daily    MEDICATIONS  (PRN):  docusate sodium 100 milliGRAM(s) Oral two times a day PRN Constipation  senna 2 Tablet(s) Oral at bedtime PRN Constipation        T(C): , Max: 36.9 (02-10-19 @ 11:03)  T(F): , Max: 98.4 (02-10-19 @ 11:03)  HR: 58 (02-11-19 @ 06:04)  BP: 101/46 (02-11-19 @ 04:35)  BP(mean): --  RR: 17 (02-10-19 @ 20:15)  SpO2: 100% (02-11-19 @ 04:35)  Wt(kg): --    02-10 @ 07:01  -  02-11 @ 07:00  --------------------------------------------------------  IN: 0 mL / OUT: 1000 mL / NET: -1000 mL    PHYSICAL EXAM:    Constitutional: NAD,frail  HEENT: PERRLA, EOMI,  MMM  Neck: No LAD, No JVD  Respiratory: good aeration  Cardiovascular: S1 and S2, RRR  Gastrointestinal: BS+, soft, NT/ND  Extremities: LE chronic changes  Neurological: Alert  Psychiatric: Normal mood, normal affect  : No Mancia  Skin: No rashes  Access: Not applicable        LABS:    11 Feb 2019 08:00    138    |  102    |  32     ----------------------------<  115    3.7     |  30     |  1.39   10 Feb 2019 07:12    140    |  102    |  34     ----------------------------<  100    3.6     |  33     |  1.36   09 Feb 2019 07:51    144    |  102    |  39     ----------------------------<  93     3.6     |  34     |  1.44   08 Feb 2019 09:52    142    |  103    |  39     ----------------------------<  103    3.6     |  35     |  1.65     Ca    8.3        11 Feb 2019 08:00  Ca    8.1        10 Feb 2019 07:12  Ca    8.3        09 Feb 2019 07:51  Ca    8.3        08 Feb 2019 09:52  Phos  3.4       08 Feb 2019 09:52  Mg     2.2       08 Feb 2019 09:52    TPro  7.1    /  Alb  2.9    /  TBili  0.7    /  DBili  x      /  AST  20     /  ALT  6      /  AlkPhos  114    08 Feb 2019 09:52          Urine Studies:          RADIOLOGY & ADDITIONAL STUDIES:

## 2019-02-11 NOTE — PROGRESS NOTE ADULT - ASSESSMENT
89 PMH of CAD s/p PCI / CABG, systolic CHF, h/o NSVT, s/p AICD, HTN, dylsipidemia, h/o orthostatic hypotension, CKD III base 1.4 mg/dl - 1.8 mg/dl , a-fib (on coumadin), parkinson's disease, admitted on 12/16 for evaluation of increasing shortness of breath with renal evaluation of CKD III. 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

## 2019-02-11 NOTE — PROGRESS NOTE ADULT - ASSESSMENT
89 y/o male with PMH CAD s/p PCI/CABG, systolic CHF s/p AICD, HTN, dyslipidemia, CVA, orthostatic hypotension, CKD III, A fib on Coumadin, Parkinson's who presented via EMS s/p fall at home w/ hypotension with reported (SBP's in 90s), lethargy, weight loss and apparently less conversant on 2/4. Found to have BLE cellulitis, Pre-renal MARILYN on CKD III and supratherapeutic INR (3.27); Fx workup negative, CT head negative.     Pre-Renal MARILYN on CKD Stage III (base 1.4 - 1.8) 2/2 possibly poor oral intake and Lasix  Urine Retention  - Nephrology following   - Failed Voiding Trial x1 --> repeat voiding trial as per family request   - Started bethanechol BID  - Continue Flomax HS   - sCr downtrending  1.44 ---> 1.36 - Changed Lasix to PO, D/C potassium     Hypotension 2/2 possible infectious etiology vs MARILYN on CKD  - Cellulitis BLE - Changed to ceftin PO BID  - BLE doppler negative DVT  - BC/UCx NTD  - CXR - small left pleural effusion - repeat CXR 2/6 unchanged   - Continue midodrine daily    Fall   - CT head shows no bleed  - CT cspine - no fracture  - Fracture workup negative   - PT therapy consult - recommendations for [facility]ICKSON    Metabolic encephalopathy 2/2 possibly dehydration, Lasix and infection   - Clinically improving  - At baseline pt A&Ox2    Chronic decompensated systolic HF rEF  - Echo 2/2018 - EF 35-40 %  - Stable for now  - Continue Daily weight  - Fluid restriction 1500     Afib  - Restart coumadin 1mg   - daily INR  - Continue coreg for rate control     Parkinson Disease/ Dementia  - Continue Sinemet   - Continue Aricept     CAD s/p PCI and CABG  - Continue Plavix  - Continue Lipitor     Advanced Directives:   - DNR   - HCP - David Mckeon (Cell) [phone]    Dispo Planning  - [facility]  -- D/C TM

## 2019-02-11 NOTE — PROGRESS NOTE ADULT - SUBJECTIVE AND OBJECTIVE BOX
HOSPITALIST PROGRESS NOTE:  SUBJECTIVE:  PCP:  Chief Complaint: Patient is a 89y old  Male who presents with a chief complaint of MARILYN, DEhydration, hypotension, metabolic encephalopathy, fall (11 Feb 2019 10:41)      HPI:  89 y/o male with PMH CAD s/p PCI/CABG, systolic CHF s/p AICD, HTN, dyslipidemia, CVA, orthostatic hypotension, CKD III, A fib on Coumadin, Parkinson's who presented via EMS s/p fall at home w/ hypotension and lethargy, and apparently less conversant on 2/4. At that time visiting nurse reported pt was hypotensive at home, with systolic blood pressure in the mid 90's and thought pt was dehydrated. Visiting nurse stated, that he had lost 3 lb over 2 days after an increase Lasix dosing. Pt reported, no fever, chest pain, urinary pain or SOB.  Patient is A+Ox2 at baseline and is a poor historian.  History obtained from a daughter and son who reported that the pt is incontinent of urine. Of note, pt recently hospitalized for PNA. On admission pt found to have, sCr 2.35 (baseline 1.4-1.8), INR 3.27, CXR with small left pleural effusion, Fx w/up negative.  Interval HPI 2/10: Pt resting in bed comfortable, no c/o of pain or SOB.  BLE with edema noted L>R - appears unchanged.     2/11: Above Reviewed.  Patient has no complaints. attempting 2nd void of trial today    Allergies:  morphine (Headache)    REVIEW OF SYSTEMS:  Unable to obtain ROS 2/2 confusion.OBJECTIVE    Physical Exam:  Vital Signs:    Vital Signs Last 24 Hrs  T(C): 36.7 (11 Feb 2019 11:13), Max: 36.7 (10 Feb 2019 20:15)  T(F): 98.1 (11 Feb 2019 11:13), Max: 98.1 (11 Feb 2019 11:13)  HR: 78 (11 Feb 2019 11:13) (50 - 88)  BP: 112/51 (11 Feb 2019 11:13) (101/46 - 125/60)  BP(mean): --  RR: 18 (11 Feb 2019 11:13) (17 - 18)  SpO2: 100% (11 Feb 2019 11:13) (92% - 100%)  I&O's Summary    10 Feb 2019 07:01  -  11 Feb 2019 07:00  --------------------------------------------------------  IN: 0 mL / OUT: 1000 mL / NET: -1000 mL        Constitutional: NAD, awake and alert, well-developed  Neurological: confused, no focal deficits  HEENT: PERRLA, EOMI, MMM  Neck: Soft and supple, No LAD, No JVD  Respiratory: Breath sounds are clear bilaterally, No wheezing, rales or rhonchi  Cardiovascular: S1 and S2, regular rate and rhythm; no Murmurs, gallops or rubs  Gastrointestinal: Bowel Sounds present, soft, nontender, nondistended, no guarding, no rebound tenderness  Back: No CVA tenderness   Extremities:  Redness present in BLE. Left arm skin tear.  Vascular: 2+ peripheral pulses  Musculoskeletal: 5/5 strength b/l upper and lower extremities  Skin: No rashes  Breast: Deferred  Rectal: Deferred    MEDICATIONS  (STANDING):  ALBUTerol/ipratropium for Nebulization 3 milliLiter(s) Nebulizer every 6 hours  atorvastatin 10 milliGRAM(s) Oral at bedtime  bethanechol 10 milliGRAM(s) Oral two times a day  buDESOnide 160 MICROgram(s)/formoterol 4.5 MICROgram(s) Inhaler 2 Puff(s) Inhalation two times a day  carbidopa/levodopa  25/100 1 Tablet(s) Oral <User Schedule>  cefuroxime   Tablet 500 milliGRAM(s) Oral every 12 hours  clopidogrel Tablet 75 milliGRAM(s) Oral daily  donepezil 5 milliGRAM(s) Oral at bedtime  furosemide    Tablet 40 milliGRAM(s) Oral daily  metoprolol tartrate 25 milliGRAM(s) Oral two times a day  midodrine 5 milliGRAM(s) Oral <User Schedule>  multivitamin 1 Tablet(s) Oral daily  pantoprazole    Tablet 40 milliGRAM(s) Oral before breakfast  tamsulosin 0.4 milliGRAM(s) Oral at bedtime  warfarin 1 milliGRAM(s) Oral daily      LABS: All Labs Reviewed:    02-11    138  |  102  |  32<H>  ----------------------------<  115<H>  3.7   |  30  |  1.39<H>    Ca    8.3<L>      11 Feb 2019 08:00      PT/INR - ( 11 Feb 2019 08:00 )   PT: 17.0 sec;   INR: 1.51 ratio         PTT - ( 11 Feb 2019 08:00 )  PTT:38.2 sec        RADIOLOGY/EKG:    < from: Xray Chest 1 View- PORTABLE-Routine (02.06.19 @ 08:26) >    IMPRESSION:  Findings are likely indicative of congestive heart failure for which   clinical correlation is recommended.    < end of copied text >    < from: CT Cervical Spine No Cont (02.04.19 @ 14:33) >    IMPRESSION:  No acute fracture or subluxation. No interval change since cervical spine   CT of 7/19/18.    < end of copied text >  < from: CT Head No Cont (02.04.19 @ 14:28) >    IMPRESSION:    1)  involutional changes and volume loss, stable compared with prior CT.   No acute abnormality suggested..  2)  no intracerebral hemorrhage or contusion is identified.      < end of copied text >

## 2019-02-11 NOTE — PROVIDER CONTACT NOTE (OTHER) - SITUATION
Consult left with Dr. Hawkins's answering service,
ARF on increased lasix for CHF
Consult made, spoke with Gladys form answering service.

## 2019-02-12 ENCOUNTER — TRANSCRIPTION ENCOUNTER (OUTPATIENT)
Age: 84
End: 2019-02-12

## 2019-02-12 VITALS — SYSTOLIC BLOOD PRESSURE: 120 MMHG | HEART RATE: 74 BPM | DIASTOLIC BLOOD PRESSURE: 70 MMHG

## 2019-02-12 LAB
ANION GAP SERPL CALC-SCNC: 5 MMOL/L — SIGNIFICANT CHANGE UP (ref 5–17)
BUN SERPL-MCNC: 35 MG/DL — HIGH (ref 7–23)
CALCIUM SERPL-MCNC: 8.2 MG/DL — LOW (ref 8.5–10.1)
CHLORIDE SERPL-SCNC: 100 MMOL/L — SIGNIFICANT CHANGE UP (ref 96–108)
CO2 SERPL-SCNC: 33 MMOL/L — HIGH (ref 22–31)
CREAT SERPL-MCNC: 1.46 MG/DL — HIGH (ref 0.5–1.3)
GLUCOSE SERPL-MCNC: 112 MG/DL — HIGH (ref 70–99)
HCT VFR BLD CALC: 29.8 % — LOW (ref 39–50)
HGB BLD-MCNC: 9.3 G/DL — LOW (ref 13–17)
INR BLD: 1.36 RATIO — HIGH (ref 0.88–1.16)
MAGNESIUM SERPL-MCNC: 2.1 MG/DL — SIGNIFICANT CHANGE UP (ref 1.6–2.6)
MCHC RBC-ENTMCNC: 27.4 PG — SIGNIFICANT CHANGE UP (ref 27–34)
MCHC RBC-ENTMCNC: 31.2 GM/DL — LOW (ref 32–36)
MCV RBC AUTO: 87.9 FL — SIGNIFICANT CHANGE UP (ref 80–100)
NRBC # BLD: 0 /100 WBCS — SIGNIFICANT CHANGE UP (ref 0–0)
PHOSPHATE SERPL-MCNC: 2.7 MG/DL — SIGNIFICANT CHANGE UP (ref 2.5–4.5)
PLATELET # BLD AUTO: 185 K/UL — SIGNIFICANT CHANGE UP (ref 150–400)
POTASSIUM SERPL-MCNC: 3.8 MMOL/L — SIGNIFICANT CHANGE UP (ref 3.5–5.3)
POTASSIUM SERPL-SCNC: 3.8 MMOL/L — SIGNIFICANT CHANGE UP (ref 3.5–5.3)
PROTHROM AB SERPL-ACNC: 15.2 SEC — HIGH (ref 10–12.9)
RBC # BLD: 3.39 M/UL — LOW (ref 4.2–5.8)
RBC # FLD: 16.9 % — HIGH (ref 10.3–14.5)
SODIUM SERPL-SCNC: 138 MMOL/L — SIGNIFICANT CHANGE UP (ref 135–145)
WBC # BLD: 4.58 K/UL — SIGNIFICANT CHANGE UP (ref 3.8–10.5)
WBC # FLD AUTO: 4.58 K/UL — SIGNIFICANT CHANGE UP (ref 3.8–10.5)

## 2019-02-12 PROCEDURE — 99223 1ST HOSP IP/OBS HIGH 75: CPT

## 2019-02-12 RX ORDER — ALBUTEROL 90 UG/1
2 AEROSOL, METERED ORAL
Qty: 1 | Refills: 0
Start: 2019-02-12 | End: 2019-03-13

## 2019-02-12 RX ORDER — CEFUROXIME AXETIL 250 MG
1 TABLET ORAL
Qty: 0 | Refills: 0 | COMMUNITY
Start: 2019-02-12

## 2019-02-12 RX ORDER — BUDESONIDE AND FORMOTEROL FUMARATE DIHYDRATE 160; 4.5 UG/1; UG/1
1 AEROSOL RESPIRATORY (INHALATION)
Qty: 1 | Refills: 0 | OUTPATIENT
Start: 2019-02-12 | End: 2019-03-13

## 2019-02-12 RX ORDER — TAMSULOSIN HYDROCHLORIDE 0.4 MG/1
1 CAPSULE ORAL
Qty: 30 | Refills: 0
Start: 2019-02-12 | End: 2019-03-13

## 2019-02-12 RX ORDER — HEPARIN SODIUM 5000 [USP'U]/ML
5000 INJECTION INTRAVENOUS; SUBCUTANEOUS EVERY 8 HOURS
Qty: 0 | Refills: 0 | Status: DISCONTINUED | OUTPATIENT
Start: 2019-02-12 | End: 2019-02-12

## 2019-02-12 RX ORDER — WARFARIN SODIUM 2.5 MG/1
1 TABLET ORAL
Qty: 0 | Refills: 0 | COMMUNITY
Start: 2019-02-12

## 2019-02-12 RX ORDER — CEFUROXIME AXETIL 250 MG
1 TABLET ORAL
Qty: 10 | Refills: 0 | OUTPATIENT
Start: 2019-02-12 | End: 2019-02-16

## 2019-02-12 RX ORDER — BETHANECHOL CHLORIDE 25 MG
1 TABLET ORAL
Qty: 0 | Refills: 0 | COMMUNITY
Start: 2019-02-12

## 2019-02-12 RX ORDER — BUDESONIDE AND FORMOTEROL FUMARATE DIHYDRATE 160; 4.5 UG/1; UG/1
2 AEROSOL RESPIRATORY (INHALATION)
Qty: 0 | Refills: 0 | DISCHARGE
Start: 2019-02-12 | End: 2019-03-13

## 2019-02-12 RX ORDER — BUDESONIDE AND FORMOTEROL FUMARATE DIHYDRATE 160; 4.5 UG/1; UG/1
1 AEROSOL RESPIRATORY (INHALATION)
Qty: 0 | Refills: 0 | COMMUNITY
Start: 2019-02-12

## 2019-02-12 RX ORDER — TAMSULOSIN HYDROCHLORIDE 0.4 MG/1
1 CAPSULE ORAL
Qty: 0 | Refills: 0 | COMMUNITY
Start: 2019-02-12

## 2019-02-12 RX ORDER — WARFARIN SODIUM 2.5 MG/1
3 TABLET ORAL ONCE
Qty: 0 | Refills: 0 | Status: DISCONTINUED | OUTPATIENT
Start: 2019-02-12 | End: 2019-02-12

## 2019-02-12 RX ORDER — BETHANECHOL CHLORIDE 25 MG
1 TABLET ORAL
Qty: 60 | Refills: 0 | OUTPATIENT
Start: 2019-02-12 | End: 2019-03-13

## 2019-02-12 RX ADMIN — Medication 3 MILLILITER(S): at 01:08

## 2019-02-12 RX ADMIN — CARBIDOPA AND LEVODOPA 1 TABLET(S): 25; 100 TABLET ORAL at 13:46

## 2019-02-12 RX ADMIN — MIDODRINE HYDROCHLORIDE 5 MILLIGRAM(S): 2.5 TABLET ORAL at 13:47

## 2019-02-12 RX ADMIN — Medication 25 MILLIGRAM(S): at 05:17

## 2019-02-12 RX ADMIN — BUDESONIDE AND FORMOTEROL FUMARATE DIHYDRATE 2 PUFF(S): 160; 4.5 AEROSOL RESPIRATORY (INHALATION) at 07:53

## 2019-02-12 RX ADMIN — PANTOPRAZOLE SODIUM 40 MILLIGRAM(S): 20 TABLET, DELAYED RELEASE ORAL at 05:10

## 2019-02-12 RX ADMIN — Medication 1 TABLET(S): at 13:47

## 2019-02-12 RX ADMIN — CARBIDOPA AND LEVODOPA 1 TABLET(S): 25; 100 TABLET ORAL at 05:10

## 2019-02-12 RX ADMIN — Medication 10 MILLIGRAM(S): at 16:57

## 2019-02-12 RX ADMIN — CLOPIDOGREL BISULFATE 75 MILLIGRAM(S): 75 TABLET, FILM COATED ORAL at 13:46

## 2019-02-12 RX ADMIN — Medication 3 MILLILITER(S): at 14:33

## 2019-02-12 RX ADMIN — Medication 25 MILLIGRAM(S): at 16:57

## 2019-02-12 RX ADMIN — Medication 10 MILLIGRAM(S): at 05:10

## 2019-02-12 RX ADMIN — Medication 500 MILLIGRAM(S): at 16:57

## 2019-02-12 RX ADMIN — HEPARIN SODIUM 5000 UNIT(S): 5000 INJECTION INTRAVENOUS; SUBCUTANEOUS at 13:49

## 2019-02-12 RX ADMIN — Medication 3 MILLILITER(S): at 07:52

## 2019-02-12 RX ADMIN — Medication 500 MILLIGRAM(S): at 05:10

## 2019-02-12 RX ADMIN — HEPARIN SODIUM 5000 UNIT(S): 5000 INJECTION INTRAVENOUS; SUBCUTANEOUS at 09:23

## 2019-02-12 RX ADMIN — Medication 40 MILLIGRAM(S): at 13:46

## 2019-02-12 NOTE — DISCHARGE NOTE ADULT - OTHER SIGNIFICANT FINDINGS
RADIOLOGY/EKG:    < from: Xray Chest 1 View- PORTABLE-Routine (02.06.19 @ 08:26) >    IMPRESSION:  Findings are likely indicative of congestive heart failure for which   clinical correlation is recommended.    < end of copied text >    < from: CT Cervical Spine No Cont (02.04.19 @ 14:33) >    IMPRESSION:  No acute fracture or subluxation. No interval change since cervical spine   CT of 7/19/18.    < end of copied text >  < from: CT Head No Cont (02.04.19 @ 14:28) >    IMPRESSION:    1)  involutional changes and volume loss, stable compared with prior CT.   No acute abnormality suggested..  2)  no intracerebral hemorrhage or contusion is identified.      < end of copied text >

## 2019-02-12 NOTE — CONSULT NOTE ADULT - SUBJECTIVE AND OBJECTIVE BOX
HPI:  Pt is a 89 y/o gentleman with  PMH CAD s/p PCI/CABG, systolic CHF s/p AICD, HTN, dyslipidemia, CVA, h/o orthostatic hypotension, CKD III, A fib on Coumadin, parkinson's, who presents via EMS  w/ hypotension and lethargy,  apparently less conversant.     Pt was hospitalized  recently for PNA .  A visiting nurse reported pt was hypotensive at home, with systolic blood pressure in the mid 90's and thought pt was dehydrated.  He had lost 3 lb over 2 days after an increase in lasix dosing.  No fever, chest pain or SOB.  No urinary pain or resp complaints.   Patient is A+Ox2 at baseline. Patient denies being in any pain.  He is a poor historian and is unable to give any recent history.  History obtained from a daughter and later I spoke with pts son at bedside. Pts son reports the pt is incontinent of urine.   	 Pt c/o feeling lightheaded today, and fell from bed to floor.     Fam Hx:  Mother  in her 30s from strangulated hernia  Father  in middle age  Brother  of malaria/Tb (2019 18:03)      Patient is a 89y old  Male who presents with a chief complaint of MARILYN, DEhydration, hypotension, metabolic encephalopathy, fall (2019 15:42)      Consulted by Dr. Jones   for VTE prophylaxis, risk stratification, and anticoagulation management.    PAST MEDICAL & SURGICAL HISTORY:  Parkinson's disease  CHF (congestive heart failure)  Hyperlipidemia  HTN (hypertension)  CAD (coronary artery disease)  AICD (automatic cardioverter/defibrillator) present  S/P CABG          CrCl: 42        [id number] Score: Risk Factor                                                        Points    [x ] Congestive Heart Failure			1  [ x] Hypertension					1  [ x] Age = 75y					2  [ ] Age 65-74y					1  [ ] Diabetes Mellitus				1  [ x] Stroke/TIA (ie, thromboembolic)		2  [x ] Vascular Disease (MI/PAD/aortic plaque)	1  [ ] Sex Category (ie, female sex)			1    Total Score = 	7	      IMPROVE Bleeding Risk Score: 5.5    Falls Risk:   High (x  )  Mod (  )  Low (  )      FAMILY HISTORY:    Denies any personal or familial history of clotting or bleeding disorders.    Allergies    morphine (Headache)    Intolerances        REVIEW OF SYSTEMS    (  )Fever	     (  )Constipation	(  )SOB				(  )Headache	(  )Dysuria  (  )Chills	     (  )Melena	(  )Dyspnea present on exertion	                    (  )Dizziness                    (  )Polyuria  (  )Nausea	     (  )Hematochezia	(  )Cough			                    (  )Syncope   	(  )Hematuria  (  )Vomiting    (  )Chest Pain	(  )Wheezing			(  )Weakness  (  )Diarrhea     (  )Palpitations	(  )Anorexia			( x )Myalgia    All  other review of systems negative: Yes    Unable to obtain review of systems due to:    Vital Signs Last 24 Hrs  T(C): 36.8 (19 @ 05:27), Max: 36.8 (19 @ 05:27)  T(F): 98.3 (19 @ 05:27), Max: 98.3 (19 @ 05:27)  HR: 63 (19 @ 05:27) (52 - 80)  BP: 104/47 (19 @ 05:27) (103/49 - 127/51)  BP(mean): --  RR: 18 (19 @ 05:27) (18 - 18)  SpO2: 93% (19 @ 05:27) (93% - 100%)      PHYSICAL EXAM:    Constitutional: Appears Well    Neurological: A& O x 3    Skin: Warm    Respiratory and Thorax: normal effort; Breath sounds: normal; No rales/wheezing/rhonchi  	  Cardiovascular: S1, S2, regular, NMBR	    Gastrointestinal: BS + x 4Q, nontender	    Genitourinary:  Bladder nondistended, nontender    Musculoskeletal:   General Right:   no muscle/joint tenderness,   normal tone, no joint swelling,   ROM: limited/full	    General Left:   no muscle/joint tenderness,   normal tone, no joint swelling,   ROM: limited/full      Lower extrems:   Right: no calf tenderness              negative weston's sign               + pedal pulses    Left:   no calf tenderness              negative weston's sign               + pedal pulses                          9.3    4.58  )-----------( 185      ( 2019 07:13 )             29.8           138  |  100  |  35<H>  ----------------------------<  112<H>  3.8   |  33<H>  |  1.46<H>    Ca    8.2<L>      2019 07:13  Phos  2.7     02-12  Mg     2.1     02-12        PT/INR - ( 2019 07:13 )   PT: 15.2 sec;   INR: 1.36 ratio         PTT - ( 2019 08:00 )  PTT:38.2 sec			                CT Spine  FINDINGS:     Mild dextroscoliosis of the cervical spine. Nonspecific mild reversal of   the normal cervical lordosis centered at C5.    There is no prevertebral soft tissue swelling. There is no splaying of   the spinous processes. The occipital condyles are normal. Lateral masses   of C1 align normally with C2. No lucent fracture line is identified.   There is minimal degenerative anterior subluxation of C4 upon C5.    Multilevel moderate degenerative osteoarthritis and moderate-severe   degenerative disc disease are present. Findings include marginal   osteophytes, uncovertebral spurring, moderate to severe loss of normal   disc space height and endplate sclerosis, and moderate to severe facet   joint space compartment narrowing with subchondral sclerosis and   hypertrophic osteophytes at multiple levels.     No evidence for high-grade spinal canal stenosis, although the spinal   canal contents are suboptimally evaluated inherent to CT technique.    The visualized lung apices demonstrate mild centrilobular and paraseptal   emphysematous changes, unchanged. There is minimal interlobular septal   thickening suggesting minimal interstitial pulmonary edema.    Miscellaneous:  Partially imaged single-lead left-sided pacemaker/AICD.   Partially imaged sternotomy wires and multiple mediastinal surgical clips.    IMPRESSION:  No acute fracture or subluxation. No interval change since cervical spine   CT of 18.    	CT head:  FINDINGS:      HEMISPHERES:Involutional changes and volume loss are again noted, stable   when compared with prior CT. No acute abnormality is suggested.  VENTRICLES:  Midline and normal in size.  POSTERIOR FOSSA:  The brain stem and cerebellum are unremarkable.  No CP   angle lesion noted.  EXTRACEREBRAL SPACES:  No subdural or epidural collections are noted.  SKULL BASE AND CALVARIUM:  Appears intact.  No fracture or destructive   lesion is identified.  SINUSES AND MASTOIDS:  Clear.  MISCELLANEOUS:  No orbital or suprasellar abnormality noted.      IMPRESSION:    1)  involutional changes and volume loss, stable compared with prior CT.   No acute abnormality suggested..  2)  no intracerebral hemorrhage or contusion is identified.      Doppler BLE  FINDINGS:    There is normal compressibility of the bilateral common femoral, femoral   and popliteal veins. No calf vein thrombosis is detected.  Previously seen nonocclusive thrombosis in the right popliteal vein is no   longer visualized.  Doppler examination shows normal spontaneous and phasic flow. Bilateral    lower legs soft tissue edema is again noted    IMPRESSION:   Previously seen thrombosis in the right popliteal vein is no longer   visualized.  No evidence of bilateral lower extremity deep venous thrombosis at this   time.              MEDICATIONS  (STANDING):  ALBUTerol/ipratropium for Nebulization 3 milliLiter(s) Nebulizer every 6 hours  atorvastatin 10 milliGRAM(s) Oral at bedtime  bethanechol 10 milliGRAM(s) Oral two times a day  buDESOnide 160 MICROgram(s)/formoterol 4.5 MICROgram(s) Inhaler 2 Puff(s) Inhalation two times a day  carbidopa/levodopa  25/100 1 Tablet(s) Oral <User Schedule>  cefuroxime   Tablet 500 milliGRAM(s) Oral every 12 hours  clopidogrel Tablet 75 milliGRAM(s) Oral daily  donepezil 5 milliGRAM(s) Oral at bedtime  furosemide    Tablet 40 milliGRAM(s) Oral daily  heparin  Injectable 5000 Unit(s) SubCutaneous every 8 hours  metoprolol tartrate 25 milliGRAM(s) Oral two times a day  midodrine 5 milliGRAM(s) Oral <User Schedule>  multivitamin 1 Tablet(s) Oral daily  pantoprazole    Tablet 40 milliGRAM(s) Oral before breakfast  tamsulosin 0.4 milliGRAM(s) Oral at bedtime  warfarin 3 milliGRAM(s) Oral once          DVT Prophylaxis:  LMWH                   (  )  Heparin SQ           (  )  Coumadin             ( x )  Xarelto                  (  )  Eliquis                   (  )  Venodynes           (  x)  Ambulation          ( x )  UFH                       (  )  Contraindicated  (  ) HPI:  Pt is a 89 y/o gentleman with  PMH CAD s/p PCI/CABG, systolic CHF s/p AICD, HTN, dyslipidemia, CVA, h/o orthostatic hypotension, CKD III, A fib on Coumadin, parkinson's, who presents via EMS  w/ hypotension and lethargy,  apparently less conversant.     Pt was hospitalized  recently for PNA .  A visiting nurse reported pt was hypotensive at home, with systolic blood pressure in the mid 90's and thought pt was dehydrated.  He had lost 3 lb over 2 days after an increase in lasix dosing.  No fever, chest pain or SOB.  No urinary pain or resp complaints.   Patient is A+Ox2 at baseline. Patient denies being in any pain.  He is a poor historian and is unable to give any recent history.  History obtained from a daughter and later I spoke with pts son at bedside. Pts son reports the pt is incontinent of urine.   	 Pt c/o feeling lightheaded today, and fell from bed to floor.     Fam Hx:  Mother  in her 30s from strangulated hernia  Father  in middle age  Brother  of malaria/Tb (2019 18:03)      Patient is a 89y old  Male who presents with a chief complaint of MARILYN, DEhydration, hypotension, metabolic encephalopathy, fall (2019 15:42)      Consulted by Dr. Jones   for VTE prophylaxis, risk stratification, and anticoagulation management.    PAST MEDICAL & SURGICAL HISTORY:  Parkinson's disease  CHF (congestive heart failure)  Hyperlipidemia  HTN (hypertension)  CAD (coronary artery disease)  AICD (automatic cardioverter/defibrillator) present  S/P CABG          CrCl: 42        [id number] Score: Risk Factor                                                        Points    [x ] Congestive Heart Failure			1  [ x] Hypertension					1  [ x] Age = 75y					2  [ ] Age 65-74y					1  [ ] Diabetes Mellitus				1  [ x] Stroke/TIA (ie, thromboembolic)		2  [x ] Vascular Disease (MI/PAD/aortic plaque)	1  [ ] Sex Category (ie, female sex)			1    Total Score = 	7	      IMPROVE Bleeding Risk Score: 5.5    Falls Risk:   High (x  )  Mod (  )  Low (  )      FAMILY HISTORY:    Denies any personal or familial history of clotting or bleeding disorders.    Allergies    morphine (Headache)    Intolerances        REVIEW OF SYSTEMS    (  )Fever	     (  )Constipation	(  )SOB				(  )Headache	(  )Dysuria  (  )Chills	     (  )Melena	(  )Dyspnea present on exertion	                    (  )Dizziness                    (  )Polyuria  (  )Nausea	     (  )Hematochezia	(  )Cough			                    (  )Syncope   	(  )Hematuria  (  )Vomiting    (  )Chest Pain	(  )Wheezing			(  )Weakness  (  )Diarrhea     (  )Palpitations	(  )Anorexia			( x )Myalgia    All  other review of systems negative: Yes    Unable to obtain review of systems due to:    Vital Signs Last 24 Hrs  T(C): 36.8 (19 @ 05:27), Max: 36.8 (19 @ 05:27)  T(F): 98.3 (19 @ 05:27), Max: 98.3 (19 @ 05:27)  HR: 63 (19 @ 05:27) (52 - 80)  BP: 104/47 (19 @ 05:27) (103/49 - 127/51)  BP(mean): --  RR: 18 (19 @ 05:27) (18 - 18)  SpO2: 93% (19 @ 05:27) (93% - 100%)      PHYSICAL EXAM:    Constitutional: Appears Well    Neurological: A& O x 1, name only    Skin: Warm    Respiratory and Thorax: normal effort; Breath sounds: normal; scattered rhonchi, non productive cough  	  Cardiovascular: S1, S2, regular, NMBR	    Gastrointestinal: BS + x 4Q, nontender	    Genitourinary:  Bladder nondistended, nontender, ventura in situ    Musculoskeletal:   General Right:   no muscle/joint tenderness,   normal tone, no joint swelling,   ROM: limited	  2+ edema  brownish discoloration to Lower calf    General Left:   no muscle/joint tenderness,   normal tone, no joint swelling,   ROM: limited  2+ Edema  reddish discoloration to Lower calf      Lower extrems:   Right: no calf tenderness              negative weston's sign               + pedal pulses    Left:   no calf tenderness              negative weston's sign               + pedal pulses                          9.3    4.58  )-----------( 185      ( 2019 07:13 )             29.8           138  |  100  |  35<H>  ----------------------------<  112<H>  3.8   |  33<H>  |  1.46<H>    Ca    8.2<L>      2019 07:13  Phos  2.7     02-12  Mg     2.1     02-12        PT/INR - ( 2019 07:13 )   PT: 15.2 sec;   INR: 1.36 ratio         PTT - ( 2019 08:00 )  PTT:38.2 sec			                CT Spine  FINDINGS:     Mild dextroscoliosis of the cervical spine. Nonspecific mild reversal of   the normal cervical lordosis centered at C5.    There is no prevertebral soft tissue swelling. There is no splaying of   the spinous processes. The occipital condyles are normal. Lateral masses   of C1 align normally with C2. No lucent fracture line is identified.   There is minimal degenerative anterior subluxation of C4 upon C5.    Multilevel moderate degenerative osteoarthritis and moderate-severe   degenerative disc disease are present. Findings include marginal   osteophytes, uncovertebral spurring, moderate to severe loss of normal   disc space height and endplate sclerosis, and moderate to severe facet   joint space compartment narrowing with subchondral sclerosis and   hypertrophic osteophytes at multiple levels.     No evidence for high-grade spinal canal stenosis, although the spinal   canal contents are suboptimally evaluated inherent to CT technique.    The visualized lung apices demonstrate mild centrilobular and paraseptal   emphysematous changes, unchanged. There is minimal interlobular septal   thickening suggesting minimal interstitial pulmonary edema.    Miscellaneous:  Partially imaged single-lead left-sided pacemaker/AICD.   Partially imaged sternotomy wires and multiple mediastinal surgical clips.    IMPRESSION:  No acute fracture or subluxation. No interval change since cervical spine   CT of 18.    	CT head:  FINDINGS:      HEMISPHERES:Involutional changes and volume loss are again noted, stable   when compared with prior CT. No acute abnormality is suggested.  VENTRICLES:  Midline and normal in size.  POSTERIOR FOSSA:  The brain stem and cerebellum are unremarkable.  No CP   angle lesion noted.  EXTRACEREBRAL SPACES:  No subdural or epidural collections are noted.  SKULL BASE AND CALVARIUM:  Appears intact.  No fracture or destructive   lesion is identified.  SINUSES AND MASTOIDS:  Clear.  MISCELLANEOUS:  No orbital or suprasellar abnormality noted.      IMPRESSION:    1)  involutional changes and volume loss, stable compared with prior CT.   No acute abnormality suggested..  2)  no intracerebral hemorrhage or contusion is identified.      Doppler BLE  FINDINGS:    There is normal compressibility of the bilateral common femoral, femoral   and popliteal veins. No calf vein thrombosis is detected.  Previously seen nonocclusive thrombosis in the right popliteal vein is no   longer visualized.  Doppler examination shows normal spontaneous and phasic flow. Bilateral    lower legs soft tissue edema is again noted    IMPRESSION:   Previously seen thrombosis in the right popliteal vein is no longer   visualized.  No evidence of bilateral lower extremity deep venous thrombosis at this   time.              MEDICATIONS  (STANDING):  ALBUTerol/ipratropium for Nebulization 3 milliLiter(s) Nebulizer every 6 hours  atorvastatin 10 milliGRAM(s) Oral at bedtime  bethanechol 10 milliGRAM(s) Oral two times a day  buDESOnide 160 MICROgram(s)/formoterol 4.5 MICROgram(s) Inhaler 2 Puff(s) Inhalation two times a day  carbidopa/levodopa  25/100 1 Tablet(s) Oral <User Schedule>  cefuroxime   Tablet 500 milliGRAM(s) Oral every 12 hours  clopidogrel Tablet 75 milliGRAM(s) Oral daily  donepezil 5 milliGRAM(s) Oral at bedtime  furosemide    Tablet 40 milliGRAM(s) Oral daily  heparin  Injectable 5000 Unit(s) SubCutaneous every 8 hours  metoprolol tartrate 25 milliGRAM(s) Oral two times a day  midodrine 5 milliGRAM(s) Oral <User Schedule>  multivitamin 1 Tablet(s) Oral daily  pantoprazole    Tablet 40 milliGRAM(s) Oral before breakfast  tamsulosin 0.4 milliGRAM(s) Oral at bedtime  warfarin 3 milliGRAM(s) Oral once          DVT Prophylaxis:  LMWH                   (  )  Heparin SQ           (  )  Coumadin             ( x )  Xarelto                  (  )  Eliquis                   (  )  Venodynes           (  x)  Ambulation          ( x )  UFH                       (  )  Contraindicated  (  )

## 2019-02-12 NOTE — PROGRESS NOTE ADULT - ASSESSMENT
89 PMH of CAD s/p PCI / CABG, systolic CHF, h/o NSVT, s/p AICD, HTN, dylsipidemia, h/o orthostatic hypotension, CKD III base 1.4 mg/dl - 1.8 mg/dl , a-fib (on coumadin), parkinson's disease, admitted on 12/16 for evaluation of increasing shortness of breath with renal evaluation of CKD III. 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 to HTN nephrosclerosis, likely requires a pre-renal state as well to maintain euvolemia    MARILYN/CKD III  -Renal function stabilized, and below baseline  -Tolerating oral lasix, may need bid or higher dose as outpatient to maintain volume status  -Dose meds crcl ~ 35 ml/min    Hypokalemia  -Chronic issue, partly from lasix also from florinef with last admit  -stable    Orthostasis  -Converted from florinef to midodrine during last admission  - midodrine as standing order    d/c with staff  dc planning ongoing

## 2019-02-12 NOTE — DISCHARGE NOTE ADULT - MEDICATION SUMMARY - MEDICATIONS TO CHANGE
I will SWITCH the dose or number of times a day I take the medications listed below when I get home from the hospital:    warfarin 1 mg oral tablet  -- 1 tab(s) by mouth once a day

## 2019-02-12 NOTE — CONSULT NOTE ADULT - REASON FOR ADMISSION
MARILYN, DEhydration, hypotension, metabolic encephalopathy, fall

## 2019-02-12 NOTE — CHART NOTE - NSCHARTNOTEFT_GEN_A_CORE
nursing reported 3 rd time pt required straight cath for urinary retention    tonight 425 cc ; Mancia to be placed to gravity  chart reviewed and  was already consulted

## 2019-02-12 NOTE — PROGRESS NOTE ADULT - ASSESSMENT
89 y/o male with PMH CAD s/p PCI/CABG, systolic CHF s/p AICD, HTN, dyslipidemia, CVA, orthostatic hypotension, CKD III, A fib on Coumadin, Parkinson's who presented via EMS s/p fall at home w/ hypotension with reported (SBP's in 90s), lethargy, weight loss and apparently less conversant on 2/4. Found to have BLE cellulitis, Pre-renal MARILYN on CKD III and supratherapeutic INR (3.27); Fx workup negative, CT head negative.     Pre-Renal MARILYN on CKD Stage III (base 1.4 - 1.8) 2/2 possibly poor oral intake and Lasix  Urine Retention  - Nephrology following   - Failed Voiding Trial x3 -->Mancia placed   - continue bethanechol BID  - Continue Flomax HS   - sCr downtrending  1.44 ---> 1.36 - Changed Lasix to PO, D/C potassium     Hypotension 2/2 possible infectious etiology vs MARILYN on CKD  - Cellulitis BLE - Changed to ceftin PO BID  - BLE doppler negative DVT  - BC/UCx NTD  - CXR - small left pleural effusion - repeat CXR 2/6 unchanged   - Continue midodrine daily    Fall   - CT head shows no bleed  - CT cspine - no fracture  - Fracture workup negative   - PT therapy consult - recommendations for ERICKSON    Metabolic encephalopathy 2/2 possibly dehydration, Lasix and infection   - Clinically improving  - At baseline pt A&Ox2    Chronic decompensated systolic HF rEF  - Echo 2/2018 - EF 35-40 %  - Stable for now  - Continue Daily weight  - Fluid restriction 1500     Afib  - increase coumadin 3mg tonight then resume 1mg after with INR check on thursday  - daily INR  - Continue coreg for rate control     Parkinson Disease/ Dementia  - Continue Sinemet   - Continue Aricept     CAD s/p PCI and CABG  - Continue Plavix  - Continue Lipitor     Advanced Directives:   - DNR   - HCP - David Mckeon (Cell) [phone]    Dispo Planning  - EIRCKSON

## 2019-02-12 NOTE — PROGRESS NOTE ADULT - SUBJECTIVE AND OBJECTIVE BOX
HOSPITALIST PROGRESS NOTE:  SUBJECTIVE:  PCP:  Chief Complaint: Patient is a 89y old  Male who presents with a chief complaint of MARILYN, DEhydration, hypotension, metabolic encephalopathy, fall (11 Feb 2019 10:41)      HPI:  89 y/o male with PMH CAD s/p PCI/CABG, systolic CHF s/p AICD, HTN, dyslipidemia, CVA, orthostatic hypotension, CKD III, A fib on Coumadin, Parkinson's who presented via EMS s/p fall at home w/ hypotension and lethargy, and apparently less conversant on 2/4. At that time visiting nurse reported pt was hypotensive at home, with systolic blood pressure in the mid 90's and thought pt was dehydrated. Visiting nurse stated, that he had lost 3 lb over 2 days after an increase Lasix dosing. Pt reported, no fever, chest pain, urinary pain or SOB.  Patient is A+Ox2 at baseline and is a poor historian.  History obtained from a daughter and son who reported that the pt is incontinent of urine. Of note, pt recently hospitalized for PNA. On admission pt found to have, sCr 2.35 (baseline 1.4-1.8), INR 3.27, CXR with small left pleural effusion, Fx w/up negative.  Interval HPI 2/10: Pt resting in bed comfortable, no c/o of pain or SOB.  BLE with edema noted L>R - appears unchanged.     2/11: Above Reviewed.  Patient has no complaints. attempting 2nd void of trial today  2/12:  Patient failed voiding trial and ventura placed again. No other overnight events     Allergies:  morphine (Headache)    REVIEW OF SYSTEMS:  Unable to obtain ROS 2/2 confusion.OBJECTIVE    Physical Exam:  Vital Signs Last 24 Hrs  T(C): 36.8 (12 Feb 2019 05:27), Max: 36.8 (12 Feb 2019 05:27)  T(F): 98.3 (12 Feb 2019 05:27), Max: 98.3 (12 Feb 2019 05:27)  HR: 63 (12 Feb 2019 05:27) (52 - 80)  BP: 104/47 (12 Feb 2019 05:27) (103/49 - 127/51)  BP(mean): --  RR: 18 (12 Feb 2019 05:27) (18 - 18)  SpO2: 93% (12 Feb 2019 05:27) (93% - 100%)      Constitutional: NAD, awake and alert, well-developed  Neurological: confused, no focal deficits  HEENT: PERRLA, EOMI, MMM  Neck: Soft and supple, No LAD, No JVD  Respiratory: Breath sounds are clear bilaterally, No wheezing, rales or rhonchi  Cardiovascular: S1 and S2, regular rate and rhythm; no Murmurs, gallops or rubs  Gastrointestinal: Bowel Sounds present, soft, nontender, nondistended, no guarding, no rebound tenderness  Back: No CVA tenderness   Extremities:  Redness present in BLE. Left arm skin tear.  Vascular: 2+ peripheral pulses  Musculoskeletal: 5/5 strength b/l upper and lower extremities  Skin: No rashes  Breast: Deferred  Rectal: Deferred    MEDICATIONS  (STANDING):  ALBUTerol/ipratropium for Nebulization 3 milliLiter(s) Nebulizer every 6 hours  atorvastatin 10 milliGRAM(s) Oral at bedtime  bethanechol 10 milliGRAM(s) Oral two times a day  buDESOnide 160 MICROgram(s)/formoterol 4.5 MICROgram(s) Inhaler 2 Puff(s) Inhalation two times a day  carbidopa/levodopa  25/100 1 Tablet(s) Oral <User Schedule>  cefuroxime   Tablet 500 milliGRAM(s) Oral every 12 hours  clopidogrel Tablet 75 milliGRAM(s) Oral daily  donepezil 5 milliGRAM(s) Oral at bedtime  furosemide    Tablet 40 milliGRAM(s) Oral daily  metoprolol tartrate 25 milliGRAM(s) Oral two times a day  midodrine 5 milliGRAM(s) Oral <User Schedule>  multivitamin 1 Tablet(s) Oral daily  pantoprazole    Tablet 40 milliGRAM(s) Oral before breakfast  tamsulosin 0.4 milliGRAM(s) Oral at bedtime  warfarin 1 milliGRAM(s) Oral daily    Lab Results:  CBC  CBC Full  -  ( 12 Feb 2019 07:13 )  WBC Count : 4.58 K/uL  Hemoglobin : 9.3 g/dL  Hematocrit : 29.8 %  Platelet Count - Automated : 185 K/uL  Mean Cell Volume : 87.9 fl  Mean Cell Hemoglobin : 27.4 pg  Mean Cell Hemoglobin Concentration : 31.2 gm/dL  Auto Neutrophil # : x  Auto Lymphocyte # : x  Auto Monocyte # : x  Auto Eosinophil # : x  Auto Basophil # : x  Auto Neutrophil % : x  Auto Lymphocyte % : x  Auto Monocyte % : x  Auto Eosinophil % : x  Auto Basophil % : x    .		Differential:	[] Automated		[] Manual  Chemistry                        9.3    4.58  )-----------( 185      ( 12 Feb 2019 07:13 )             29.8     02-12    138  |  100  |  35<H>  ----------------------------<  112<H>  3.8   |  33<H>  |  1.46<H>    Ca    8.2<L>      12 Feb 2019 07:13  Phos  2.7     02-12  Mg     2.1     02-12        PT/INR - ( 12 Feb 2019 07:13 )   PT: 15.2 sec;   INR: 1.36 ratio         PTT - ( 11 Feb 2019 08:00 )  PTT:38.2 sec          MICROBIOLOGY/CULTURES:  Culture Results:   No growth at 5 days. (02-05 @ 17:24)      RADIOLOGY RESULTS:        RADIOLOGY/EKG:    < from: Xray Chest 1 View- PORTABLE-Routine (02.06.19 @ 08:26) >    IMPRESSION:  Findings are likely indicative of congestive heart failure for which   clinical correlation is recommended.    < end of copied text >    < from: CT Cervical Spine No Cont (02.04.19 @ 14:33) >    IMPRESSION:  No acute fracture or subluxation. No interval change since cervical spine   CT of 7/19/18.    < end of copied text >  < from: CT Head No Cont (02.04.19 @ 14:28) >    IMPRESSION:    1)  involutional changes and volume loss, stable compared with prior CT.   No acute abnormality suggested..  2)  no intracerebral hemorrhage or contusion is identified.      < end of copied text >

## 2019-02-12 NOTE — DISCHARGE NOTE ADULT - PLAN OF CARE
Pre-Renal MARILYN on CKD Stage III / Urine Retention Failed trial and Void X 3 continue Bethanechol and Flomax; continue with ventura and FU with urologist B/L LE cellulitis - doppler negative DVT  - BC/UCx NTD  - continue Ceftin X 5 more days Afib - increase coumadin 3mg tonight then resume 1mg after with INR check on thursday

## 2019-02-12 NOTE — PROGRESS NOTE ADULT - REASON FOR ADMISSION
MARILYN, Dehydration, hypotension, metabolic encephalopathy, fall

## 2019-02-12 NOTE — DISCHARGE NOTE ADULT - MEDICATION SUMMARY - MEDICATIONS TO TAKE
I will START or STAY ON the medications listed below when I get home from the hospital:    tamsulosin 0.4 mg oral capsule  -- 1 cap(s) by mouth once a day (at bedtime)  -- Indication: For urinary retention    warfarin 1 mg oral tablet  -- 1 tab(s) by mouth once a day  -- Indication: For AFIB    warfarin 3 mg oral tablet  -- 1 tab(s) by mouth once tonight (2/12) then resume 1mg QD with INR check on 2/14  -- Indication: For AFIB    atorvastatin 10 mg oral tablet  -- 1 tab(s) by mouth once a day (at bedtime)  -- Indication: For Hyperlipidemia    carbidopa-levodopa 25 mg-100 mg oral tablet  -- 1 tab(s) by mouth 3 times a day (6am, 12pm, 9pm)  -- Indication: For Parkinson's disease    clopidogrel 75 mg oral tablet  -- 1 tab(s) by mouth once a day  -- Indication: For CAD (coronary artery disease)    Metoprolol Tartrate 25 mg oral tablet  -- 0.5 tab(s) by mouth 2 times a day  -- Indication: For HTN    ipratropium-albuterol 0.5 mg-2.5 mg/3 mLinhalation solution  -- 3 milliliter(s) inhaled every 4 hours, As Needed - for shortness of breath and/or wheezing  -- Indication: For Dyspnea    budesonide-formoterol 160 mcg-4.5 mcg/inh inhalation aerosol  -- 1 puff(s) inhaled 2 times a day  -- Indication: For Dyspnea    cefuroxime 500 mg oral tablet  -- 1 tab(s) by mouth every 12 hours  -- Indication: For B/L LE infection    donepezil 5 mg oral tablet  -- 1 tab(s) by mouth once a day (at bedtime)  -- Indication: For Dementia    Lasix 40 mg oral tablet  -- 1  by mouth once a day  -- Indication: For ChF    Senna Lax 8.6 mg oral tablet  -- 2 tab(s) by mouth once a day (at bedtime), As Needed - for constipation  -- Indication: For Constipation    docusate sodium 100 mg oral capsule  -- 1 cap(s) by mouth 2 times a day, As Needed  -- Indication: For Constipation    potassium chloride 20 mEq oral tablet, extended release  -- 1 tab(s) by mouth once a day  -- Indication: For vitamins    midodrine 5 mg oral tablet  -- 1 tab(s) by mouth   -- Indication: For home meds    bethanechol 10 mg oral tablet  -- 1 tab(s) by mouth 2 times a day  -- Indication: For urinary retention    pantoprazole 40 mg oral delayed release tablet  -- 1 tab(s) by mouth once a day (before a meal)  -- Indication: For gerd    Multiple Vitamins oral tablet  -- 1 tab(s) by mouth once a day  -- Indication: For vitamins I will START or STAY ON the medications listed below when I get home from the hospital:    tamsulosin 0.4 mg oral capsule  -- 1 cap(s) by mouth once a day (at bedtime)  -- Indication: For urinary retention    warfarin 3 mg oral tablet  -- 1 tab(s) by mouth once tonight (2/12) then resume 1mg QD with INR check on 2/14  -- Indication: For AFIB    warfarin 1 mg oral tablet  -- 1 tab(s) by mouth once a day  -- Indication: For AFIB    atorvastatin 10 mg oral tablet  -- 1 tab(s) by mouth once a day (at bedtime)  -- Indication: For Hyperlipidemia    carbidopa-levodopa 25 mg-100 mg oral tablet  -- 1 tab(s) by mouth 3 times a day (6am, 12pm, 9pm)  -- Indication: For Parkinson's disease    clopidogrel 75 mg oral tablet  -- 1 tab(s) by mouth once a day  -- Indication: For CAD (coronary artery disease)    Metoprolol Tartrate 25 mg oral tablet  -- 0.5 tab(s) by mouth 2 times a day  -- Indication: For HTN    ipratropium-albuterol 0.5 mg-2.5 mg/3 mLinhalation solution  -- 3 milliliter(s) inhaled every 4 hours, As Needed - for shortness of breath and/or wheezing  -- Indication: For Dyspnea    budesonide-formoterol 160 mcg-4.5 mcg/inh inhalation aerosol  -- 1 puff(s) inhaled 2 times a day  -- Indication: For dyspnea     cefuroxime 500 mg oral tablet  -- 1 tab(s) by mouth every 12 hours  -- Indication: For b/L LE infection    donepezil 5 mg oral tablet  -- 1 tab(s) by mouth once a day (at bedtime)  -- Indication: For Dementia    Lasix 40 mg oral tablet  -- 1  by mouth once a day  -- Indication: For ChF    Senna Lax 8.6 mg oral tablet  -- 2 tab(s) by mouth once a day (at bedtime), As Needed - for constipation  -- Indication: For Constipation    docusate sodium 100 mg oral capsule  -- 1 cap(s) by mouth 2 times a day, As Needed  -- Indication: For Constipation    potassium chloride 20 mEq oral tablet, extended release  -- 1 tab(s) by mouth once a day  -- Indication: For vitamins    midodrine 5 mg oral tablet  -- 1 tab(s) by mouth   -- Indication: For home meds    bethanechol 25 mg oral tablet  -- 1 tab(s) by mouth 2 times a day   -- May cause drowsiness or dizziness.  Take medication on an empty stomach 1 hour before or 2 to 3 hours after a meal unless otherwise directed by your doctor.  This drug may impair the ability to drive or operate machinery.  Use care until you become familiar with its effects.    -- Indication: For urinary retention    pantoprazole 40 mg oral delayed release tablet  -- 1 tab(s) by mouth once a day (before a meal)  -- Indication: For gerd    Multiple Vitamins oral tablet  -- 1 tab(s) by mouth once a day  -- Indication: For vitamins

## 2019-02-12 NOTE — DISCHARGE NOTE ADULT - CARE PROVIDERS DIRECT ADDRESSES
[email or web address]

## 2019-02-12 NOTE — CONSULT NOTE ADULT - ASSESSMENT
A:  89 yo male with H/O afib on long term coumadin, CHADVASc #7, high VTE risk,  INR on adm 3.27, usual dose is Coumadin 1 mg po daily, Consulted by Dr Jones for Anticoagulation Management of Coumadin      P: INR 1.36 after coumadin 1 mg x 4 nights  INC coumadin to 3 mg po x 1 tonight  Start Heparin 5000 units sq q 8 h until INR > 2.0  daily CBC, BMP and PT/INR  venodynes BLE  INC mobility as alyssa      Thank you for the consult, will follow

## 2019-02-12 NOTE — PROGRESS NOTE ADULT - SUBJECTIVE AND OBJECTIVE BOX
Patient is a 89y Male who reports no complaints, sitting in chair.         MEDICATIONS  (STANDING):  ALBUTerol/ipratropium for Nebulization 3 milliLiter(s) Nebulizer every 6 hours  atorvastatin 10 milliGRAM(s) Oral at bedtime  bethanechol 10 milliGRAM(s) Oral two times a day  buDESOnide 160 MICROgram(s)/formoterol 4.5 MICROgram(s) Inhaler 2 Puff(s) Inhalation two times a day  carbidopa/levodopa  25/100 1 Tablet(s) Oral <User Schedule>  cefuroxime   Tablet 500 milliGRAM(s) Oral every 12 hours  clopidogrel Tablet 75 milliGRAM(s) Oral daily  donepezil 5 milliGRAM(s) Oral at bedtime  furosemide    Tablet 40 milliGRAM(s) Oral daily  heparin  Injectable 5000 Unit(s) SubCutaneous every 8 hours  metoprolol tartrate 25 milliGRAM(s) Oral two times a day  midodrine 5 milliGRAM(s) Oral <User Schedule>  multivitamin 1 Tablet(s) Oral daily  pantoprazole    Tablet 40 milliGRAM(s) Oral before breakfast  tamsulosin 0.4 milliGRAM(s) Oral at bedtime  warfarin 3 milliGRAM(s) Oral once    MEDICATIONS  (PRN):  docusate sodium 100 milliGRAM(s) Oral two times a day PRN Constipation  senna 2 Tablet(s) Oral at bedtime PRN Constipation        T(C): , Max: 36.8 (02-12-19 @ 05:27)  T(F): , Max: 98.3 (02-12-19 @ 05:27)  HR: 71 (02-12-19 @ 12:13)  BP: 99/43 (02-12-19 @ 12:13)  BP(mean): --  RR: 18 (02-12-19 @ 12:13)  SpO2: 100% (02-12-19 @ 12:13)  Wt(kg): --    02-11 @ 07:01  -  02-12 @ 07:00  --------------------------------------------------------  IN: 480 mL / OUT: 1000 mL / NET: -520 mL      PHYSICAL EXAM:    Constitutional: NAD, frail  HEENT: PERRLA, EOMI,  MMM  Neck: No LAD, No JVD  Respiratory: dist BS  Cardiovascular: S1 and S2, RRR  Gastrointestinal: BS+, soft, NT/ND  Extremities: ++ peripheral edema, chronic changes  Neurological: Arousable  : No Mancia  Skin: No rashes  Access: Not applicable        LABS:                        9.3    4.58  )-----------( 185      ( 12 Feb 2019 07:13 )             29.8     12 Feb 2019 07:13    138    |  100    |  35     ----------------------------<  112    3.8     |  33     |  1.46   11 Feb 2019 08:00    138    |  102    |  32     ----------------------------<  115    3.7     |  30     |  1.39   10 Feb 2019 07:12    140    |  102    |  34     ----------------------------<  100    3.6     |  33     |  1.36   09 Feb 2019 07:51    144    |  102    |  39     ----------------------------<  93     3.6     |  34     |  1.44     Ca    8.2        12 Feb 2019 07:13  Ca    8.3        11 Feb 2019 08:00  Ca    8.1        10 Feb 2019 07:12  Ca    8.3        09 Feb 2019 07:51  Phos  2.7       12 Feb 2019 07:13  Mg     2.1       12 Feb 2019 07:13            Urine Studies:          RADIOLOGY & ADDITIONAL STUDIES:

## 2019-02-12 NOTE — DISCHARGE NOTE ADULT - CARE PLAN
Principal Discharge DX:	MARILYN (acute kidney injury)  Goal:	Pre-Renal MARILYN on CKD Stage III / Urine Retention  Assessment and plan of treatment:	Failed trial and Void X 3 continue Bethanechol and Flomax; continue with ventura and FU with urologist  Goal:	B/L LE cellulitis  Assessment and plan of treatment:	- doppler negative DVT  - BC/UCx NTD  - continue Ceftin X 5 more days  Goal:	Afib  Assessment and plan of treatment:	- increase coumadin 3mg tonight then resume 1mg after with INR check on thursday

## 2019-02-12 NOTE — PROGRESS NOTE ADULT - PROVIDER SPECIALTY LIST ADULT
Hospitalist
Nephrology
Hospitalist
Hospitalist
Nephrology

## 2019-02-12 NOTE — DISCHARGE NOTE ADULT - HOSPITAL COURSE
HOSPITALIST PROGRESS NOTE:  SUBJECTIVE:  PCP:  Chief Complaint: Patient is a 89y old  Male who presents with a chief complaint of MARILYN, DEhydration, hypotension, metabolic encephalopathy, fall (11 Feb 2019 10:41)      HPI:  89 y/o male with PMH CAD s/p PCI/CABG, systolic CHF s/p AICD, HTN, dyslipidemia, CVA, orthostatic hypotension, CKD III, A fib on Coumadin, Parkinson's who presented via EMS s/p fall at home w/ hypotension and lethargy, and apparently less conversant on 2/4. At that time visiting nurse reported pt was hypotensive at home, with systolic blood pressure in the mid 90's and thought pt was dehydrated. Visiting nurse stated, that he had lost 3 lb over 2 days after an increase Lasix dosing. Pt reported, no fever, chest pain, urinary pain or SOB.  Patient is A+Ox2 at baseline and is a poor historian.  History obtained from a daughter and son who reported that the pt is incontinent of urine. Of note, pt recently hospitalized for PNA. On admission pt found to have, sCr 2.35 (baseline 1.4-1.8), INR 3.27, CXR with small left pleural effusion, Fx w/up negative.  Interval HPI 2/10: Pt resting in bed comfortable, no c/o of pain or SOB.  BLE with edema noted L>R - appears unchanged.     2/11: Above Reviewed.  Patient has no complaints. attempting 2nd void of trial today  2/12:  Patient failed voiding trial and ventura placed again. No other overnight events       89 y/o male with PMH CAD s/p PCI/CABG, systolic CHF s/p AICD, HTN, dyslipidemia, CVA, orthostatic hypotension, CKD III, A fib on Coumadin, Parkinson's who presented via EMS s/p fall at home w/ hypotension with reported (SBP's in 90s), lethargy, weight loss and apparently less conversant on 2/4. Found to have BLE cellulitis, Pre-renal MARILYN on CKD III and supratherapeutic INR (3.27); Fx workup negative, CT head negative.     Pre-Renal MARILYN on CKD Stage III (base 1.4 - 1.8) 2/2 possibly poor oral intake and Lasix  Urine Retention  - Nephrology following   - Failed Voiding Trial x3 -->Ventura placed   - continue bethanechol BID  - Continue Flomax HS   - sCr downtrending  1.44 ---> 1.36 - Changed Lasix to PO, D/C potassium     Hypotension 2/2 possible infectious etiology vs MARILYN on CKD  - Cellulitis BLE - Changed to ceftin PO BID  - BLE doppler negative DVT  - BC/UCx NTD  - CXR - small left pleural effusion - repeat CXR 2/6 unchanged   - Continue midodrine daily    Fall   - CT head shows no bleed  - CT cspine - no fracture  - Fracture workup negative   - PT therapy consult - recommendations for ERICK[facility]    Metabolic encephalopathy 2/2 possibly dehydration, Lasix and infection   - Clinically improving  - At baseline pt A&Ox2    Chronic decompensated systolic HF rEF  - Echo 2/2018 - EF 35-40 %  - Stable for now  - Continue Daily weight  - Fluid restriction 1500     Afib  - increase coumadin 3mg tonight then resume 1mg after with INR check on thursday  - daily INR  - Continue coreg for rate control     Parkinson Disease/ Dementia  - Continue Sinemet   - Continue Aricept     CAD s/p PCI and CABG  - Continue Plavix  - Continue Lipitor     Advanced Directives:   - DNR   - HCP - David Mckeon (Cell) [phone]    Dispo Planning  - ERICKSON    Total time: 40 minutes

## 2019-02-12 NOTE — DISCHARGE NOTE ADULT - PATIENT PORTAL LINK FT
You can access the [facility] Portal, offered by [facility], by registering with the following website: [email or web address]

## 2019-02-12 NOTE — DISCHARGE NOTE ADULT - CARE PROVIDER_API CALL
Tyler Wong)  Internal Medicine  [address], [address]  [place] [address]  Phone: [phone]  Fax: [phone]  Follow Up Time:     Galen Mancia)  Urology  [address], [address] [place] [address]  Phone: [phone]  Fax: [phone]  Follow Up Time:

## 2019-02-13 ENCOUNTER — EMERGENCY (EMERGENCY)
Facility: HOSPITAL | Age: 84
LOS: 0 days | Discharge: NURSING, REHAB OR LONG-TERM CARE | End: 2019-02-13
Attending: STUDENT IN AN ORGANIZED HEALTH CARE EDUCATION/TRAINING PROGRAM | Admitting: STUDENT IN AN ORGANIZED HEALTH CARE EDUCATION/TRAINING PROGRAM
Payer: MEDICARE

## 2019-02-13 VITALS
OXYGEN SATURATION: 99 % | RESPIRATION RATE: 18 BRPM | HEART RATE: 55 BPM | DIASTOLIC BLOOD PRESSURE: 50 MMHG | SYSTOLIC BLOOD PRESSURE: 102 MMHG

## 2019-02-13 VITALS
HEART RATE: 57 BPM | OXYGEN SATURATION: 99 % | TEMPERATURE: 98 F | RESPIRATION RATE: 16 BRPM | DIASTOLIC BLOOD PRESSURE: 47 MMHG | HEIGHT: 68 IN | WEIGHT: 197.09 LBS | SYSTOLIC BLOOD PRESSURE: 99 MMHG

## 2019-02-13 DIAGNOSIS — I25.10 ATHEROSCLEROTIC HEART DISEASE OF NATIVE CORONARY ARTERY WITHOUT ANGINA PECTORIS: ICD-10-CM

## 2019-02-13 DIAGNOSIS — Z95.810 PRESENCE OF AUTOMATIC (IMPLANTABLE) CARDIAC DEFIBRILLATOR: ICD-10-CM

## 2019-02-13 DIAGNOSIS — R53.1 WEAKNESS: ICD-10-CM

## 2019-02-13 DIAGNOSIS — G20 PARKINSON'S DISEASE: ICD-10-CM

## 2019-02-13 DIAGNOSIS — Z79.01 LONG TERM (CURRENT) USE OF ANTICOAGULANTS: ICD-10-CM

## 2019-02-13 DIAGNOSIS — I50.9 HEART FAILURE, UNSPECIFIED: ICD-10-CM

## 2019-02-13 DIAGNOSIS — Z95.1 PRESENCE OF AORTOCORONARY BYPASS GRAFT: ICD-10-CM

## 2019-02-13 DIAGNOSIS — I95.9 HYPOTENSION, UNSPECIFIED: ICD-10-CM

## 2019-02-13 DIAGNOSIS — I11.0 HYPERTENSIVE HEART DISEASE WITH HEART FAILURE: ICD-10-CM

## 2019-02-13 LAB
ALBUMIN SERPL ELPH-MCNC: 3 G/DL — LOW (ref 3.3–5)
ALP SERPL-CCNC: 143 U/L — HIGH (ref 40–120)
ALT FLD-CCNC: 10 U/L — LOW (ref 12–78)
ANION GAP SERPL CALC-SCNC: 6 MMOL/L — SIGNIFICANT CHANGE UP (ref 5–17)
APTT BLD: 35.3 SEC — SIGNIFICANT CHANGE UP (ref 27.5–36.3)
AST SERPL-CCNC: 28 U/L — SIGNIFICANT CHANGE UP (ref 15–37)
BASOPHILS # BLD AUTO: 0.03 K/UL — SIGNIFICANT CHANGE UP (ref 0–0.2)
BASOPHILS NFR BLD AUTO: 0.6 % — SIGNIFICANT CHANGE UP (ref 0–2)
BILIRUB SERPL-MCNC: 0.5 MG/DL — SIGNIFICANT CHANGE UP (ref 0.2–1.2)
BUN SERPL-MCNC: 48 MG/DL — HIGH (ref 7–23)
CALCIUM SERPL-MCNC: 8.2 MG/DL — LOW (ref 8.5–10.1)
CHLORIDE SERPL-SCNC: 99 MMOL/L — SIGNIFICANT CHANGE UP (ref 96–108)
CO2 SERPL-SCNC: 31 MMOL/L — SIGNIFICANT CHANGE UP (ref 22–31)
CREAT SERPL-MCNC: 1.52 MG/DL — HIGH (ref 0.5–1.3)
EOSINOPHIL # BLD AUTO: 0.22 K/UL — SIGNIFICANT CHANGE UP (ref 0–0.5)
EOSINOPHIL NFR BLD AUTO: 4.3 % — SIGNIFICANT CHANGE UP (ref 0–6)
GLUCOSE SERPL-MCNC: 113 MG/DL — HIGH (ref 70–99)
HCT VFR BLD CALC: 31.5 % — LOW (ref 39–50)
HGB BLD-MCNC: 9.8 G/DL — LOW (ref 13–17)
IMM GRANULOCYTES NFR BLD AUTO: 0.4 % — SIGNIFICANT CHANGE UP (ref 0–1.5)
INR BLD: 1.33 RATIO — HIGH (ref 0.88–1.16)
LYMPHOCYTES # BLD AUTO: 1.21 K/UL — SIGNIFICANT CHANGE UP (ref 1–3.3)
LYMPHOCYTES # BLD AUTO: 23.5 % — SIGNIFICANT CHANGE UP (ref 13–44)
MCHC RBC-ENTMCNC: 27.5 PG — SIGNIFICANT CHANGE UP (ref 27–34)
MCHC RBC-ENTMCNC: 31.1 GM/DL — LOW (ref 32–36)
MCV RBC AUTO: 88.2 FL — SIGNIFICANT CHANGE UP (ref 80–100)
MONOCYTES # BLD AUTO: 0.47 K/UL — SIGNIFICANT CHANGE UP (ref 0–0.9)
MONOCYTES NFR BLD AUTO: 9.1 % — SIGNIFICANT CHANGE UP (ref 2–14)
NEUTROPHILS # BLD AUTO: 3.2 K/UL — SIGNIFICANT CHANGE UP (ref 1.8–7.4)
NEUTROPHILS NFR BLD AUTO: 62.1 % — SIGNIFICANT CHANGE UP (ref 43–77)
NRBC # BLD: 0 /100 WBCS — SIGNIFICANT CHANGE UP (ref 0–0)
PLATELET # BLD AUTO: 179 K/UL — SIGNIFICANT CHANGE UP (ref 150–400)
POTASSIUM SERPL-MCNC: 4 MMOL/L — SIGNIFICANT CHANGE UP (ref 3.5–5.3)
POTASSIUM SERPL-SCNC: 4 MMOL/L — SIGNIFICANT CHANGE UP (ref 3.5–5.3)
PROT SERPL-MCNC: 7.3 GM/DL — SIGNIFICANT CHANGE UP (ref 6–8.3)
PROTHROM AB SERPL-ACNC: 14.9 SEC — HIGH (ref 10–12.9)
RBC # BLD: 3.57 M/UL — LOW (ref 4.2–5.8)
RBC # FLD: 16.9 % — HIGH (ref 10.3–14.5)
SODIUM SERPL-SCNC: 136 MMOL/L — SIGNIFICANT CHANGE UP (ref 135–145)
WBC # BLD: 5.15 K/UL — SIGNIFICANT CHANGE UP (ref 3.8–10.5)
WBC # FLD AUTO: 5.15 K/UL — SIGNIFICANT CHANGE UP (ref 3.8–10.5)

## 2019-02-13 PROCEDURE — 71045 X-RAY EXAM CHEST 1 VIEW: CPT | Mod: 26

## 2019-02-13 PROCEDURE — 99285 EMERGENCY DEPT VISIT HI MDM: CPT

## 2019-02-13 PROCEDURE — 93010 ELECTROCARDIOGRAM REPORT: CPT

## 2019-02-13 NOTE — PHYSICAL THERAPY INITIAL EVALUATION ADULT - LEVEL OF CONSCIOUSNESS, REHAB EVAL
[place] ,responds to questions with short answers inconsistently,speech is hypophonic,?h/o vocal cord polyps as well per son/alert

## 2019-02-13 NOTE — PHYSICAL THERAPY INITIAL EVALUATION ADULT - DIAGNOSIS, PT EVAL
generalized weakness, hypotension,old R CVA,Parkinsons /movement disorder generalized weakness, hypotension, old R CVA,Parkinsons /movement disorder,CHF s/p AICD in past,chronic BLE edema,CKD ,

## 2019-02-13 NOTE — PHYSICAL THERAPY INITIAL EVALUATION ADULT - SKIN COLOR/CHARACTERISTICS
B lower legs with patchy erythema and h/o cellulitis per daughter ,++bipedal edema L >R ,wears adjustable velcro closure slippers to accommodate edema/redness nonblanchable

## 2019-02-13 NOTE — PHYSICAL THERAPY INITIAL EVALUATION ADULT - PRECAUTIONS/LIMITATIONS, REHAB EVAL
hearing precautions/fall precautions/+ indwelling GARZON catheter fall precautions/hearing precautions/skin precautions given increased BLE limb volumes/increased skin tension ,potential for pressure injury; + indwelling GARZON catheter,+AICD ,recent h/o falls ,+h/o orthostatic hypotension

## 2019-02-13 NOTE — ED PROVIDER NOTE - OBJECTIVE STATEMENT
89 y/o male with a PMHx of CAD s/p CABG, s/p AICD, CHF, HTN, HLD, Parkinson's disease presents to the ED regarding low blood pressure. Pt discharged from [facility] yesterday for low blood pressure and renal failure. At home this morning pt's daughter checked his blood pressure and it was in the 90s. Visiting nurse came by, confirmed low blood pressure so sent pt to the hospital. Pt denies fever, chills, cough, SOB, chest pain, nausea, vomiting, diarrhea, urinary complaints, dizziness, HA. Hx obtained from daughter at bedside.

## 2019-02-13 NOTE — PHYSICAL THERAPY INITIAL EVALUATION ADULT - ACTIVE RANGE OF MOTION EXAMINATION, REHAB EVAL
mild  arthritic changes B hands/bilateral  lower extremity Active ROM was WFL (within functional limits)/bilateral upper extremity Active ROM was WFL (within functional limits)

## 2019-02-13 NOTE — PHYSICAL THERAPY INITIAL EVALUATION ADULT - PERTINENT HX OF CURRENT PROBLEM, REHAB EVAL
pt was discharged home yesterday (although all PT notes indicated pt should be discharged to [facility]) returns with hypotension,generalized weakness,; he has chronic BLE edema with venous stasis changes B lower legs

## 2019-02-13 NOTE — ED ADULT TRIAGE NOTE - CHIEF COMPLAINT QUOTE
Pt. to the ED BIBA and Daughter C/O Hypotension- As per Daughter pt. was D/C from Hospital yesterday for fall and hypotension- Pt. D/C Home with Blanche ( Daughter states out put of 1000c since D/C) Hx. of recent fall, Dehydration, CHF ( On Lasix), COPD, CAD, Afib and Parkinson's /Dementia- Daughter denies any fall and or trauma today- sent by visiting nurse to ED for Hypotension

## 2019-02-13 NOTE — PHYSICAL THERAPY INITIAL EVALUATION ADULT - PLANNED THERAPY INTERVENTIONS, PT EVAL
gait training/balance training/ROM/bed mobility training/stretching/transfer training/postural re-education/strengthening

## 2019-02-13 NOTE — PHYSICAL THERAPY INITIAL EVALUATION ADULT - DISCHARGE DISPOSITION, PT EVAL
rehabilitation facility/as preveiously recommended 2/6-2/12/19 rehabilitation facility/as previously recommended 2/6-2/12/19

## 2019-02-13 NOTE — ED PROVIDER NOTE - CHIEF COMPLAINT
The patient is a 89y Male complaining of hypertension. The patient is a 89y Male complaining of hypotension.

## 2019-02-13 NOTE — ED PROVIDER NOTE - CARDIAC, MLM
Normal rate, regular rhythm.  Heart sounds S1, S2.  No murmurs, rubs or gallops. +bilateral lower extremity edema

## 2019-02-13 NOTE — PHYSICAL THERAPY INITIAL EVALUATION ADULT - PATIENT PROFILE REVIEW, REHAB EVAL
yes/readmitted due to hypotension per VNS ,called EMS; per family unable to stand/ambulate,pt is on diuretics

## 2019-02-13 NOTE — PHYSICAL THERAPY INITIAL EVALUATION ADULT - MODALITIES TREATMENT COMMENTS
pt returns to hospital due to hypotension, generalized weakness, inability to stand/climb steps into home without significant assistance ,increased caregiver burden for safety

## 2019-02-13 NOTE — PHYSICAL THERAPY INITIAL EVALUATION ADULT - CRITERIA FOR SKILLED THERAPEUTIC INTERVENTIONS
functional limitations in following categories/risk reduction/prevention/predicted duration of therapy intervention/therapy frequency/anticipated discharge recommendation/rehab potential/anticipated equipment needs at discharge/impairments found

## 2019-02-13 NOTE — ED PROVIDER NOTE - CLINICAL SUMMARY MEDICAL DECISION MAKING FREE TEXT BOX
89 y/o M presents with low blood pressure at home, no complaints. Will perform EKG, CXR, urine, labs, re-eval.

## 2019-02-13 NOTE — PHYSICAL THERAPY INITIAL EVALUATION ADULT - GENERAL OBSERVATIONS, REHAB EVAL
resting on stretcher in hallway ,wearing baseball cap,L oral angle lower vs R with + drooling intermittently from L side of mouth, +masked facies with mild L facial weakness +phantom pillow with fixed forward head alignment ; [place],speech hypophonic ,Ox>2

## 2019-02-13 NOTE — ED PROVIDER NOTE - PROGRESS NOTE DETAILS
[facility] MO: [facility]- social work consulted for possible rehab placement; s/o to Dr. Bishop pending labs and disposition per social work. labs unchanged - pt has placement in rehab. will d/c with rehab. Bhanu Bishop M.D., Attending Physician

## 2019-02-13 NOTE — PHYSICAL THERAPY INITIAL EVALUATION ADULT - GAIT DEVIATIONS NOTED, PT EVAL
decreased stride length/increased time in double stance/decreased velocity of limb motion/decreased fabi/+ forward head alignment/head down posture ,cues to increase postural awareness ; decreased step height,trudging quality with decreased heel to toe patterning/decreased step length

## 2019-02-18 ENCOUNTER — APPOINTMENT (OUTPATIENT)
Dept: CARDIOLOGY | Facility: CLINIC | Age: 84
End: 2019-02-18

## 2019-04-09 ENCOUNTER — APPOINTMENT (OUTPATIENT)
Dept: CARDIOLOGY | Facility: CLINIC | Age: 84
End: 2019-04-09
Payer: MEDICARE

## 2019-04-09 ENCOUNTER — NON-APPOINTMENT (OUTPATIENT)
Age: 84
End: 2019-04-09

## 2019-04-09 VITALS
WEIGHT: 207 LBS | TEMPERATURE: 97.7 F | HEART RATE: 59 BPM | HEIGHT: 68 IN | BODY MASS INDEX: 31.37 KG/M2 | RESPIRATION RATE: 18 BRPM

## 2019-04-09 VITALS — SYSTOLIC BLOOD PRESSURE: 110 MMHG | DIASTOLIC BLOOD PRESSURE: 60 MMHG

## 2019-04-09 DIAGNOSIS — I25.10 ATHEROSCLEROTIC HEART DISEASE OF NATIVE CORONARY ARTERY W/OUT ANGINA PECTORIS: ICD-10-CM

## 2019-04-09 PROCEDURE — 99214 OFFICE O/P EST MOD 30 MIN: CPT | Mod: 25

## 2019-04-09 PROCEDURE — 93000 ELECTROCARDIOGRAM COMPLETE: CPT

## 2019-04-15 ENCOUNTER — INPATIENT (INPATIENT)
Facility: HOSPITAL | Age: 84
LOS: 9 days | Discharge: NURSING, REHAB OR LONG-TERM CARE | End: 2019-04-25
Attending: HOSPITALIST | Admitting: HOSPITALIST
Payer: MEDICARE

## 2019-04-15 VITALS
SYSTOLIC BLOOD PRESSURE: 113 MMHG | HEART RATE: 50 BPM | OXYGEN SATURATION: 99 % | RESPIRATION RATE: 20 BRPM | DIASTOLIC BLOOD PRESSURE: 50 MMHG

## 2019-04-15 LAB
ALBUMIN SERPL ELPH-MCNC: 2.9 G/DL — LOW (ref 3.3–5)
ALP SERPL-CCNC: 152 U/L — HIGH (ref 40–120)
ALT FLD-CCNC: 7 U/L — LOW (ref 12–78)
ANION GAP SERPL CALC-SCNC: 7 MMOL/L — SIGNIFICANT CHANGE UP (ref 5–17)
APPEARANCE UR: CLEAR — SIGNIFICANT CHANGE UP
APTT BLD: 43.7 SEC — HIGH (ref 27.5–36.3)
AST SERPL-CCNC: 12 U/L — LOW (ref 15–37)
BACTERIA # UR AUTO: ABNORMAL
BASOPHILS # BLD AUTO: 0.06 K/UL — SIGNIFICANT CHANGE UP (ref 0–0.2)
BASOPHILS NFR BLD AUTO: 1.3 % — SIGNIFICANT CHANGE UP (ref 0–2)
BILIRUB SERPL-MCNC: 0.8 MG/DL — SIGNIFICANT CHANGE UP (ref 0.2–1.2)
BILIRUB UR-MCNC: NEGATIVE — SIGNIFICANT CHANGE UP
BUN SERPL-MCNC: 31 MG/DL — HIGH (ref 7–23)
CALCIUM SERPL-MCNC: 8.4 MG/DL — LOW (ref 8.5–10.1)
CHLORIDE SERPL-SCNC: 106 MMOL/L — SIGNIFICANT CHANGE UP (ref 96–108)
CO2 SERPL-SCNC: 30 MMOL/L — SIGNIFICANT CHANGE UP (ref 22–31)
COLOR SPEC: YELLOW — SIGNIFICANT CHANGE UP
CREAT SERPL-MCNC: 1.64 MG/DL — HIGH (ref 0.5–1.3)
DIFF PNL FLD: ABNORMAL
EOSINOPHIL # BLD AUTO: 0.17 K/UL — SIGNIFICANT CHANGE UP (ref 0–0.5)
EOSINOPHIL NFR BLD AUTO: 3.7 % — SIGNIFICANT CHANGE UP (ref 0–6)
EPI CELLS # UR: SIGNIFICANT CHANGE UP
GLUCOSE SERPL-MCNC: 94 MG/DL — SIGNIFICANT CHANGE UP (ref 70–99)
GLUCOSE UR QL: NEGATIVE MG/DL — SIGNIFICANT CHANGE UP
HCT VFR BLD CALC: 33.8 % — LOW (ref 39–50)
HGB BLD-MCNC: 10.2 G/DL — LOW (ref 13–17)
HYALINE CASTS # UR AUTO: ABNORMAL /LPF
IMM GRANULOCYTES NFR BLD AUTO: 0.2 % — SIGNIFICANT CHANGE UP (ref 0–1.5)
INR BLD: 2.59 RATIO — HIGH (ref 0.88–1.16)
KETONES UR-MCNC: NEGATIVE — SIGNIFICANT CHANGE UP
LACTATE SERPL-SCNC: 1.3 MMOL/L — SIGNIFICANT CHANGE UP (ref 0.7–2)
LEUKOCYTE ESTERASE UR-ACNC: NEGATIVE — SIGNIFICANT CHANGE UP
LYMPHOCYTES # BLD AUTO: 0.57 K/UL — LOW (ref 1–3.3)
LYMPHOCYTES # BLD AUTO: 12.6 % — LOW (ref 13–44)
MCHC RBC-ENTMCNC: 25.4 PG — LOW (ref 27–34)
MCHC RBC-ENTMCNC: 30.2 GM/DL — LOW (ref 32–36)
MCV RBC AUTO: 84.1 FL — SIGNIFICANT CHANGE UP (ref 80–100)
MONOCYTES # BLD AUTO: 0.39 K/UL — SIGNIFICANT CHANGE UP (ref 0–0.9)
MONOCYTES NFR BLD AUTO: 8.6 % — SIGNIFICANT CHANGE UP (ref 2–14)
NEUTROPHILS # BLD AUTO: 3.34 K/UL — SIGNIFICANT CHANGE UP (ref 1.8–7.4)
NEUTROPHILS NFR BLD AUTO: 73.6 % — SIGNIFICANT CHANGE UP (ref 43–77)
NITRITE UR-MCNC: NEGATIVE — SIGNIFICANT CHANGE UP
NRBC # BLD: 0 /100 WBCS — SIGNIFICANT CHANGE UP (ref 0–0)
NT-PROBNP SERPL-SCNC: 1878 PG/ML — HIGH (ref 0–450)
PH UR: 5 — SIGNIFICANT CHANGE UP (ref 5–8)
PLATELET # BLD AUTO: 186 K/UL — SIGNIFICANT CHANGE UP (ref 150–400)
POTASSIUM SERPL-MCNC: 3.9 MMOL/L — SIGNIFICANT CHANGE UP (ref 3.5–5.3)
POTASSIUM SERPL-SCNC: 3.9 MMOL/L — SIGNIFICANT CHANGE UP (ref 3.5–5.3)
PROT SERPL-MCNC: 7.2 GM/DL — SIGNIFICANT CHANGE UP (ref 6–8.3)
PROT UR-MCNC: NEGATIVE MG/DL — SIGNIFICANT CHANGE UP
PROTHROM AB SERPL-ACNC: 29.6 SEC — HIGH (ref 10–12.9)
RAPID RVP RESULT: SIGNIFICANT CHANGE UP
RBC # BLD: 4.02 M/UL — LOW (ref 4.2–5.8)
RBC # FLD: 18.6 % — HIGH (ref 10.3–14.5)
RBC CASTS # UR COMP ASSIST: ABNORMAL /HPF (ref 0–4)
SODIUM SERPL-SCNC: 143 MMOL/L — SIGNIFICANT CHANGE UP (ref 135–145)
SP GR SPEC: 1.01 — SIGNIFICANT CHANGE UP (ref 1.01–1.02)
TROPONIN I SERPL-MCNC: 0.02 NG/ML — SIGNIFICANT CHANGE UP (ref 0.01–0.04)
UROBILINOGEN FLD QL: NEGATIVE MG/DL — SIGNIFICANT CHANGE UP
WBC # BLD: 4.54 K/UL — SIGNIFICANT CHANGE UP (ref 3.8–10.5)
WBC # FLD AUTO: 4.54 K/UL — SIGNIFICANT CHANGE UP (ref 3.8–10.5)
WBC UR QL: SIGNIFICANT CHANGE UP

## 2019-04-15 PROCEDURE — 99233 SBSQ HOSP IP/OBS HIGH 50: CPT

## 2019-04-15 PROCEDURE — 99285 EMERGENCY DEPT VISIT HI MDM: CPT | Mod: 25

## 2019-04-15 PROCEDURE — 71045 X-RAY EXAM CHEST 1 VIEW: CPT | Mod: 26

## 2019-04-15 PROCEDURE — 93010 ELECTROCARDIOGRAM REPORT: CPT

## 2019-04-15 RX ORDER — CEFEPIME 1 G/1
1000 INJECTION, POWDER, FOR SOLUTION INTRAMUSCULAR; INTRAVENOUS ONCE
Qty: 0 | Refills: 0 | Status: COMPLETED | OUTPATIENT
Start: 2019-04-15 | End: 2019-04-15

## 2019-04-15 RX ORDER — VANCOMYCIN HCL 1 G
1000 VIAL (EA) INTRAVENOUS ONCE
Qty: 0 | Refills: 0 | Status: COMPLETED | OUTPATIENT
Start: 2019-04-15 | End: 2019-04-15

## 2019-04-15 RX ORDER — FUROSEMIDE 40 MG
60 TABLET ORAL ONCE
Qty: 0 | Refills: 0 | Status: DISCONTINUED | OUTPATIENT
Start: 2019-04-15 | End: 2019-04-15

## 2019-04-15 RX ORDER — SODIUM CHLORIDE 9 MG/ML
3 INJECTION INTRAMUSCULAR; INTRAVENOUS; SUBCUTANEOUS ONCE
Qty: 0 | Refills: 0 | Status: COMPLETED | OUTPATIENT
Start: 2019-04-15 | End: 2019-04-15

## 2019-04-15 RX ORDER — FUROSEMIDE 40 MG
40 TABLET ORAL ONCE
Qty: 0 | Refills: 0 | Status: COMPLETED | OUTPATIENT
Start: 2019-04-15 | End: 2019-04-15

## 2019-04-15 RX ORDER — CARBIDOPA AND LEVODOPA 25; 100 MG/1; MG/1
1 TABLET ORAL ONCE
Qty: 0 | Refills: 0 | Status: COMPLETED | OUTPATIENT
Start: 2019-04-15 | End: 2019-04-15

## 2019-04-15 RX ORDER — SODIUM CHLORIDE 9 MG/ML
1000 INJECTION INTRAMUSCULAR; INTRAVENOUS; SUBCUTANEOUS ONCE
Qty: 0 | Refills: 0 | Status: COMPLETED | OUTPATIENT
Start: 2019-04-15 | End: 2019-04-15

## 2019-04-15 RX ADMIN — SODIUM CHLORIDE 3 MILLILITER(S): 9 INJECTION INTRAMUSCULAR; INTRAVENOUS; SUBCUTANEOUS at 21:52

## 2019-04-15 RX ADMIN — Medication 250 MILLIGRAM(S): at 22:10

## 2019-04-15 RX ADMIN — CARBIDOPA AND LEVODOPA 1 TABLET(S): 25; 100 TABLET ORAL at 22:09

## 2019-04-15 RX ADMIN — CEFEPIME 1000 MILLIGRAM(S): 1 INJECTION, POWDER, FOR SOLUTION INTRAMUSCULAR; INTRAVENOUS at 22:10

## 2019-04-15 RX ADMIN — SODIUM CHLORIDE 1000 MILLILITER(S): 9 INJECTION INTRAMUSCULAR; INTRAVENOUS; SUBCUTANEOUS at 22:09

## 2019-04-15 NOTE — ED PROVIDER NOTE - CARE PLAN
Principal Discharge DX:	HCAP (healthcare-associated pneumonia)  Secondary Diagnosis:	Hypotensive episode  Secondary Diagnosis:	Peripheral edema  Secondary Diagnosis:	Hypoxia

## 2019-04-15 NOTE — ED PROVIDER NOTE - PROGRESS NOTE DETAILS
Dr. Matt:  Informed earlier that pt's BP dropped into 70s systolic.  Labs, CXr reviewed: + RLL infilt.  IVF & IV Abx ordered, Lasix D/C'ed.  Bp improved & stabilized with IVF.  Hospitalist NP aware of Tele admission.

## 2019-04-15 NOTE — H&P ADULT - ATTENDING COMMENTS
89 y.o. male from [facility] with PMH CAD s/p PCI/CABG, HFrEF s/p AICD, HTN, dyslipidemia, CVA, h/o orthostatic hypotension, CKD stage III, A fib on Coumadin, Parkinson's disease presents with shortness of breath.    PMH: as above  PSH: as above  Social Hx: former smoker, quit>40 yr ago, no EtOH, no drugs  Family Hx: Mother- in her 30s from strangulated hernia; Brother-malaria/TB    #shortness of breath, likely multifactorial (HFrEF exacerbation and COPD exacerbation)  -pt with decreased breath sound, +wheezing, +rales  -admit to tele  -pulse ox monitoring and supplemental oxygen  -cont lasix  -I/O, daily weight  -monitor vital signs closely  -iv steroids  -cont inhalers  -cardio consult  -pulm consult  -obtain CT chest w/o  -hold antibiotics for now    #AFib on coumadin  -therapeutic INR  -check daily INR, cont coumadin  -rate control    #CAD s/p CABG  -cont home meds    #parkinson's disease with orthostatic hypotension  #dementia  -on sinemet, aricept, midodrine    #CKD stage III  -monitor renal function, avoid nephrotoxic meds    #DVT ppx  -on coumadin    #MOLST form in chart - DNR only

## 2019-04-15 NOTE — ED ADULT TRIAGE NOTE - CHIEF COMPLAINT QUOTE
Brought in by EMS for shortness of breath and hypoxia at facility. patient received 1 albuterol prior to arrival. hx from EMS.

## 2019-04-15 NOTE — H&P ADULT - ASSESSMENT
Acute respiratory failure/hypoxia mulitfactorial: Acute on chronic HFrEF superimposed by valvular insufficiency/ possible HCAP  LVEF 35-40% / mod-severe TR / Mod MR on 1/2018 TTE  daily wts / strict I & O /  Fluid restriction 1500ml daily  lasix 40mg IV daily - reassess in 1-2 days (might need more if BP allows)  O2 support via NC  CXR likely b/l pulmonary edema (self interpretation)  f/u CXR official report  hold antibiotic for now (low suspicion for PNA)  TTE  cardiology consult    Chronic Afib  rate control / therapeutic INR  con't home dose coreg /coumadin   daily INR for goal of 2-3  [id number] Score = 7 (age-2, HTN-1, CHF-1, CVA-2, vasc disease-1)    Episode of Hypotension in ED (see ED provider note/addendum)  resolved after IV bolus   monitor    CKD Stage III   ventura placed in ED for strict I & O monitoring   had urinary retention w/ difficulty inserting ventura last adm   baselin crt up to 1.8   con't home meds bethanechol BID / Flomax HS     Parkinson Disease/ Dementia  Con't Sinemet / Aricept     CAD s/p PCI and CABG  con't plavix/statin/BB   chest pain free / TNI negative / ECG no acute ST-T/rhythm findings    DVT PPX  pt on coumadin    IMPROVE VTE Individual Risk Assessment  RISK                                                                Points    [  ] Previous VTE                                                  3  [  ] Thrombophilia                                               2  [  ] Lower limb paralysis                                      2        (unable to hold up >15 seconds)    [  ] Current Cancer                                              2         (within 6 months)  [ x] Immobilization > 24 hrs                                1  [  ] ICU/CCU stay > 24 hours                              1  [x ] Age > 60                                                      1    IMPROVE VTE Score 2    Dispo  goals of care discussion --> DNR/DNI (already established MOLST received from [facility] w/ pt)    Time spent 69 mins Acute respiratory failure/hypoxia mulitfactorial: Acute on chronic HFrEF superimposed by valvular insufficiency/ possible HCAP  LVEF 35-40% / mod-severe TR / Mod MR on 1/2018 TTE  daily wts / strict I & O /  Fluid restriction 1500ml daily  lasix 40mg IV bid - reassess in 1-2 days (might need more if BP allows)  O2 support via NC  CXR likely b/l pulmonary edema (self interpretation)  f/u CXR official report  hold antibiotic for now (low suspicion for PNA)  TTE  cardiology consult    Chronic Afib  rate control / therapeutic INR  con't home dose coreg /coumadin   daily INR for goal of 2-3  [id number] Score = 7 (age-2, HTN-1, CHF-1, CVA-2, vasc disease-1)    Episode of Hypotension in ED (see ED provider note/addendum)  resolved after IV bolus   monitor    CKD Stage III   ventura placed in ED for strict I & O monitoring   had urinary retention w/ difficulty inserting ventura last adm   baselin crt up to 1.8   con't home meds bethanechol BID / Flomax HS     Parkinson Disease/ Dementia  Con't Sinemet / Aricept     CAD s/p PCI and CABG  con't plavix/statin/BB   chest pain free / TNI negative / ECG no acute ST-T/rhythm findings    DVT PPX  pt on coumadin    IMPROVE VTE Individual Risk Assessment  RISK                                                                Points    [  ] Previous VTE                                                  3  [  ] Thrombophilia                                               2  [  ] Lower limb paralysis                                      2        (unable to hold up >15 seconds)    [  ] Current Cancer                                              2         (within 6 months)  [ x] Immobilization > 24 hrs                                1  [  ] ICU/CCU stay > 24 hours                              1  [x ] Age > 60                                                      1    IMPROVE VTE Score 2    Dispo  goals of care discussion --> DNR/DNI (already established MOLST received from [facility] w/ pt)    Time spent 69 mins Acute respiratory failure/hypoxia mulitfactorial: Acute on chronic HFrEF superimposed by valvular insufficiency/ possible HCAP  LVEF 35-40% / mod-severe TR / Mod MR on 1/2018 TTE  daily wts / strict I & O /  Fluid restriction 1500ml daily  lasix 40mg IV bid - reassess in 1-2 days (might need more if BP allows)  O2 support via NC  CXR likely b/l pulmonary edema (self interpretation)  f/u CXR official report  hold antibiotic for now (low suspicion for PNA)  TTE  cardiology consult    Chronic Afib  rate control / therapeutic INR  con't home dose coreg /coumadin   daily INR for goal of 2-3  [id number] Score = 7 (age-2, HTN-1, CHF-1, CVA-2, vasc disease-1)    Episode of Hypotension in ED (see ED provider note/addendum)  resolved after IV bolus   monitor    CKD Stage III   ventura placed in ED for strict I & O monitoring   had urinary retention w/ difficulty inserting ventura last adm   baselin crt up to 1.8   con't home meds bethanechol BID / Flomax HS     Parkinson Disease/ Dementia  Con't Sinemet / Aricept     CAD s/p PCI and CABG  con't plavix/statin/BB   chest pain free / TNI negative / ECG no acute ST-T/rhythm findings    DVT PPX  pt on coumadin    IMPROVE VTE Individual Risk Assessment  RISK                                                                Points    [  ] Previous VTE                                                  3  [  ] Thrombophilia                                               2  [  ] Lower limb paralysis                                      2        (unable to hold up >15 seconds)    [  ] Current Cancer                                              2         (within 6 months)  [ x] Immobilization > 24 hrs                                1  [  ] ICU/CCU stay > 24 hours                              1  [x ] Age > 60                                                      1    IMPROVE VTE Score 2    Dispo  goals of care discussion --> DNR (already established MOLST received from [facility] w/ pt)    Time spent 69 mins

## 2019-04-15 NOTE — H&P ADULT - NSICDXPASTMEDICALHX_GEN_ALL_CORE_FT
PAST MEDICAL HISTORY:  CAD (coronary artery disease)     CHF (congestive heart failure)     COPD (chronic obstructive pulmonary disease)     HTN (hypertension)     Hyperlipidemia     Parkinson's disease

## 2019-04-15 NOTE — ED PROVIDER NOTE - CARDIAC, MLM
bradycardic, regular rhythm.  Heart sounds S1, S2.  No murmurs, rubs or gallops. normal radial pulses.

## 2019-04-15 NOTE — ED ADULT NURSE NOTE - NSIMPLEMENTINTERV_GEN_ALL_ED
Implemented All Fall with Harm Risk Interventions:  [place] to call system. Call bell, personal items and telephone within reach. Instruct patient to call for assistance. Room bathroom lighting operational. Non-slip footwear when patient is off stretcher. Physically safe environment: no spills, clutter or unnecessary equipment. Stretcher in lowest position, wheels locked, appropriate side rails in place. Provide visual cue, wrist band, yellow gown, etc. Monitor gait and stability. Monitor for mental status changes and reorient to person, place, and time. Review medications for side effects contributing to fall risk. Reinforce activity limits and safety measures with patient and family. Provide visual clues: red socks.

## 2019-04-15 NOTE — H&P ADULT - NSHPPHYSICALEXAM_GEN_ALL_CORE
PHYSICAL EXAM:    GENERAL: generalized anasarca    HEENT:  AT/NC, pupils equal and reactive, no oropharyngeal lesions, erythema, exudates, oral thrush    NECK:   mild JVD, no carotid bruits    CV:  +S1, +S2, irregular +murmur    RESP: poor air entry, +rales, +wheezing      BREAST:  not examined    GI:  +abd distention, pitting edema, non-tender, normal BS, no bruits    RECTAL:  not examined    :  ventura placed in ED, patent with clear yellow urine    EXT: 4+ weeping edema to BLE, chronic vascular changes to BLE,  no calf pain    VASCULAR:  pulses equal and symmetric in the upper and lower extremities    NEURO:  A&Ox3, no focal neurological deficits, follows all commands, able to move extremities spontaneously  Vital Signs Last 24 Hrs  T(C): 36.3 (15 Apr 2019 20:00), Max: 36.3 (15 Apr 2019 20:00)  T(F): 97.4 (15 Apr 2019 20:00), Max: 97.4 (15 Apr 2019 20:00)  HR: 55 (15 Apr 2019 20:00) (50 - 55)  BP: 106/50 (15 Apr 2019 20:00) (106/50 - 113/50)  BP(mean): --  RR: 23 (15 Apr 2019 20:00) (12 - 27)  SpO2: 97% (15 Apr 2019 20:00) (93% - 99%) Vital Signs Last 24 Hrs  T(C): 36.4 (16 Apr 2019 03:00), Max: 36.7 (16 Apr 2019 00:59)  T(F): 97.5 (16 Apr 2019 03:00), Max: 98 (16 Apr 2019 00:59)  HR: 54 (16 Apr 2019 03:00) (50 - 56)  BP: 105/50 (16 Apr 2019 03:00) (85/60 - 116/59)  BP(mean): --  RR: 20 (16 Apr 2019 03:00) (12 - 27)  SpO2: 95% (16 Apr 2019 03:00) (93% - 99%)    PHYSICAL EXAM:    GENERAL: generalized anasarca    HEENT:  AT/NC, pupils equal and reactive, no oropharyngeal lesions, erythema, exudates, oral thrush    NECK:   mild JVD, no carotid bruits    CV:  +S1, +S2, irregular +murmur    RESP: poor air entry, +rales, +wheezing      BREAST:  not examined    GI:  +abd distention, pitting edema, non-tender, normal BS, no bruits    RECTAL:  not examined    :  ventura placed in ED, patent with clear yellow urine    EXT: 4+ weeping edema to BLE, chronic vascular changes to BLE,  no calf pain    VASCULAR:  pulses equal and symmetric in the upper and lower extremities    NEURO:  A&Ox3, no focal neurological deficits, follows all commands, able to move extremities spontaneously

## 2019-04-15 NOTE — H&P ADULT - HISTORY OF PRESENT ILLNESS
89 year old Man [facility] resident with hx of CAD s/p PCI/CABG,  HFrEF, s/p AICD, HTN, dyslipidemia, CVA, orthostatic hypotension, CKD III, A fib on Coumadin, Parkinson's presented from [facility] via EMS for worsening SOB with O2 desat.  O2 sat 90% en route to ED. In the ED  /50 initially then drop to 70's systolic, HR 50, O2 sat 99% with O2, RR 20. Denies CP/palpitation/HA/dizziness/abd pain/n/v/d/f/c    He received NS 1L IV bolus, vanco 1gm and cefepime 1gm, ventura placed

## 2019-04-15 NOTE — ED ADULT NURSE REASSESSMENT NOTE - NS ED NURSE REASSESS COMMENT FT1
Lab michell blood cultures and other blood work and sent to lab, another attempt to begin IV and 22G Right forearm.

## 2019-04-15 NOTE — ED PROVIDER NOTE - OBJECTIVE STATEMENT
Pt is an 88 y/o M, with PMHx of CAD s/p CABG, s/p AICD, COPD, Afib on coumadin, CHF, HTN, HLD, Parkinson's disease, BIBEMS from [facility], for evaluation of shortness of breath for the last few days. Pt states even at worse, he is having difficulties breathing. Son notes on a visit a couple of days ago, pt was sitting in bed and appeared to have labored breathing. Improved on O2. EMS reports O2 sat at 90% on RA on initial eval. Pt has been having mild productive cough with yellow sputum production. Pt reports he is feeling fatigued and more tired. + swelling to the LE and pt states he has not been eating well. Denies fever and CP. Per son, pt was admitted to [facility] hospital a few months ago for viral PNA, but states he was coughing more at that time. Currently on abx for cellulitis of the LLE. Arrives with MOLST form and pt is documented DNR. Son is healthcare proxy who reports pt is not DNI.   Casanov- PMD  Anto- cardiology

## 2019-04-15 NOTE — REASON FOR VISIT
[Follow-Up - Clinic] : a clinic follow-up of [Abnormal ECG] : an abnormal ECG [Coronary Artery Disease] : coronary artery disease [Dyspnea] : dyspnea [Fatigue] : feeling tired (fatigue) [Heart Failure] : congestive heart failure [Hypertension] : hypertension [Hyperlipidemia] : hyperlipidemia [Medication Management] : Medication management [FreeTextEntry2] : status post PCI and stent [FreeTextEntry1] : 89 year old with HTN,HLD,3 VESSEL cad,chf\par \par Recent discharge from hospital with MARILYN, hypotension, metabolic encephalopathy, fall.  In rehab [facility] at present. Afib on AC . INR being followed by rehab. \par Bilateral leg cellulitis in ace wrap. \par PPM/ICD - interrogation done 2 years back in [place] according to son. \par Routine blood works being done at rehab weekly \par Cr elevated will be following a nephrologist. \par \par

## 2019-04-15 NOTE — DISCUSSION/SUMMARY
[Coronary Artery Disease] : coronary artery disease [Exercise Regimen] : an exercise regimen [Dietary Modification] : dietary modification [Weight Reduction] : weight reduction [Patient] : the patient [Diastolic Heart Failure] : diastolic congestive heart failure [Improving] : improving [Sodium Restriction] : sodium restriction [Stable] : stable [Hyperlipidemia] : hyperlipidemia [Continue] : continuing statins [de-identified] : Monitor weight if increase by 2-3 lbs in 24 hours or 5 lbs in one week need to notify provider, fluid restriction to 1500 ml / day.  [FreeTextEntry1] : Patient Will interrogate PPM/ICD \par Will seek medical attention if symptoms  of worsening heart failure.  \par OV in one month.

## 2019-04-15 NOTE — ED PROVIDER NOTE - CONSTITUTIONAL, MLM
normal... ill appearing, white, adult male, awake, alert, oriented to person, place, time/situation, mild respiratory distress

## 2019-04-15 NOTE — ED ADULT NURSE REASSESSMENT NOTE - NS ED NURSE REASSESS COMMENT FT1
Having a great deal of difficulty finding IV access after multiple attempts. IV RN not available. Patient is third spacing fluid up to chest. Will ask another RN to try. Dr. Matt made aware. ER phlebotomist called for blood draw.

## 2019-04-15 NOTE — H&P ADULT - NSHPREVIEWOFSYSTEMS_GEN_ALL_CORE
REVIEW OF SYSTEMS    General: denies fever, chills    Skin/Breast: no changes  	  Ophthalmologic: no vision changes  	  ENMT:  normal    Respiratory and Thorax: as in HPI  	  Cardiovascular: denies CP/palpitations    Gastrointestinal: increase abd girth, normal BM, no abd pain    Genitourinary: no dysuria/urgency/frequency    Musculoskeletal: no muscle tenderness    Neurological: no changes    Psychiatric: denies S/H ideation, no depression/anxiety	    Hematology/Lymphatics: normal, no LN palpable	    Endocrine: normal    Allergic/Immunologic:	      REVIEW OF SYSTEM: ROS comprehensively negative except as above

## 2019-04-15 NOTE — ED PROVIDER NOTE - PMH
CAD (coronary artery disease)    CHF (congestive heart failure)    COPD (chronic obstructive pulmonary disease)    HTN (hypertension)    Hyperlipidemia    Parkinson's disease

## 2019-04-15 NOTE — ED PROVIDER NOTE - CLINICAL SUMMARY MEDICAL DECISION MAKING FREE TEXT BOX
89 yo M hx of Parkinson's, CAD on Plavix, Afib on coumadin, CAD with CHF, COPD, BIBA from NH with two days of worsening SOB. + worsening peripheral edema. Minimal cough, no cp or fever. + hypoxic in NH. Plan rectal temp, EKG, CXR, labs including RVP, BNP, serial troponin, o2 NC, PO sinemet, IV lasix, monitor, observe, expect tele admit.

## 2019-04-15 NOTE — REVIEW OF SYSTEMS
[Feeling Fatigued] : feeling fatigued [Shortness Of Breath] : shortness of breath [Dyspnea on exertion] : dyspnea during exertion [Negative] : Heme/Lymph [Chest  Pressure] : no chest pressure [Chest Pain] : no chest pain [Lower Ext Edema] : no extremity edema [Leg Claudication] : no intermittent leg claudication [Palpitations] : no palpitations

## 2019-04-16 DIAGNOSIS — J96.20 ACUTE AND CHRONIC RESPIRATORY FAILURE, UNSPECIFIED WHETHER WITH HYPOXIA OR HYPERCAPNIA: ICD-10-CM

## 2019-04-16 DIAGNOSIS — I48.91 UNSPECIFIED ATRIAL FIBRILLATION: ICD-10-CM

## 2019-04-16 DIAGNOSIS — J98.11 ATELECTASIS: ICD-10-CM

## 2019-04-16 DIAGNOSIS — J44.9 CHRONIC OBSTRUCTIVE PULMONARY DISEASE, UNSPECIFIED: ICD-10-CM

## 2019-04-16 DIAGNOSIS — I38 ENDOCARDITIS, VALVE UNSPECIFIED: ICD-10-CM

## 2019-04-16 DIAGNOSIS — I95.9 HYPOTENSION, UNSPECIFIED: ICD-10-CM

## 2019-04-16 DIAGNOSIS — I10 ESSENTIAL (PRIMARY) HYPERTENSION: ICD-10-CM

## 2019-04-16 DIAGNOSIS — I25.10 ATHEROSCLEROTIC HEART DISEASE OF NATIVE CORONARY ARTERY WITHOUT ANGINA PECTORIS: ICD-10-CM

## 2019-04-16 DIAGNOSIS — I50.9 HEART FAILURE, UNSPECIFIED: ICD-10-CM

## 2019-04-16 DIAGNOSIS — R60.9 EDEMA, UNSPECIFIED: ICD-10-CM

## 2019-04-16 LAB
ANION GAP SERPL CALC-SCNC: 6 MMOL/L — SIGNIFICANT CHANGE UP (ref 5–17)
BUN SERPL-MCNC: 30 MG/DL — HIGH (ref 7–23)
CALCIUM SERPL-MCNC: 8 MG/DL — LOW (ref 8.5–10.1)
CHLORIDE SERPL-SCNC: 107 MMOL/L — SIGNIFICANT CHANGE UP (ref 96–108)
CO2 SERPL-SCNC: 30 MMOL/L — SIGNIFICANT CHANGE UP (ref 22–31)
CREAT SERPL-MCNC: 1.44 MG/DL — HIGH (ref 0.5–1.3)
GLUCOSE SERPL-MCNC: 88 MG/DL — SIGNIFICANT CHANGE UP (ref 70–99)
INR BLD: 2.46 RATIO — HIGH (ref 0.88–1.16)
POTASSIUM SERPL-MCNC: 3.3 MMOL/L — LOW (ref 3.5–5.3)
POTASSIUM SERPL-SCNC: 3.3 MMOL/L — LOW (ref 3.5–5.3)
PROTHROM AB SERPL-ACNC: 28.1 SEC — HIGH (ref 10–12.9)
SODIUM SERPL-SCNC: 143 MMOL/L — SIGNIFICANT CHANGE UP (ref 135–145)
TROPONIN I SERPL-MCNC: 0.02 NG/ML — SIGNIFICANT CHANGE UP (ref 0.01–0.04)

## 2019-04-16 PROCEDURE — 99223 1ST HOSP IP/OBS HIGH 75: CPT

## 2019-04-16 PROCEDURE — 71250 CT THORAX DX C-: CPT | Mod: 26

## 2019-04-16 RX ORDER — FUROSEMIDE 40 MG
40 TABLET ORAL EVERY 12 HOURS
Qty: 0 | Refills: 0 | Status: DISCONTINUED | OUTPATIENT
Start: 2019-04-16 | End: 2019-04-16

## 2019-04-16 RX ORDER — TAMSULOSIN HYDROCHLORIDE 0.4 MG/1
0.4 CAPSULE ORAL AT BEDTIME
Qty: 0 | Refills: 0 | Status: DISCONTINUED | OUTPATIENT
Start: 2019-04-16 | End: 2019-04-25

## 2019-04-16 RX ORDER — BUDESONIDE, MICRONIZED 100 %
0.25 POWDER (GRAM) MISCELLANEOUS
Qty: 0 | Refills: 0 | Status: DISCONTINUED | OUTPATIENT
Start: 2019-04-16 | End: 2019-04-16

## 2019-04-16 RX ORDER — SIMVASTATIN 20 MG/1
20 TABLET, FILM COATED ORAL AT BEDTIME
Qty: 0 | Refills: 0 | Status: DISCONTINUED | OUTPATIENT
Start: 2019-04-16 | End: 2019-04-25

## 2019-04-16 RX ORDER — POTASSIUM CHLORIDE 20 MEQ
40 PACKET (EA) ORAL ONCE
Qty: 0 | Refills: 0 | Status: COMPLETED | OUTPATIENT
Start: 2019-04-16 | End: 2019-04-16

## 2019-04-16 RX ORDER — ASCORBIC ACID 60 MG
500 TABLET,CHEWABLE ORAL DAILY
Qty: 0 | Refills: 0 | Status: DISCONTINUED | OUTPATIENT
Start: 2019-04-16 | End: 2019-04-25

## 2019-04-16 RX ORDER — SENNA PLUS 8.6 MG/1
2 TABLET ORAL AT BEDTIME
Qty: 0 | Refills: 0 | Status: DISCONTINUED | OUTPATIENT
Start: 2019-04-16 | End: 2019-04-25

## 2019-04-16 RX ORDER — BUDESONIDE AND FORMOTEROL FUMARATE DIHYDRATE 160; 4.5 UG/1; UG/1
2 AEROSOL RESPIRATORY (INHALATION)
Qty: 0 | Refills: 0 | Status: DISCONTINUED | OUTPATIENT
Start: 2019-04-16 | End: 2019-04-25

## 2019-04-16 RX ORDER — IPRATROPIUM/ALBUTEROL SULFATE 18-103MCG
3 AEROSOL WITH ADAPTER (GRAM) INHALATION EVERY 6 HOURS
Qty: 0 | Refills: 0 | Status: DISCONTINUED | OUTPATIENT
Start: 2019-04-16 | End: 2019-04-18

## 2019-04-16 RX ORDER — WARFARIN SODIUM 2.5 MG/1
1 TABLET ORAL
Qty: 0 | Refills: 0 | COMMUNITY

## 2019-04-16 RX ORDER — PANTOPRAZOLE SODIUM 20 MG/1
40 TABLET, DELAYED RELEASE ORAL
Qty: 0 | Refills: 0 | Status: DISCONTINUED | OUTPATIENT
Start: 2019-04-16 | End: 2019-04-25

## 2019-04-16 RX ORDER — SIMVASTATIN 20 MG/1
1 TABLET, FILM COATED ORAL
Qty: 0 | Refills: 0 | COMMUNITY

## 2019-04-16 RX ORDER — DONEPEZIL HYDROCHLORIDE 10 MG/1
10 TABLET, FILM COATED ORAL AT BEDTIME
Qty: 0 | Refills: 0 | Status: DISCONTINUED | OUTPATIENT
Start: 2019-04-16 | End: 2019-04-19

## 2019-04-16 RX ORDER — WARFARIN SODIUM 2.5 MG/1
5 TABLET ORAL ONCE
Qty: 0 | Refills: 0 | Status: COMPLETED | OUTPATIENT
Start: 2019-04-16 | End: 2019-04-16

## 2019-04-16 RX ORDER — FUROSEMIDE 40 MG
40 TABLET ORAL DAILY
Qty: 0 | Refills: 0 | Status: DISCONTINUED | OUTPATIENT
Start: 2019-04-16 | End: 2019-04-19

## 2019-04-16 RX ORDER — CARBIDOPA AND LEVODOPA 25; 100 MG/1; MG/1
1 TABLET ORAL THREE TIMES A DAY
Qty: 0 | Refills: 0 | Status: DISCONTINUED | OUTPATIENT
Start: 2019-04-16 | End: 2019-04-25

## 2019-04-16 RX ORDER — METOPROLOL TARTRATE 50 MG
12.5 TABLET ORAL
Qty: 0 | Refills: 0 | Status: DISCONTINUED | OUTPATIENT
Start: 2019-04-16 | End: 2019-04-22

## 2019-04-16 RX ORDER — WARFARIN SODIUM 2.5 MG/1
2 TABLET ORAL ONCE
Qty: 0 | Refills: 0 | Status: COMPLETED | OUTPATIENT
Start: 2019-04-16 | End: 2019-04-16

## 2019-04-16 RX ORDER — BETHANECHOL CHLORIDE 25 MG
25 TABLET ORAL
Qty: 0 | Refills: 0 | Status: DISCONTINUED | OUTPATIENT
Start: 2019-04-16 | End: 2019-04-16

## 2019-04-16 RX ORDER — FUROSEMIDE 40 MG
1 TABLET ORAL
Qty: 0 | Refills: 0 | COMMUNITY

## 2019-04-16 RX ORDER — CLOPIDOGREL BISULFATE 75 MG/1
75 TABLET, FILM COATED ORAL DAILY
Qty: 0 | Refills: 0 | Status: DISCONTINUED | OUTPATIENT
Start: 2019-04-16 | End: 2019-04-25

## 2019-04-16 RX ADMIN — DONEPEZIL HYDROCHLORIDE 10 MILLIGRAM(S): 10 TABLET, FILM COATED ORAL at 21:17

## 2019-04-16 RX ADMIN — CARBIDOPA AND LEVODOPA 1 TABLET(S): 25; 100 TABLET ORAL at 21:17

## 2019-04-16 RX ADMIN — Medication 3 MILLILITER(S): at 22:35

## 2019-04-16 RX ADMIN — Medication 40 MILLIGRAM(S): at 00:53

## 2019-04-16 RX ADMIN — CARBIDOPA AND LEVODOPA 1 TABLET(S): 25; 100 TABLET ORAL at 05:36

## 2019-04-16 RX ADMIN — CLOPIDOGREL BISULFATE 75 MILLIGRAM(S): 75 TABLET, FILM COATED ORAL at 11:11

## 2019-04-16 RX ADMIN — BUDESONIDE AND FORMOTEROL FUMARATE DIHYDRATE 2 PUFF(S): 160; 4.5 AEROSOL RESPIRATORY (INHALATION) at 22:37

## 2019-04-16 RX ADMIN — Medication 1 TABLET(S): at 11:11

## 2019-04-16 RX ADMIN — Medication 40 MILLIEQUIVALENT(S): at 11:11

## 2019-04-16 RX ADMIN — Medication 40 MILLIGRAM(S): at 17:02

## 2019-04-16 RX ADMIN — PANTOPRAZOLE SODIUM 40 MILLIGRAM(S): 20 TABLET, DELAYED RELEASE ORAL at 05:37

## 2019-04-16 RX ADMIN — WARFARIN SODIUM 2 MILLIGRAM(S): 2.5 TABLET ORAL at 01:34

## 2019-04-16 RX ADMIN — DONEPEZIL HYDROCHLORIDE 10 MILLIGRAM(S): 10 TABLET, FILM COATED ORAL at 01:34

## 2019-04-16 RX ADMIN — Medication 25 MILLIGRAM(S): at 05:36

## 2019-04-16 RX ADMIN — CARBIDOPA AND LEVODOPA 1 TABLET(S): 25; 100 TABLET ORAL at 00:53

## 2019-04-16 RX ADMIN — WARFARIN SODIUM 5 MILLIGRAM(S): 2.5 TABLET ORAL at 21:17

## 2019-04-16 RX ADMIN — SIMVASTATIN 20 MILLIGRAM(S): 20 TABLET, FILM COATED ORAL at 21:16

## 2019-04-16 RX ADMIN — Medication 500 MILLIGRAM(S): at 11:11

## 2019-04-16 RX ADMIN — Medication 12.5 MILLIGRAM(S): at 17:02

## 2019-04-16 RX ADMIN — CARBIDOPA AND LEVODOPA 1 TABLET(S): 25; 100 TABLET ORAL at 13:39

## 2019-04-16 RX ADMIN — Medication 3 MILLILITER(S): at 05:03

## 2019-04-16 RX ADMIN — Medication 25 MILLIGRAM(S): at 17:02

## 2019-04-16 RX ADMIN — TAMSULOSIN HYDROCHLORIDE 0.4 MILLIGRAM(S): 0.4 CAPSULE ORAL at 21:17

## 2019-04-16 RX ADMIN — Medication 40 MILLIGRAM(S): at 05:36

## 2019-04-16 RX ADMIN — Medication 3 MILLILITER(S): at 08:39

## 2019-04-16 RX ADMIN — Medication 3 MILLILITER(S): at 14:13

## 2019-04-16 NOTE — CONSULT NOTE ADULT - PROBLEM SELECTOR RECOMMENDATION 9
possibly multifactorial , mainly exacerbation of copd with pneumonia and large left pleural effusion ,  possible component of acute on chronic mixed heart failure ,  agree with bronchodilator , steroid therapy , would change  lasix to once a day

## 2019-04-16 NOTE — DIETITIAN INITIAL EVALUATION ADULT. - NS AS NUTRI DX NUTRIENT
Malnutrition/Meets criteria for Severe protein-calorie nutrition in the context  of chronic illness
[address] Comprehensive

## 2019-04-16 NOTE — PROGRESS NOTE ADULT - ASSESSMENT
#Acute respiratory failure/hypoxia most likely secondary to acute on chronic HFrEF   - CT chest on 4/16 - No evidence of definite PNA; large R effusion and small left effusion with adjacent atelectasis  - CXR on admission noted - reports R and left pleural effusion  - Continue on lasix 40mg IV daily   - Continue O2 support via NC PRN  - Echo in 2018 with LVEF 35-40%, mod-severe TR, Mod MR  - daily weight with strict I & O   - Fluid restriction 1500ml daily  - Cardiology recs appreciated    #Chronic Afib  rate control / therapeutic INR  - INR of 2.44 currently  - Coumadin 5mg tonight  - Continue on home dose coreg   - daily INR for goal of 2-3  - [id number] Score = 7 (age-2, HTN-1, CHF-1, CVA-2, vasc disease-1)    # Episode of Hypotension in ED  - Resolved - BP currently stable  - s/p IV bolus in ED    # CKD Stage III   - baseline creatinine up to 1.8   - ventura placed in ED for strict I & O monitoring   - Continue home meds bethanechol BID / Flomax HS     # Parkinson Disease/ Dementia  - Continue Sinemet / Aricept     # CAD s/p PCI and CABG  - Pt with no c/o Chest pain  - Continue on plavix/statin/BB     # DVT PPX  - On coumadin #Acute respiratory failure/hypoxia most likely secondary to acute on chronic HFrEF   - CT chest on 4/16 - No evidence of definite PNA; large R effusion and small left effusion with adjacent atelectasis  - CXR on admission noted - reports R and left pleural effusion  - Continue on lasix 40mg IV daily   - Continue O2 support via NC PRN  - Echo in 2018 with LVEF 35-40%, mod-severe TR, Mod MR  - daily weight with strict I & O   - Fluid restriction 1500ml daily  - Cardiology recs appreciated    #Chronic Afib  rate control / therapeutic INR  - INR of 2.44 currently  - Coumadin 5mg tonight  - Continue on home dose coreg   - daily INR for goal of 2-3  - [id number] Score = 7 (age-2, HTN-1, CHF-1, CVA-2, vasc disease-1)    # Episode of Hypotension in ED  - Resolved - BP currently stable  - s/p IV bolus in ED    #Hypokalemia  - K+ of 3.3  - Repleted  - F/U AM BMP    # CKD Stage III   - baseline creatinine up to 1.8   - ventura placed in ED for strict I & O monitoring   - Continue home meds bethanechol BID / Flomax HS     # Parkinson Disease/ Dementia  - Continue Sinemet / Aricept     # CAD s/p PCI and CABG  - Pt with no c/o Chest pain  - Continue on plavix/statin/BB     # DVT PPX  - On coumadin #Acute respiratory failure/hypoxia most likely secondary to acute on chronic HFrEF   - CT chest on 4/16 - No evidence of definite PNA; large R effusion and small left effusion with adjacent atelectasis  - CXR on admission noted - reports R and left pleural effusion  - Continue on lasix 40mg IV daily   - Continue O2 support via NC PRN  - Echo in 2018 with LVEF 35-40%, mod-severe TR, Mod MR  - daily weight with strict I & O   - Fluid restriction 1500ml daily  - Cardiology recs appreciated    #Chronic Afib  rate control / therapeutic INR  - INR of 2.44 currently  - Coumadin 5mg tonight  - Continue on home dose coreg   - daily INR for goal of 2-3  - [id number] Score = 7 (age-2, HTN-1, CHF-1, CVA-2, vasc disease-1)    # Episode of Hypotension in ED  - Resolved - BP currently stable  - s/p IV bolus in ED    #Hypokalemia  - K+ of 3.3  - Repleted  - F/U AM BMP    # CKD Stage III   - baseline creatinine up to 1.8   - ventura placed in ED for strict I & O monitoring   - Continue home meds Flomax HS     # Parkinson Disease/ Dementia  - Continue Sinemet / Aricept     # CAD s/p PCI and CABG  - Pt with no c/o Chest pain  - Continue on plavix/statin/BB     # DVT PPX  - On coumadin #Acute respiratory failure/hypoxia most likely secondary to acute on chronic HFrEF   - CT chest on 4/16 - No evidence of definite PNA; large R effusion and small left effusion with adjacent atelectasis  - CXR on admission noted - reports R and left pleural effusion  - Continue on lasix 40mg IV daily   - Continue O2 support via NC PRN  - Echo in 2018 with LVEF 35-40%, mod-severe TR, Mod MR  - daily weight with strict I & O   - Fluid restriction 1500ml daily  - Cardiology recs appreciated  - Pulm consult pending    #Chronic Afib  rate control / therapeutic INR  - INR of 2.44 currently  - Coumadin 5mg tonight  - Continue on home dose coreg   - daily INR for goal of 2-3  - [id number] Score = 7 (age-2, HTN-1, CHF-1, CVA-2, vasc disease-1)    # Episode of Hypotension in ED  - Resolved - BP currently stable  - s/p IV bolus in ED    #Hypokalemia  - K+ of 3.3  - Repleted  - F/U AM BMP    # CKD Stage III   - baseline creatinine up to 1.8   - ventura placed in ED for strict I & O monitoring   - Continue home meds Flomax HS     # Parkinson Disease/ Dementia  - Continue Sinemet / Aricept     # CAD s/p PCI and CABG  - Pt with no c/o Chest pain  - Continue on plavix/statin/BB     # DVT PPX  - On coumadin

## 2019-04-16 NOTE — DIETITIAN INITIAL EVALUATION ADULT. - PERTINENT LABORATORY DATA
04-16 Na143 mmol/L Glu 88 mg/dL K+ 3.3 mmol/L<L> Cr  1.44 mg/dL<H> BUN 30 mg/dL<H> Phos n/a   Alb n/a   PAB n/a

## 2019-04-16 NOTE — CONSULT NOTE ADULT - SUBJECTIVE AND OBJECTIVE BOX
HPI:  89 year old Man [facility] resident with hx of CAD s/p PCI/CABG,  HFrEF, s/p AICD, HTN, dyslipidemia, CVA, orthostatic hypotension, CKD III, A fib on Coumadin, Parkinson's presented from [facility] via EMS for worsening SOB with O2 desat.  O2 sat 90% en route to ED. In the ED  /50 initially then drop to 70's systolic, HR 50, O2 sat 99% with O2, RR 20. Denies CP/palpitation/HA/dizziness/abd pain/n/v/d/f/c    He received NS 1L IV bolus, vanco 1gm and cefepime 1gm, ventura placed (15 Apr 2019 23:38)    : no distress. afebrile.      PAST MEDICAL & SURGICAL HISTORY:  COPD (chronic obstructive pulmonary disease)  Parkinson's disease  CHF (congestive heart failure)  Hyperlipidemia  HTN (hypertension)  CAD (coronary artery disease)  AICD (automatic cardioverter/defibrillator) present  S/P CABG      MEDICATIONS  (STANDING):  ALBUTerol/ipratropium for Nebulization 3 milliLiter(s) Nebulizer every 6 hours  ascorbic acid 500 milliGRAM(s) Oral daily  buDESOnide 160 MICROgram(s)/formoterol 4.5 MICROgram(s) Inhaler 2 Puff(s) Inhalation two times a day  carbidopa/levodopa  25/100 1 Tablet(s) Oral three times a day  clopidogrel Tablet 75 milliGRAM(s) Oral daily  donepezil 10 milliGRAM(s) Oral at bedtime  furosemide   Injectable 40 milliGRAM(s) IV Push daily  metoprolol tartrate Oral Tab/Cap - Peds 12.5 milliGRAM(s) Oral two times a day  multivitamin 1 Tablet(s) Oral daily  pantoprazole    Tablet 40 milliGRAM(s) Oral before breakfast  simvastatin 20 milliGRAM(s) Oral at bedtime  tamsulosin Oral Tab/Cap - Peds 0.4 milliGRAM(s) Oral at bedtime  warfarin 5 milliGRAM(s) Oral once    MEDICATIONS  (PRN):  senna 8.6 milliGRAM(s) Oral Tablet - Peds 2 Tablet(s) Oral at bedtime PRN for constipation      Allergies    morphine (Headache)    Intolerances        SOCIAL HISTORY: Denies tobacco, etoh abuse or illicit drug use    FAMILY HISTORY:      Vital Signs Last 24 Hrs  T(C): 36.7 (2019 16:28), Max: 36.7 (2019 00:59)  T(F): 98.1 (2019 16:28), Max: 98.1 (2019 16:28)  HR: 50 (2019 16:28) (48 - 56)  BP: 127/38 (2019 16:28) (85/60 - 127/38)  BP(mean): --  RR: 19 (2019 11:10) (12 - 27)  SpO2: 97% (2019 16:28) (93% - 100%)    REVIEW OF SYSTEMS:    CONSTITUTIONAL:  As per HPI.  HEENT:  Eyes:  No diplopia or blurred vision. ENT:  No earache, sore throat or runny nose.  CARDIOVASCULAR:  See above.  RESPIRATORY:  See above.  GASTROINTESTINAL:  No nausea, vomiting or diarrhea.  GENITOURINARY:  No dysuria, frequency or urgency.  MUSCULOSKELETAL:  As per HPI.  SKIN:  No change in skin, hair or nails.  NEUROLOGIC:  No paresthesias, fasciculations, seizures or weakness.  PSYCHIATRIC:  No disorder of thought or mood.  ENDOCRINE:  No heat or cold intolerance, polyuria or polydipsia.  HEMATOLOGICAL:  No easy bruising or bleedings:  .     PHYSICAL EXAMINATION:    GENERAL APPEARANCE:  Pt. is not currently dyspneic, in no distress.   HEENT:  Pupils are normal and react normally. No icterus. Mucous membranes well colored.  NECK:  Supple. No lymphadenopathy. Jugular venous pressure not elevated. Carotids equal.   HEART:   The cardiac impulse has a normal quality. HR 50.  CHEST:  Decreased BS's R base.  ABDOMEN:  Soft and nontender.   EXTREMITIES:  There is no cyanosis, clubbing. 4 plus LE edema with stasis discoloration.  SKIN:  No rash or significant lesions are noted.  Neuro: Communicative.      LABS:                        10.2   4.54  )-----------( 186      ( 15 Apr 2019 20:00 )             33.8     04-16    143  |  107  |  30<H>  ----------------------------<  88  3.3<L>   |  30  |  1.44<H>    Ca    8.0<L>      2019 05:51    TPro  7.2  /  Alb  2.9<L>  /  TBili  0.8  /  DBili  x   /  AST  12<L>  /  ALT  7<L>  /  AlkPhos  152<H>  15    LIVER FUNCTIONS - ( 15 Apr 2019 20:00 )  Alb: 2.9 g/dL / Pro: 7.2 gm/dL / ALK PHOS: 152 U/L / ALT: 7 U/L / AST: 12 U/L / GGT: x           PT/INR - ( 2019 09:47 )   PT: 28.1 sec;   INR: 2.46 ratio         PTT - ( 15 Apr 2019 20:00 )  PTT:43.7 sec  CARDIAC MARKERS ( 15 Apr 2019 23:32 )  0.021 ng/mL / x     / x     / x     / x      CARDIAC MARKERS ( 15 Apr 2019 20:00 )  0.018 ng/mL / x     / x     / x     / x          Urinalysis Basic - ( 15 Apr 2019 21:50 )    Color: Yellow / Appearance: Clear / S.015 / pH: x  Gluc: x / Ketone: Negative  / Bili: Negative / Urobili: Negative mg/dL   Blood: x / Protein: Negative mg/dL / Nitrite: Negative   Leuk Esterase: Negative / RBC: 3-5 /HPF / WBC 0-2   Sq Epi: x / Non Sq Epi: Occasional / Bacteria: Occasional      Serum Pro-Brain Natriuretic Peptide: 1878 pg/mL (04.15.19 @ 20:00)        RADIOLOGY & ADDITIONAL STUDIES:       EXAM:  CT CHEST                            PROCEDURE DATE:  2019          INTERPRETATION:  CLINICAL INFORMATION: Shortness of breath      COMPARISON: chest radiograph of the previous day and CT of the chest of   2018.    PROCEDURE:   CTof the Chest was performed without intravenous contrast.  Sagittal and coronal reformats were performed.      FINDINGS:    CHEST:     LUNGS AND LARGE AIRWAYS: Patent central airways.  No pulmonary nodules.  PLEURA: Large right effusion and small lefteffusion  VESSELS: Atherosclerotic calcifications  HEART: Coronary artery bypass graft surgical changes and pacemaker in   place. No pericardial effusion.  MEDIASTINUM AND SHIRLEY: No lymphadenopathy.  CHEST WALL AND LOWER NECK: Within normal limits.  VISUALIZED UPPER ABDOMEN: Splenic calcifications and cysts  BONES: Within normal limits.    IMPRESSION: Large right effusion and small left effusion with adjacent   atelectasis. No definite evidence of pneumonia.

## 2019-04-16 NOTE — PROGRESS NOTE ADULT - SUBJECTIVE AND OBJECTIVE BOX
CHIEF COMPLAINT:    SUBJECTIVE:     REVIEW OF SYSTEMS:  CONSTITUTIONAL: No weakness, fevers or chills  EYES/ENT: No visual changes;  No vertigo or throat pain   NECK: No pain or stiffness  RESPIRATORY: No cough, wheezing, hemoptysis; No shortness of breath  CARDIOVASCULAR: No chest pain or palpitations  GASTROINTESTINAL: No abdominal or epigastric pain. No nausea, vomiting, or hematemesis; No diarrhea or constipation. No melena or hematochezia.  GENITOURINARY: No dysuria, frequency or hematuria  NEUROLOGICAL: No numbness or weakness  SKIN: No itching, burning, rashes, or lesions   All other review of systems is negative unless indicated above    Vital Signs Last 24 Hrs  T(C): 36.7 (16 Apr 2019 16:28), Max: 36.7 (16 Apr 2019 00:59)  T(F): 98.1 (16 Apr 2019 16:28), Max: 98.1 (16 Apr 2019 16:28)  HR: 50 (16 Apr 2019 16:28) (48 - 56)  BP: 127/38 (16 Apr 2019 16:28) (85/60 - 127/38)  BP(mean): --  RR: 19 (16 Apr 2019 11:10) (12 - 27)  SpO2: 97% (16 Apr 2019 16:28) (93% - 100%)    I&O's Summary    15 Apr 2019 07:01  -  16 Apr 2019 07:00  --------------------------------------------------------  IN: 0 mL / OUT: 700 mL / NET: -700 mL        CAPILLARY BLOOD GLUCOSE          PHYSICAL EXAM:  Constitutional: NAD, awake and alert, well-developed  HEENT: PERR, EOMI, Normal Hearing, MMM  Neck: Soft and supple, No LAD, No JVD  Respiratory: Breath sounds are clear bilaterally, No wheezing, rales or rhonchi  Cardiovascular: S1 and S2, regular rate and rhythm, no Murmurs, gallops or rubs  Gastrointestinal: Bowel Sounds present, soft, nontender, nondistended, no guarding, no rebound  Extremities: No peripheral edema  Vascular: 2+ peripheral pulses  Neurological: A/O x 3, no focal deficits  Musculoskeletal: 5/5 strength b/l upper and lower extremities  Skin: No rashes    MEDICATIONS:  MEDICATIONS  (STANDING):  ALBUTerol/ipratropium for Nebulization 3 milliLiter(s) Nebulizer every 6 hours  ascorbic acid 500 milliGRAM(s) Oral daily  bethanechol 25 milliGRAM(s) Oral two times a day  buDESOnide 160 MICROgram(s)/formoterol 4.5 MICROgram(s) Inhaler 2 Puff(s) Inhalation two times a day  carbidopa/levodopa  25/100 1 Tablet(s) Oral three times a day  clopidogrel Tablet 75 milliGRAM(s) Oral daily  donepezil 10 milliGRAM(s) Oral at bedtime  furosemide   Injectable 40 milliGRAM(s) IV Push daily  methylPREDNISolone sodium succinate Injectable 40 milliGRAM(s) IV Push two times a day  metoprolol tartrate Oral Tab/Cap - Peds 12.5 milliGRAM(s) Oral two times a day  multivitamin 1 Tablet(s) Oral daily  pantoprazole    Tablet 40 milliGRAM(s) Oral before breakfast  simvastatin 20 milliGRAM(s) Oral at bedtime  tamsulosin Oral Tab/Cap - Peds 0.4 milliGRAM(s) Oral at bedtime  warfarin 5 milliGRAM(s) Oral once      LABS: All Labs Reviewed:                        10.2   4.54  )-----------( 186      ( 15 Apr 2019 20:00 )             33.8     04-16    143  |  107  |  30<H>  ----------------------------<  88  3.3<L>   |  30  |  1.44<H>    Ca    8.0<L>      16 Apr 2019 05:51    TPro  7.2  /  Alb  2.9<L>  /  TBili  0.8  /  DBili  x   /  AST  12<L>  /  ALT  7<L>  /  AlkPhos  152<H>  04-15    PT/INR - ( 16 Apr 2019 09:47 )   PT: 28.1 sec;   INR: 2.46 ratio         PTT - ( 15 Apr 2019 20:00 )  PTT:43.7 sec  CARDIAC MARKERS ( 15 Apr 2019 23:32 )  0.021 ng/mL / x     / x     / x     / x      CARDIAC MARKERS ( 15 Apr 2019 20:00 )  0.018 ng/mL / x     / x     / x     / x          Blood Culture:     RADIOLOGY/EKG:    DVT PPX:    ADVANCED DIRECTIVE:    DISPOSITION: CHIEF COMPLAINT:  HPI:  89 year old Man [facility] resident with hx of CAD s/p PCI/CABG,  HFrEF, s/p AICD, HTN, dyslipidemia, CVA, orthostatic hypotension, CKD III, A fib on Coumadin, Parkinson's presented from [facility] via EMS for worsening SOB with O2 desat.  O2 sat 90% en route to ED. In the ED  /50 initially then drop to 70's systolic, HR 50, O2 sat 99% with O2, RR 20. Denied CP/palpitation/HA/dizziness/abd pain/n/v/d/f/c  He received NS 1L IV bolus, vanco 1gm and cefepime 1gm, ventura placed    SUBJECTIVE:   4/16: No reported acute issues overnight. Pt is continued on IV lasix, per CT chest obtained today, no evidence of definite PNA on image, however noted large right pleural effusion and small pleural effusion of the left lung.    REVIEW OF SYSTEMS:  CONSTITUTIONAL: No weakness, fevers or chills  EYES/ENT: No visual changes;  No vertigo or throat pain   NECK: No pain or stiffness  RESPIRATORY: No cough, wheezing, hemoptysis; No shortness of breath  CARDIOVASCULAR: No chest pain or palpitations  GASTROINTESTINAL: No abdominal or epigastric pain. No nausea, vomiting, or hematemesis; No diarrhea or constipation. No melena or hematochezia.  GENITOURINARY: No dysuria, frequency or hematuria  NEUROLOGICAL: No numbness or weakness  SKIN: No itching, burning, rashes, or lesions   All other review of systems is negative unless indicated above    Vital Signs Last 24 Hrs  T(C): 36.7 (16 Apr 2019 16:28), Max: 36.7 (16 Apr 2019 00:59)  T(F): 98.1 (16 Apr 2019 16:28), Max: 98.1 (16 Apr 2019 16:28)  HR: 50 (16 Apr 2019 16:28) (48 - 56)  BP: 127/38 (16 Apr 2019 16:28) (85/60 - 127/38)  BP(mean): --  RR: 19 (16 Apr 2019 11:10) (12 - 27)  SpO2: 97% (16 Apr 2019 16:28) (93% - 100%)    I&O's Summary    15 Apr 2019 07:01  -  16 Apr 2019 07:00  --------------------------------------------------------  IN: 0 mL / OUT: 700 mL / NET: -700 mL        CAPILLARY BLOOD GLUCOSE          PHYSICAL EXAM:  Constitutional: NAD, awake and alert, well-developed  HEENT: PERR, EOMI, Normal Hearing, MMM  Neck: Soft and supple, No LAD, No JVD  Respiratory: Breath sounds are clear bilaterally, No wheezing, rales or rhonchi  Cardiovascular: S1 and S2, regular rate and rhythm, no Murmurs, gallops or rubs  Gastrointestinal: Bowel Sounds present, soft, nontender, nondistended, no guarding, no rebound  Extremities: No peripheral edema  Vascular: 2+ peripheral pulses  Neurological: A/O x 3, no focal deficits  Musculoskeletal: 5/5 strength b/l upper and lower extremities  Skin: No rashes    MEDICATIONS:  MEDICATIONS  (STANDING):  ALBUTerol/ipratropium for Nebulization 3 milliLiter(s) Nebulizer every 6 hours  ascorbic acid 500 milliGRAM(s) Oral daily  bethanechol 25 milliGRAM(s) Oral two times a day  buDESOnide 160 MICROgram(s)/formoterol 4.5 MICROgram(s) Inhaler 2 Puff(s) Inhalation two times a day  carbidopa/levodopa  25/100 1 Tablet(s) Oral three times a day  clopidogrel Tablet 75 milliGRAM(s) Oral daily  donepezil 10 milliGRAM(s) Oral at bedtime  furosemide   Injectable 40 milliGRAM(s) IV Push daily  methylPREDNISolone sodium succinate Injectable 40 milliGRAM(s) IV Push two times a day  metoprolol tartrate Oral Tab/Cap - Peds 12.5 milliGRAM(s) Oral two times a day  multivitamin 1 Tablet(s) Oral daily  pantoprazole    Tablet 40 milliGRAM(s) Oral before breakfast  simvastatin 20 milliGRAM(s) Oral at bedtime  tamsulosin Oral Tab/Cap - Peds 0.4 milliGRAM(s) Oral at bedtime  warfarin 5 milliGRAM(s) Oral once      LABS: All Labs Reviewed:                        10.2   4.54  )-----------( 186      ( 15 Apr 2019 20:00 )             33.8     04-16    143  |  107  |  30<H>  ----------------------------<  88  3.3<L>   |  30  |  1.44<H>    Ca    8.0<L>      16 Apr 2019 05:51    TPro  7.2  /  Alb  2.9<L>  /  TBili  0.8  /  DBili  x   /  AST  12<L>  /  ALT  7<L>  /  AlkPhos  152<H>  04-15    PT/INR - ( 16 Apr 2019 09:47 )   PT: 28.1 sec;   INR: 2.46 ratio         PTT - ( 15 Apr 2019 20:00 )  PTT:43.7 sec  CARDIAC MARKERS ( 15 Apr 2019 23:32 )  0.021 ng/mL / x     / x     / x     / x      CARDIAC MARKERS ( 15 Apr 2019 20:00 )  0.018 ng/mL / x     / x     / x     / x          < from: CT Chest No Cont (04.16.19 @ 09:13) >  FINDINGS:    CHEST:     LUNGS AND LARGE AIRWAYS: Patent central airways.  No pulmonary nodules.  PLEURA: Large right effusion and small lefteffusion  VESSELS: Atherosclerotic calcifications  HEART: Coronary artery bypass graft surgical changes and pacemaker in   place. No pericardial effusion.  MEDIASTINUM AND SHIRLEY: No lymphadenopathy.  CHEST WALL AND LOWER NECK: Within normal limits.  VISUALIZED UPPER ABDOMEN: Splenic calcifications and cysts  BONES: Within normal limits.    IMPRESSION: Large right effusion and small left effusion with adjacent   atelectasis. No definite evidence of pneumonia. CHIEF COMPLAINT:  HPI:  89 year old Man [facility] resident with hx of CAD s/p PCI/CABG,  HFrEF, s/p AICD, HTN, dyslipidemia, CVA, orthostatic hypotension, CKD III, A fib on Coumadin, Parkinson's presented from [facility] via EMS for worsening SOB with O2 desat.  O2 sat 90% en route to ED. In the ED  /50 initially then drop to 70's systolic, HR 50, O2 sat 99% with O2, RR 20. Denied CP/palpitation/HA/dizziness/abd pain/n/v/d/f/c  He received NS 1L IV bolus, vanco 1gm and cefepime 1gm, ventura placed    SUBJECTIVE:   4/16: No reported acute issues overnight. Pt is continued on IV lasix, per CT chest obtained today, no evidence of definite PNA on image, however noted large right pleural effusion and small pleural effusion of the left lung.    REVIEW OF SYSTEMS:  CONSTITUTIONAL: No weakness, fevers or chills  EYES/ENT: No visual changes;  No vertigo or throat pain   NECK: No pain or stiffness  RESPIRATORY: No cough, wheezing, hemoptysis; No shortness of breath  CARDIOVASCULAR: No chest pain or palpitations  GASTROINTESTINAL: No abdominal or epigastric pain. No nausea, vomiting, or hematemesis; No diarrhea or constipation. No melena or hematochezia.  GENITOURINARY: No dysuria, frequency or hematuria  NEUROLOGICAL: No numbness or weakness  SKIN: No itching, burning, rashes, or lesions   All other review of systems is negative unless indicated above    Vital Signs Last 24 Hrs  T(C): 36.7 (16 Apr 2019 16:28), Max: 36.7 (16 Apr 2019 00:59)  T(F): 98.1 (16 Apr 2019 16:28), Max: 98.1 (16 Apr 2019 16:28)  HR: 50 (16 Apr 2019 16:28) (48 - 56)  BP: 127/38 (16 Apr 2019 16:28) (85/60 - 127/38)  BP(mean): --  RR: 19 (16 Apr 2019 11:10) (12 - 27)  SpO2: 97% (16 Apr 2019 16:28) (93% - 100%)    I&O's Summary    15 Apr 2019 07:01  -  16 Apr 2019 07:00  --------------------------------------------------------  IN: 0 mL / OUT: 700 mL / NET: -700 mL        CAPILLARY BLOOD GLUCOSE          PHYSICAL EXAM:  Constitutional: NAD, awake and alert, on NC  HEENT: EOMI, Normal Hearing, MMM  Neck: Soft and supple, No LAD, No JVD  Respiratory: decreased air entry b/l, +crackles R>L  Cardiovascular: S1 and S2, regular rate and rhythm  Gastrointestinal: Bowel Sounds present, +distended abdomen, soft on palpation  Extremities: b/l pitting edema, very mild erythema of LLE (previous cellulitis, s/p abx)  Vascular: 2+ peripheral pulses  Neurological: A/O x 3, no focal deficits  Musculoskeletal: 5/5 strength b/l upper and lower extremities  Skin: No rashes    MEDICATIONS:  MEDICATIONS  (STANDING):  ALBUTerol/ipratropium for Nebulization 3 milliLiter(s) Nebulizer every 6 hours  ascorbic acid 500 milliGRAM(s) Oral daily  bethanechol 25 milliGRAM(s) Oral two times a day  buDESOnide 160 MICROgram(s)/formoterol 4.5 MICROgram(s) Inhaler 2 Puff(s) Inhalation two times a day  carbidopa/levodopa  25/100 1 Tablet(s) Oral three times a day  clopidogrel Tablet 75 milliGRAM(s) Oral daily  donepezil 10 milliGRAM(s) Oral at bedtime  furosemide   Injectable 40 milliGRAM(s) IV Push daily  methylPREDNISolone sodium succinate Injectable 40 milliGRAM(s) IV Push two times a day  metoprolol tartrate Oral Tab/Cap - Peds 12.5 milliGRAM(s) Oral two times a day  multivitamin 1 Tablet(s) Oral daily  pantoprazole    Tablet 40 milliGRAM(s) Oral before breakfast  simvastatin 20 milliGRAM(s) Oral at bedtime  tamsulosin Oral Tab/Cap - Peds 0.4 milliGRAM(s) Oral at bedtime  warfarin 5 milliGRAM(s) Oral once      LABS: All Labs Reviewed:                        10.2   4.54  )-----------( 186      ( 15 Apr 2019 20:00 )             33.8     04-16    143  |  107  |  30<H>  ----------------------------<  88  3.3<L>   |  30  |  1.44<H>    Ca    8.0<L>      16 Apr 2019 05:51    TPro  7.2  /  Alb  2.9<L>  /  TBili  0.8  /  DBili  x   /  AST  12<L>  /  ALT  7<L>  /  AlkPhos  152<H>  04-15    PT/INR - ( 16 Apr 2019 09:47 )   PT: 28.1 sec;   INR: 2.46 ratio         PTT - ( 15 Apr 2019 20:00 )  PTT:43.7 sec  CARDIAC MARKERS ( 15 Apr 2019 23:32 )  0.021 ng/mL / x     / x     / x     / x      CARDIAC MARKERS ( 15 Apr 2019 20:00 )  0.018 ng/mL / x     / x     / x     / x          < from: CT Chest No Cont (04.16.19 @ 09:13) >  FINDINGS:    CHEST:     LUNGS AND LARGE AIRWAYS: Patent central airways.  No pulmonary nodules.  PLEURA: Large right effusion and small lefteffusion  VESSELS: Atherosclerotic calcifications  HEART: Coronary artery bypass graft surgical changes and pacemaker in   place. No pericardial effusion.  MEDIASTINUM AND SHIRLEY: No lymphadenopathy.  CHEST WALL AND LOWER NECK: Within normal limits.  VISUALIZED UPPER ABDOMEN: Splenic calcifications and cysts  BONES: Within normal limits.    IMPRESSION: Large right effusion and small left effusion with adjacent   atelectasis. No definite evidence of pneumonia. CHIEF COMPLAINT:  HPI:  89 year old Man [facility] resident with hx of CAD s/p PCI/CABG,  HFrEF, s/p AICD, HTN, dyslipidemia, CVA, orthostatic hypotension, CKD III, A fib on Coumadin, Parkinson's presented from [facility] via EMS for worsening SOB with O2 desat.  O2 sat 90% en route to ED. In the ED  /50 initially then drop to 70's systolic, HR 50, O2 sat 99% with O2, RR 20. Denied CP/palpitation/HA/dizziness/abd pain/n/v/d/f/c  He received NS 1L IV bolus, vanco 1gm and cefepime 1gm, ventura placed    SUBJECTIVE:   4/16: No reported acute issues overnight. Pt is continued on IV lasix, per CT chest obtained today, no evidence of definite PNA on image, however noted large right pleural effusion and small pleural effusion of the left lung.    REVIEW OF SYSTEMS:  CONSTITUTIONAL: No weakness, fevers or chills  EYES/ENT: No visual changes;  No vertigo or throat pain   NECK: No pain or stiffness  RESPIRATORY: No cough, No shortness of breath  CARDIOVASCULAR: No chest pain or palpitations  GASTROINTESTINAL: No abdominal or epigastric pain. No nausea, vomiting, or hematemesis; No diarrhea or constipation. No melena or hematochezia.  GENITOURINARY: No dysuria, frequency or hematuria  NEUROLOGICAL: No numbness or weakness  SKIN: No itching, burning, rashes, or lesions   All other review of systems is negative unless indicated above    Vital Signs Last 24 Hrs  T(C): 36.7 (16 Apr 2019 16:28), Max: 36.7 (16 Apr 2019 00:59)  T(F): 98.1 (16 Apr 2019 16:28), Max: 98.1 (16 Apr 2019 16:28)  HR: 50 (16 Apr 2019 16:28) (48 - 56)  BP: 127/38 (16 Apr 2019 16:28) (85/60 - 127/38)  BP(mean): --  RR: 19 (16 Apr 2019 11:10) (12 - 27)  SpO2: 97% (16 Apr 2019 16:28) (93% - 100%)    I&O's Summary    15 Apr 2019 07:01  -  16 Apr 2019 07:00  --------------------------------------------------------  IN: 0 mL / OUT: 700 mL / NET: -700 mL        CAPILLARY BLOOD GLUCOSE          PHYSICAL EXAM:  Constitutional: NAD, awake and alert, on NC  HEENT: EOMI, Normal Hearing, MMM  Neck: Soft and supple, No LAD, No JVD  Respiratory: decreased air entry b/l, +basilar crackles R>L  Cardiovascular: S1 and S2, regular rate and rhythm  Gastrointestinal: Bowel Sounds present, +distended abdomen, soft on palpation  Extremities: b/l pitting edema, very mild erythema of LLE (previous cellulitis, s/p abx)  Vascular: 2+ peripheral pulses  Neurological: A/O x 3, no focal deficits  Musculoskeletal: 4-5/5 strength in lower extremities, 5/5 strength of UE b/l  Skin: No rashes    MEDICATIONS:  MEDICATIONS  (STANDING):  ALBUTerol/ipratropium for Nebulization 3 milliLiter(s) Nebulizer every 6 hours  ascorbic acid 500 milliGRAM(s) Oral daily  bethanechol 25 milliGRAM(s) Oral two times a day  buDESOnide 160 MICROgram(s)/formoterol 4.5 MICROgram(s) Inhaler 2 Puff(s) Inhalation two times a day  carbidopa/levodopa  25/100 1 Tablet(s) Oral three times a day  clopidogrel Tablet 75 milliGRAM(s) Oral daily  donepezil 10 milliGRAM(s) Oral at bedtime  furosemide   Injectable 40 milliGRAM(s) IV Push daily  methylPREDNISolone sodium succinate Injectable 40 milliGRAM(s) IV Push two times a day  metoprolol tartrate Oral Tab/Cap - Peds 12.5 milliGRAM(s) Oral two times a day  multivitamin 1 Tablet(s) Oral daily  pantoprazole    Tablet 40 milliGRAM(s) Oral before breakfast  simvastatin 20 milliGRAM(s) Oral at bedtime  tamsulosin Oral Tab/Cap - Peds 0.4 milliGRAM(s) Oral at bedtime  warfarin 5 milliGRAM(s) Oral once      LABS: All Labs Reviewed:                        10.2   4.54  )-----------( 186      ( 15 Apr 2019 20:00 )             33.8     04-16    143  |  107  |  30<H>  ----------------------------<  88  3.3<L>   |  30  |  1.44<H>    Ca    8.0<L>      16 Apr 2019 05:51    TPro  7.2  /  Alb  2.9<L>  /  TBili  0.8  /  DBili  x   /  AST  12<L>  /  ALT  7<L>  /  AlkPhos  152<H>  04-15    PT/INR - ( 16 Apr 2019 09:47 )   PT: 28.1 sec;   INR: 2.46 ratio         PTT - ( 15 Apr 2019 20:00 )  PTT:43.7 sec  CARDIAC MARKERS ( 15 Apr 2019 23:32 )  0.021 ng/mL / x     / x     / x     / x      CARDIAC MARKERS ( 15 Apr 2019 20:00 )  0.018 ng/mL / x     / x     / x     / x          < from: CT Chest No Cont (04.16.19 @ 09:13) >  FINDINGS:    CHEST:     LUNGS AND LARGE AIRWAYS: Patent central airways.  No pulmonary nodules.  PLEURA: Large right effusion and small lefteffusion  VESSELS: Atherosclerotic calcifications  HEART: Coronary artery bypass graft surgical changes and pacemaker in   place. No pericardial effusion.  MEDIASTINUM AND SHIRLEY: No lymphadenopathy.  CHEST WALL AND LOWER NECK: Within normal limits.  VISUALIZED UPPER ABDOMEN: Splenic calcifications and cysts  BONES: Within normal limits.    IMPRESSION: Large right effusion and small left effusion with adjacent   atelectasis. No definite evidence of pneumonia. CHIEF COMPLAINT:  HPI:  89 year old Man [facility] resident with hx of CAD s/p PCI/CABG,  HFrEF, s/p AICD, HTN, dyslipidemia, CVA, orthostatic hypotension, CKD III, A fib on Coumadin, Parkinson's presented from [facility] via EMS for worsening SOB with O2 desat.  O2 sat 90% en route to ED. In the ED  /50 initially then drop to 70's systolic, HR 50, O2 sat 99% with O2, RR 20. Denied CP/palpitation/HA/dizziness/abd pain/n/v/d/f/c  He received NS 1L IV bolus, vanco 1gm and cefepime 1gm, ventura placed    SUBJECTIVE:   4/16: No reported acute issues overnight. Pt is continued on IV lasix, per CT chest obtained today, no evidence of definite PNA on image, however noted large right pleural effusion and small pleural effusion of the left lung.    REVIEW OF SYSTEMS:  CONSTITUTIONAL: No weakness, fevers or chills  EYES/ENT: No visual changes;  No vertigo or throat pain   NECK: No pain or stiffness  RESPIRATORY: No cough, No shortness of breath  CARDIOVASCULAR: No chest pain or palpitations  GASTROINTESTINAL: No abdominal or epigastric pain. No nausea, vomiting, or hematemesis; No diarrhea or constipation. No melena or hematochezia.  GENITOURINARY: No dysuria, frequency or hematuria  NEUROLOGICAL: No numbness or weakness  SKIN: No itching, burning, rashes, or lesions   All other review of systems is negative unless indicated above    Vital Signs Last 24 Hrs  T(C): 36.7 (16 Apr 2019 16:28), Max: 36.7 (16 Apr 2019 00:59)  T(F): 98.1 (16 Apr 2019 16:28), Max: 98.1 (16 Apr 2019 16:28)  HR: 50 (16 Apr 2019 16:28) (48 - 56)  BP: 127/38 (16 Apr 2019 16:28) (85/60 - 127/38)  BP(mean): --  RR: 19 (16 Apr 2019 11:10) (12 - 27)  SpO2: 97% (16 Apr 2019 16:28) (93% - 100%)    I&O's Summary    15 Apr 2019 07:01  -  16 Apr 2019 07:00  --------------------------------------------------------  IN: 0 mL / OUT: 700 mL / NET: -700 mL        CAPILLARY BLOOD GLUCOSE          PHYSICAL EXAM:  Constitutional: NAD, awake and alert, on NC  HEENT: EOMI, Normal Hearing, MMM  Neck: Soft and supple, No LAD, No JVD  Respiratory: decreased air entry b/l, +basilar crackles R>L  Cardiovascular: S1 and S2, regular rate and rhythm  Gastrointestinal: Bowel Sounds present, +distended abdomen, soft on palpation  Extremities: b/l pitting edema, very mild erythema of LLE (previous cellulitis, s/p abx)  Vascular: 2+ peripheral pulses  Neurological: A/O x 3, no focal deficits  Musculoskeletal: generalized weakness  Skin: No rashes    MEDICATIONS:  MEDICATIONS  (STANDING):  ALBUTerol/ipratropium for Nebulization 3 milliLiter(s) Nebulizer every 6 hours  ascorbic acid 500 milliGRAM(s) Oral daily  bethanechol 25 milliGRAM(s) Oral two times a day  buDESOnide 160 MICROgram(s)/formoterol 4.5 MICROgram(s) Inhaler 2 Puff(s) Inhalation two times a day  carbidopa/levodopa  25/100 1 Tablet(s) Oral three times a day  clopidogrel Tablet 75 milliGRAM(s) Oral daily  donepezil 10 milliGRAM(s) Oral at bedtime  furosemide   Injectable 40 milliGRAM(s) IV Push daily  methylPREDNISolone sodium succinate Injectable 40 milliGRAM(s) IV Push two times a day  metoprolol tartrate Oral Tab/Cap - Peds 12.5 milliGRAM(s) Oral two times a day  multivitamin 1 Tablet(s) Oral daily  pantoprazole    Tablet 40 milliGRAM(s) Oral before breakfast  simvastatin 20 milliGRAM(s) Oral at bedtime  tamsulosin Oral Tab/Cap - Peds 0.4 milliGRAM(s) Oral at bedtime  warfarin 5 milliGRAM(s) Oral once      LABS: All Labs Reviewed:                        10.2   4.54  )-----------( 186      ( 15 Apr 2019 20:00 )             33.8     04-16    143  |  107  |  30<H>  ----------------------------<  88  3.3<L>   |  30  |  1.44<H>    Ca    8.0<L>      16 Apr 2019 05:51    TPro  7.2  /  Alb  2.9<L>  /  TBili  0.8  /  DBili  x   /  AST  12<L>  /  ALT  7<L>  /  AlkPhos  152<H>  04-15    PT/INR - ( 16 Apr 2019 09:47 )   PT: 28.1 sec;   INR: 2.46 ratio         PTT - ( 15 Apr 2019 20:00 )  PTT:43.7 sec  CARDIAC MARKERS ( 15 Apr 2019 23:32 )  0.021 ng/mL / x     / x     / x     / x      CARDIAC MARKERS ( 15 Apr 2019 20:00 )  0.018 ng/mL / x     / x     / x     / x          < from: CT Chest No Cont (04.16.19 @ 09:13) >  FINDINGS:    CHEST:     LUNGS AND LARGE AIRWAYS: Patent central airways.  No pulmonary nodules.  PLEURA: Large right effusion and small lefteffusion  VESSELS: Atherosclerotic calcifications  HEART: Coronary artery bypass graft surgical changes and pacemaker in   place. No pericardial effusion.  MEDIASTINUM AND SHIRLEY: No lymphadenopathy.  CHEST WALL AND LOWER NECK: Within normal limits.  VISUALIZED UPPER ABDOMEN: Splenic calcifications and cysts  BONES: Within normal limits.    IMPRESSION: Large right effusion and small left effusion with adjacent   atelectasis. No definite evidence of pneumonia.

## 2019-04-16 NOTE — CHART NOTE - NSCHARTNOTEFT_GEN_A_CORE
Upon Nutritional Assessment by the Registered Dietitian your patient was determined to meet criteria / has evidence of the following diagnosis/diagnoses:          [ ]  Mild Protein Calorie Malnutrition        [ ]  Moderate Protein Calorie Malnutrition        [x] Severe Protein Calorie Malnutrition        [ ] Unspecified Protein Calorie Malnutrition        [ ] Underweight / BMI <19        [ ] Morbid Obesity / BMI > 40      Findings as based on:  •  Comprehensive nutrition assessment and consultation  •  Calorie counts (nutrient intake analysis)  •  Food acceptance and intake status from observations by staff  •  Follow up  •  Patient education  •  Intervention secondary to interdisciplinary rounds  •   concerns      Treatment:    The following diet has been recommended:  -Encourage PO intake  -Gelatein BID    PROVIDER Section:     By signing this assessment you are acknowledging and agree with the diagnosis/diagnoses assigned by the Registered Dietitian    Comments:

## 2019-04-16 NOTE — CONSULT NOTE ADULT - SUBJECTIVE AND OBJECTIVE BOX
CHIEF COMPLAINT: worsening of sob     HPI:  89 year old Man [facility] resident with hx of CAD s/p PCI/CABG,  HFrEF, s/p AICD, HTN, dyslipidemia, CVA, orthostatic hypotension, CKD III, A fib on Coumadin, Parkinson's presented from [facility] via EMS for worsening SOB with O2 desat.  O2 sat 90% en route to ED. In the ED  /50 initially then drop to 70's systolic, HR 50, O2 sat 99% with O2, RR 20. Denies CP/palpitation/HA/dizziness/abd pain/n/v/d/f/c    He received NS 1L IV bolus, vanco 1gm and cefepime 1gm, ventura placed (15 Apr 2019 23:38). Patient is feeling better today , does have wheezing , hx of bronchitis , patient denies any chest pain , Patient was placed on IV lasix ,      Patient is not very good historian ,        PAST MEDICAL & SURGICAL HISTORY:  COPD (chronic obstructive pulmonary disease)  Parkinson's disease  CHF (congestive heart failure)  Hyperlipidemia  HTN (hypertension)  CAD (coronary artery disease)  AICD (automatic cardioverter/defibrillator) present  S/P CABG      Allergies    morphine (Headache)    Intolerances        SOCIAL HISTORY:n  former smoker     FAMILY HISTORY:   no hx of CAD     MEDICATIONS:  MEDICATIONS  (STANDING):  ALBUTerol/ipratropium for Nebulization 3 milliLiter(s) Nebulizer every 6 hours  ascorbic acid 500 milliGRAM(s) Oral daily  bethanechol 25 milliGRAM(s) Oral two times a day  buDESOnide 160 MICROgram(s)/formoterol 4.5 MICROgram(s) Inhaler 2 Puff(s) Inhalation two times a day  carbidopa/levodopa  25/100 1 Tablet(s) Oral three times a day  clopidogrel Tablet 75 milliGRAM(s) Oral daily  donepezil 10 milliGRAM(s) Oral at bedtime  furosemide   Injectable 40 milliGRAM(s) IV Push every 12 hours  methylPREDNISolone sodium succinate Injectable 40 milliGRAM(s) IV Push two times a day  metoprolol tartrate Oral Tab/Cap - Peds 12.5 milliGRAM(s) Oral two times a day  multivitamin 1 Tablet(s) Oral daily  pantoprazole    Tablet 40 milliGRAM(s) Oral before breakfast  simvastatin 20 milliGRAM(s) Oral at bedtime  tamsulosin Oral Tab/Cap - Peds 0.4 milliGRAM(s) Oral at bedtime    MEDICATIONS  (PRN):  senna 8.6 milliGRAM(s) Oral Tablet - Peds 2 Tablet(s) Oral at bedtime PRN for constipation      REVIEW OF SYSTEMS:     as above  limited historian   swollen body     All other review of systems is negative unless indicated above    Vital Signs Last 24 Hrs  T(C): 36.6 (16 Apr 2019 11:10), Max: 36.7 (16 Apr 2019 00:59)  T(F): 97.8 (16 Apr 2019 11:10), Max: 98 (16 Apr 2019 00:59)  HR: 51 (16 Apr 2019 11:10) (48 - 56)  BP: 112/56 (16 Apr 2019 11:10) (85/60 - 116/59)  BP(mean): --  RR: 19 (16 Apr 2019 11:10) (12 - 27)  SpO2: 94% (16 Apr 2019 11:10) (93% - 100%)    I&O's Summary    15 Apr 2019 07:01  -  16 Apr 2019 07:00  --------------------------------------------------------  IN: 0 mL / OUT: 700 mL / NET: -700 mL        PHYSICAL EXAM:    Constitutional: NAD, awake and alert, well-developed  HEENT: PERR, EOMI,  No oral cyananosis.  Neck:  supple,  No JVD  Respiratory: Breath sounds are clear bilaterally, expiratory wheeze   Cardiovascular: S1 and S2, regular rate and rhythm, ESM   Gastrointestinal: Bowel Sounds present, soft, nontender.   Extremities:  2 plus peripheral edema. No clubbing or cyanosis.  Vascular: 2+ peripheral pulses  Neurological: A/O x 3, no focal deficits  Musculoskeletal: no calf tenderness.  Skin: erythema on LE  swollen extremities       LABS: All Labs Reviewed:                        10.2   4.54  )-----------( 186      ( 15 Apr 2019 20:00 )             33.8     16 Apr 2019 05:51    143    |  107    |  30     ----------------------------<  88     3.3     |  30     |  1.44   15 Apr 2019 20:00    143    |  106    |  31     ----------------------------<  94     3.9     |  30     |  1.64     Ca    8.0        16 Apr 2019 05:51  Ca    8.4        15 Apr 2019 20:00    TPro  7.2    /  Alb  2.9    /  TBili  0.8    /  DBili  x      /  AST  12     /  ALT  7      /  AlkPhos  152    15 Apr 2019 20:00    PT/INR - ( 16 Apr 2019 09:47 )   PT: 28.1 sec;   INR: 2.46 ratio         PTT - ( 15 Apr 2019 20:00 )  PTT:43.7 sec  CARDIAC MARKERS ( 15 Apr 2019 23:32 )  0.021 ng/mL / x     / x     / x     / x      CARDIAC MARKERS ( 15 Apr 2019 20:00 )  0.018 ng/mL / x     / x     / x     / x          Blood Culture:   04-15 @ 20:00  Pro Bnp [id number]        RADIOLOGY/EKG:  < from: 12 Lead ECG (04.15.19 @ 20:16) >  Ventricular-paced rhythm  Abnormal ECG  Confirmed by KASSIDY MCKENZIE MD ([id number]) on 4/15/2019 10:06:13 PM    < end of copied text >      < from: CT Chest No Cont (04.16.19 @ 09:13) >  IMPRESSION: Large right effusion and small left effusion with adjacent   atelectasis. No definite evidence of pneumonia.    < end of copied text >

## 2019-04-16 NOTE — CONSULT NOTE ADULT - ASSESSMENT
Acute/chronic CHF. R greater than L Pleural effusions.  NC O2 prn.  Diurese as tolerated.   Low EF.  Valvular heart disease.  AF.    COPD. nebs, symbicort.   Compressive atelectasis 2ndary to pleural effusions.    DVT prophylaxis. Acute/chronic CHF. R greater than L Pleural effusions.  NC O2 prn.  Diurese as tolerated.   Low EF.  CAD. CABG.  Valvular heart disease.  AF.    COPD. nebs, symbicort.   Compressive atelectasis 2ndary to pleural effusions.    DVT prophylaxis.

## 2019-04-16 NOTE — DIETITIAN INITIAL EVALUATION ADULT. - PERTINENT MEDS FT
MEDICATIONS  (STANDING):  ALBUTerol/ipratropium for Nebulization 3 milliLiter(s) Nebulizer every 6 hours  ascorbic acid 500 milliGRAM(s) Oral daily  bethanechol 25 milliGRAM(s) Oral two times a day  buDESOnide 160 MICROgram(s)/formoterol 4.5 MICROgram(s) Inhaler 2 Puff(s) Inhalation two times a day  carbidopa/levodopa  25/100 1 Tablet(s) Oral three times a day  clopidogrel Tablet 75 milliGRAM(s) Oral daily  donepezil 10 milliGRAM(s) Oral at bedtime  furosemide   Injectable 40 milliGRAM(s) IV Push daily  methylPREDNISolone sodium succinate Injectable 40 milliGRAM(s) IV Push two times a day  metoprolol tartrate Oral Tab/Cap - Peds 12.5 milliGRAM(s) Oral two times a day  multivitamin 1 Tablet(s) Oral daily  pantoprazole    Tablet 40 milliGRAM(s) Oral before breakfast  simvastatin 20 milliGRAM(s) Oral at bedtime  tamsulosin Oral Tab/Cap - Peds 0.4 milliGRAM(s) Oral at bedtime    MEDICATIONS  (PRN):  senna 8.6 milliGRAM(s) Oral Tablet - Peds 2 Tablet(s) Oral at bedtime PRN for constipation

## 2019-04-16 NOTE — DIETITIAN INITIAL EVALUATION ADULT. - OTHER INFO
Pt seen for Heart failure. Pt identified with malnutrition on prior admissions.  Pt's pmhx noted for CAD s/p PCI/CABG,  HFrEF, s/p AICD, HTN, dyslipidemia, CVA, orthostatic hypotension, CKD III, A fib on Coumadin, Parkinson's presented from [facility] via EMS for worsening SOB with O2 desat.  Pt reports appetite has been poor for 4 week, but pt was very brief and could not go into details. Pt without chewing and swallowing difficulty. Pt denies n/v/d/c/. Pt didn't want to contiue interview. Pt presents with #+ edema in LE. Pt noted with h/x of NFPE of Severe muscle wasting: Clavicle, temporal, scapula. Severe fat loss: Triceps, buccal, and Ocular. RD performed NFPE and confirm continued weight loss. Pt's weight loss likely masked by edema. Pt also noted with stage I PU on heel. Pt meets criteria for Severe protein-calorie malnutrition in the context of chronic illness. Recommendations: 1) Liberalize diet to low sodium w/mechanical soft consistency due to edentulous status. 2) additional protein supplement to assist with wound healing Gelatein Plus po BID (40gm protein). 3) monitor daily weights. 4) monitor labs/electrolytes.

## 2019-04-17 LAB
ANION GAP SERPL CALC-SCNC: 7 MMOL/L — SIGNIFICANT CHANGE UP (ref 5–17)
APTT BLD: 43.8 SEC — HIGH (ref 27.5–36.3)
BUN SERPL-MCNC: 32 MG/DL — HIGH (ref 7–23)
CALCIUM SERPL-MCNC: 8.5 MG/DL — SIGNIFICANT CHANGE UP (ref 8.5–10.1)
CHLORIDE SERPL-SCNC: 104 MMOL/L — SIGNIFICANT CHANGE UP (ref 96–108)
CO2 SERPL-SCNC: 29 MMOL/L — SIGNIFICANT CHANGE UP (ref 22–31)
CREAT SERPL-MCNC: 1.6 MG/DL — HIGH (ref 0.5–1.3)
GLUCOSE SERPL-MCNC: 149 MG/DL — HIGH (ref 70–99)
INR BLD: 3.14 RATIO — HIGH (ref 0.88–1.16)
MAGNESIUM SERPL-MCNC: 2.4 MG/DL — SIGNIFICANT CHANGE UP (ref 1.6–2.6)
NT-PROBNP SERPL-SCNC: 2373 PG/ML — HIGH (ref 0–450)
PHOSPHATE SERPL-MCNC: 3.9 MG/DL — SIGNIFICANT CHANGE UP (ref 2.5–4.5)
POTASSIUM SERPL-MCNC: 3.7 MMOL/L — SIGNIFICANT CHANGE UP (ref 3.5–5.3)
POTASSIUM SERPL-SCNC: 3.7 MMOL/L — SIGNIFICANT CHANGE UP (ref 3.5–5.3)
PROTHROM AB SERPL-ACNC: 36.1 SEC — HIGH (ref 10–12.9)
SODIUM SERPL-SCNC: 140 MMOL/L — SIGNIFICANT CHANGE UP (ref 135–145)

## 2019-04-17 PROCEDURE — 93010 ELECTROCARDIOGRAM REPORT: CPT

## 2019-04-17 PROCEDURE — 99233 SBSQ HOSP IP/OBS HIGH 50: CPT

## 2019-04-17 PROCEDURE — 93306 TTE W/DOPPLER COMPLETE: CPT | Mod: 26

## 2019-04-17 RX ADMIN — CARBIDOPA AND LEVODOPA 1 TABLET(S): 25; 100 TABLET ORAL at 13:03

## 2019-04-17 RX ADMIN — BUDESONIDE AND FORMOTEROL FUMARATE DIHYDRATE 2 PUFF(S): 160; 4.5 AEROSOL RESPIRATORY (INHALATION) at 08:01

## 2019-04-17 RX ADMIN — CARBIDOPA AND LEVODOPA 1 TABLET(S): 25; 100 TABLET ORAL at 05:31

## 2019-04-17 RX ADMIN — Medication 3 MILLILITER(S): at 21:55

## 2019-04-17 RX ADMIN — DONEPEZIL HYDROCHLORIDE 10 MILLIGRAM(S): 10 TABLET, FILM COATED ORAL at 21:31

## 2019-04-17 RX ADMIN — Medication 40 MILLIGRAM(S): at 12:49

## 2019-04-17 RX ADMIN — Medication 500 MILLIGRAM(S): at 12:49

## 2019-04-17 RX ADMIN — Medication 12.5 MILLIGRAM(S): at 05:31

## 2019-04-17 RX ADMIN — CARBIDOPA AND LEVODOPA 1 TABLET(S): 25; 100 TABLET ORAL at 21:31

## 2019-04-17 RX ADMIN — CLOPIDOGREL BISULFATE 75 MILLIGRAM(S): 75 TABLET, FILM COATED ORAL at 12:49

## 2019-04-17 RX ADMIN — SIMVASTATIN 20 MILLIGRAM(S): 20 TABLET, FILM COATED ORAL at 21:31

## 2019-04-17 RX ADMIN — Medication 1 TABLET(S): at 12:50

## 2019-04-17 RX ADMIN — Medication 3 MILLILITER(S): at 08:00

## 2019-04-17 RX ADMIN — Medication 3 MILLILITER(S): at 01:56

## 2019-04-17 RX ADMIN — PANTOPRAZOLE SODIUM 40 MILLIGRAM(S): 20 TABLET, DELAYED RELEASE ORAL at 05:32

## 2019-04-17 RX ADMIN — TAMSULOSIN HYDROCHLORIDE 0.4 MILLIGRAM(S): 0.4 CAPSULE ORAL at 21:31

## 2019-04-17 NOTE — PHYSICAL THERAPY INITIAL EVALUATION ADULT - PRECAUTIONS/LIMITATIONS, REHAB EVAL
currently on supplemental o2 4L/min/oxygen therapy device and L/min oxygen therapy device and L/min/fall precautions/currently on supplemental O2 4L/min; h/o fall prior to last hospitalization associated with hypotension Feb 2019

## 2019-04-17 NOTE — PROGRESS NOTE ADULT - SUBJECTIVE AND OBJECTIVE BOX
PCP:    REQUESTING PHYSICIAN:    REASON FOR CONSULT:    CHIEF COMPLAINT:    HPI:  89 year old Man [facility] resident with hx of CAD s/p PCI/CABG,  HFrEF, s/p AICD, HTN, dyslipidemia, CVA, orthostatic hypotension, CKD III, A fib on Coumadin, Parkinson's presented from [facility] via EMS for worsening SOB with O2 desat.  O2 sat 90% en route to ED. In the ED  /50 initially then drop to 70's systolic, HR 50, O2 sat 99% with O2, RR 20. Denies CP/palpitation/HA/dizziness/abd pain/n/v/d/f/c    He received NS 1L IV bolus, vanco 1gm and cefepime 1gm, ventura placed (15 Apr 2019 23:38). Patient is feeling better today , does have wheezing , hx of bronchitis , patient denies any chest pain , Patient was placed on IV lasix ,      Patient is not very good historian ,    19: Lethargic. No complaints. Paced rhythm. PVCs.     PAST MEDICAL & SURGICAL HISTORY:  COPD (chronic obstructive pulmonary disease)  Parkinson's disease  CHF (congestive heart failure)  Hyperlipidemia  HTN (hypertension)  CAD (coronary artery disease)  AICD (automatic cardioverter/defibrillator) present  S/P CABG      SOCIAL HISTORY:    FAMILY HISTORY:      ALLERGIES:  Allergies    morphine (Headache)    Intolerances        MEDICATIONS:    MEDICATIONS  (STANDING):  ALBUTerol/ipratropium for Nebulization 3 milliLiter(s) Nebulizer every 6 hours  ascorbic acid 500 milliGRAM(s) Oral daily  buDESOnide 160 MICROgram(s)/formoterol 4.5 MICROgram(s) Inhaler 2 Puff(s) Inhalation two times a day  carbidopa/levodopa  25/100 1 Tablet(s) Oral three times a day  clopidogrel Tablet 75 milliGRAM(s) Oral daily  donepezil 10 milliGRAM(s) Oral at bedtime  furosemide   Injectable 40 milliGRAM(s) IV Push daily  metoprolol tartrate Oral Tab/Cap - Peds 12.5 milliGRAM(s) Oral two times a day  multivitamin 1 Tablet(s) Oral daily  pantoprazole    Tablet 40 milliGRAM(s) Oral before breakfast  simvastatin 20 milliGRAM(s) Oral at bedtime  tamsulosin Oral Tab/Cap - Peds 0.4 milliGRAM(s) Oral at bedtime    MEDICATIONS  (PRN):  senna 8.6 milliGRAM(s) Oral Tablet - Peds 2 Tablet(s) Oral at bedtime PRN for constipation        Vital Signs Last 24 Hrs  T(C): 36.6 (2019 04:39), Max: 36.7 (2019 16:28)  T(F): 97.9 (2019 04:39), Max: 98.1 (2019 16:28)  HR: 50 (2019 04:39) (50 - 57)  BP: 113/47 (2019 04:39) (112/56 - 127/38)  BP(mean): --  RR: 19 (2019 11:10) (19 - 19)  SpO2: 97% (2019 04:39) (94% - 97%)Daily     Daily Weight in k.5 (2019 14:41)I&O's Summary      PHYSICAL EXAM:    Constitutional: NAD, awake lethargic  HEENT: PERR, EOMI,  No oral cyananosis.  Neck:  supple,  No JVD  Respiratory: Breath sounds improved. Dull bases  Cardiovascular: S1 and S2, regular rate and rhythm,   Gastrointestinal: Bowel Sounds present, soft, nontender.   Extremities: Erythema, edema  Vascular: reduced pulses  Neurological: A/O x 3, no focal deficits  Musculoskeletal: no calf tenderness.  Skin: No rashes.      LABS: All Labs Reviewed:                        10.2   4.54  )-----------( 186      ( 15 Apr 2019 20:00 )             33.8     2019 05:40    140    |  104    |  32     ----------------------------<  149    3.7     |  29     |  1.60   2019 05:51    143    |  107    |  30     ----------------------------<  88     3.3     |  30     |  1.44   15 Apr 2019 20:00    143    |  106    |  31     ----------------------------<  94     3.9     |  30     |  1.64     Ca    8.5        2019 05:40  Ca    8.0        2019 05:51  Ca    8.4        15 Apr 2019 20:00  Phos  3.9       2019 05:40  Mg     2.4       2019 05:40    TPro  7.2    /  Alb  2.9    /  TBili  0.8    /  DBili  x      /  AST  12     /  ALT  7      /  AlkPhos  152    15 Apr 2019 20:00    PT/INR - ( 2019 05:40 )   PT: 36.1 sec;   INR: 3.14 ratio         PTT - ( 2019 05:40 )  PTT:43.8 sec  CARDIAC MARKERS ( 15 Apr 2019 23:32 )  0.021 ng/mL / x     / x     / x     / x      CARDIAC MARKERS ( 15 Apr 2019 20:00 )  0.018 ng/mL / x     / x     / x     / x          Blood Culture: Organism --  Gram Stain Blood -- Gram Stain --  Specimen Source .Blood None  Culture-Blood --       @ 05:40  Pro Bnp [id number]  04-15 @ 20:00  Pro Bnp 1878        RADIOLOGY/EKG:< from: 12 Lead ECG (04.15.19 @ 20:16) >  Diagnosis Line Ventricular-paced rhythm  Abnormal ECG  Confirmed by BIGG BEYER MD ([id number]) on 4/15/2019 10:06:13 PM    < end of copied text >        ECHO/CARDIAC CATHTERIZATION/STRESS TEST:< from: Transthoracic Echocardiogram (18 @ 09:24) >  Impression     Summary     The left ventricle is normal in size, wall thickness. Moderately   decreased   left ventricular systolic function with an estimated left ventricular   ejection fraction of 35-40 %. There is paradoxical septal motion   The right atrium appears moderately dilated.   A device wire is seen in the RV and RA.   Normal aortic valve structure and function.   Normal appearing mitral valve structure and function.   Moderate (2+) mitral regurgitation is present.   Mild mitral annular calcification is present.   Normal appearing tricuspid valve structure and function.   Moderate to severe (3+) tricuspid valve regurgitation is present.   There is severe elevation in right ventricular systolic pressure     Signature     ----------------------------------------------------------------   Electronically signed by Bigg Beyer(Interpreting physician)   on 2018 11:51 AM    < end of copied text >

## 2019-04-17 NOTE — PHARMACOTHERAPY INTERVENTION NOTE - COMMENTS
Confirmed dosing and administration times for parkinson's disease medications: Sinemet 25/100 TID @ 6A, 2P, 10P  Medication ordered appropriately with times adjusted

## 2019-04-17 NOTE — PROGRESS NOTE ADULT - ASSESSMENT
#Acute respiratory failure/hypoxia most likely secondary to acute on chronic HFrEF   - CT chest on 4/16 - No evidence of definite PNA; large R effusion and small left effusion with adjacent atelectasis  - CXR on admission noted - reports R and left pleural effusion  - Continue on lasix 40mg IV daily   - Continue O2 support via NC PRN- currently on 4L NC with O2sat %; wean as tolerated  - Echo in 2018 with LVEF 35-40%, mod-severe TR, Mod MR  - daily weight with strict I & O   - Fluid restriction 1500ml daily  - Cardiology recs appreciated  - Pulm consult and recs appreciated  - Would consider ACE-I on discharge  - f/u repeat echo 4/17    #Supratherapeutic INR  - INR of 3.14 this AM  - Hold coumadin tonight    #Chronic Afib  rate control / therapeutic INR  - Hold Coumadin tonight given supratherapeutic INR  - Continue on home dose coreg   - daily INR for goal of 2-3  - [id number] Score = 7 (age-2, HTN-1, CHF-1, CVA-2, vasc disease-1)    #Hypokalemia  - Resolved    # CKD Stage III   - baseline creatinine up to 1.8   - Cr slightly increased to 1.60 from previous day in the setting of lasix IV administration  - ventura placed in ED for strict I & O monitoring - removed  - Pt voiding on his won  - Continue home meds Flomax HS   - Monitor I/Os    # Parkinson Disease/ Dementia  - Continue Sinemet / Aricept     # CAD s/p PCI and CABG  - Pt with no c/o Chest pain  - Continue on plavix/statin/BB     # DVT PPX  - On coumadin - HOLD tonight given supratherapeutic INR

## 2019-04-17 NOTE — PHYSICAL THERAPY INITIAL EVALUATION ADULT - DIAGNOSIS, PT EVAL
hypoxia,impaired endurance/activity tolerance ;HCAP, Parkinsons disease/movement disorder hypoxia,impaired endurance/activity tolerance,acute on chronic decompensated HFrEF (35-40% Feb 2018)<B pleural effusions (large R ,small L ) +Parkinson's disease/movement disorder

## 2019-04-17 NOTE — PROGRESS NOTE ADULT - SUBJECTIVE AND OBJECTIVE BOX
CHIEF COMPLAINT:  HPI:  89 year old Man [facility] resident with hx of CAD s/p PCI/CABG,  HFrEF, s/p AICD, HTN, dyslipidemia, CVA, orthostatic hypotension, CKD III, A fib on Coumadin, Parkinson's presented from [facility] via EMS for worsening SOB with O2 desat.  O2 sat 90% en route to ED. In the ED  /50 initially then drop to 70's systolic, HR 50, O2 sat 99% with O2, RR 20. Denied CP/palpitation/HA/dizziness/abd pain/n/v/d/f/c  He received NS 1L IV bolus, vanco 1gm and cefepime 1gm, ventura placed    SUBJECTIVE:   4/17: Pt seen this AM, states that his breathing is a little better on O2 supplement, but feels out of breath without it. He denies any other sxs, including CP or pain anywhere else. Pt is currently on 4L NC with O2sat in the %. Also continued on 40mg IV lasix. Overnight was bradycardic to high 40s-50, pt was asymptomatic.    REVIEW OF SYSTEMS:  CONSTITUTIONAL: No fevers or chills  EYES/ENT: No visual changes;  No vertigo or throat pain   NECK: No pain or stiffness  RESPIRATORY: No cough, shortness of breath better on O2 supplementation  CARDIOVASCULAR: No chest pain or palpitations  GASTROINTESTINAL: No abdominal or epigastric pain. No nausea, vomiting, no diarrhea/constipation  GENITOURINARY: No dysuria, frequency or hematuria  NEUROLOGICAL: No numbness, +generalized weakness  SKIN: No itching, burning, rashes, or lesions   All other review of systems is negative unless indicated ab    Vital Signs Last 24 Hrs  T(C): 36.6 (17 Apr 2019 09:45), Max: 36.7 (16 Apr 2019 16:28)  T(F): 97.8 (17 Apr 2019 09:45), Max: 98.1 (16 Apr 2019 16:28)  HR: 56 (17 Apr 2019 09:45) (50 - 58)  BP: 111/47 (17 Apr 2019 09:45) (111/47 - 127/38)  RR: 18 (17 Apr 2019 09:45) (18 - 18)  SpO2: 100% (17 Apr 2019 09:45) (97% - 100%)    I&O's Summary      CAPILLARY BLOOD GLUCOSE      PHYSICAL EXAM:  Constitutional: pt is awake, alert, in no acute respiratory distress, currently on 4L O2 NC  HEENT: EOMI, Normal Hearing, MMM  Neck: Soft and supple  Respiratory: moving air b/l on ausculation, mild crackles in basilar lungs  Cardiovascular: S1 and S2, regular rate and rhythm  Gastrointestinal: +BS, abdomen distended but soft and nontender  Extremities: 3+ b/l pitting edema, venous changes of b/l LE  Vascular: 2+ peripheral pulses  Neurological: A/O x 3, no focal deficits  Musculoskeletal: generalized weakness  Skin: No rashes    MEDICATIONS:  MEDICATIONS  (STANDING):  ALBUTerol/ipratropium for Nebulization 3 milliLiter(s) Nebulizer every 6 hours  ascorbic acid 500 milliGRAM(s) Oral daily  buDESOnide 160 MICROgram(s)/formoterol 4.5 MICROgram(s) Inhaler 2 Puff(s) Inhalation two times a day  carbidopa/levodopa  25/100 1 Tablet(s) Oral three times a day  clopidogrel Tablet 75 milliGRAM(s) Oral daily  donepezil 10 milliGRAM(s) Oral at bedtime  furosemide   Injectable 40 milliGRAM(s) IV Push daily  metoprolol tartrate Oral Tab/Cap - Peds 12.5 milliGRAM(s) Oral two times a day  multivitamin 1 Tablet(s) Oral daily  pantoprazole    Tablet 40 milliGRAM(s) Oral before breakfast  simvastatin 20 milliGRAM(s) Oral at bedtime  tamsulosin Oral Tab/Cap - Peds 0.4 milliGRAM(s) Oral at bedtime      LABS: All Labs Reviewed:                        10.2   4.54  )-----------( 186      ( 15 Apr 2019 20:00 )             33.8     04-17    140  |  104  |  32<H>  ----------------------------<  149<H>  3.7   |  29  |  1.60<H>    Ca    8.5      17 Apr 2019 05:40  Phos  3.9     04-17  Mg     2.4     04-17    TPro  7.2  /  Alb  2.9<L>  /  TBili  0.8  /  DBili  x   /  AST  12<L>  /  ALT  7<L>  /  AlkPhos  152<H>  04-15    PT/INR - ( 17 Apr 2019 05:40 )   PT: 36.1 sec;   INR: 3.14 ratio         PTT - ( 17 Apr 2019 05:40 )  PTT:43.8 sec  CARDIAC MARKERS ( 15 Apr 2019 23:32 )  0.021 ng/mL / x     / x     / x     / x      CARDIAC MARKERS ( 15 Apr 2019 20:00 )  0.018 ng/mL / x     / x     / x     / x          Blood Culture: 04-15 @ 20:00  Organism --  Gram Stain Blood -- Gram Stain --  Specimen Source .Blood None  Culture-Blood --

## 2019-04-18 DIAGNOSIS — I50.23 ACUTE ON CHRONIC SYSTOLIC (CONGESTIVE) HEART FAILURE: ICD-10-CM

## 2019-04-18 LAB
ANION GAP SERPL CALC-SCNC: 8 MMOL/L — SIGNIFICANT CHANGE UP (ref 5–17)
APTT BLD: 39.7 SEC — HIGH (ref 27.5–36.3)
BUN SERPL-MCNC: 36 MG/DL — HIGH (ref 7–23)
CALCIUM SERPL-MCNC: 8.4 MG/DL — LOW (ref 8.5–10.1)
CHLORIDE SERPL-SCNC: 105 MMOL/L — SIGNIFICANT CHANGE UP (ref 96–108)
CO2 SERPL-SCNC: 26 MMOL/L — SIGNIFICANT CHANGE UP (ref 22–31)
CREAT SERPL-MCNC: 1.5 MG/DL — HIGH (ref 0.5–1.3)
GLUCOSE SERPL-MCNC: 95 MG/DL — SIGNIFICANT CHANGE UP (ref 70–99)
HCT VFR BLD CALC: 31.6 % — LOW (ref 39–50)
HGB BLD-MCNC: 9.1 G/DL — LOW (ref 13–17)
INR BLD: 2.56 RATIO — HIGH (ref 0.88–1.16)
MCHC RBC-ENTMCNC: 24.8 PG — LOW (ref 27–34)
MCHC RBC-ENTMCNC: 28.8 GM/DL — LOW (ref 32–36)
MCV RBC AUTO: 86.1 FL — SIGNIFICANT CHANGE UP (ref 80–100)
NRBC # BLD: 0 /100 WBCS — SIGNIFICANT CHANGE UP (ref 0–0)
PLATELET # BLD AUTO: 158 K/UL — SIGNIFICANT CHANGE UP (ref 150–400)
POTASSIUM SERPL-MCNC: 3.4 MMOL/L — LOW (ref 3.5–5.3)
POTASSIUM SERPL-SCNC: 3.4 MMOL/L — LOW (ref 3.5–5.3)
PROTHROM AB SERPL-ACNC: 29.2 SEC — HIGH (ref 10–12.9)
RBC # BLD: 3.67 M/UL — LOW (ref 4.2–5.8)
RBC # FLD: 18.5 % — HIGH (ref 10.3–14.5)
SODIUM SERPL-SCNC: 139 MMOL/L — SIGNIFICANT CHANGE UP (ref 135–145)
WBC # BLD: 4.81 K/UL — SIGNIFICANT CHANGE UP (ref 3.8–10.5)
WBC # FLD AUTO: 4.81 K/UL — SIGNIFICANT CHANGE UP (ref 3.8–10.5)

## 2019-04-18 PROCEDURE — 99233 SBSQ HOSP IP/OBS HIGH 50: CPT

## 2019-04-18 RX ORDER — POTASSIUM CHLORIDE 20 MEQ
40 PACKET (EA) ORAL EVERY 4 HOURS
Qty: 0 | Refills: 0 | Status: COMPLETED | OUTPATIENT
Start: 2019-04-18 | End: 2019-04-18

## 2019-04-18 RX ORDER — TIOTROPIUM BROMIDE 18 UG/1
1 CAPSULE ORAL; RESPIRATORY (INHALATION) DAILY
Qty: 0 | Refills: 0 | Status: DISCONTINUED | OUTPATIENT
Start: 2019-04-18 | End: 2019-04-25

## 2019-04-18 RX ORDER — FUROSEMIDE 40 MG
40 TABLET ORAL ONCE
Qty: 0 | Refills: 0 | Status: DISCONTINUED | OUTPATIENT
Start: 2019-04-18 | End: 2019-04-18

## 2019-04-18 RX ORDER — WARFARIN SODIUM 2.5 MG/1
3 TABLET ORAL ONCE
Qty: 0 | Refills: 0 | Status: COMPLETED | OUTPATIENT
Start: 2019-04-18 | End: 2019-04-18

## 2019-04-18 RX ORDER — ALBUTEROL 90 UG/1
2.5 AEROSOL, METERED ORAL
Qty: 0 | Refills: 0 | Status: DISCONTINUED | OUTPATIENT
Start: 2019-04-18 | End: 2019-04-25

## 2019-04-18 RX ORDER — ALBUTEROL 90 UG/1
2.5 AEROSOL, METERED ORAL
Qty: 0 | Refills: 0 | Status: DISCONTINUED | OUTPATIENT
Start: 2019-04-18 | End: 2019-04-18

## 2019-04-18 RX ORDER — FUROSEMIDE 40 MG
40 TABLET ORAL ONCE
Qty: 0 | Refills: 0 | Status: COMPLETED | OUTPATIENT
Start: 2019-04-18 | End: 2019-04-18

## 2019-04-18 RX ORDER — SODIUM CHLORIDE 9 MG/ML
1000 INJECTION INTRAMUSCULAR; INTRAVENOUS; SUBCUTANEOUS
Qty: 0 | Refills: 0 | Status: DISCONTINUED | OUTPATIENT
Start: 2019-04-18 | End: 2019-04-18

## 2019-04-18 RX ADMIN — Medication 40 MILLIGRAM(S): at 15:42

## 2019-04-18 RX ADMIN — Medication 40 MILLIEQUIVALENT(S): at 17:32

## 2019-04-18 RX ADMIN — Medication 40 MILLIEQUIVALENT(S): at 22:18

## 2019-04-18 RX ADMIN — Medication 1 TABLET(S): at 12:21

## 2019-04-18 RX ADMIN — SIMVASTATIN 20 MILLIGRAM(S): 20 TABLET, FILM COATED ORAL at 22:18

## 2019-04-18 RX ADMIN — ALBUTEROL 2.5 MILLIGRAM(S): 90 AEROSOL, METERED ORAL at 20:13

## 2019-04-18 RX ADMIN — TAMSULOSIN HYDROCHLORIDE 0.4 MILLIGRAM(S): 0.4 CAPSULE ORAL at 22:17

## 2019-04-18 RX ADMIN — CARBIDOPA AND LEVODOPA 1 TABLET(S): 25; 100 TABLET ORAL at 05:42

## 2019-04-18 RX ADMIN — CLOPIDOGREL BISULFATE 75 MILLIGRAM(S): 75 TABLET, FILM COATED ORAL at 12:20

## 2019-04-18 RX ADMIN — WARFARIN SODIUM 3 MILLIGRAM(S): 2.5 TABLET ORAL at 22:17

## 2019-04-18 RX ADMIN — Medication 3 MILLILITER(S): at 07:42

## 2019-04-18 RX ADMIN — CARBIDOPA AND LEVODOPA 1 TABLET(S): 25; 100 TABLET ORAL at 13:31

## 2019-04-18 RX ADMIN — PANTOPRAZOLE SODIUM 40 MILLIGRAM(S): 20 TABLET, DELAYED RELEASE ORAL at 05:42

## 2019-04-18 RX ADMIN — BUDESONIDE AND FORMOTEROL FUMARATE DIHYDRATE 2 PUFF(S): 160; 4.5 AEROSOL RESPIRATORY (INHALATION) at 20:16

## 2019-04-18 RX ADMIN — Medication 40 MILLIGRAM(S): at 12:20

## 2019-04-18 RX ADMIN — Medication 12.5 MILLIGRAM(S): at 17:34

## 2019-04-18 RX ADMIN — DONEPEZIL HYDROCHLORIDE 10 MILLIGRAM(S): 10 TABLET, FILM COATED ORAL at 22:17

## 2019-04-18 RX ADMIN — Medication 3 MILLILITER(S): at 01:37

## 2019-04-18 RX ADMIN — Medication 500 MILLIGRAM(S): at 12:20

## 2019-04-18 RX ADMIN — BUDESONIDE AND FORMOTEROL FUMARATE DIHYDRATE 2 PUFF(S): 160; 4.5 AEROSOL RESPIRATORY (INHALATION) at 07:41

## 2019-04-18 RX ADMIN — CARBIDOPA AND LEVODOPA 1 TABLET(S): 25; 100 TABLET ORAL at 22:17

## 2019-04-18 NOTE — PROGRESS NOTE ADULT - SUBJECTIVE AND OBJECTIVE BOX
HPI:  89 year old Man [facility] resident with hx of CAD s/p PCI/CABG,  HFrEF, s/p AICD, HTN, dyslipidemia, CVA, orthostatic hypotension, CKD III, A fib on Coumadin, Parkinson's presented from [facility] via EMS for worsening SOB with O2 desat.  O2 sat 90% en route to ED. In the ED  /50 initially then drop to 70's systolic, HR 50, O2 sat 99% with O2, RR 20. Denies CP/palpitation/HA/dizziness/abd pain/n/v/d/f/c    He received NS 1L IV bolus, vanco 1gm and cefepime 1gm, ventura placed (15 Apr 2019 23:38)    : no distress. afebrile.  : no distress, afebrile.      PAST MEDICAL & SURGICAL HISTORY:  COPD (chronic obstructive pulmonary disease)  Parkinson's disease  CHF (congestive heart failure)  Hyperlipidemia  HTN (hypertension)  CAD (coronary artery disease)  AICD (automatic cardioverter/defibrillator) present  S/P CABG      MEDICATIONS  (STANDING):  ALBUTerol/ipratropium for Nebulization 3 milliLiter(s) Nebulizer every 6 hours  ascorbic acid 500 milliGRAM(s) Oral daily  buDESOnide 160 MICROgram(s)/formoterol 4.5 MICROgram(s) Inhaler 2 Puff(s) Inhalation two times a day  carbidopa/levodopa  25/100 1 Tablet(s) Oral three times a day  clopidogrel Tablet 75 milliGRAM(s) Oral daily  donepezil 10 milliGRAM(s) Oral at bedtime  furosemide   Injectable 40 milliGRAM(s) IV Push daily  metoprolol tartrate Oral Tab/Cap - Peds 12.5 milliGRAM(s) Oral two times a day  multivitamin 1 Tablet(s) Oral daily  pantoprazole    Tablet 40 milliGRAM(s) Oral before breakfast  simvastatin 20 milliGRAM(s) Oral at bedtime  tamsulosin Oral Tab/Cap - Peds 0.4 milliGRAM(s) Oral at bedtime  warfarin 5 milliGRAM(s) Oral once    MEDICATIONS  (PRN):  senna 8.6 milliGRAM(s) Oral Tablet - Peds 2 Tablet(s) Oral at bedtime PRN for constipation      Allergies    morphine (Headache)    Intolerances        SOCIAL HISTORY: Denies tobacco, etoh abuse or illicit drug use    FAMILY HISTORY:      Vital Signs Last 24 Hrs  T(C): 36.7 (2019 16:28), Max: 36.7 (2019 00:59)  T(F): 98.1 (2019 16:28), Max: 98.1 (2019 16:28)  HR: 50 (2019 16:28) (48 - 56)  BP: 127/38 (2019 16:28) (85/60 - 127/38)  BP(mean): --  RR: 19 (2019 11:10) (12 - 27)  SpO2: 97% (2019 16:28) (93% - 100%)    REVIEW OF SYSTEMS:    CONSTITUTIONAL:  As per HPI.  HEENT:  Eyes:  No diplopia or blurred vision. ENT:  No earache, sore throat or runny nose.  CARDIOVASCULAR:  See above.  RESPIRATORY:  See above.  GASTROINTESTINAL:  No nausea, vomiting or diarrhea.  GENITOURINARY:  No dysuria, frequency or urgency.  MUSCULOSKELETAL:  As per HPI.  SKIN:  No change in skin, hair or nails.  NEUROLOGIC:  No paresthesias, fasciculations, seizures or weakness.  PSYCHIATRIC:  No disorder of thought or mood.  ENDOCRINE:  No heat or cold intolerance, polyuria or polydipsia.  HEMATOLOGICAL:  No easy bruising or bleedings:  .     PHYSICAL EXAMINATION:    GENERAL APPEARANCE:  Pt. is not currently dyspneic, in no distress.   HEENT:  Pupils are normal and react normally. No icterus. Mucous membranes well colored.  NECK:  Supple. No lymphadenopathy. Jugular venous pressure not elevated. Carotids equal.   HEART:   The cardiac impulse has a normal quality. HR 50.  CHEST:  Decreased BS's R base.   ABDOMEN:  Soft and nontender.   EXTREMITIES:  There is no cyanosis, clubbing. 3- 4 plus LE edema with stasis discoloration.  SKIN:  No rash or significant lesions are noted.  Neuro: Communicative.      LABS:                        10.2   4.54  )-----------( 186      ( 15 Apr 2019 20:00 )             33.8     04-16    143  |  107  |  30<H>  ----------------------------<  88  3.3<L>   |  30  |  1.44<H>    Ca    8.0<L>      2019 05:51    TPro  7.2  /  Alb  2.9<L>  /  TBili  0.8  /  DBili  x   /  AST  12<L>  /  ALT  7<L>  /  AlkPhos  152<H>  04-15    LIVER FUNCTIONS - ( 15 Apr 2019 20:00 )  Alb: 2.9 g/dL / Pro: 7.2 gm/dL / ALK PHOS: 152 U/L / ALT: 7 U/L / AST: 12 U/L / GGT: x           PT/INR - ( 2019 09:47 )   PT: 28.1 sec;   INR: 2.46 ratio         PTT - ( 15 Apr 2019 20:00 )  PTT:43.7 sec  CARDIAC MARKERS ( 15 Apr 2019 23:32 )  0.021 ng/mL / x     / x     / x     / x      CARDIAC MARKERS ( 15 Apr 2019 20:00 )  0.018 ng/mL / x     / x     / x     / x          Urinalysis Basic - ( 15 Apr 2019 21:50 )    Color: Yellow / Appearance: Clear / S.015 / pH: x  Gluc: x / Ketone: Negative  / Bili: Negative / Urobili: Negative mg/dL   Blood: x / Protein: Negative mg/dL / Nitrite: Negative   Leuk Esterase: Negative / RBC: 3-5 /HPF / WBC 0-2   Sq Epi: x / Non Sq Epi: Occasional / Bacteria: Occasional      Serum Pro-Brain Natriuretic Peptide: 1878 pg/mL (04.15.19 @ 20:00)        RADIOLOGY & ADDITIONAL STUDIES:       EXAM:  CT CHEST                            PROCEDURE DATE:  2019          INTERPRETATION:  CLINICAL INFORMATION: Shortness of breath      COMPARISON: chest radiograph of the previous day and CT of the chest of   2018.    PROCEDURE:   CTof the Chest was performed without intravenous contrast.  Sagittal and coronal reformats were performed.      FINDINGS:    CHEST:     LUNGS AND LARGE AIRWAYS: Patent central airways.  No pulmonary nodules.  PLEURA: Large right effusion and small lefteffusion  VESSELS: Atherosclerotic calcifications  HEART: Coronary artery bypass graft surgical changes and pacemaker in   place. No pericardial effusion.  MEDIASTINUM AND SHIRLEY: No lymphadenopathy.  CHEST WALL AND LOWER NECK: Within normal limits.  VISUALIZED UPPER ABDOMEN: Splenic calcifications and cysts  BONES: Within normal limits.    IMPRESSION: Large right effusion and small left effusion with adjacent   atelectasis. No definite evidence of pneumonia. HPI:  89 year old Man [facility] resident with hx of CAD s/p PCI/CABG,  HFrEF, s/p AICD, HTN, dyslipidemia, CVA, orthostatic hypotension, CKD III, A fib on Coumadin, Parkinson's presented from [facility] via EMS for worsening SOB with O2 desat.  O2 sat 90% en route to ED. In the ED  /50 initially then drop to 70's systolic, HR 50, O2 sat 99% with O2, RR 20. Denies CP/palpitation/HA/dizziness/abd pain/n/v/d/f/c    He received NS 1L IV bolus, vanco 1gm and cefepime 1gm, ventura placed (15 Apr 2019 23:38)    : no distress. afebrile.  : no distress, afebrile.      PAST MEDICAL & SURGICAL HISTORY:  COPD (chronic obstructive pulmonary disease)  Parkinson's disease  CHF (congestive heart failure)  Hyperlipidemia  HTN (hypertension)  CAD (coronary artery disease)  AICD (automatic cardioverter/defibrillator) present  S/P CABG      MEDICATIONS  (STANDING):  ALBUTerol/ipratropium for Nebulization 3 milliLiter(s) Nebulizer every 6 hours  ascorbic acid 500 milliGRAM(s) Oral daily  buDESOnide 160 MICROgram(s)/formoterol 4.5 MICROgram(s) Inhaler 2 Puff(s) Inhalation two times a day  carbidopa/levodopa  25/100 1 Tablet(s) Oral three times a day  clopidogrel Tablet 75 milliGRAM(s) Oral daily  donepezil 10 milliGRAM(s) Oral at bedtime  furosemide   Injectable 40 milliGRAM(s) IV Push daily  metoprolol tartrate Oral Tab/Cap - Peds 12.5 milliGRAM(s) Oral two times a day  multivitamin 1 Tablet(s) Oral daily  pantoprazole    Tablet 40 milliGRAM(s) Oral before breakfast  simvastatin 20 milliGRAM(s) Oral at bedtime  tamsulosin Oral Tab/Cap - Peds 0.4 milliGRAM(s) Oral at bedtime  warfarin 5 milliGRAM(s) Oral once    MEDICATIONS  (PRN):  senna 8.6 milliGRAM(s) Oral Tablet - Peds 2 Tablet(s) Oral at bedtime PRN for constipation      Allergies    morphine (Headache)    Intolerances        SOCIAL HISTORY: Denies tobacco, etoh abuse or illicit drug use    FAMILY HISTORY:      Vital Signs Last 24 Hrs  T(C): 36.7 (2019 16:28), Max: 36.7 (2019 00:59)  T(F): 98.1 (2019 16:28), Max: 98.1 (2019 16:28)  HR: 50 (2019 16:28) (48 - 56)  BP: 127/38 (2019 16:28) (85/60 - 127/38)  BP(mean): --  RR: 19 (2019 11:10) (12 - 27)  SpO2: 97% (2019 16:28) (93% - 100%)    REVIEW OF SYSTEMS:    CONSTITUTIONAL:  As per HPI.  HEENT:  Eyes:  No diplopia or blurred vision. ENT:  No earache, sore throat or runny nose.  CARDIOVASCULAR:  See above.  RESPIRATORY:  See above.  GASTROINTESTINAL:  No nausea, vomiting or diarrhea.  GENITOURINARY:  No dysuria, frequency or urgency.  MUSCULOSKELETAL:  As per HPI.  SKIN:  No change in skin, hair or nails.  NEUROLOGIC:  No paresthesias, fasciculations, seizures or weakness.  PSYCHIATRIC:  No disorder of thought or mood.  ENDOCRINE:  No heat or cold intolerance, polyuria or polydipsia.  HEMATOLOGICAL:  No easy bruising or bleedings:  .     PHYSICAL EXAMINATION:    GENERAL APPEARANCE:  Pt. is not currently dyspneic, in no distress.   HEENT:  Pupils are normal and react normally. No icterus. Mucous membranes well colored.  NECK:  Supple. No lymphadenopathy. Jugular venous pressure not elevated. Carotids equal.   HEART:   The cardiac impulse has a normal quality. HR 50.  CHEST:  Decreased BS's R base. scattered expir wheeze.   ABDOMEN:  Soft and nontender.   EXTREMITIES:  There is no cyanosis, clubbing. 3- 4 plus LE edema with stasis discoloration.  SKIN:  No rash or significant lesions are noted.  Neuro: Communicative.      LABS:                        10.2   4.54  )-----------( 186      ( 15 Apr 2019 20:00 )             33.8     04-16    143  |  107  |  30<H>  ----------------------------<  88  3.3<L>   |  30  |  1.44<H>    Ca    8.0<L>      2019 05:51    TPro  7.2  /  Alb  2.9<L>  /  TBili  0.8  /  DBili  x   /  AST  12<L>  /  ALT  7<L>  /  AlkPhos  152<H>  04-15    LIVER FUNCTIONS - ( 15 Apr 2019 20:00 )  Alb: 2.9 g/dL / Pro: 7.2 gm/dL / ALK PHOS: 152 U/L / ALT: 7 U/L / AST: 12 U/L / GGT: x           PT/INR - ( 2019 09:47 )   PT: 28.1 sec;   INR: 2.46 ratio         PTT - ( 15 Apr 2019 20:00 )  PTT:43.7 sec  CARDIAC MARKERS ( 15 Apr 2019 23:32 )  0.021 ng/mL / x     / x     / x     / x      CARDIAC MARKERS ( 15 Apr 2019 20:00 )  0.018 ng/mL / x     / x     / x     / x          Urinalysis Basic - ( 15 Apr 2019 21:50 )    Color: Yellow / Appearance: Clear / S.015 / pH: x  Gluc: x / Ketone: Negative  / Bili: Negative / Urobili: Negative mg/dL   Blood: x / Protein: Negative mg/dL / Nitrite: Negative   Leuk Esterase: Negative / RBC: 3-5 /HPF / WBC 0-2   Sq Epi: x / Non Sq Epi: Occasional / Bacteria: Occasional      Serum Pro-Brain Natriuretic Peptide: 1878 pg/mL (04.15.19 @ 20:00)        RADIOLOGY & ADDITIONAL STUDIES:       EXAM:  CT CHEST                            PROCEDURE DATE:  2019          INTERPRETATION:  CLINICAL INFORMATION: Shortness of breath      COMPARISON: chest radiograph of the previous day and CT of the chest of   2018.    PROCEDURE:   CTof the Chest was performed without intravenous contrast.  Sagittal and coronal reformats were performed.      FINDINGS:    CHEST:     LUNGS AND LARGE AIRWAYS: Patent central airways.  No pulmonary nodules.  PLEURA: Large right effusion and small lefteffusion  VESSELS: Atherosclerotic calcifications  HEART: Coronary artery bypass graft surgical changes and pacemaker in   place. No pericardial effusion.  MEDIASTINUM AND SHIRLEY: No lymphadenopathy.  CHEST WALL AND LOWER NECK: Within normal limits.  VISUALIZED UPPER ABDOMEN: Splenic calcifications and cysts  BONES: Within normal limits.    IMPRESSION: Large right effusion and small left effusion with adjacent   atelectasis. No definite evidence of pneumonia.

## 2019-04-18 NOTE — PROGRESS NOTE ADULT - SUBJECTIVE AND OBJECTIVE BOX
CHIEF COMPLAINT:  HPI:  89 year old Man [facility] resident with hx of CAD s/p PCI/CABG,  HFrEF, s/p AICD, HTN, dyslipidemia, CVA, orthostatic hypotension, CKD III, A fib on Coumadin, Parkinson's presented from [facility] via EMS for worsening SOB with O2 desat.  O2 sat 90% en route to ED. In the ED  /50 initially then drop to 70's systolic, HR 50, O2 sat 99% with O2, RR 20. Denied CP/palpitation/HA/dizziness/abd pain/n/v/d/f/c  He received NS 1L IV bolus, vanco 1gm and cefepime 1gm, ventura placed    SUBJECTIVE:   4/18:    REVIEW OF SYSTEMS:  CONSTITUTIONAL: No weakness, fevers or chills  EYES/ENT: No visual changes;  No vertigo or throat pain   NECK: No pain or stiffness  RESPIRATORY: No cough, wheezing, hemoptysis; No shortness of breath  CARDIOVASCULAR: No chest pain or palpitations  GASTROINTESTINAL: No abdominal or epigastric pain. No nausea, vomiting, or hematemesis; No diarrhea or constipation. No melena or hematochezia.  GENITOURINARY: No dysuria, frequency or hematuria  NEUROLOGICAL: No numbness or weakness  SKIN: No itching, burning, rashes, or lesions   All other review of systems is negative unless indicated above    Vital Signs Last 24 Hrs  T(C): 36.6 (18 Apr 2019 10:23), Max: 36.7 (17 Apr 2019 16:51)  T(F): 97.8 (18 Apr 2019 10:23), Max: 98 (17 Apr 2019 16:51)  HR: 84 (18 Apr 2019 10:23) (49 - 84)  BP: 103/65 (18 Apr 2019 10:23) (94/41 - 110/53)  BP(mean): 75 (18 Apr 2019 10:23) (75 - 75)  RR: 18 (18 Apr 2019 10:23) (18 - 18)  SpO2: 95% (18 Apr 2019 10:23) (95% - 100%)    I&O's Summary      CAPILLARY BLOOD GLUCOSE          PHYSICAL EXAM:  Constitutional: NAD, awake and alert, well-developed  HEENT: PERR, EOMI, Normal Hearing, MMM  Neck: Soft and supple, No LAD, No JVD  Respiratory: Breath sounds are clear bilaterally, No wheezing, rales or rhonchi  Cardiovascular: S1 and S2, regular rate and rhythm, no Murmurs, gallops or rubs  Gastrointestinal: Bowel Sounds present, soft, nontender, nondistended, no guarding, no rebound  Extremities: No peripheral edema  Vascular: 2+ peripheral pulses  Neurological: A/O x 3, no focal deficits  Musculoskeletal: 5/5 strength b/l upper and lower extremities  Skin: No rashes    MEDICATIONS:  MEDICATIONS  (STANDING):  ascorbic acid 500 milliGRAM(s) Oral daily  buDESOnide 160 MICROgram(s)/formoterol 4.5 MICROgram(s) Inhaler 2 Puff(s) Inhalation two times a day  carbidopa/levodopa  25/100 1 Tablet(s) Oral three times a day  clopidogrel Tablet 75 milliGRAM(s) Oral daily  donepezil 10 milliGRAM(s) Oral at bedtime  furosemide   Injectable 40 milliGRAM(s) IV Push daily  furosemide   Injectable 40 milliGRAM(s) IV Push once  metoprolol tartrate Oral Tab/Cap - Peds 12.5 milliGRAM(s) Oral two times a day  multivitamin 1 Tablet(s) Oral daily  pantoprazole    Tablet 40 milliGRAM(s) Oral before breakfast  potassium chloride    Tablet ER 40 milliEquivalent(s) Oral every 4 hours  simvastatin 20 milliGRAM(s) Oral at bedtime  tamsulosin Oral Tab/Cap - Peds 0.4 milliGRAM(s) Oral at bedtime  tiotropium 18 MICROgram(s) Capsule 1 Capsule(s) Inhalation daily      LABS: All Labs Reviewed:                        9.1    4.81  )-----------( 158      ( 18 Apr 2019 06:04 )             31.6     04-18    139  |  105  |  36<H>  ----------------------------<  95  3.4<L>   |  26  |  1.50<H>    Ca    8.4<L>      18 Apr 2019 06:04  Phos  3.9     04-17  Mg     2.4     04-17      PT/INR - ( 18 Apr 2019 06:04 )   PT: 29.2 sec;   INR: 2.56 ratio         PTT - ( 18 Apr 2019 06:04 )  PTT:39.7 sec      Blood Culture: 04-15 @ 20:00  Organism --  Gram Stain Blood -- Gram Stain --  Specimen Source .Blood None  Culture-Blood --        RADIOLOGY/EKG:    DVT PPX:    ADVANCED DIRECTIVE:    DISPOSITION: CHIEF COMPLAINT:  HPI:  89 year old Man [facility] resident with hx of CAD s/p PCI/CABG,  HFrEF, s/p AICD, HTN, dyslipidemia, CVA, orthostatic hypotension, CKD III, A fib on Coumadin, Parkinson's presented from [facility] via EMS for worsening SOB with O2 desat.  O2 sat 90% en route to ED. In the ED  /50 initially then drop to 70's systolic, HR 50, O2 sat 99% with O2, RR 20. Denied CP/palpitation/HA/dizziness/abd pain/n/v/d/f/c  He received NS 1L IV bolus, vanco 1gm and cefepime 1gm, ventura placed (currently removed and pt is able to void on his own).    SUBJECTIVE:   4/18: No acute issues overnight. Pt without c/o SOB this AM, still on O2 supplementation. Pt however with noted wheezing and rhonchi on lung exam. An extra dose of IV lasix 40mg therefore was ordered to be given.    REVIEW OF SYSTEMS:  CONSTITUTIONAL: No fevers   EYES/ENT: No visual changes  NECK: No pain or stiffness  RESPIRATORY: No cough, denies SOB while on O2 supplement  CARDIOVASCULAR: No chest pain or palpitations  GASTROINTESTINAL: No abdominal or epigastric pain. No nausea, vomiting, no diarrhea/constipation  GENITOURINARY: No dysuria, frequency or hematuria  NEUROLOGICAL: No numbness, +generalized weakness  SKIN: No itching, burning, rashes, or lesions   All other review of systems is negative unless indicated above    Vital Signs Last 24 Hrs  T(C): 36.6 (18 Apr 2019 10:23), Max: 36.7 (17 Apr 2019 16:51)  T(F): 97.8 (18 Apr 2019 10:23), Max: 98 (17 Apr 2019 16:51)  HR: 84 (18 Apr 2019 10:23) (49 - 84)  BP: 103/65 (18 Apr 2019 10:23) (94/41 - 110/53)  BP(mean): 75 (18 Apr 2019 10:23) (75 - 75)  RR: 18 (18 Apr 2019 10:23) (18 - 18)  SpO2: 95% (18 Apr 2019 10:23) (95% - 100%)    I&O's Summary      CAPILLARY BLOOD GLUCOSE      PHYSICAL EXAM:  Constitutional: on O2 supplement via NC, no increased respiratory effort  HEENT: EOMI, Normal Hearing, MMM  Neck: Soft and supple  Respiratory: +expiratory wheezing, +rhonchi and decreased breath sounds of posterior basal lung fields  Cardiovascular: S1 and S2, regular rate and rhythm  Gastrointestinal: soft, breath sounds present, distended abdomen, nontender  Extremities: 3+ b/l pitting edema   Neurological: A/O x 3  Musculoskeletal: generalized weakness  Skin: No rashes    MEDICATIONS:  MEDICATIONS  (STANDING):  ascorbic acid 500 milliGRAM(s) Oral daily  buDESOnide 160 MICROgram(s)/formoterol 4.5 MICROgram(s) Inhaler 2 Puff(s) Inhalation two times a day  carbidopa/levodopa  25/100 1 Tablet(s) Oral three times a day  clopidogrel Tablet 75 milliGRAM(s) Oral daily  donepezil 10 milliGRAM(s) Oral at bedtime  furosemide   Injectable 40 milliGRAM(s) IV Push daily  furosemide   Injectable 40 milliGRAM(s) IV Push once  metoprolol tartrate Oral Tab/Cap - Peds 12.5 milliGRAM(s) Oral two times a day  multivitamin 1 Tablet(s) Oral daily  pantoprazole    Tablet 40 milliGRAM(s) Oral before breakfast  potassium chloride    Tablet ER 40 milliEquivalent(s) Oral every 4 hours  simvastatin 20 milliGRAM(s) Oral at bedtime  tamsulosin Oral Tab/Cap - Peds 0.4 milliGRAM(s) Oral at bedtime  tiotropium 18 MICROgram(s) Capsule 1 Capsule(s) Inhalation daily      LABS: All Labs Reviewed:                        9.1    4.81  )-----------( 158      ( 18 Apr 2019 06:04 )             31.6     04-18    139  |  105  |  36<H>  ----------------------------<  95  3.4<L>   |  26  |  1.50<H>    Ca    8.4<L>      18 Apr 2019 06:04  Phos  3.9     04-17  Mg     2.4     04-17      PT/INR - ( 18 Apr 2019 06:04 )   PT: 29.2 sec;   INR: 2.56 ratio         PTT - ( 18 Apr 2019 06:04 )  PTT:39.7 sec          Transthoracic Echocardiogram (04.17.19 @ 11:54)  Impression     Summary     Normal leftventricular size with moderate, global systolic dysfunction.   Estimated left ventricular ejection fraction is 35%. Septal flattening is   seen; this finding is consistent with right heart pressure / volume   overload. Paradoxical septal motion.   The right ventricle exhibits mild dilation, mild diffuse hypokinesis, and   mild depression of contractility.   Biatrial enlargement.   Moderate mitral regurgitation.   Moderate to severe tricuspid valve regurgitation.   Mild pulmonary hypertension.

## 2019-04-18 NOTE — PROGRESS NOTE ADULT - ASSESSMENT
#Acute respiratory failure/hypoxia most likely secondary to acute on chronic HFrEF   - CT chest on 4/16 - No evidence of definite PNA; large R effusion and small left effusion with adjacent atelectasis  - CXR on admission noted - reports R and left pleural effusion  - Continue on lasix 40mg IV daily   - Continue O2 support via NC PRN- currently on 4L NC with O2sat %; wean as tolerated  - Echo in 2018 with LVEF 35-40%, mod-severe TR, Mod MR  - daily weight with strict I & O   - Fluid restriction 1500ml daily  - Cardiology recs appreciated  - Pulm consult and recs appreciated  - Would consider ACE-I on discharge  - f/u repeat echo 4/17    #Supratherapeutic INR  - INR of 3.14 this AM  - Hold coumadin tonight    #Chronic Afib  rate control / therapeutic INR  - Hold Coumadin tonight given supratherapeutic INR  - Continue on home dose coreg   - daily INR for goal of 2-3  - [id number] Score = 7 (age-2, HTN-1, CHF-1, CVA-2, vasc disease-1)    #Hypokalemia  - Resolved    # CKD Stage III   - baseline creatinine up to 1.8   - Cr slightly increased to 1.60 from previous day in the setting of lasix IV administration  - ventura placed in ED for strict I & O monitoring - removed  - Pt voiding on his won  - Continue home meds Flomax HS   - Monitor I/Os    # Parkinson Disease/ Dementia  - Continue Sinemet / Aricept     # CAD s/p PCI and CABG  - Pt with no c/o Chest pain  - Continue on plavix/statin/BB     # DVT PPX  - On coumadin - HOLD tonight given supratherapeutic INR #Acute respiratory failure/hypoxia most likely secondary to acute on chronic HFrEF   - CT chest on 4/16 - No evidence of definite PNA; large R effusion and small left effusion with adjacent atelectasis  - CXR on admission noted - reports R and left pleural effusion  - Continue on lasix 40mg IV daily  - Extra dose IV lasix 40mg x1 given today as pt with wheezing and diffuse rhonchi on exam   - Continue O2 support via NC PRN- will trial wean off of O2 in the AM  - Echo in 2018 with LVEF 35-40%, mod-severe TR, Mod MR  - Repeat echo on 4/17/19 - result findings including LV with moderate global systolic dysfunction, LVEF 35%, mild diffuse hypokinesis of R ventricle  - continue with daily weight with strict I & O - wt of 99kg about the same as previous day  - Continue fluid restriction 1500ml daily  - Cardiology recs appreciated  - Pulm consult and recs appreciated  - Would consider ACE-I on discharge    #Supratherapeutic INR  - INR now within therapeutic range- 2.54 today  - Will give coumadin 3mg tonight - order already placed  - F/U INR level in the AM    #Hypokalemia  - K+ of 3.4 today  - K+ supplement given today  - Monitor level with AM BMP      #Chronic Afib  - rate controlled  - Coumadin tonight - INR within therapeutic range  - Continue on home dose coreg   - daily INR for goal of 2-3  - [id number] Score = 7 (age-2, HTN-1, CHF-1, CVA-2, vasc disease-1)      # CKD Stage III   - baseline creatinine up to 1.8   - s/p ventura   - Pt voiding on his own  - Continue home meds Flomax HS   - Monitor I/Os  - Continue to monitor Cr level - currently 1.50    # Parkinson Disease/ Dementia  - Continue Sinemet / Aricept     # CAD s/p PCI and CABG  - Continue on plavix/statin/BB     # DVT PPX  - On coumadin

## 2019-04-18 NOTE — PROGRESS NOTE ADULT - ASSESSMENT
Acute/chronic CHF. R greater than L Pleural effusions.  NC O2 prn.  On IV lasix.   Low EF.  CAD. CABG.  Valvular heart disease.  AF.    COPD. nebs, symbicort.   Compressive atelectasis 2ndary to pleural effusions.    DVT prophylaxis.

## 2019-04-18 NOTE — PROGRESS NOTE ADULT - SUBJECTIVE AND OBJECTIVE BOX
REASON FOR VISIT: CHF    HPI:  89 year old man with a history of CAD s/p CABG x3 and PCI, AF, chronic systolic HF, ICD, HTN, HLD, severe pulmonary HTN, mitral/tricuspid insufficiency, Parkinson's Disease admitted 4/15/19 after presenting to the ED with dyspnea, hypoxia, and hypotension.    4/18/19:  "I feel a little better."  No new complaints.    MEDICATIONS  (STANDING):  ALBUTerol/ipratropium for Nebulization 3 milliLiter(s) Nebulizer every 6 hours  ascorbic acid 500 milliGRAM(s) Oral daily  buDESOnide 160 MICROgram(s)/formoterol 4.5 MICROgram(s) Inhaler 2 Puff(s) Inhalation two times a day  carbidopa/levodopa  25/100 1 Tablet(s) Oral three times a day  clopidogrel Tablet 75 milliGRAM(s) Oral daily  donepezil 10 milliGRAM(s) Oral at bedtime  furosemide   Injectable 40 milliGRAM(s) IV Push daily  metoprolol tartrate Oral Tab/Cap - Peds 12.5 milliGRAM(s) Oral two times a day  multivitamin 1 Tablet(s) Oral daily  pantoprazole    Tablet 40 milliGRAM(s) Oral before breakfast  simvastatin 20 milliGRAM(s) Oral at bedtime  tamsulosin Oral Tab/Cap - Peds 0.4 milliGRAM(s) Oral at bedtime    Vital Signs Last 24 Hrs  T(C): 36.6 (18 Apr 2019 10:23), Max: 36.7 (17 Apr 2019 16:51)  T(F): 97.8 (18 Apr 2019 10:23), Max: 98 (17 Apr 2019 16:51)  HR: 84 (18 Apr 2019 10:23) (49 - 84)  BP: 103/65 (18 Apr 2019 10:23) (94/41 - 110/53)  BP(mean): 75 (18 Apr 2019 10:23) (75 - 75)  RR: 18 (18 Apr 2019 10:23) (18 - 18)  SpO2: 95% (18 Apr 2019 10:23) (95% - 100%)    PHYSICAL EXAM:  Constitutional: Chronically-ill appearing, no distress  Respiratory: Breath sounds are clear but decreased (R>L); no crackles  Cardiovascular: S1 and S2, regular rate and rhythm,   Gastrointestinal: Bowel Sounds present, soft, nontender.   Extremities: 2+ pitting pedal edema b/l    LABS:                 9.1    4.81  )-----------( 158      ( 18 Apr 2019 06:04 )             31.6     139  |  105  |  36<H>  ----------------------------<  95  3.4<L>   |  26  |  1.50<H>    Ca    8.4<L>      18 Apr 2019 06:04  Phos  3.9     04-17  Mg     2.4     04-17    Transthoracic Echocardiogram (02.26.18 @ 09:24) >   The left ventricle is normal in size, wall thickness. Moderately decreased left ventricular systolic function with an estimated left ventricular ejection fraction of 35-40%.    The right atrium appears moderately dilated.   Moderate (2+) mitral regurgitation.   Moderate to severe (3+) tricuspid valve regurgitation.   There is severe elevation in right ventricular systolic pressure    CT Chest No Cont (04.16.19 @ 09:13):  Large right effusion and small left effusion with adjacent atelectasis. No definite evidence of pneumonia.

## 2019-04-19 LAB
ANION GAP SERPL CALC-SCNC: 8 MMOL/L — SIGNIFICANT CHANGE UP (ref 5–17)
APTT BLD: 40.5 SEC — HIGH (ref 27.5–36.3)
BUN SERPL-MCNC: 37 MG/DL — HIGH (ref 7–23)
CALCIUM SERPL-MCNC: 8.2 MG/DL — LOW (ref 8.5–10.1)
CHLORIDE SERPL-SCNC: 107 MMOL/L — SIGNIFICANT CHANGE UP (ref 96–108)
CO2 SERPL-SCNC: 30 MMOL/L — SIGNIFICANT CHANGE UP (ref 22–31)
CREAT SERPL-MCNC: 1.28 MG/DL — SIGNIFICANT CHANGE UP (ref 0.5–1.3)
GLUCOSE SERPL-MCNC: 95 MG/DL — SIGNIFICANT CHANGE UP (ref 70–99)
HCT VFR BLD CALC: 31.6 % — LOW (ref 39–50)
HGB BLD-MCNC: 9.5 G/DL — LOW (ref 13–17)
INR BLD: 2.3 RATIO — HIGH (ref 0.88–1.16)
MCHC RBC-ENTMCNC: 25.7 PG — LOW (ref 27–34)
MCHC RBC-ENTMCNC: 30.1 GM/DL — LOW (ref 32–36)
MCV RBC AUTO: 85.6 FL — SIGNIFICANT CHANGE UP (ref 80–100)
NRBC # BLD: 0 /100 WBCS — SIGNIFICANT CHANGE UP (ref 0–0)
PLATELET # BLD AUTO: 150 K/UL — SIGNIFICANT CHANGE UP (ref 150–400)
POTASSIUM SERPL-MCNC: 3.9 MMOL/L — SIGNIFICANT CHANGE UP (ref 3.5–5.3)
POTASSIUM SERPL-SCNC: 3.9 MMOL/L — SIGNIFICANT CHANGE UP (ref 3.5–5.3)
PROTHROM AB SERPL-ACNC: 26.2 SEC — HIGH (ref 10–12.9)
RBC # BLD: 3.69 M/UL — LOW (ref 4.2–5.8)
RBC # FLD: 18.4 % — HIGH (ref 10.3–14.5)
SODIUM SERPL-SCNC: 145 MMOL/L — SIGNIFICANT CHANGE UP (ref 135–145)
WBC # BLD: 4.18 K/UL — SIGNIFICANT CHANGE UP (ref 3.8–10.5)
WBC # FLD AUTO: 4.18 K/UL — SIGNIFICANT CHANGE UP (ref 3.8–10.5)

## 2019-04-19 PROCEDURE — 99233 SBSQ HOSP IP/OBS HIGH 50: CPT

## 2019-04-19 RX ORDER — FUROSEMIDE 40 MG
40 TABLET ORAL
Qty: 0 | Refills: 0 | Status: DISCONTINUED | OUTPATIENT
Start: 2019-04-19 | End: 2019-04-22

## 2019-04-19 RX ORDER — WARFARIN SODIUM 2.5 MG/1
3 TABLET ORAL ONCE
Qty: 0 | Refills: 0 | Status: COMPLETED | OUTPATIENT
Start: 2019-04-19 | End: 2019-04-19

## 2019-04-19 RX ADMIN — Medication 40 MILLIGRAM(S): at 11:33

## 2019-04-19 RX ADMIN — CLOPIDOGREL BISULFATE 75 MILLIGRAM(S): 75 TABLET, FILM COATED ORAL at 11:33

## 2019-04-19 RX ADMIN — Medication 40 MILLIGRAM(S): at 18:58

## 2019-04-19 RX ADMIN — TAMSULOSIN HYDROCHLORIDE 0.4 MILLIGRAM(S): 0.4 CAPSULE ORAL at 21:50

## 2019-04-19 RX ADMIN — Medication 1 TABLET(S): at 11:33

## 2019-04-19 RX ADMIN — Medication 12.5 MILLIGRAM(S): at 05:52

## 2019-04-19 RX ADMIN — BUDESONIDE AND FORMOTEROL FUMARATE DIHYDRATE 2 PUFF(S): 160; 4.5 AEROSOL RESPIRATORY (INHALATION) at 21:07

## 2019-04-19 RX ADMIN — CARBIDOPA AND LEVODOPA 1 TABLET(S): 25; 100 TABLET ORAL at 13:55

## 2019-04-19 RX ADMIN — WARFARIN SODIUM 3 MILLIGRAM(S): 2.5 TABLET ORAL at 21:50

## 2019-04-19 RX ADMIN — ALBUTEROL 2.5 MILLIGRAM(S): 90 AEROSOL, METERED ORAL at 08:30

## 2019-04-19 RX ADMIN — TIOTROPIUM BROMIDE 1 CAPSULE(S): 18 CAPSULE ORAL; RESPIRATORY (INHALATION) at 08:30

## 2019-04-19 RX ADMIN — Medication 500 MILLIGRAM(S): at 11:30

## 2019-04-19 RX ADMIN — PANTOPRAZOLE SODIUM 40 MILLIGRAM(S): 20 TABLET, DELAYED RELEASE ORAL at 05:53

## 2019-04-19 RX ADMIN — CARBIDOPA AND LEVODOPA 1 TABLET(S): 25; 100 TABLET ORAL at 05:53

## 2019-04-19 RX ADMIN — SIMVASTATIN 20 MILLIGRAM(S): 20 TABLET, FILM COATED ORAL at 21:50

## 2019-04-19 RX ADMIN — BUDESONIDE AND FORMOTEROL FUMARATE DIHYDRATE 2 PUFF(S): 160; 4.5 AEROSOL RESPIRATORY (INHALATION) at 08:28

## 2019-04-19 RX ADMIN — CARBIDOPA AND LEVODOPA 1 TABLET(S): 25; 100 TABLET ORAL at 21:50

## 2019-04-19 NOTE — PROGRESS NOTE ADULT - SUBJECTIVE AND OBJECTIVE BOX
REASON FOR VISIT: CHF    HPI:  89 year old man with a history of CAD s/p CABG x3 and PCI, AF, chronic systolic HF, ICD, HTN, HLD, severe pulmonary HTN, mitral/tricuspid insufficiency, Parkinson's Disease admitted 4/15/19 after presenting to the ED with dyspnea, hypoxia, and hypotension.    4/18/19:  "I feel a little better."  No new complaints.    4/19/19 Patient is feeling little better , still has anasarca decreasing   MEDICATIONS  (STANDING):  ascorbic acid 500 milliGRAM(s) Oral daily  buDESOnide 160 MICROgram(s)/formoterol 4.5 MICROgram(s) Inhaler 2 Puff(s) Inhalation two times a day  carbidopa/levodopa  25/100 1 Tablet(s) Oral three times a day  clopidogrel Tablet 75 milliGRAM(s) Oral daily  donepezil 10 milliGRAM(s) Oral at bedtime  furosemide   Injectable 40 milliGRAM(s) IV Push daily  metoprolol tartrate Oral Tab/Cap - Peds 12.5 milliGRAM(s) Oral two times a day  multivitamin 1 Tablet(s) Oral daily  pantoprazole    Tablet 40 milliGRAM(s) Oral before breakfast  simvastatin 20 milliGRAM(s) Oral at bedtime  tamsulosin Oral Tab/Cap - Peds 0.4 milliGRAM(s) Oral at bedtime  tiotropium 18 MICROgram(s) Capsule 1 Capsule(s) Inhalation daily    MEDICATIONS  (PRN):  ALBUTerol    0.083% 2.5 milliGRAM(s) Nebulizer four times a day PRN Shortness of Breath and/or Wheezing  senna 8.6 milliGRAM(s) Oral Tablet - Peds 2 Tablet(s) Oral at bedtime PRN for constipation      Vital Signs Last 24 Hrs  T(C): 36.3 (19 Apr 2019 04:53), Max: 36.6 (18 Apr 2019 10:23)  T(F): 97.4 (19 Apr 2019 04:53), Max: 97.8 (18 Apr 2019 10:23)  HR: 55 (19 Apr 2019 04:59) (50 - 84)  BP: 103/49 (19 Apr 2019 04:53) (103/49 - 113/50)  BP(mean): 75 (18 Apr 2019 10:23) (75 - 75)  RR: 20 (18 Apr 2019 16:44) (18 - 20)  SpO2: 100% (19 Apr 2019 04:53) (95% - 100%)    I&O's Summary    18 Apr 2019 07:01  -  19 Apr 2019 07:00  --------------------------------------------------------  IN: 600 mL / OUT: 4 mL / NET: 596 mL      PHYSICAL EXAM:  Constitutional: Chronically-ill appearing, no distress  Respiratory: Breath sounds are clear but decreased (R>L); no crackles  Cardiovascular: S1 and S2, regular rate and rhythm,   Gastrointestinal: Bowel Sounds present, soft, nontender.   Extremities: 2+ pitting pedal edema b/l    LABS:                            9.5    4.18  )-----------( 150      ( 19 Apr 2019 05:51 )             31.6     04-19    145  |  107  |  37<H>  ----------------------------<  95  3.9   |  30  |  1.28    Ca    8.2<L>      19 Apr 2019 05:51            PT/INR - ( 19 Apr 2019 05:51 )   PT: 26.2 sec;   INR: 2.30 ratio         PTT - ( 19 Apr 2019 05:51 )  PTT:40.5 sec      Culture Results:   No growth to date. (04-15-19 @ 20:00)  Culture Results:   No growth to date. (04-15-19 @ 20:00)      Transthoracic Echocardiogram (02.26.18 @ 09:24) >   The left ventricle is normal in size, wall thickness. Moderately decreased left ventricular systolic function with an estimated left ventricular ejection fraction of 35-40%.    The right atrium appears moderately dilated.   Moderate (2+) mitral regurgitation.   Moderate to severe (3+) tricuspid valve regurgitation.   There is severe elevation in right ventricular systolic pressure    CT Chest No Cont (04.16.19 @ 09:13):  Large right effusion and small left effusion with adjacent atelectasis. No definite evidence of pneumonia.

## 2019-04-19 NOTE — PROGRESS NOTE ADULT - ASSESSMENT
#Acute respiratory failure/hypoxia most likely secondary to acute on chronic HFrEF   - CT chest on 4/16 - No evidence of definite PNA; large R effusion and small left effusion with adjacent atelectasis  - CXR on admission noted - reports R and left pleural effusion  - Continue on lasix- dose increased from 40mg Daily -->BID  - Continue O2 support via NC PRN - wean as tolerated  - Echo in 2018 with LVEF 35-40%, mod-severe TR, Mod MR  - Repeat echo on 4/17/19 - result findings including LV with moderate global systolic dysfunction, LVEF 35%, mild diffuse hypokinesis of R ventricle  - continue with daily weight with strict I & O - wt  96.1kg from 99kg on 4/18  - Continue fluid restriction 1200ml daily  - Cardiology recs appreciated  - Pulm consult and recs appreciated  - Would consider ACE-I on discharge    #Hypokalemia  - Resolved    #Chronic Afib  - Continue on home dose coreg   - Therapeutic INR - 2.30 today  - Coumadin 3mg tonight  - daily INR for goal of 2-3  - [id number] Score = 7 (age-2, HTN-1, CHF-1, CVA-2, vasc disease-1)  - Tele d/c'ed as no abnormal activity on monitor    # CKD Stage III   - baseline creatinine up to 1.8   - Cr improved - at 1.28  - s/p ventura   - Continue home meds Flomax HS   - Monitor Cr while on high dose lasix      # Parkinson Disease/ Dementia  - Continue Sinemet   - Aricept discontinued given prolonged QTc on previous EKG    # CAD s/p PCI and CABG  - Continue on plavix/statin/BB     #Advanced directives  - Pt currently DNR but not DNI  - f/u Palliative consult for goals of care discussion - pt agrees to Palliative care team speaking with son    # DVT PPX  - On coumadin

## 2019-04-19 NOTE — PROGRESS NOTE ADULT - SUBJECTIVE AND OBJECTIVE BOX
CHIEF COMPLAINT:  HPI:  89 year old Man [facility] resident with hx of CAD s/p PCI/CABG,  HFrEF, s/p AICD, HTN, dyslipidemia, CVA, orthostatic hypotension, CKD III, A fib on Coumadin, Parkinson's presented from [facility] via EMS for worsening SOB with O2 desat.  O2 sat 90% en route to ED. In the ED  /50 initially then drop to 70's systolic, HR 50, O2 sat 99% with O2, RR 20. Denied CP/palpitation/HA/dizziness/abd pain/n/v/d/f/c  He received NS 1L IV bolus, vanco 1gm and cefepime 1gm, ventura placed (currently removed and pt is able to void on his own).    SUBJECTIVE:   4/19: Pt reports that he is a little better, attempted weaning off of O2 this AM but complained of SOB therefore was left on O2 via NC. Pt denies any other issues/complaints. He is out of bed and in chair, still edematous. Will increase lasix 40mg IV to BID. No reported acute issues overnight. Creatinine of 1.28 this AM. INR is therapeutic.    REVIEW OF SYSTEMS:  CONSTITUTIONAL: No fevers, no chills  EYES/ENT: No visual changes  NECK: No pain or stiffness  RESPIRATORY: No cough, SOB off of O2, no SOB on O2  CARDIOVASCULAR: No chest pain or palpitations  GASTROINTESTINAL: No abdominal or epigastric pain. No nausea, vomiting, no diarrhea/constipation  GENITOURINARY: No dysuria, frequency or hematuria  NEUROLOGICAL: No numbness, +generalized weakness  SKIN: No itching, burning, rashes, or lesions   All other review of systems is negative unless indicated above    Vital Signs Last 24 Hrs  T(C): 36.5 (19 Apr 2019 11:42), Max: 36.5 (19 Apr 2019 11:42)  T(F): 97.7 (19 Apr 2019 11:42), Max: 97.7 (19 Apr 2019 11:42)  HR: 62 (19 Apr 2019 11:42) (50 - 62)  BP: 109/48 (19 Apr 2019 11:42) (103/49 - 113/50)  RR: 19 (19 Apr 2019 11:42) (19 - 20)  SpO2: 97% (19 Apr 2019 11:42) (97% - 100%)    I&O's Summary    18 Apr 2019 07:01  -  19 Apr 2019 07:00  --------------------------------------------------------  IN: 600 mL / OUT: 4 mL / NET: 596 mL        CAPILLARY BLOOD GLUCOSE      PHYSICAL EXAM:  Constitutional: awake, alert, out of bed and in chair, no acute discomfort  HEENT: EOMI, Normal Hearing, MMM  Neck: Soft and supple  Respiratory: +expiratory wheeze on posterior lung fields aucultation, +rhonchi with improvement from previous day  Cardiovascular: S1 and S2, regular rate and rhythm, no murmur  Gastrointestinal: soft, breath sounds present, distended abdomen, nontender  Extremities: 3+ b/l pitting edema, edema improves in supine position   Neurological: A/O x 3  Musculoskeletal: generalized weakness, able to move legs in sitting position  Skin: No rashes          MEDICATIONS:  MEDICATIONS  (STANDING):  ascorbic acid 500 milliGRAM(s) Oral daily  buDESOnide 160 MICROgram(s)/formoterol 4.5 MICROgram(s) Inhaler 2 Puff(s) Inhalation two times a day  carbidopa/levodopa  25/100 1 Tablet(s) Oral three times a day  clopidogrel Tablet 75 milliGRAM(s) Oral daily  furosemide   Injectable 40 milliGRAM(s) IV Push two times a day  metoprolol tartrate Oral Tab/Cap - Peds 12.5 milliGRAM(s) Oral two times a day  multivitamin 1 Tablet(s) Oral daily  pantoprazole    Tablet 40 milliGRAM(s) Oral before breakfast  simvastatin 20 milliGRAM(s) Oral at bedtime  tamsulosin Oral Tab/Cap - Peds 0.4 milliGRAM(s) Oral at bedtime  tiotropium 18 MICROgram(s) Capsule 1 Capsule(s) Inhalation daily  warfarin 3 milliGRAM(s) Oral once      LABS: All Labs Reviewed:                        9.5    4.18  )-----------( 150      ( 19 Apr 2019 05:51 )             31.6     04-19    145  |  107  |  37<H>  ----------------------------<  95  3.9   |  30  |  1.28    Ca    8.2<L>      19 Apr 2019 05:51      PT/INR - ( 19 Apr 2019 05:51 )   PT: 26.2 sec;   INR: 2.30 ratio         PTT - ( 19 Apr 2019 05:51 )  PTT:40.5 sec

## 2019-04-20 LAB
ANION GAP SERPL CALC-SCNC: 7 MMOL/L — SIGNIFICANT CHANGE UP (ref 5–17)
APTT BLD: 42.7 SEC — HIGH (ref 27.5–36.3)
BUN SERPL-MCNC: 38 MG/DL — HIGH (ref 7–23)
CALCIUM SERPL-MCNC: 8.1 MG/DL — LOW (ref 8.5–10.1)
CHLORIDE SERPL-SCNC: 105 MMOL/L — SIGNIFICANT CHANGE UP (ref 96–108)
CO2 SERPL-SCNC: 32 MMOL/L — HIGH (ref 22–31)
CREAT SERPL-MCNC: 1.31 MG/DL — HIGH (ref 0.5–1.3)
GLUCOSE SERPL-MCNC: 106 MG/DL — HIGH (ref 70–99)
INR BLD: 2.3 RATIO — HIGH (ref 0.88–1.16)
POTASSIUM SERPL-MCNC: 3.9 MMOL/L — SIGNIFICANT CHANGE UP (ref 3.5–5.3)
POTASSIUM SERPL-SCNC: 3.9 MMOL/L — SIGNIFICANT CHANGE UP (ref 3.5–5.3)
PROTHROM AB SERPL-ACNC: 26.2 SEC — HIGH (ref 10–12.9)
SODIUM SERPL-SCNC: 144 MMOL/L — SIGNIFICANT CHANGE UP (ref 135–145)

## 2019-04-20 RX ORDER — WARFARIN SODIUM 2.5 MG/1
3 TABLET ORAL ONCE
Qty: 0 | Refills: 0 | Status: COMPLETED | OUTPATIENT
Start: 2019-04-20 | End: 2019-04-20

## 2019-04-20 RX ORDER — HYDRALAZINE HCL 50 MG
25 TABLET ORAL EVERY 12 HOURS
Qty: 0 | Refills: 0 | Status: DISCONTINUED | OUTPATIENT
Start: 2019-04-20 | End: 2019-04-22

## 2019-04-20 RX ADMIN — CARBIDOPA AND LEVODOPA 1 TABLET(S): 25; 100 TABLET ORAL at 06:28

## 2019-04-20 RX ADMIN — Medication 1 TABLET(S): at 12:13

## 2019-04-20 RX ADMIN — Medication 500 MILLIGRAM(S): at 12:12

## 2019-04-20 RX ADMIN — TAMSULOSIN HYDROCHLORIDE 0.4 MILLIGRAM(S): 0.4 CAPSULE ORAL at 21:49

## 2019-04-20 RX ADMIN — BUDESONIDE AND FORMOTEROL FUMARATE DIHYDRATE 2 PUFF(S): 160; 4.5 AEROSOL RESPIRATORY (INHALATION) at 07:51

## 2019-04-20 RX ADMIN — WARFARIN SODIUM 3 MILLIGRAM(S): 2.5 TABLET ORAL at 21:49

## 2019-04-20 RX ADMIN — Medication 25 MILLIGRAM(S): at 18:10

## 2019-04-20 RX ADMIN — TIOTROPIUM BROMIDE 1 CAPSULE(S): 18 CAPSULE ORAL; RESPIRATORY (INHALATION) at 07:50

## 2019-04-20 RX ADMIN — Medication 40 MILLIGRAM(S): at 18:08

## 2019-04-20 RX ADMIN — CARBIDOPA AND LEVODOPA 1 TABLET(S): 25; 100 TABLET ORAL at 21:49

## 2019-04-20 RX ADMIN — Medication 40 MILLIGRAM(S): at 06:49

## 2019-04-20 RX ADMIN — CARBIDOPA AND LEVODOPA 1 TABLET(S): 25; 100 TABLET ORAL at 16:17

## 2019-04-20 RX ADMIN — PANTOPRAZOLE SODIUM 40 MILLIGRAM(S): 20 TABLET, DELAYED RELEASE ORAL at 06:28

## 2019-04-20 RX ADMIN — SIMVASTATIN 20 MILLIGRAM(S): 20 TABLET, FILM COATED ORAL at 21:49

## 2019-04-20 RX ADMIN — BUDESONIDE AND FORMOTEROL FUMARATE DIHYDRATE 2 PUFF(S): 160; 4.5 AEROSOL RESPIRATORY (INHALATION) at 21:13

## 2019-04-20 RX ADMIN — CLOPIDOGREL BISULFATE 75 MILLIGRAM(S): 75 TABLET, FILM COATED ORAL at 12:13

## 2019-04-20 RX ADMIN — Medication 12.5 MILLIGRAM(S): at 18:08

## 2019-04-20 NOTE — PROGRESS NOTE ADULT - ASSESSMENT
PROBLEMS:    Acute/chronic CHF-R greater than L Pleural effusions-Low EF  CAD S/P CABG  Valvular heart disease  AF  COPD  Compressive atelectasis 2ndary to pleural effusions    PLAN:    keep neg balance  aerosols  supportive care  dvt prophylasix

## 2019-04-20 NOTE — PROGRESS NOTE ADULT - SUBJECTIVE AND OBJECTIVE BOX
CHIEF COMPLAINT:  HPI:  89 year old Man, resident of [facility], with hx of CAD s/p PCI/CABG,  HFrEF, s/p AICD, HTN, dyslipidemia, CVA, orthostatic hypotension, CKD III, A fib on Coumadin, Parkinson's presented from [facility] via EMS for worsening SOB with O2 desat.  O2 sat 90% en route to ED. In the ED  /50 initially then drop to 70's systolic, HR 50, O2 sat 99% with O2, RR 20. Denied CP/palpitation/HA/dizziness/abd pain/n/v/d/f/c. He received NS 1L IV bolus, vanco 1gm and cefepime 1gm, ventura placed (currently removed and pt is able to void on his own).    SUBJECTIVE:   4/20: No reported acute issues overnight. Pt seen and examined in the AM at bedside. Pt is having breakfast, self-feeding himself, not in any acute discomfort, still on 3L O2 via NC. He denies SOB, denies any other sxs. O2sat %. NC O2 therefore decreased to 2L for gradual wean. Hydralazine was also started today (with parameters) to maximize therapy for pt's HF.    REVIEW OF SYSTEMS:  CONSTITUTIONAL: No fevers, no chills  EYES/ENT: No visual changes  NECK: No pain or stiffness  RESPIRATORY: No cough, no SOB (on O2 NC)  CARDIOVASCULAR: No chest pain or palpitations  GASTROINTESTINAL: No abdominal or epigastric pain. No nausea, vomiting, no diarrhea/constipation  GENITOURINARY: No dysuria, frequency or hematuria  NEUROLOGICAL: No numbness, +generalized weakness  SKIN: No itching, burning, rashes, or lesions   All other review of systems is negative unless indicated above    Vital Signs Last 24 Hrs  T(C): 36.5 (20 Apr 2019 12:15), Max: 36.7 (20 Apr 2019 05:35)  T(F): 97.7 (20 Apr 2019 12:15), Max: 98.1 (20 Apr 2019 05:35)  HR: 53 (20 Apr 2019 12:15) (49 - 57)  BP: 103/53 (20 Apr 2019 12:15) (93/42 - 109/44)  RR: 17 (20 Apr 2019 12:15) (17 - 18)  SpO2: 100% (20 Apr 2019 12:15) (94% - 100%)    I&O's Summary    19 Apr 2019 07:01  -  20 Apr 2019 07:00  --------------------------------------------------------  IN: 237 mL / OUT: 0 mL / NET: 237 mL      PHYSICAL EXAM:  Constitutional: not in discomfort, awake and alert  HEENT: EOMI, Normal Hearing, MMM  Neck: Soft and supple, No LAD, No JVD  Respiratory: good lung aeration b/l with + crackles b/l, no wheezing  Cardiovascular: S1 and S2, regular rate and rhythm, no Murmurs, gallops or rubs  Gastrointestinal: Bowel Sounds present, soft, nontender, nondistended, no guarding, no rebound  Extremities: improving peripheral edema of b/l LE and UE, 2+ edema  Vascular: 2+ peripheral pulses  Neurological: A/O x 3, slow speech but intelligible  Musculoskeletal: limited assessment given edematous extremities  Skin: No rashes    MEDICATIONS:  MEDICATIONS  (STANDING):  ascorbic acid 500 milliGRAM(s) Oral daily  buDESOnide 160 MICROgram(s)/formoterol 4.5 MICROgram(s) Inhaler 2 Puff(s) Inhalation two times a day  carbidopa/levodopa  25/100 1 Tablet(s) Oral three times a day  clopidogrel Tablet 75 milliGRAM(s) Oral daily  furosemide   Injectable 40 milliGRAM(s) IV Push two times a day  hydrALAZINE 25 milliGRAM(s) Oral every 12 hours  metoprolol tartrate Oral Tab/Cap - Peds 12.5 milliGRAM(s) Oral two times a day  multivitamin 1 Tablet(s) Oral daily  pantoprazole    Tablet 40 milliGRAM(s) Oral before breakfast  simvastatin 20 milliGRAM(s) Oral at bedtime  tamsulosin Oral Tab/Cap - Peds 0.4 milliGRAM(s) Oral at bedtime  tiotropium 18 MICROgram(s) Capsule 1 Capsule(s) Inhalation daily  warfarin 3 milliGRAM(s) Oral once      LABS: All Labs Reviewed:                        9.5    4.18  )-----------( 150      ( 19 Apr 2019 05:51 )             31.6     04-20    144  |  105  |  38<H>  ----------------------------<  106<H>  3.9   |  32<H>  |  1.31<H>    Ca    8.1<L>      20 Apr 2019 05:28      PT/INR - ( 20 Apr 2019 05:28 )   PT: 26.2 sec;   INR: 2.30 ratio         PTT - ( 20 Apr 2019 05:28 )  PTT:42.7 sec

## 2019-04-20 NOTE — PROGRESS NOTE ADULT - ASSESSMENT
#Acute respiratory failure/hypoxia most likely secondary to acute on chronic HFrEF   - CT chest on 4/16 - No evidence of definite PNA; large R effusion and small left effusion with adjacent atelectasis  - CXR on admission noted - reports R and left pleural effusion  - Continue on lasix 40mg IV BID  - Hydralazine 25mg PO BID added   - HOLD hydralazine for SBP<100  - Continue O2 support via NC PRN - wean as tolerated, currently on 2L NC  - Echo in 2018 with LVEF 35-40%, mod-severe TR, Mod MR  - Repeat echo on 4/17/19 - result findings including LV with moderate global systolic dysfunction, LVEF 35%, mild diffuse hypokinesis of R ventricle  - continue to monitor daily weight  - Continue fluid restriction 1200ml daily  - Cardiology recs appreciated  - Pulm consult and recs appreciated  - Would consider ACE-I on discharge      #Chronic Afib  - Continue on home dose coreg   - INR therapeutic  - Coumadin 3mg tonight  - daily INR for goal of 2-3  - [id number] Score = 7 (age-2, HTN-1, CHF-1, CVA-2, vasc disease-1)    # CKD Stage III   - baseline creatinine up to 1.8   - Cr with slight bump to 1.31 from 1.28 on previous day  - Adjust lasix dose accordingly if worsening Cr  - s/p ventura cath (removed on 4/17)- pt voiding on his own  - Continue home meds Flomax HS   - Monitor Cr while on high dose lasix      # Parkinson Disease/ Dementia  - Continue Sinemet   - Aricept discontinued given prolonged QTc on EKG    # CAD s/p PCI and CABG  - Continue plavix/statin/BB     #Advanced directives  - Pt currently DNR but not DNI  - f/u Palliative consult for goals of care discussion - pt agrees to Palliative care team speaking with son    # DVT PPX  - On coumadin

## 2019-04-20 NOTE — CHART NOTE - NSCHARTNOTEFT_GEN_A_CORE
Pt seen and examined with house staff.  Plan formulated and reviewed on rounds     Briefly, 89 y/o male with HFrEF, CVA, ICD, AF on coumadin and CAD admitted with acute on chronic decompensated HFrEF--treated with IV furosemide 40 q12 with decrease in wt  Unable to give full hx  Unobtainable ros    Stable vitals  NAD  extreme edema 2+    Acute on chronic decompensated HFrEF  CKD  AF on coumadin    Cont with aggressive diuresis  Daily wts  Add afterload reducer and increase as tolerated by BP--Maximize HF therapy  Dose coumadin to INR

## 2019-04-20 NOTE — PROGRESS NOTE ADULT - SUBJECTIVE AND OBJECTIVE BOX
Subjective:    pat sitting in chair, no respiratory distress.    Home Medications:  budesonide-formoterol 160 mcg-4.5 mcg/inh inhalation aerosol: 2 puff(s) inhaled 2 times a day (16 Apr 2019 13:18)  carbidopa-levodopa 25 mg-100 mg oral tablet: 1 tab(s) orally 3 times a day (6am, 2pm, 10pm) (16 Apr 2019 13:18)  clopidogrel 75 mg oral tablet: 1 tab(s) orally once a day (16 Apr 2019 13:18)  Coumadin 5 mg oral tablet: 1 tab(s) orally once a day (at bedtime) (16 Apr 2019 13:18)  docusate sodium 100 mg oral capsule: 1 cap(s) orally 2 times a day, As Needed (16 Apr 2019 13:18)  donepezil 5 mg oral tablet: 2 tab(s) orally once a day (at bedtime) (16 Apr 2019 13:18)  Fleet Enema 7 g-19 g rectal enema: 133 milliliter(s) rectal once a day, As Needed (16 Apr 2019 13:18)  ipratropium-albuterol 0.5 mg-2.5 mg/3 mLinhalation solution: 3 milliliter(s) inhaled every 6 hours, As Needed - for shortness of breath and/or wheezing (16 Apr 2019 13:18)  Lasix 20 mg oral tablet: 1 tab(s) orally once a day (in the morning) (16 Apr 2019 13:18)  Lasix 40 mg oral tablet: 1 tab(s) orally once a day (at bedtime) (16 Apr 2019 13:18)  Lasix 40 mg oral tablet: 1  orally once a day (in the morning) (16 Apr 2019 13:18)  Metoprolol Tartrate 25 mg oral tablet: 0.5 tab(s) orally 2 times a day (16 Apr 2019 13:18)  midodrine 5 mg oral tablet: 1 tab(s) orally  (16 Apr 2019 13:18)  Milk of Magnesia: 30 milliliter(s) orally once a day, As Needed for constipation (16 Apr 2019 13:18)  Multiple Vitamins oral tablet: 1 tab(s) orally once a day (16 Apr 2019 13:18)  omeprazole 20 mg oral delayed release tablet: 1 tab(s) orally once a day (16 Apr 2019 13:18)  potassium chloride 20 mEq oral tablet, extended release: 1 tab(s) orally once a day (16 Apr 2019 13:18)  Senna Lax 8.6 mg oral tablet: 2 tab(s) orally once a day (at bedtime), As Needed - for constipation (16 Apr 2019 13:18)  Vitamin C 500 mg oral tablet: 1 tab(s) orally once a day (16 Apr 2019 13:18)    MEDICATIONS  (STANDING):  ascorbic acid 500 milliGRAM(s) Oral daily  buDESOnide 160 MICROgram(s)/formoterol 4.5 MICROgram(s) Inhaler 2 Puff(s) Inhalation two times a day  carbidopa/levodopa  25/100 1 Tablet(s) Oral three times a day  clopidogrel Tablet 75 milliGRAM(s) Oral daily  furosemide   Injectable 40 milliGRAM(s) IV Push two times a day  metoprolol tartrate Oral Tab/Cap - Peds 12.5 milliGRAM(s) Oral two times a day  multivitamin 1 Tablet(s) Oral daily  pantoprazole    Tablet 40 milliGRAM(s) Oral before breakfast  simvastatin 20 milliGRAM(s) Oral at bedtime  tamsulosin Oral Tab/Cap - Peds 0.4 milliGRAM(s) Oral at bedtime  tiotropium 18 MICROgram(s) Capsule 1 Capsule(s) Inhalation daily    MEDICATIONS  (PRN):  ALBUTerol    0.083% 2.5 milliGRAM(s) Nebulizer four times a day PRN Shortness of Breath and/or Wheezing  senna 8.6 milliGRAM(s) Oral Tablet - Peds 2 Tablet(s) Oral at bedtime PRN for constipation      Allergies    morphine (Headache)    Intolerances        Vital Signs Last 24 Hrs  T(C): 36.5 (20 Apr 2019 12:15), Max: 36.7 (20 Apr 2019 05:35)  T(F): 97.7 (20 Apr 2019 12:15), Max: 98.1 (20 Apr 2019 05:35)  HR: 53 (20 Apr 2019 12:15) (49 - 57)  BP: 103/53 (20 Apr 2019 12:15) (93/42 - 109/44)  BP(mean): --  RR: 17 (20 Apr 2019 12:15) (17 - 20)  SpO2: 100% (20 Apr 2019 12:15) (94% - 100%)      PHYSICAL EXAMINATION:    NECK:  Supple. No lymphadenopathy. Jugular venous pressure not elevated. Carotids equal.   HEART:   The cardiac impulse has a normal quality. Reg., Nl S1 and S2.  There are no murmurs, rubs or gallops noted  CHEST:  Chest crackles to auscultation. Normal respiratory effort.  ABDOMEN:  Soft and nontender.   EXTREMITIES:  There is no edema.       LABS:                        9.5    4.18  )-----------( 150      ( 19 Apr 2019 05:51 )             31.6     04-20    144  |  105  |  38<H>  ----------------------------<  106<H>  3.9   |  32<H>  |  1.31<H>    Ca    8.1<L>      20 Apr 2019 05:28      PT/INR - ( 20 Apr 2019 05:28 )   PT: 26.2 sec;   INR: 2.30 ratio         PTT - ( 20 Apr 2019 05:28 )  PTT:42.7 sec

## 2019-04-21 LAB
ANION GAP SERPL CALC-SCNC: 8 MMOL/L — SIGNIFICANT CHANGE UP (ref 5–17)
APTT BLD: 42.4 SEC — HIGH (ref 27.5–36.3)
BUN SERPL-MCNC: 40 MG/DL — HIGH (ref 7–23)
CALCIUM SERPL-MCNC: 8 MG/DL — LOW (ref 8.5–10.1)
CHLORIDE SERPL-SCNC: 104 MMOL/L — SIGNIFICANT CHANGE UP (ref 96–108)
CO2 SERPL-SCNC: 31 MMOL/L — SIGNIFICANT CHANGE UP (ref 22–31)
CREAT SERPL-MCNC: 1.25 MG/DL — SIGNIFICANT CHANGE UP (ref 0.5–1.3)
CULTURE RESULTS: SIGNIFICANT CHANGE UP
CULTURE RESULTS: SIGNIFICANT CHANGE UP
GLUCOSE SERPL-MCNC: 92 MG/DL — SIGNIFICANT CHANGE UP (ref 70–99)
INR BLD: 2.41 RATIO — HIGH (ref 0.88–1.16)
POTASSIUM SERPL-MCNC: 3.5 MMOL/L — SIGNIFICANT CHANGE UP (ref 3.5–5.3)
POTASSIUM SERPL-SCNC: 3.5 MMOL/L — SIGNIFICANT CHANGE UP (ref 3.5–5.3)
PROTHROM AB SERPL-ACNC: 27.5 SEC — HIGH (ref 10–12.9)
SODIUM SERPL-SCNC: 143 MMOL/L — SIGNIFICANT CHANGE UP (ref 135–145)
SPECIMEN SOURCE: SIGNIFICANT CHANGE UP
SPECIMEN SOURCE: SIGNIFICANT CHANGE UP

## 2019-04-21 PROCEDURE — 99233 SBSQ HOSP IP/OBS HIGH 50: CPT

## 2019-04-21 RX ORDER — WARFARIN SODIUM 2.5 MG/1
3 TABLET ORAL ONCE
Qty: 0 | Refills: 0 | Status: COMPLETED | OUTPATIENT
Start: 2019-04-21 | End: 2019-04-21

## 2019-04-21 RX ADMIN — TAMSULOSIN HYDROCHLORIDE 0.4 MILLIGRAM(S): 0.4 CAPSULE ORAL at 21:24

## 2019-04-21 RX ADMIN — Medication 500 MILLIGRAM(S): at 12:25

## 2019-04-21 RX ADMIN — ALBUTEROL 2.5 MILLIGRAM(S): 90 AEROSOL, METERED ORAL at 08:19

## 2019-04-21 RX ADMIN — CARBIDOPA AND LEVODOPA 1 TABLET(S): 25; 100 TABLET ORAL at 14:21

## 2019-04-21 RX ADMIN — PANTOPRAZOLE SODIUM 40 MILLIGRAM(S): 20 TABLET, DELAYED RELEASE ORAL at 06:10

## 2019-04-21 RX ADMIN — TIOTROPIUM BROMIDE 1 CAPSULE(S): 18 CAPSULE ORAL; RESPIRATORY (INHALATION) at 08:23

## 2019-04-21 RX ADMIN — Medication 1 TABLET(S): at 12:25

## 2019-04-21 RX ADMIN — WARFARIN SODIUM 3 MILLIGRAM(S): 2.5 TABLET ORAL at 21:24

## 2019-04-21 RX ADMIN — CLOPIDOGREL BISULFATE 75 MILLIGRAM(S): 75 TABLET, FILM COATED ORAL at 12:25

## 2019-04-21 RX ADMIN — CARBIDOPA AND LEVODOPA 1 TABLET(S): 25; 100 TABLET ORAL at 21:24

## 2019-04-21 RX ADMIN — ALBUTEROL 2.5 MILLIGRAM(S): 90 AEROSOL, METERED ORAL at 16:30

## 2019-04-21 RX ADMIN — CARBIDOPA AND LEVODOPA 1 TABLET(S): 25; 100 TABLET ORAL at 06:09

## 2019-04-21 RX ADMIN — SIMVASTATIN 20 MILLIGRAM(S): 20 TABLET, FILM COATED ORAL at 21:24

## 2019-04-21 RX ADMIN — Medication 40 MILLIGRAM(S): at 06:10

## 2019-04-21 RX ADMIN — BUDESONIDE AND FORMOTEROL FUMARATE DIHYDRATE 2 PUFF(S): 160; 4.5 AEROSOL RESPIRATORY (INHALATION) at 20:45

## 2019-04-21 RX ADMIN — Medication 40 MILLIGRAM(S): at 19:13

## 2019-04-21 RX ADMIN — BUDESONIDE AND FORMOTEROL FUMARATE DIHYDRATE 2 PUFF(S): 160; 4.5 AEROSOL RESPIRATORY (INHALATION) at 08:23

## 2019-04-21 NOTE — PROGRESS NOTE ADULT - ASSESSMENT
PROBLEMS:    Acute/chronic CHF-R greater than L Pleural effusions-Low EF  CAD S/P CABG  Valvular heart disease  AF  COPD  Compressive atelectasis 2ndary to pleural effusions    PLAN:    pulmonary stable  keep neg balance  aerosols  supportive care  dvt prophylasix

## 2019-04-21 NOTE — PROGRESS NOTE ADULT - PROBLEM SELECTOR PLAN 4
Satisfactory control; continue present antihypertensive regimen, check orthostatic , high risk orthostatic hypotension

## 2019-04-21 NOTE — PROGRESS NOTE ADULT - SUBJECTIVE AND OBJECTIVE BOX
Subjective:    pat no new comaplint, sitting in chair.    Home Medications:  budesonide-formoterol 160 mcg-4.5 mcg/inh inhalation aerosol: 2 puff(s) inhaled 2 times a day (16 Apr 2019 13:18)  carbidopa-levodopa 25 mg-100 mg oral tablet: 1 tab(s) orally 3 times a day (6am, 2pm, 10pm) (16 Apr 2019 13:18)  clopidogrel 75 mg oral tablet: 1 tab(s) orally once a day (16 Apr 2019 13:18)  Coumadin 5 mg oral tablet: 1 tab(s) orally once a day (at bedtime) (16 Apr 2019 13:18)  docusate sodium 100 mg oral capsule: 1 cap(s) orally 2 times a day, As Needed (16 Apr 2019 13:18)  donepezil 5 mg oral tablet: 2 tab(s) orally once a day (at bedtime) (16 Apr 2019 13:18)  Fleet Enema 7 g-19 g rectal enema: 133 milliliter(s) rectal once a day, As Needed (16 Apr 2019 13:18)  ipratropium-albuterol 0.5 mg-2.5 mg/3 mLinhalation solution: 3 milliliter(s) inhaled every 6 hours, As Needed - for shortness of breath and/or wheezing (16 Apr 2019 13:18)  Lasix 20 mg oral tablet: 1 tab(s) orally once a day (in the morning) (16 Apr 2019 13:18)  Lasix 40 mg oral tablet: 1 tab(s) orally once a day (at bedtime) (16 Apr 2019 13:18)  Lasix 40 mg oral tablet: 1  orally once a day (in the morning) (16 Apr 2019 13:18)  Metoprolol Tartrate 25 mg oral tablet: 0.5 tab(s) orally 2 times a day (16 Apr 2019 13:18)  midodrine 5 mg oral tablet: 1 tab(s) orally  (16 Apr 2019 13:18)  Milk of Magnesia: 30 milliliter(s) orally once a day, As Needed for constipation (16 Apr 2019 13:18)  Multiple Vitamins oral tablet: 1 tab(s) orally once a day (16 Apr 2019 13:18)  omeprazole 20 mg oral delayed release tablet: 1 tab(s) orally once a day (16 Apr 2019 13:18)  potassium chloride 20 mEq oral tablet, extended release: 1 tab(s) orally once a day (16 Apr 2019 13:18)  Senna Lax 8.6 mg oral tablet: 2 tab(s) orally once a day (at bedtime), As Needed - for constipation (16 Apr 2019 13:18)  Vitamin C 500 mg oral tablet: 1 tab(s) orally once a day (16 Apr 2019 13:18)    MEDICATIONS  (STANDING):  ascorbic acid 500 milliGRAM(s) Oral daily  buDESOnide 160 MICROgram(s)/formoterol 4.5 MICROgram(s) Inhaler 2 Puff(s) Inhalation two times a day  carbidopa/levodopa  25/100 1 Tablet(s) Oral three times a day  clopidogrel Tablet 75 milliGRAM(s) Oral daily  furosemide   Injectable 40 milliGRAM(s) IV Push two times a day  hydrALAZINE 25 milliGRAM(s) Oral every 12 hours  metoprolol tartrate Oral Tab/Cap - Peds 12.5 milliGRAM(s) Oral two times a day  multivitamin 1 Tablet(s) Oral daily  pantoprazole    Tablet 40 milliGRAM(s) Oral before breakfast  simvastatin 20 milliGRAM(s) Oral at bedtime  tamsulosin Oral Tab/Cap - Peds 0.4 milliGRAM(s) Oral at bedtime  tiotropium 18 MICROgram(s) Capsule 1 Capsule(s) Inhalation daily    MEDICATIONS  (PRN):  ALBUTerol    0.083% 2.5 milliGRAM(s) Nebulizer four times a day PRN Shortness of Breath and/or Wheezing  senna 8.6 milliGRAM(s) Oral Tablet - Peds 2 Tablet(s) Oral at bedtime PRN for constipation      Allergies    morphine (Headache)    Intolerances        Vital Signs Last 24 Hrs  T(C): 36.4 (21 Apr 2019 06:00), Max: 36.7 (20 Apr 2019 21:35)  T(F): 97.5 (21 Apr 2019 06:00), Max: 98.1 (20 Apr 2019 21:35)  HR: 58 (21 Apr 2019 08:30) (49 - 62)  BP: 117/47 (21 Apr 2019 06:00) (94/36 - 120/52)  BP(mean): --  RR: 18 (21 Apr 2019 06:00) (17 - 18)  SpO2: 95% (21 Apr 2019 08:30) (95% - 100%)      PHYSICAL EXAMINATION:    NECK:  Supple. No lymphadenopathy. Jugular venous pressure not elevated. Carotids equal.   HEART:   The cardiac impulse has a normal quality. Reg., Nl S1 and S2.  There are no murmurs, rubs or gallops noted  CHEST:  Chest crackles to auscultation. Normal respiratory effort.  ABDOMEN:  Soft and nontender.   EXTREMITIES:  There is no edema.       LABS:    04-21    143  |  104  |  40<H>  ----------------------------<  92  3.5   |  31  |  1.25    Ca    8.0<L>      21 Apr 2019 07:17      PT/INR - ( 21 Apr 2019 07:17 )   PT: 27.5 sec;   INR: 2.41 ratio         PTT - ( 21 Apr 2019 07:17 )  PTT:42.4 sec

## 2019-04-21 NOTE — PROGRESS NOTE ADULT - SUBJECTIVE AND OBJECTIVE BOX
REASON FOR VISIT: CHF    HPI:  89 year old man with a history of CAD s/p CABG x3 and PCI, AF, chronic systolic HF, ICD, HTN, HLD, severe pulmonary HTN, mitral/tricuspid insufficiency, Parkinson's Disease admitted 4/15/19 after presenting to the ED with dyspnea, hypoxia, and hypotension.    4/18/19:  "I feel a little better."  No new complaints.    4/19/19 Patient is feeling little better , still has anasarca decreasing     4/21/19 Patient is comfortable ,   anasarca  including sctrotal edema despite on iv diuretic , low normal BP episodes , prior hx of orthostatic hypotension on midodrine at home           MEDICATIONS  (STANDING):  ascorbic acid 500 milliGRAM(s) Oral daily  buDESOnide 160 MICROgram(s)/formoterol 4.5 MICROgram(s) Inhaler 2 Puff(s) Inhalation two times a day  carbidopa/levodopa  25/100 1 Tablet(s) Oral three times a day  clopidogrel Tablet 75 milliGRAM(s) Oral daily  furosemide   Injectable 40 milliGRAM(s) IV Push two times a day  hydrALAZINE 25 milliGRAM(s) Oral every 12 hours  metoprolol tartrate Oral Tab/Cap - Peds 12.5 milliGRAM(s) Oral two times a day  multivitamin 1 Tablet(s) Oral daily  pantoprazole    Tablet 40 milliGRAM(s) Oral before breakfast  simvastatin 20 milliGRAM(s) Oral at bedtime  tamsulosin Oral Tab/Cap - Peds 0.4 milliGRAM(s) Oral at bedtime  tiotropium 18 MICROgram(s) Capsule 1 Capsule(s) Inhalation daily    MEDICATIONS  (PRN):  ALBUTerol    0.083% 2.5 milliGRAM(s) Nebulizer four times a day PRN Shortness of Breath and/or Wheezing  senna 8.6 milliGRAM(s) Oral Tablet - Peds 2 Tablet(s) Oral at bedtime PRN for constipation      Vital Signs Last 24 Hrs  T(C): 36.4 (21 Apr 2019 06:00), Max: 36.7 (20 Apr 2019 21:35)  T(F): 97.5 (21 Apr 2019 06:00), Max: 98.1 (20 Apr 2019 21:35)  HR: 49 (21 Apr 2019 06:00) (49 - 62)  BP: 117/47 (21 Apr 2019 06:00) (94/36 - 120/52)  BP(mean): --  RR: 18 (21 Apr 2019 06:00) (17 - 18)  SpO2: 98% (21 Apr 2019 06:00) (98% - 100%)    I&O's Summary      PHYSICAL EXAM:  Constitutional: Chronically-ill appearing, no distress  Respiratory: Breath sounds are clear but decreased (R>L); no crackles  Cardiovascular: S1 and S2, regular rate and rhythm,   Gastrointestinal: Bowel Sounds present, soft, nontender.   Extremities: 2+ pitting pedal/ UE  edema b/l  scrotal edema ,     LABS:                        04-21    143  |  104  |  40<H>  ----------------------------<  92  3.5   |  31  |  1.25    Ca    8.0<L>      21 Apr 2019 07:17            PT/INR - ( 21 Apr 2019 07:17 )   PT: 27.5 sec;   INR: 2.41 ratio         PTT - ( 21 Apr 2019 07:17 )  PTT:42.4 sec          Transthoracic Echocardiogram (02.26.18 @ 09:24) >   The left ventricle is normal in size, wall thickness. Moderately decreased left ventricular systolic function with an estimated left ventricular ejection fraction of 35-40%.    The right atrium appears moderately dilated.   Moderate (2+) mitral regurgitation.   Moderate to severe (3+) tricuspid valve regurgitation.   There is severe elevation in right ventricular systolic pressure    CT Chest No Cont (04.16.19 @ 09:13):  Large right effusion and small left effusion with adjacent atelectasis. No definite evidence of pneumonia.

## 2019-04-21 NOTE — CHART NOTE - NSCHARTNOTEFT_GEN_A_CORE
Pt seen and examined with house staff.  Plan formulated and reviewed on rounds    Briefly, 89 y/o male with HFrEF, CVA, ICD, AF on coumadin and CAD admitted with acute on chronic decompensated HFrEF--treated with IV furosemide 40 q12 with decrease in wt  No events overnight     ROS o/w negative     Stable vitals  NAD  extreme edema 2+ (better)    Acute on chronic decompensated HFrEF  CKD  AF on coumadin    Cont with aggressive diuresis  Daily wts  Add afterload reducer and increase as tolerated by BP--Maximize HF therapy--would increase hydralazine to q8   Dose coumadin to INR  OOB

## 2019-04-21 NOTE — PROGRESS NOTE ADULT - ASSESSMENT
#Acute respiratory failure/hypoxia most likely secondary to acute on chronic HFrEF   - CT chest on 4/16 - No evidence of definite PNA; large R effusion and small left effusion with adjacent atelectasis  - CXR on admission noted - reports R and left pleural effusion  - Continue on lasix 40mg IV BID  - Hydralazine 25mg PO BID added   - HOLD hydralazine for SBP<100  - Continue O2 support via NC PRN - wean as tolerated, currently on 2L NC  - Echo in 2018 with LVEF 35-40%, mod-severe TR, Mod MR  - Repeat echo on 4/17/19 - result findings including LV with moderate global systolic dysfunction, LVEF 35%, mild diffuse hypokinesis of R ventricle  - continue to monitor daily weight  - Continue fluid restriction 1200ml daily  - Cardiology recs appreciated  - Pulm consult and recs appreciated  - Would consider ACE-I on discharge    #Chronic Afib  - rate controlled   - Continue on home dose coreg   - INR therapeutic (2.41)  - Coumadin 3mg tonight  - daily INR for goal of 2-3  - [id number] Score = 7 (age-2, HTN-1, CHF-1, CVA-2, vasc disease-1)    #CKD Stage III   - baseline creatinine up to 1.8   - Cr with slight bump to 1.31 from 1.28 on previous day  - Adjust lasix dose accordingly if worsening Cr  - s/p ventura cath (removed on 4/17)- pt voiding on his own  - Continue home meds Flomax HS   - Monitor Cr while on high dose lasix      #Parkinson Disease/ Dementia  - Continue Sinemet   - Aricept discontinued given prolonged QTc on EKG    #CAD s/p PCI and CABG  - Continue plavix/statin/BB     #Advanced directives  - Pt currently DNR but not DNI  - f/u Palliative consult for goals of care discussion - pt agrees to Palliative care team speaking with son    #DVT PPX  - On coumadin

## 2019-04-21 NOTE — PROGRESS NOTE ADULT - SUBJECTIVE AND OBJECTIVE BOX
89 year old Man [facility] resident with hx of CAD s/p PCI/CABG,  HFrEF, s/p AICD, HTN, dyslipidemia, CVA, orthostatic hypotension, CKD III, A fib on Coumadin, Parkinson's presented from [facility] via EMS for worsening SOB with O2 desat.  O2 sat 90% en route to ED. In the ED  /50 initially then drop to 70's systolic, HR 50, O2 sat 99% with O2, RR 20. Denies CP/palpitation/HA/dizziness/abd pain/n/v/d/f/c    4/21/19 - Patient seen and examined at bedside. Hemodynamically stable, afebrile, no overnight events. Breathing has improved.  Patient clinically improving, less edema in the legs. Continue diurese.     MEDICATIONS  (STANDING):  ascorbic acid 500 milliGRAM(s) Oral daily  buDESOnide 160 MICROgram(s)/formoterol 4.5 MICROgram(s) Inhaler 2 Puff(s) Inhalation two times a day  carbidopa/levodopa  25/100 1 Tablet(s) Oral three times a day  clopidogrel Tablet 75 milliGRAM(s) Oral daily  furosemide   Injectable 40 milliGRAM(s) IV Push two times a day  hydrALAZINE 25 milliGRAM(s) Oral every 12 hours  metoprolol tartrate Oral Tab/Cap - Peds 12.5 milliGRAM(s) Oral two times a day  multivitamin 1 Tablet(s) Oral daily  pantoprazole    Tablet 40 milliGRAM(s) Oral before breakfast  simvastatin 20 milliGRAM(s) Oral at bedtime  tamsulosin Oral Tab/Cap - Peds 0.4 milliGRAM(s) Oral at bedtime  tiotropium 18 MICROgram(s) Capsule 1 Capsule(s) Inhalation daily  warfarin 3 milliGRAM(s) Oral once    MEDICATIONS  (PRN):  ALBUTerol    0.083% 2.5 milliGRAM(s) Nebulizer four times a day PRN Shortness of Breath and/or Wheezing  senna 8.6 milliGRAM(s) Oral Tablet - Peds 2 Tablet(s) Oral at bedtime PRN for constipation    Vital Signs Last 24 Hrs  T(C): 36.3 (21 Apr 2019 12:19), Max: 36.7 (20 Apr 2019 21:35)  T(F): 97.4 (21 Apr 2019 12:19), Max: 98.1 (20 Apr 2019 21:35)  HR: 46 (21 Apr 2019 12:19) (46 - 62)  BP: 104/41 (21 Apr 2019 12:19) (94/36 - 120/52)  RR: 19 (21 Apr 2019 12:19) (18 - 19)  SpO2: 100% (21 Apr 2019 12:19) (95% - 100%)    PHYSICAL EXAM:  Constitutional: not in discomfort, awake and alert  HEENT: EOMI, Normal Hearing, MMM  Neck: Soft and supple, No LAD, No JVD  Respiratory: good lung aeration b/l with + crackles b/l, no wheezing  Cardiovascular: S1 and S2, regular rate and rhythm, no Murmurs, gallops or rubs  Gastrointestinal: Bowel Sounds present, soft, nontender, nondistended, no guarding, no rebound  Extremities: improving peripheral edema of b/l LE and UE, 2+ edema  Vascular: 2+ peripheral pulses  Neurological: A/O x 3, slow speech but intelligible  Musculoskeletal: limited assessment given edematous extremities  Skin: No rashes      LABS:      21 Apr 2019 07:17    143    |  104    |  40     ----------------------------<  92     3.5     |  31     |  1.25     Ca    8.0        21 Apr 2019 07:17        PT/INR - ( 21 Apr 2019 07:17 )   PT: 27.5 sec;   INR: 2.41 ratio         PTT - ( 21 Apr 2019 07:17 )  PTT:42.4 sec  CAPILLARY BLOOD GLUCOSE                RADIOLOGY:

## 2019-04-22 LAB
ADD ON TEST-SPECIMEN IN LAB: SIGNIFICANT CHANGE UP
ANION GAP SERPL CALC-SCNC: 6 MMOL/L — SIGNIFICANT CHANGE UP (ref 5–17)
APTT BLD: 41 SEC — HIGH (ref 27.5–36.3)
BUN SERPL-MCNC: 40 MG/DL — HIGH (ref 7–23)
CALCIUM SERPL-MCNC: 8 MG/DL — LOW (ref 8.5–10.1)
CHLORIDE SERPL-SCNC: 105 MMOL/L — SIGNIFICANT CHANGE UP (ref 96–108)
CO2 SERPL-SCNC: 34 MMOL/L — HIGH (ref 22–31)
CREAT SERPL-MCNC: 1.28 MG/DL — SIGNIFICANT CHANGE UP (ref 0.5–1.3)
GLUCOSE SERPL-MCNC: 105 MG/DL — HIGH (ref 70–99)
HCT VFR BLD CALC: 30.3 % — LOW (ref 39–50)
HGB BLD-MCNC: 9 G/DL — LOW (ref 13–17)
INR BLD: 2.6 RATIO — HIGH (ref 0.88–1.16)
MAGNESIUM SERPL-MCNC: 2.2 MG/DL — SIGNIFICANT CHANGE UP (ref 1.6–2.6)
MCHC RBC-ENTMCNC: 25 PG — LOW (ref 27–34)
MCHC RBC-ENTMCNC: 29.7 GM/DL — LOW (ref 32–36)
MCV RBC AUTO: 84.2 FL — SIGNIFICANT CHANGE UP (ref 80–100)
NRBC # BLD: 0 /100 WBCS — SIGNIFICANT CHANGE UP (ref 0–0)
PLATELET # BLD AUTO: 159 K/UL — SIGNIFICANT CHANGE UP (ref 150–400)
POTASSIUM SERPL-MCNC: 3.1 MMOL/L — LOW (ref 3.5–5.3)
POTASSIUM SERPL-SCNC: 3.1 MMOL/L — LOW (ref 3.5–5.3)
PROTHROM AB SERPL-ACNC: 29.7 SEC — HIGH (ref 10–12.9)
RBC # BLD: 3.6 M/UL — LOW (ref 4.2–5.8)
RBC # FLD: 18.2 % — HIGH (ref 10.3–14.5)
SODIUM SERPL-SCNC: 145 MMOL/L — SIGNIFICANT CHANGE UP (ref 135–145)
WBC # BLD: 3.97 K/UL — SIGNIFICANT CHANGE UP (ref 3.8–10.5)
WBC # FLD AUTO: 3.97 K/UL — SIGNIFICANT CHANGE UP (ref 3.8–10.5)

## 2019-04-22 PROCEDURE — 99232 SBSQ HOSP IP/OBS MODERATE 35: CPT

## 2019-04-22 PROCEDURE — 99233 SBSQ HOSP IP/OBS HIGH 50: CPT

## 2019-04-22 RX ORDER — METOPROLOL TARTRATE 50 MG
12.5 TABLET ORAL DAILY
Qty: 0 | Refills: 0 | Status: DISCONTINUED | OUTPATIENT
Start: 2019-04-22 | End: 2019-04-23

## 2019-04-22 RX ORDER — POTASSIUM CHLORIDE 20 MEQ
20 PACKET (EA) ORAL
Qty: 0 | Refills: 0 | Status: COMPLETED | OUTPATIENT
Start: 2019-04-22 | End: 2019-04-22

## 2019-04-22 RX ORDER — FUROSEMIDE 40 MG
80 TABLET ORAL
Qty: 0 | Refills: 0 | Status: DISCONTINUED | OUTPATIENT
Start: 2019-04-23 | End: 2019-04-23

## 2019-04-22 RX ORDER — FUROSEMIDE 40 MG
80 TABLET ORAL ONCE
Qty: 0 | Refills: 0 | Status: COMPLETED | OUTPATIENT
Start: 2019-04-22 | End: 2019-04-22

## 2019-04-22 RX ORDER — FUROSEMIDE 40 MG
20 TABLET ORAL DAILY
Qty: 0 | Refills: 0 | Status: DISCONTINUED | OUTPATIENT
Start: 2019-04-22 | End: 2019-04-22

## 2019-04-22 RX ORDER — WARFARIN SODIUM 2.5 MG/1
3 TABLET ORAL ONCE
Qty: 0 | Refills: 0 | Status: COMPLETED | OUTPATIENT
Start: 2019-04-22 | End: 2019-04-22

## 2019-04-22 RX ADMIN — Medication 80 MILLIGRAM(S): at 18:24

## 2019-04-22 RX ADMIN — Medication 20 MILLIEQUIVALENT(S): at 11:32

## 2019-04-22 RX ADMIN — Medication 1 TABLET(S): at 09:38

## 2019-04-22 RX ADMIN — CLOPIDOGREL BISULFATE 75 MILLIGRAM(S): 75 TABLET, FILM COATED ORAL at 09:39

## 2019-04-22 RX ADMIN — TAMSULOSIN HYDROCHLORIDE 0.4 MILLIGRAM(S): 0.4 CAPSULE ORAL at 21:06

## 2019-04-22 RX ADMIN — CARBIDOPA AND LEVODOPA 1 TABLET(S): 25; 100 TABLET ORAL at 21:06

## 2019-04-22 RX ADMIN — TIOTROPIUM BROMIDE 1 CAPSULE(S): 18 CAPSULE ORAL; RESPIRATORY (INHALATION) at 07:41

## 2019-04-22 RX ADMIN — ALBUTEROL 2.5 MILLIGRAM(S): 90 AEROSOL, METERED ORAL at 18:45

## 2019-04-22 RX ADMIN — CARBIDOPA AND LEVODOPA 1 TABLET(S): 25; 100 TABLET ORAL at 06:28

## 2019-04-22 RX ADMIN — SIMVASTATIN 20 MILLIGRAM(S): 20 TABLET, FILM COATED ORAL at 21:06

## 2019-04-22 RX ADMIN — ALBUTEROL 2.5 MILLIGRAM(S): 90 AEROSOL, METERED ORAL at 07:39

## 2019-04-22 RX ADMIN — Medication 500 MILLIGRAM(S): at 09:38

## 2019-04-22 RX ADMIN — BUDESONIDE AND FORMOTEROL FUMARATE DIHYDRATE 2 PUFF(S): 160; 4.5 AEROSOL RESPIRATORY (INHALATION) at 20:23

## 2019-04-22 RX ADMIN — WARFARIN SODIUM 3 MILLIGRAM(S): 2.5 TABLET ORAL at 21:06

## 2019-04-22 RX ADMIN — CARBIDOPA AND LEVODOPA 1 TABLET(S): 25; 100 TABLET ORAL at 13:33

## 2019-04-22 RX ADMIN — PANTOPRAZOLE SODIUM 40 MILLIGRAM(S): 20 TABLET, DELAYED RELEASE ORAL at 06:28

## 2019-04-22 RX ADMIN — Medication 25 MILLIGRAM(S): at 06:28

## 2019-04-22 RX ADMIN — Medication 20 MILLIEQUIVALENT(S): at 09:38

## 2019-04-22 RX ADMIN — Medication 20 MILLIEQUIVALENT(S): at 13:33

## 2019-04-22 RX ADMIN — BUDESONIDE AND FORMOTEROL FUMARATE DIHYDRATE 2 PUFF(S): 160; 4.5 AEROSOL RESPIRATORY (INHALATION) at 07:41

## 2019-04-22 RX ADMIN — ALBUTEROL 2.5 MILLIGRAM(S): 90 AEROSOL, METERED ORAL at 15:30

## 2019-04-22 NOTE — PROGRESS NOTE ADULT - SUBJECTIVE AND OBJECTIVE BOX
HPI:  89 year old Man [facility] resident with hx of CAD s/p PCI/CABG,  HFrEF, s/p AICD, HTN, dyslipidemia, CVA, orthostatic hypotension, CKD III, A fib on Coumadin, Parkinson's presented from [facility] via EMS for worsening SOB with O2 desat.  O2 sat 90% en route to ED. In the ED  /50 initially then drop to 70's systolic, HR 50, O2 sat 99% with O2, RR 20. Denies CP/palpitation/HA/dizziness/abd pain/n/v/d/f/c    He received NS 1L IV bolus, vanco 1gm and cefepime 1gm, ventura placed    SUBJECTIVE:   Pt seen and examined at bedside today. C/o dry cough. Overnight pt bradycardic to 46. Metoprolol dose decreased to 12.5 mg QD. Discussed with Dr. Palla Lasix dosing. AM Lasix held.   Pt denied shortness of breath when seen at bedside this AM.    REVIEW OF SYSTEMS:    CONSTITUTIONAL: No weakness, fevers or chills  EYES/ENT: No visual changes;  No vertigo or throat pain   NECK: No pain or stiffness  RESPIRATORY: + dry cough, no wheezing, hemoptysis; No shortness of breath  CARDIOVASCULAR: No chest pain or palpitations  GASTROINTESTINAL: No abdominal or epigastric pain. No nausea, vomiting, or hematemesis; No diarrhea or constipation. No melena or hematochezia.  GENITOURINARY: No dysuria, frequency or hematuria  NEUROLOGICAL: No numbness or weakness  SKIN: No itching, burning, rashes, or lesions   All other review of systems is negative unless indicated above    Vital Signs Last 24 Hrs  T(C): 36.6 (22 Apr 2019 11:40), Max: 36.6 (21 Apr 2019 21:07)  T(F): 97.8 (22 Apr 2019 11:40), Max: 97.8 (21 Apr 2019 21:07)  HR: 60 (22 Apr 2019 15:32) (47 - 62)  BP: 98/41 (22 Apr 2019 11:40) (94/37 - 108/39)  BP(mean): --  RR: 18 (22 Apr 2019 11:40) (18 - 19)  SpO2: 100% (22 Apr 2019 11:40) (95% - 100%)    I&O's Summary      CAPILLARY BLOOD GLUCOSE      PHYSICAL EXAM:    Constitutional: NAD, awake and alert  Neck: Soft and supple, No LAD, No JVD  Respiratory: Breath sounds are clear bilaterally, No wheezing, rales or rhonchi  Cardiovascular: S1 and S2, regular rate and rhythm, no Murmurs, gallops or rubs  Gastrointestinal: Bowel Sounds present, soft, nontender, nondistended, no guarding, no rebound  Extremities: No peripheral edema, 4+ pitting edema   Vascular: 2+ peripheral pulses  Neurological: alert and oriented  Skin: No rashes    MEDICATIONS:  MEDICATIONS  (STANDING):  ascorbic acid 500 milliGRAM(s) Oral daily  buDESOnide 160 MICROgram(s)/formoterol 4.5 MICROgram(s) Inhaler 2 Puff(s) Inhalation two times a day  carbidopa/levodopa  25/100 1 Tablet(s) Oral three times a day  clopidogrel Tablet 75 milliGRAM(s) Oral daily  furosemide    Tablet 20 milliGRAM(s) Oral daily  metoprolol tartrate 12.5 milliGRAM(s) Oral daily  multivitamin 1 Tablet(s) Oral daily  pantoprazole    Tablet 40 milliGRAM(s) Oral before breakfast  simvastatin 20 milliGRAM(s) Oral at bedtime  tamsulosin Oral Tab/Cap - Peds 0.4 milliGRAM(s) Oral at bedtime  tiotropium 18 MICROgram(s) Capsule 1 Capsule(s) Inhalation daily  warfarin 3 milliGRAM(s) Oral once      LABS: All Labs Reviewed:                        9.0    3.97  )-----------( 159      ( 22 Apr 2019 07:07 )             30.3     04-22    145  |  105  |  40<H>  ----------------------------<  105<H>  3.1<L>   |  34<H>  |  1.28    Ca    8.0<L>      22 Apr 2019 07:08  Mg     2.2     04-22      PT/INR - ( 22 Apr 2019 07:08 )   PT: 29.7 sec;   INR: 2.60 ratio         PTT - ( 22 Apr 2019 07:08 )  PTT:41.0 sec      Blood Culture:     RADIOLOGY/EKG:        EXAM:  CT CHEST                            PROCEDURE DATE:  04/16/2019          INTERPRETATION:  CLINICAL INFORMATION: Shortness of breath      COMPARISON: chest radiograph of the previous day and CT of the chest of   12/17/2018.    PROCEDURE:   CTof the Chest was performed without intravenous contrast.  Sagittal and coronal reformats were performed.      FINDINGS:    CHEST:     LUNGS AND LARGE AIRWAYS: Patent central airways.  No pulmonary nodules.  PLEURA: Large right effusion and small lefteffusion  VESSELS: Atherosclerotic calcifications  HEART: Coronary artery bypass graft surgical changes and pacemaker in   place. No pericardial effusion.  MEDIASTINUM AND SHIRLEY: No lymphadenopathy.  CHEST WALL AND LOWER NECK: Within normal limits.  VISUALIZED UPPER ABDOMEN: Splenic calcifications and cysts  BONES: Within normal limits.    IMPRESSION: Large right effusion and small left effusion with adjacent   atelectasis. No definite evidence of pneumonia.      DVT PPX: On  wafarin     ADVANCED DIRECTIVE:    DISPOSITION: D/C when stable

## 2019-04-22 NOTE — CONSULT NOTE ADULT - SUBJECTIVE AND OBJECTIVE BOX
HPI: Pt is a 89y old Male with hx of CAD s/p PCI/CABG,  HFrEF, s/p AICD, HTN, dyslipidemia, CVA, orthostatic hypotension, CKD III, A fib on Coumadin, Parkinson's presented from [facility] for worsening SOB with O2 desat. Pt hypotensive in the ED and was admitted for increased dyspnea secondary to heart failure. CT chest reveals sola pleural effusions with atelectasis. Palliative Consult to further establish GOC.  19 Seen and examined at bedside with no family present. Pt eating breakfast. Denies dyspnea at rest however states he is moderatley dyspneic on exertion. Also C/O pain in SOLA lower extremities upon movement.    PAIN: (X )Yes   ( )No  Level: mild-mod  Location: BLE  Intensity:   2 /10  Quality: intermittent   Aggravating Factors: movement/touch  Alleviating Factors:  Impact on ADLs: severe    DYSPNEA: (X ) Yes  ( ) No  Level: on exertion    PAST MEDICAL & SURGICAL HISTORY:  COPD (chronic obstructive pulmonary disease)  Parkinson's disease  CHF (congestive heart failure)  Hyperlipidemia  HTN (hypertension)  CAD (coronary artery disease)  AICD (automatic cardioverter/defibrillator) present  S/P CABG    SOCIAL HX:  Lives in SNF  Hx opiate tolerance ( )YES  (X )NO    Baseline ADLs  (Prior to Admission)  ( ) Independent   (X )Dependent    FAMILY HISTORY:  Unable to provide poor historian    Review of Systems:    Anxiety-denies  Depression-  Physical Discomfort-mod  Dyspnea-on exertion  Constipation-denies cant recall last BM  Anorexia-denies  Fatigue-mod-severe  Weakness-severe      All other systems reviewed and negative  Unable to obtain/Limited due to: poor historian      PHYSICAL EXAM:    Vital Signs Last 24 Hrs  T(C): 36.5 (2019 05:18), Max: 36.6 (2019 21:07)  T(F): 97.7 (2019 05:18), Max: 97.8 (2019 21:07)  HR: 56 (2019 09:36) (46 - 60)  BP: 94/37 (2019 09:36) (94/37 - 108/39)  RR: 19 (2019 05:18) (19 - 19)  SpO2: 100% (2019 05:18) (95% - 100%)  Daily Weight in k (2019 05:18)    PPSV2:  30-40 %    General: Elderly male in bed weak in NAD  Mental Status: A&O X3/unable to provide hx of present illness  HEENT: nasal O2 intact  Lungs: exp wheezes sola  Cardiac: S1S2+  GI: abd soft NT ND + BS  : voids  Ext: BLE edema/erythema   Neuro: no focal def      LABS:                        9.0    3.97  )-----------( 159      ( 2019 07:07 )             30.3         145  |  105  |  40<H>  ----------------------------<  105<H>  3.1<L>   |  34<H>  |  1.28    Ca    8.0<L>      2019 07:08  Mg     2.2           PT/INR - ( 2019 07:08 )   PT: 29.7 sec;   INR: 2.60 ratio         PTT - ( 2019 07:08 )  PTT:41.0 sec  Albumin: Albumin, Serum: 2.9 g/dL (04-15 @ 20:00)      Allergies    morphine (Headache)    Intolerances      MEDICATIONS  (STANDING):  ascorbic acid 500 milliGRAM(s) Oral daily  buDESOnide 160 MICROgram(s)/formoterol 4.5 MICROgram(s) Inhaler 2 Puff(s) Inhalation two times a day  carbidopa/levodopa  25/100 1 Tablet(s) Oral three times a day  clopidogrel Tablet 75 milliGRAM(s) Oral daily  furosemide    Tablet 20 milliGRAM(s) Oral daily  metoprolol tartrate Oral Tab/Cap - Peds 12.5 milliGRAM(s) Oral two times a day  multivitamin 1 Tablet(s) Oral daily  pantoprazole    Tablet 40 milliGRAM(s) Oral before breakfast  potassium chloride    Tablet ER 20 milliEquivalent(s) Oral every 2 hours  simvastatin 20 milliGRAM(s) Oral at bedtime  tamsulosin Oral Tab/Cap - Peds 0.4 milliGRAM(s) Oral at bedtime  tiotropium 18 MICROgram(s) Capsule 1 Capsule(s) Inhalation daily    MEDICATIONS  (PRN):  ALBUTerol    0.083% 2.5 milliGRAM(s) Nebulizer four times a day PRN Shortness of Breath and/or Wheezing  senna 8.6 milliGRAM(s) Oral Tablet - Peds 2 Tablet(s) Oral at bedtime PRN for constipation      RADIOLOGY/ADDITIONAL STUDIES:  < from: CT Chest No Cont (19 @ 09:13) >    EXAM:  CT CHEST                            PROCEDURE DATE:  2019          INTERPRETATION:  CLINICAL INFORMATION: Shortness of breath      COMPARISON: chest radiograph of the previous day and CT of the chest of   2018.    PROCEDURE:   CTof the Chest was performed without intravenous contrast.  Sagittal and coronal reformats were performed.      FINDINGS:    CHEST:     LUNGS AND LARGE AIRWAYS: Patent central airways.  No pulmonary nodules.  PLEURA: Large right effusion and small lefteffusion  VESSELS: Atherosclerotic calcifications  HEART: Coronary artery bypass graft surgical changes and pacemaker in   place. No pericardial effusion.  MEDIASTINUM AND SHIRLEY: No lymphadenopathy.  CHEST WALL AND LOWER NECK: Within normal limits.  VISUALIZED UPPER ABDOMEN: Splenic calcifications and cysts  BONES: Within normal limits.    IMPRESSION: Large right effusion and small left effusion with adjacent   atelectasis. No definite evidence of pneumonia.

## 2019-04-22 NOTE — PROGRESS NOTE ADULT - ASSESSMENT
89 year old Man [facility] resident with hx of CAD s/p PCI/CABG,  HFrEF, s/p AICD, HTN, dyslipidemia, CVA, orthostatic hypotension, CKD III, A fib on Coumadin, Parkinson's presented from [facility] via EMS for worsening SOB with O2 desat.     Acute respiratory failure/hypoxia mulitfactorial: Acute on chronic HFrEF superimposed by valvular insufficiency  LVEF 35-40% / mod-severe TR / Mod MR on 1/2018 TTE  daily wts / strict I & O /  Fluid restriction 1500ml daily  s/p Lasix 40 mg IV BID  Lasix 80 mg x1 today, Lasix 80 mg BID if BP tolerates tomorrow  O2 support via NC  CXR - large R effusion, and small left effusion w/ adjacent atelectasis, no definite evidence of PNA  TTE- 4/17/19-  global systolic dysfunction. Estimated left ventricular ejection fraction is 35%  Cardiology following    Chronic Afib  rate control / therapeutic INR  Continue coumadin - 3 mg given today  Continue metoprolol - decreased from 12.5 mg BID to 12.5 mg QD today  daily INR for goal of 2-3  [id number] Score = 7 (age-2, HTN-1, CHF-1, CVA-2, vasc disease-1)    Episode of Hypotension in ED (see ED provider note/addendum)  resolved after IV bolus   Continue to monitor  SBP -low 100s this AM- altered BP meds today - AM Lasix held today  Lasix 80 mg PO x1     #Bradycardia  Continue metoprolol - decreased from 12.5 mg BID to 12.5 mg QD today  Continue to monitor      CKD Stage III   ventura placed in ED for strict I & O monitoring   had urinary retention w/ difficulty inserting ventura last adm   baselin crt up to 1.8   Continue Flomax HS     Parkinson Disease/ Dementia  Con't Sinemet / Aricept     CAD s/p PCI and CABG  con't plavix/statin/BB   stable- no CP    DVT PPX  pt on coumadin

## 2019-04-22 NOTE — CONSULT NOTE ADULT - ASSESSMENT
HPI: Pt is a 89y old Male with hx of CAD s/p PCI/CABG,  HFrEF, s/p AICD, HTN, dyslipidemia, CVA, orthostatic hypotension, CKD III, A fib on Coumadin, Parkinson's presented from [facility] for worsening SOB with O2 desat. Pt hypotensive in the ED and was admitted for increased dyspnea secondary to heart failure. CT chest reveals sola pleural effusions with atelectasis. Palliative Consult to further establish GOC.  4/22/19 Seen and examined at bedside with no family present. Pt eating breakfast. Denies dyspnea at rest however states he is moderately dyspneic on exertion. Also C/O pain in SOLA lower extremities upon movement.    Assessment and Plan:    1) Dyspnea  -Mod on exertion as per pt  -CT chest=sola pleural effusions/atelectasis  -H/O CAD  -H/O heart failure  -Supplemental O2 PRN  -cardio eval noted  -pulm eval noted  -cont inhalers    2) Acute on chronic heart failure  -EF=35%  -CAD s/p CABG  -cont home meds  -cardio eval noted  -Afib  -S/P AICD    3) CKD  -Improved with hydration  -Diuresis as tolerated  -monitor renal function    4) Debility  -Overall deconditioning  -R/T dyspnea  -R/T BLE edema  -Limited mobility  -PT eval    5) Advanced Directives  -Pt with limited capacity  -defers to son  -Copy of MOLST on file reviewed 3/18/19  -DNR   -Will call son to schedule GOC conversation

## 2019-04-22 NOTE — PROGRESS NOTE ADULT - SUBJECTIVE AND OBJECTIVE BOX
REASON FOR VISIT: CHF    HPI:  89 year old man with a history of CAD s/p CABG x3 and PCI, AF, chronic systolic HF, ICD, HTN, HLD, severe pulmonary HTN, mitral/tricuspid insufficiency, Parkinson's Disease admitted 4/15/19 after presenting to the ED with dyspnea, hypoxia, and hypotension.    4/18/19:  "I feel a little better."  No new complaints.    4/19/19 Patient is feeling little better , still has anasarca decreasing     4/21/19 Patient is comfortable ,   anasarca  including sctrotal edema despite on iv diuretic , low normal BP episodes , prior hx of orthostatic hypotension on midodrine at home   4/22/19 Patient is feeling ok ,  low normal S BP , did not recieve IV lasix this due to low BP ,           MEDICATIONS  (STANDING):  ascorbic acid 500 milliGRAM(s) Oral daily  buDESOnide 160 MICROgram(s)/formoterol 4.5 MICROgram(s) Inhaler 2 Puff(s) Inhalation two times a day  carbidopa/levodopa  25/100 1 Tablet(s) Oral three times a day  clopidogrel Tablet 75 milliGRAM(s) Oral daily  furosemide    Tablet 20 milliGRAM(s) Oral daily  metoprolol tartrate Oral Tab/Cap - Peds 12.5 milliGRAM(s) Oral two times a day  multivitamin 1 Tablet(s) Oral daily  pantoprazole    Tablet 40 milliGRAM(s) Oral before breakfast  potassium chloride    Tablet ER 20 milliEquivalent(s) Oral every 2 hours  simvastatin 20 milliGRAM(s) Oral at bedtime  tamsulosin Oral Tab/Cap - Peds 0.4 milliGRAM(s) Oral at bedtime  tiotropium 18 MICROgram(s) Capsule 1 Capsule(s) Inhalation daily    MEDICATIONS  (PRN):  ALBUTerol    0.083% 2.5 milliGRAM(s) Nebulizer four times a day PRN Shortness of Breath and/or Wheezing  senna 8.6 milliGRAM(s) Oral Tablet - Peds 2 Tablet(s) Oral at bedtime PRN for constipation      Vital Signs Last 24 Hrs  T(C): 36.5 (22 Apr 2019 05:18), Max: 36.6 (21 Apr 2019 21:07)  T(F): 97.7 (22 Apr 2019 05:18), Max: 97.8 (21 Apr 2019 21:07)  HR: 56 (22 Apr 2019 09:36) (46 - 60)  BP: 94/37 (22 Apr 2019 09:36) (94/37 - 108/39)  BP(mean): --  RR: 19 (22 Apr 2019 05:18) (19 - 19)  SpO2: 100% (22 Apr 2019 05:18) (95% - 100%)    I&O's Summary      PHYSICAL EXAM:  Constitutional: Chronically-ill appearing, no distress  Respiratory: Breath sounds are clear but decreased (R>L); no crackles  Cardiovascular: S1 and S2, regular rate and rhythm,   Gastrointestinal: Bowel Sounds present, soft, nontender.   Extremities: 2+ pitting pedal/ UE  edema b/l  scrotal edema ,     LABS:                                        9.0    3.97  )-----------( 159      ( 22 Apr 2019 07:07 )             30.3     04-22    145  |  105  |  40<H>  ----------------------------<  105<H>  3.1<L>   |  34<H>  |  1.28    Ca    8.0<L>      22 Apr 2019 07:08  Mg     2.2     04-22    PT/INR - ( 22 Apr 2019 07:08 )   PT: 29.7 sec;   INR: 2.60 ratio                       Transthoracic Echocardiogram (02.26.18 @ 09:24) >   The left ventricle is normal in size, wall thickness. Moderately decreased left ventricular systolic function with an estimated left ventricular ejection fraction of 35-40%.    The right atrium appears moderately dilated.   Moderate (2+) mitral regurgitation.   Moderate to severe (3+) tricuspid valve regurgitation.   There is severe elevation in right ventricular systolic pressure    CT Chest No Cont (04.16.19 @ 09:13):  Large right effusion and small left effusion with adjacent atelectasis. No definite evidence of pneumonia.

## 2019-04-22 NOTE — PROGRESS NOTE ADULT - SUBJECTIVE AND OBJECTIVE BOX
Subjective:    pat no new complaint, lying in bed.  no distress.     Home Medications:  budesonide-formoterol 160 mcg-4.5 mcg/inh inhalation aerosol: 2 puff(s) inhaled 2 times a day (16 Apr 2019 13:18)  carbidopa-levodopa 25 mg-100 mg oral tablet: 1 tab(s) orally 3 times a day (6am, 2pm, 10pm) (16 Apr 2019 13:18)  clopidogrel 75 mg oral tablet: 1 tab(s) orally once a day (16 Apr 2019 13:18)  Coumadin 5 mg oral tablet: 1 tab(s) orally once a day (at bedtime) (16 Apr 2019 13:18)  docusate sodium 100 mg oral capsule: 1 cap(s) orally 2 times a day, As Needed (16 Apr 2019 13:18)  donepezil 5 mg oral tablet: 2 tab(s) orally once a day (at bedtime) (16 Apr 2019 13:18)  Fleet Enema 7 g-19 g rectal enema: 133 milliliter(s) rectal once a day, As Needed (16 Apr 2019 13:18)  ipratropium-albuterol 0.5 mg-2.5 mg/3 mLinhalation solution: 3 milliliter(s) inhaled every 6 hours, As Needed - for shortness of breath and/or wheezing (16 Apr 2019 13:18)  Lasix 20 mg oral tablet: 1 tab(s) orally once a day (in the morning) (16 Apr 2019 13:18)  Lasix 40 mg oral tablet: 1 tab(s) orally once a day (at bedtime) (16 Apr 2019 13:18)  Lasix 40 mg oral tablet: 1  orally once a day (in the morning) (16 Apr 2019 13:18)  Metoprolol Tartrate 25 mg oral tablet: 0.5 tab(s) orally 2 times a day (16 Apr 2019 13:18)  midodrine 5 mg oral tablet: 1 tab(s) orally  (16 Apr 2019 13:18)  Milk of Magnesia: 30 milliliter(s) orally once a day, As Needed for constipation (16 Apr 2019 13:18)  Multiple Vitamins oral tablet: 1 tab(s) orally once a day (16 Apr 2019 13:18)  omeprazole 20 mg oral delayed release tablet: 1 tab(s) orally once a day (16 Apr 2019 13:18)  potassium chloride 20 mEq oral tablet, extended release: 1 tab(s) orally once a day (16 Apr 2019 13:18)  Senna Lax 8.6 mg oral tablet: 2 tab(s) orally once a day (at bedtime), As Needed - for constipation (16 Apr 2019 13:18)  Vitamin C 500 mg oral tablet: 1 tab(s) orally once a day (16 Apr 2019 13:18)    MEDICATIONS  (STANDING):  ascorbic acid 500 milliGRAM(s) Oral daily  buDESOnide 160 MICROgram(s)/formoterol 4.5 MICROgram(s) Inhaler 2 Puff(s) Inhalation two times a day  carbidopa/levodopa  25/100 1 Tablet(s) Oral three times a day  clopidogrel Tablet 75 milliGRAM(s) Oral daily  furosemide   Injectable 40 milliGRAM(s) IV Push two times a day  hydrALAZINE 25 milliGRAM(s) Oral every 12 hours  metoprolol tartrate Oral Tab/Cap - Peds 12.5 milliGRAM(s) Oral two times a day  multivitamin 1 Tablet(s) Oral daily  pantoprazole    Tablet 40 milliGRAM(s) Oral before breakfast  simvastatin 20 milliGRAM(s) Oral at bedtime  tamsulosin Oral Tab/Cap - Peds 0.4 milliGRAM(s) Oral at bedtime  tiotropium 18 MICROgram(s) Capsule 1 Capsule(s) Inhalation daily    MEDICATIONS  (PRN):  ALBUTerol    0.083% 2.5 milliGRAM(s) Nebulizer four times a day PRN Shortness of Breath and/or Wheezing  senna 8.6 milliGRAM(s) Oral Tablet - Peds 2 Tablet(s) Oral at bedtime PRN for constipation      Allergies    morphine (Headache)    Intolerances        Vital Signs Last 24 Hrs  T(C): 36.4 (21 Apr 2019 06:00), Max: 36.7 (20 Apr 2019 21:35)  T(F): 97.5 (21 Apr 2019 06:00), Max: 98.1 (20 Apr 2019 21:35)  HR: 58 (21 Apr 2019 08:30) (49 - 62)  BP: 117/47 (21 Apr 2019 06:00) (94/36 - 120/52)  BP(mean): --  RR: 18 (21 Apr 2019 06:00) (17 - 18)  SpO2: 95% (21 Apr 2019 08:30) (95% - 100%)      PHYSICAL EXAMINATION:    NECK:  Supple. No lymphadenopathy. Jugular venous pressure not elevated. Carotids equal.   HEART:   The cardiac impulse has a normal quality. Reg., Nl S1 and S2.  There are no murmurs, rubs or gallops noted  CHEST:  Decreased aud BS's bilat.  Normal respiratory effort.  ABDOMEN:  Soft and nontender.   EXTREMITIES:  3 plud LE edema.       LABS:    04-21    143  |  104  |  40<H>  ----------------------------<  92  3.5   |  31  |  1.25    Ca    8.0<L>      21 Apr 2019 07:17      PT/INR - ( 21 Apr 2019 07:17 )   PT: 27.5 sec;   INR: 2.41 ratio         PTT - ( 21 Apr 2019 07:17 )  PTT:42.4 sec

## 2019-04-22 NOTE — PROGRESS NOTE ADULT - PROBLEM SELECTOR PLAN 4
low normal range , continue low dose BB , discontinue hydralazine due to low normal range SBP , check orthostatic , high risk orthostatic hypotension

## 2019-04-23 LAB
ANION GAP SERPL CALC-SCNC: 3 MMOL/L — LOW (ref 5–17)
BUN SERPL-MCNC: 43 MG/DL — HIGH (ref 7–23)
CALCIUM SERPL-MCNC: 8.3 MG/DL — LOW (ref 8.5–10.1)
CHLORIDE SERPL-SCNC: 104 MMOL/L — SIGNIFICANT CHANGE UP (ref 96–108)
CO2 SERPL-SCNC: 34 MMOL/L — HIGH (ref 22–31)
CREAT SERPL-MCNC: 1.34 MG/DL — HIGH (ref 0.5–1.3)
GLUCOSE SERPL-MCNC: 123 MG/DL — HIGH (ref 70–99)
HCT VFR BLD CALC: 30.9 % — LOW (ref 39–50)
HGB BLD-MCNC: 9.3 G/DL — LOW (ref 13–17)
INR BLD: 3.16 RATIO — HIGH (ref 0.88–1.16)
MCHC RBC-ENTMCNC: 25.4 PG — LOW (ref 27–34)
MCHC RBC-ENTMCNC: 30.1 GM/DL — LOW (ref 32–36)
MCV RBC AUTO: 84.4 FL — SIGNIFICANT CHANGE UP (ref 80–100)
NRBC # BLD: 0 /100 WBCS — SIGNIFICANT CHANGE UP (ref 0–0)
PLATELET # BLD AUTO: 173 K/UL — SIGNIFICANT CHANGE UP (ref 150–400)
POTASSIUM SERPL-MCNC: 3.7 MMOL/L — SIGNIFICANT CHANGE UP (ref 3.5–5.3)
POTASSIUM SERPL-SCNC: 3.7 MMOL/L — SIGNIFICANT CHANGE UP (ref 3.5–5.3)
PROTHROM AB SERPL-ACNC: 36.4 SEC — HIGH (ref 10–12.9)
RBC # BLD: 3.66 M/UL — LOW (ref 4.2–5.8)
RBC # FLD: 18.4 % — HIGH (ref 10.3–14.5)
SODIUM SERPL-SCNC: 141 MMOL/L — SIGNIFICANT CHANGE UP (ref 135–145)
WBC # BLD: 4.47 K/UL — SIGNIFICANT CHANGE UP (ref 3.8–10.5)
WBC # FLD AUTO: 4.47 K/UL — SIGNIFICANT CHANGE UP (ref 3.8–10.5)

## 2019-04-23 PROCEDURE — 99232 SBSQ HOSP IP/OBS MODERATE 35: CPT

## 2019-04-23 RX ORDER — HYDROMORPHONE HYDROCHLORIDE 2 MG/ML
2 INJECTION INTRAMUSCULAR; INTRAVENOUS; SUBCUTANEOUS EVERY 4 HOURS
Qty: 0 | Refills: 0 | Status: DISCONTINUED | OUTPATIENT
Start: 2019-04-23 | End: 2019-04-25

## 2019-04-23 RX ORDER — HYDROMORPHONE HYDROCHLORIDE 2 MG/ML
0.3 INJECTION INTRAMUSCULAR; INTRAVENOUS; SUBCUTANEOUS
Qty: 0 | Refills: 0 | Status: DISCONTINUED | OUTPATIENT
Start: 2019-04-23 | End: 2019-04-25

## 2019-04-23 RX ORDER — WARFARIN SODIUM 2.5 MG/1
3 TABLET ORAL AT BEDTIME
Qty: 0 | Refills: 0 | Status: DISCONTINUED | OUTPATIENT
Start: 2019-04-23 | End: 2019-04-23

## 2019-04-23 RX ORDER — METOPROLOL TARTRATE 50 MG
12.5 TABLET ORAL EVERY 12 HOURS
Qty: 0 | Refills: 0 | Status: DISCONTINUED | OUTPATIENT
Start: 2019-04-23 | End: 2019-04-25

## 2019-04-23 RX ORDER — FUROSEMIDE 40 MG
40 TABLET ORAL EVERY 12 HOURS
Qty: 0 | Refills: 0 | Status: DISCONTINUED | OUTPATIENT
Start: 2019-04-23 | End: 2019-04-23

## 2019-04-23 RX ORDER — WARFARIN SODIUM 2.5 MG/1
1 TABLET ORAL ONCE
Qty: 0 | Refills: 0 | Status: COMPLETED | OUTPATIENT
Start: 2019-04-23 | End: 2019-04-23

## 2019-04-23 RX ORDER — METOPROLOL TARTRATE 50 MG
1 TABLET ORAL
Qty: 0 | Refills: 0 | COMMUNITY

## 2019-04-23 RX ORDER — FUROSEMIDE 40 MG
40 TABLET ORAL EVERY 12 HOURS
Qty: 0 | Refills: 0 | Status: DISCONTINUED | OUTPATIENT
Start: 2019-04-23 | End: 2019-04-24

## 2019-04-23 RX ADMIN — Medication 1 TABLET(S): at 12:04

## 2019-04-23 RX ADMIN — Medication 80 MILLIGRAM(S): at 06:26

## 2019-04-23 RX ADMIN — CLOPIDOGREL BISULFATE 75 MILLIGRAM(S): 75 TABLET, FILM COATED ORAL at 12:04

## 2019-04-23 RX ADMIN — CARBIDOPA AND LEVODOPA 1 TABLET(S): 25; 100 TABLET ORAL at 21:28

## 2019-04-23 RX ADMIN — Medication 500 MILLIGRAM(S): at 12:04

## 2019-04-23 RX ADMIN — CARBIDOPA AND LEVODOPA 1 TABLET(S): 25; 100 TABLET ORAL at 14:23

## 2019-04-23 RX ADMIN — BUDESONIDE AND FORMOTEROL FUMARATE DIHYDRATE 2 PUFF(S): 160; 4.5 AEROSOL RESPIRATORY (INHALATION) at 07:41

## 2019-04-23 RX ADMIN — SIMVASTATIN 20 MILLIGRAM(S): 20 TABLET, FILM COATED ORAL at 21:28

## 2019-04-23 RX ADMIN — ALBUTEROL 2.5 MILLIGRAM(S): 90 AEROSOL, METERED ORAL at 07:40

## 2019-04-23 RX ADMIN — TAMSULOSIN HYDROCHLORIDE 0.4 MILLIGRAM(S): 0.4 CAPSULE ORAL at 21:28

## 2019-04-23 RX ADMIN — ALBUTEROL 2.5 MILLIGRAM(S): 90 AEROSOL, METERED ORAL at 14:05

## 2019-04-23 RX ADMIN — CARBIDOPA AND LEVODOPA 1 TABLET(S): 25; 100 TABLET ORAL at 06:26

## 2019-04-23 RX ADMIN — BUDESONIDE AND FORMOTEROL FUMARATE DIHYDRATE 2 PUFF(S): 160; 4.5 AEROSOL RESPIRATORY (INHALATION) at 21:03

## 2019-04-23 RX ADMIN — TIOTROPIUM BROMIDE 1 CAPSULE(S): 18 CAPSULE ORAL; RESPIRATORY (INHALATION) at 07:41

## 2019-04-23 RX ADMIN — WARFARIN SODIUM 1 MILLIGRAM(S): 2.5 TABLET ORAL at 21:28

## 2019-04-23 RX ADMIN — PANTOPRAZOLE SODIUM 40 MILLIGRAM(S): 20 TABLET, DELAYED RELEASE ORAL at 06:26

## 2019-04-23 RX ADMIN — Medication 40 MILLIGRAM(S): at 18:17

## 2019-04-23 NOTE — PROGRESS NOTE ADULT - PROBLEM SELECTOR PLAN 4
low normal range , continue low dose BB , discontinue hydralazine due to low normal range SBP , check orthostatic , high risk orthostatic hypotension  continue diuresis

## 2019-04-23 NOTE — CHART NOTE - NSCHARTNOTEFT_GEN_A_CORE
This SW spoke with pts son David [phone] to schedule a family meeting. We will meet tomorrow at 2PM. Our team will continue to follow.

## 2019-04-23 NOTE — PROGRESS NOTE ADULT - SUBJECTIVE AND OBJECTIVE BOX
HPI: Pt is a 89y old Male with hx of CAD s/p PCI/CABG,  HFrEF, s/p AICD, HTN, dyslipidemia, CVA, orthostatic hypotension, CKD III, A fib on Coumadin, Parkinson's presented from [facility] for worsening SOB with O2 desat. Pt hypotensive in the ED and was admitted for increased dyspnea secondary to heart failure. CT chest reveals sola pleural effusions with atelectasis. Palliative Consult to further establish GOC.  4/22/19 Seen and examined at bedside with no family present. Pt eating breakfast. Denies dyspnea at rest however states he is moderatley dyspneic on exertion. Also C/O pain in SOLA lower extremities upon movement.  4/23/19 Seen and examined at bedside with no family present. Denies pain or dyspnea however appears to have mild dyspnea at rest. Eating breakfast.  PAIN: (X )Yes   ( )No  Level: mild-mod  Location: BLE  Intensity:   2 /10  Quality: intermittent   Aggravating Factors: movement/touch  Alleviating Factors:  Impact on ADLs: severe    DYSPNEA: (X ) Yes  ( ) No  Level: on exertion    PAST MEDICAL & SURGICAL HISTORY:  COPD (chronic obstructive pulmonary disease)  Parkinson's disease  CHF (congestive heart failure)  Hyperlipidemia  HTN (hypertension)  CAD (coronary artery disease)  AICD (automatic cardioverter/defibrillator) present  S/P CABG    SOCIAL HX:  Comes from [facility]  Hx opiate tolerance ( )YES  (X )NO    Baseline ADLs  (Prior to Admission)  ( ) Independent   (X )Dependent    FAMILY HISTORY:  Unable to provide poor historian    Review of Systems:    Anxiety-denies  Physical Discomfort-mod  Dyspnea-on exertion  Constipation-denies cant recall last BM  Anorexia-denies  Fatigue-mod-severe  Weakness-severe      All other systems reviewed and negative  Unable to obtain/Limited due to: poor historian      PHYSICAL EXAM:  ICU Vital Signs Last 24 Hrs  T(C): 36.6 (23 Apr 2019 05:37), Max: 36.9 (22 Apr 2019 20:01)  T(F): 97.8 (23 Apr 2019 05:37), Max: 98.5 (22 Apr 2019 20:01)  HR: 60 (23 Apr 2019 07:43) (50 - 64)  BP: 110/45 (23 Apr 2019 06:27) (98/41 - 118/50)  RR: 17 (23 Apr 2019 05:37) (17 - 18)  SpO2: 95% (23 Apr 2019 07:43) (95% - 100%)    PPSV2:  30-40 %    General: Elderly male in bed weak in NAD  Mental Status: A&O X3/unable to provide hx of present illness  HEENT: nasal O2 intact  Lungs: exp wheezes sola  Cardiac: S1S2+  GI: abd soft NT ND + BS  : voids  Ext: BLE edema/erythema   Neuro: no focal def      LABS:                             9.3    4.47  )-----------( 173      ( 23 Apr 2019 05:39 )             30.9             04-23    141  |  104  |  43<H>  ----------------------------<  123<H>  3.7   |  34<H>  |  1.34<H>    Ca    8.3<L>      23 Apr 2019 05:39  Mg     2.2     04-22       PTT - ( 22 Apr 2019 07:08 )  PTT:41.0 sec  Albumin: Albumin, Serum: 2.9 g/dL (04-15 @ 20:00)      Allergies    morphine (Headache)    Intolerances      RADIOLOGY/ADDITIONAL STUDIES:

## 2019-04-23 NOTE — PROGRESS NOTE ADULT - ASSESSMENT
HPI: Pt is a 89y old Male with hx of CAD s/p PCI/CABG,  HFrEF, s/p AICD, HTN, dyslipidemia, CVA, orthostatic hypotension, CKD III, A fib on Coumadin, Parkinson's presented from [facility] for worsening SOB with O2 desat. Pt hypotensive in the ED and was admitted for increased dyspnea secondary to heart failure. CT chest reveals sola pleural effusions with atelectasis. Palliative Consult to further establish GOC.  4/22/19 Seen and examined at bedside with no family present. Pt eating breakfast. Denies dyspnea at rest however states he is moderately dyspneic on exertion. Also C/O pain in SOLA lower extremities upon movement.    Assessment and Plan:    1) Dyspnea  -Mod on exertion as per pt  -CT chest=sola pleural effusions/atelectasis  -H/O CAD  -H/O heart failure  -Supplemental O2 PRN  -cardio eval noted  -pulm eval noted  -cont inhalers  -add Morphine 5 mg PO Q4H PRN mod pain/dyspnea  -add Morphine 3 mg IVP Q3H PRN severe pain/dyspnea    2) Acute on chronic heart failure  -EF=35%  -CAD s/p CABG  -cont home meds  -cardio eval noted  -Afib  -S/P AICD    3) CKD  -Improved with hydration  -Diuresis as tolerated  -monitor renal function    4) Debility  -Overall deconditioning  -R/T dyspnea  -R/T BLE edema  -Limited mobility  -PT eval    5) Advanced Directives  -Pt with limited capacity  -defers to son  -Copy of MOLST on file reviewed 3/18/19  -DNR   -GOC conversation scheduled 4/24/19 2P HPI: Pt is a 89y old Male with hx of CAD s/p PCI/CABG,  HFrEF, s/p AICD, HTN, dyslipidemia, CVA, orthostatic hypotension, CKD III, A fib on Coumadin, Parkinson's presented from \[facility]\"" for worsening SOB with O2 desat. Pt hypotensive in the ED and was admitted for increased dyspnea secondary to heart failure. CT chest reveals sola pleural effusions with atelectasis. Palliative Consult to further establish GOC.  4/22/19 Seen and examined at bedside with no family present. Pt eating breakfast. Denies dyspnea at rest however states he is moderately dyspneic on exertion. Also C/O pain in SOLA lower extremities upon movement.    Assessment and Plan:    1) Dyspnea  -Mod on exertion as per pt  -CT chest=sola pleural effusions/atelectasis  -H/O CAD  -H/O heart failure  -Supplemental O2 PRN  -cardio eval noted  -pulm eval noted  -cont inhalers  -add Dilaudid 2 mg PO Q4H PRN mod pain/dyspnea  -add Dilaudid 0.3 IVP Q3H PRN severe pain/dyspnea    2) Acute on chronic heart failure  -EF=35%  -CAD s/p CABG  -cont home meds  -cardio eval noted  -Afib  -S/P AICD    3) CKD  -Improved with hydration  -Diuresis as tolerated  -monitor renal function    4) Debility  -Overall deconditioning  -R/T dyspnea  -R/T BLE edema  -Limited mobility  -PT eval    5) Advanced Directives  -Pt with limited capacity  -defers to son  -Copy of MOLST on file reviewed 3/18/19  -DNR   -GOC conversation scheduled 4/24/19 2P

## 2019-04-23 NOTE — PROGRESS NOTE ADULT - ASSESSMENT
89 year old Man [facility] resident with hx of CAD s/p PCI/CABG,  HFrEF, s/p AICD, HTN, dyslipidemia, CVA, orthostatic hypotension, CKD III, A fib on Coumadin, Parkinson's presented from [facility] via EMS for worsening SOB with O2 desat.     #Acute respiratory failure/hypoxia mulitfactorial: Acute on chronic HFrEF superimposed by valvular insufficiency  - LVEF 35-40% / mod-severe TR / Mod MR on 1/2018 TTE  - daily wts / strict I & O /  Fluid restriction 1500ml daily  - unable to tolerate lasix 80mg BID due to hypotension  - will start lasix 40 mg BID IV  - continue metoprolol  - supplemental oxygen as needed  - cardiology following    #Pleural effusion  - large right pleura effusion  - CXR - large R effusion, and small left effusion  - continue lasix  - may need pleurocentesis- will f/u with pulmonology    #Afib  - rate controlled  - continue coumadin - 3 mg given today  - continue metoprolol - decreased from 12.5 mg BID to 12.5 mg QD today due to bradycardia  - daily INR for goal of 2-3  - [id number] Score = 7 (age-2, HTN-1, CHF-1, CVA-2, vasc disease-1)    #Episode of Hypotension in ED (see ED provider note/addendum)  - multifactorial: opiates, hypoalbuminemia, medications  - resolved after IV bolus   - SBP -low 100s this AM- altered BP meds today - AM Lasix held today  - continue to monitor    #Hypoalbuminemia   - will consider albumin- expect improvement in BP  - continue regular diet  - nutritionist consulted    #Bradycardia  - continue metoprolol - decreased from 12.5 mg BID to 12.5 mg QD today  - continue to monitor  - cardiology following     #CKD Stage III   - creatinine stable  - continue flomax     #Parkinson Disease/ Dementia  - continue Sinemet / Aricept     #CAD s/p PCI and CABG  - continue plavix/statin/BB     #DVT  - on coumadin 89 year old Man [facility] resident with hx of CAD s/p PCI/CABG,  HFrEF, s/p AICD, HTN, dyslipidemia, CVA, orthostatic hypotension, CKD III, A fib on Coumadin, Parkinson's presented from [facility] via EMS for worsening SOB with O2 desat.     #Acute respiratory failure/hypoxia mulitfactorial: Acute on chronic HFrEF superimposed by valvular insufficiency  - LVEF 35-40% / mod-severe TR / Mod MR on 1/2018 TTE  - daily wts / strict I & O /  Fluid restriction 1500ml daily  - unable to tolerate lasix 80mg BID due to hypotension  - will start lasix 40 mg BID IV  - continue metoprolol  - supplemental oxygen as needed  - cardiology following    #Pleural effusion  - CXR - large R effusion, and small left effusion  - continue lasix  - may need pleurocentesis- will f/u with pulmonology    #Afib  - rate controlled  - continue coumadin  - continue metoprolol - decreased from 12.5 mg BID to 12.5 mg QD today due to bradycardia  - daily INR for goal of 2-3  - [id number] Score = 7 (age-2, HTN-1, CHF-1, CVA-2, vasc disease-1)    #Hypotension in ED (see ED provider note/addendum)  - multifactorial: opiates, hypoalbuminemia, medications  - continue BP meds with holding parameters  - continue to monitor    #Hypoalbuminemia   - will consider albumin- expect improvement in BP  - continue regular diet  - nutritionist consulted    #Bradycardia  - continue metoprolol - decreased from 12.5 mg BID to 12.5 mg QD today  - continue to monitor  - cardiology following     #CKD Stage III   - creatinine stable  - continue flomax     #Parkinson Disease/ Dementia  - continue Sinemet / Aricept     #CAD s/p PCI and CABG  - continue plavix/statin/BB     #DVT  - on coumadin 89 year old Man [facility] resident with hx of CAD s/p PCI/CABG,  HFrEF, s/p AICD, HTN, dyslipidemia, CVA, orthostatic hypotension, CKD III, A fib on Coumadin, Parkinson's presented from [facility] via EMS for worsening SOB with O2 desat.     #Acute respiratory failure/hypoxia mulitfactorial: Acute on chronic HFrEF superimposed by valvular insufficiency  - LVEF 35-40% / mod-severe TR / Mod MR on 1/2018 TTE  - daily wts / strict I & O /  Fluid restriction 1500ml daily  - unable to tolerate lasix 80mg BID due to hypotension  - will start lasix 40 mg BID IV  - continue metoprolol  - supplemental oxygen as needed  - cardiology following    #Pleural effusion  - CXR - large R effusion, and small left effusion  - continue lasix  - may need pleurocentesis- will f/u with pulmonology    #Afib  - rate controlled  - continue coumadin  - continue metoprolol - decreased from 12.5 mg BID to 12.5 mg QD today due to bradycardia  - daily INR for goal of 2-3  - [id number] Score = 7 (age-2, HTN-1, CHF-1, CVA-2, vasc disease-1)    #Hypotension  - multifactorial: opiates, hypoalbuminemia, medications  - continue BP meds with holding parameters  - continue to monitor    #Hypoalbuminemia   - will consider albumin  - continue regular diet  - nutritionist consulted    #Bradycardia  - continue metoprolol - decreased from 12.5 mg BID to 12.5 mg QD today  - continue to monitor  - cardiology following     #CKD Stage III   - creatinine stable  - continue flomax     #Parkinson Disease/ Dementia  - continue Sinemet / Aricept     #CAD s/p PCI and CABG  - continue plavix/statin/BB     #DVT  - on coumadin 89 year old Man [facility] resident with hx of CAD s/p PCI/CABG,  HFrEF, s/p AICD, HTN, dyslipidemia, CVA, orthostatic hypotension, CKD III, A fib on Coumadin, Parkinson's presented from [facility] via EMS for worsening SOB with O2 desat.     #Acute respiratory failure/hypoxia mulitfactorial: Acute on chronic HFrEF superimposed by valvular insufficiency  - LVEF 35-40% / mod-severe TR / Mod MR on 1/2018 TTE  - daily wts / strict I & O /  Fluid restriction 1500ml daily  - unable to tolerate lasix 80mg BID due to hypotension  - will start lasix 40 mg BID IV  - continue metoprolol  - supplemental oxygen as needed  - cardiology following    #Pleural effusion  - CXR - large R effusion, and small left effusion  - continue lasix  - may need pleurocentesis- will f/u with pulmonology    #Afib  - rate controlled  - continue coumadin  - continue metoprolol - decreased from 12.5 mg BID to 12.5 mg QD today due to bradycardia  - daily INR for goal of 2-3  - [id number] Score = 7 (age-2, HTN-1, CHF-1, CVA-2, vasc disease-1)    #Hypotension  - multifactorial: opiates, hypoalbuminemia, medications  - continue BP meds with holding parameters  - continue to monitor    #Hypoalbuminemia   - will consider albumin  - continue regular diet  - nutritionist consulted    #Bradycardia  - continue metoprolol - decreased from 12.5 mg BID to 12.5 mg QD today  - continue to monitor  - cardiology following     #CKD Stage III   - creatinine stable  - continue flomax     #Parkinson Disease/ Dementia  - continue Sinemet / Aricept     #CAD s/p PCI and CABG  - continue plavix/statin/BB     #DVT ppx  - on coumadin

## 2019-04-23 NOTE — PROGRESS NOTE ADULT - SUBJECTIVE AND OBJECTIVE BOX
89 year old Man [facility] resident with hx of CAD s/p PCI/CABG,  HFrEF, s/p AICD, HTN, dyslipidemia, CVA, orthostatic hypotension, CKD III, A fib on Coumadin, Parkinson's presented from [facility] via EMS for worsening SOB with O2 desat.  O2 sat 90% en route to ED. In the ED  /50 initially then drop to 70's systolic, HR 50, O2 sat 99% with O2, RR 20. Denies CP/palpitation/HA/dizziness/abd pain/n/v/d/f/c    He received NS 1L IV bolus, vanco 1gm and cefepime 1gm, ventura placed    Interval history:  Patient evaluated at bedside. States that he is SOB and has a dry cough. Patient is urinating frequently. Denies chest pain, fever, chills.    REVIEW OF SYSTEMS:    CONSTITUTIONAL: +weakness, No fevers or chills  EYES/ENT: No visual changes;  No vertigo or throat pain   NECK: No pain or stiffness  RESPIRATORY: +cough, No wheezing, hemoptysis; No shortness of breath  CARDIOVASCULAR: No chest pain or palpitations  GASTROINTESTINAL: No abdominal or epigastric pain. No nausea, vomiting, or hematemesis; No diarrhea or constipation. No melena or hematochezia.  GENITOURINARY: No dysuria, frequency or hematuria  NEUROLOGICAL: No numbness or weakness  SKIN: No itching, burning, rashes, or lesions   All other review of systems is negative unless indicated above.    MEDICATIONS  (STANDING):  ascorbic acid 500 milliGRAM(s) Oral daily  buDESOnide 160 MICROgram(s)/formoterol 4.5 MICROgram(s) Inhaler 2 Puff(s) Inhalation two times a day  carbidopa/levodopa  25/100 1 Tablet(s) Oral three times a day  clopidogrel Tablet 75 milliGRAM(s) Oral daily  furosemide    Tablet 40 milliGRAM(s) Oral every 12 hours  metoprolol tartrate 12.5 milliGRAM(s) Oral every 12 hours  multivitamin 1 Tablet(s) Oral daily  pantoprazole    Tablet 40 milliGRAM(s) Oral before breakfast  simvastatin 20 milliGRAM(s) Oral at bedtime  tamsulosin Oral Tab/Cap - Peds 0.4 milliGRAM(s) Oral at bedtime  tiotropium 18 MICROgram(s) Capsule 1 Capsule(s) Inhalation daily    MEDICATIONS  (PRN):  ALBUTerol    0.083% 2.5 milliGRAM(s) Nebulizer four times a day PRN Shortness of Breath and/or Wheezing  HYDROmorphone   Tablet 2 milliGRAM(s) Oral every 4 hours PRN Moderate Pain (4 - 6)dyspnea  HYDROmorphone  Injectable 0.3 milliGRAM(s) IV Push every 3 hours PRN Severe Pain (7 - 10)dyspnea  senna 8.6 milliGRAM(s) Oral Tablet - Peds 2 Tablet(s) Oral at bedtime PRN for constipation    Vital Signs (24 Hrs):  T(C): 37 (19 @ 11:46), Max: 37 (19 @ 11:46)  HR: 49 (19 @ 12:55) (49 - 64)  BP: 106/37 (19 @ 11:46) (106/37 - 118/50)  RR: 18 (19 @ 11:46) (17 - 18)  SpO2: 92% (19 @ 12:55) (92% - 100%)  Wt(kg): --  Daily     Daily Weight in k.5 (2019 05:37)    I&O's Summary    2019 07:01  -  2019 13:17  --------------------------------------------------------  IN: 350 mL / OUT: 0 mL / NET: 350 mL    PHYSICAL EXAM:  GENERAL: NAD, well-developed  HEAD:  Atraumatic, Normocephalic  EYES: EOMI, PERRLA, conjunctiva and sclera clear  NECK: Supple, No JVD  CHEST/LUNG: b/l diffuse crackles  HEART: Regular rate and rhythm; No murmurs, rubs, or gallops  ABDOMEN: Soft, Nontender, Nondistended; Bowel sounds present  EXTREMITIES: 2/3+ pitting edema b/l; chronic venous stasis changes noted b/l   PSYCH: AAOx3  NEUROLOGY: non-focal  SKIN: No rashes or lesions    LABS:                        9.3    4.47  )-----------( 173      ( 2019 05:39 )             30.9     2019 05:39    141    |  104    |  43     ----------------------------<  123    3.7     |  34     |  1.34     Ca    8.3        2019 05:39      PT/INR - ( 2019 07:08 )   PT: 29.7 sec;   INR: 2.60 ratio         PTT - ( 2019 07:08 )  PTT:41.0 sec    < from: CT Chest No Cont (19 @ 09:13) >  IMPRESSION: Large right effusion and small left effusion with adjacent   atelectasis. No definite evidence of pneumonia.    < end of copied text >        < from: Transthoracic Echocardiogram (19 @ 11:54) >  Impression     Summary     Normal leftventricular size with moderate, global systolic dysfunction.   Estimated left ventricular ejection fraction is 35%. Septal flattening is   seen; this finding is consistent with right heart pressure / volume   overload. Paradoxical septal motion.   The right ventricle exhibits mild dilation, mild diffuse hypokinesis, and   mild depression of contractility.   Biatrial enlargement.   Moderate mitral regurgitation.   Moderate to severe tricuspid valve regurgitation.   Mild pulmonary hypertension.    < end of copied text >

## 2019-04-23 NOTE — CHART NOTE - NSCHARTNOTEFT_GEN_A_CORE
Assessment:   89 year old man with a history of CAD s/p CABG x3 and PCI, AF, chronic systolic HF, ICD, HTN, HLD, severe pulmonary HTN, mitral/tricuspid insufficiency, Parkinson's Disease.   * Pt continues to be treated for acute on chronic heart failure, Dyspnea, CT chest indicates b/l pleural effusions/atelectasis. +DNR, Palliative to have GOC conversation tomorrow with son at 2pm.     * Current diet DASH/TLC 1200mL FR. Therapeutic diet not appropriate 2/2 advancing age. Cholesterol/fat restriction not needed, however due to fluid accumulation/anasarca suggest liberalizing to low sodium, 1200 mL FR.   * Prosource Gelatein BID in place. Flowsheets indicates 50-75% consumption of meals with feeding assistance.   * (+)BM 4/23  * moderate edema left/right ankle  * Art score 12  * Skin tear noted right upperarm, Stage 1 PU b/l heel    Recommendations:  1) Liberalize diet to low sodium w/ 1200mL FR.   2) additional protein supplement to assist with wound healing Gelatein Plus po BID (40gm protein).   3) monitor daily weights.   4) monitor labs/electrolytes.      Diet Presciption: Diet, DASH/TLC:   Sodium & Cholesterol Restricted  1200mL Fluid Restriction ([id number]) (04-16-19 @ 12:31)      Wt Hx:  2.7 kg desirable wt loss.   4/23- 96.6 kg   4/16- 99.3 kg        Estimated Needs:   [x] no change since previous assessment    Estimated Energy Needs (calories/kg):  · Weight Used for Calculation  adjusted  72.5kg    Estimated Energy Needs (25-30 calories/kg):  · Weight  (lbs)  159.8 lb  · Weight (kg)  72.5 kg  · From (25cal/kg)  1812  · To (30cal/kg)  2175    Other Calculation:  · Other Calculation  Ht: 73in; Wt: 88.4kg; BMI: 31.4; IBW: 67.2kg; IBW%: 131%    Estimated Protein Needs (1.6-1.8 gm/kg): (IBW used)  · Weight (kg)  67.2  · From (1.6 g/kg)  107  · To (1.8 g/kg)  121    Estimated Fluid Needs (25-30 ml/kg):  · Weight  (lbs)  159.8  · Weight (kg)  72.5  · From (25 ml/kg)  1812  · To (30 ml/kg)  2175        Nutrition Diagnosis is [x] ongoing  [ ] resolved [ ] not applicable   Nutrition Diagnostic #1:  · Nutrition Diagnostic Terminology #1: Nutrient  · Nutrient: Malnutrition; Meets criteria for Severe protein-calorie nutrition in the context  of chronic illness  · Etiology: Increased metabolic needs 2/2 SOB and HF  · Signs/Symptoms: Severe muscle and fat wasting, 3+ edema  · Nutrition Intervention: Meals and Snack; Medical Food Supplements  · Meals and Snacks: General/healthful diet; Low Sodium Diet  · Medical and Food Supplements: Commercial food; Gelatein BID  · Goal/Expected Outcome: Pt will complete > 80% at each meal and drink supplement               Pertinent Medications: MEDICATIONS  (STANDING):  ascorbic acid 500 milliGRAM(s) Oral daily  buDESOnide 160 MICROgram(s)/formoterol 4.5 MICROgram(s) Inhaler 2 Puff(s) Inhalation two times a day  carbidopa/levodopa  25/100 1 Tablet(s) Oral three times a day  clopidogrel Tablet 75 milliGRAM(s) Oral daily  furosemide   Injectable 40 milliGRAM(s) IV Push every 12 hours  metoprolol tartrate 12.5 milliGRAM(s) Oral every 12 hours  multivitamin 1 Tablet(s) Oral daily  pantoprazole    Tablet 40 milliGRAM(s) Oral before breakfast  simvastatin 20 milliGRAM(s) Oral at bedtime  tamsulosin Oral Tab/Cap - Peds 0.4 milliGRAM(s) Oral at bedtime  tiotropium 18 MICROgram(s) Capsule 1 Capsule(s) Inhalation daily    MEDICATIONS  (PRN):  ALBUTerol    0.083% 2.5 milliGRAM(s) Nebulizer four times a day PRN Shortness of Breath and/or Wheezing  HYDROmorphone   Tablet 2 milliGRAM(s) Oral every 4 hours PRN Moderate Pain (4 - 6)dyspnea  HYDROmorphone  Injectable 0.3 milliGRAM(s) IV Push every 3 hours PRN Severe Pain (7 - 10)dyspnea  senna 8.6 milliGRAM(s) Oral Tablet - Peds 2 Tablet(s) Oral at bedtime PRN for constipation    Pertinent Labs: 04-23 Na141 mmol/L Glu 123 mg/dL<H> K+ 3.7 mmol/L Cr  1.34 mg/dL<H> BUN 43 mg/dL<H> 04-17 Phos 3.9 mg/dL     CAPILLARY BLOOD GLUCOSE          Skin: art score = 12          Monitoring and Evaluation:   [x] PO intake/Nutr support infusion [ x ] Tolerance to Nutr [ x ] weights [ x ] labs[ x ] follow up per protocol  [ ] other: Assessment:   89 year old man with a history of CAD s/p CABG x3 and PCI, AF, chronic systolic HF, ICD, HTN, HLD, severe pulmonary HTN, mitral/tricuspid insufficiency, Parkinson's Disease.   * Pt continues to be treated for acute on chronic heart failure, Dyspnea, CT chest indicates b/l pleural effusions/atelectasis. +DNR, Palliative to have GOC conversation tomorrow with son at 2pm.   * Current diet DASH/TLC 1200mL FR. Therapeutic diet not appropriate 2/2 advancing age. Cholesterol/fat restriction not needed, however due to fluid accumulation/anasarca suggest liberalizing to low sodium, 1200 mL FR.   * Prosource Gelatein BID in place. Flowsheets indicates 50-75% consumption of meals with feeding assistance.   * (+)BM 4/23  * moderate edema left/right ankle  * Art score 12  * Skin tear noted right upperarm, Stage 1 PU b/l heel    Recommendations:  1) Liberalize diet to low sodium w/ 1200mL FR.   2) Increase protein supplement to assist with wound healing Gelatein Plus po TID (60gm protein).   3) monitor daily weights.   4) monitor labs/electrolytes.      Diet Presciption: Diet, DASH/TLC:   Sodium & Cholesterol Restricted  1200mL Fluid Restriction ([id number]) (04-16-19 @ 12:31)      Wt Hx:  2.7 kg desirable wt loss.   4/23- 96.6 kg   4/16- 99.3 kg        Estimated Needs:   [x] no change since previous assessment    Estimated Energy Needs (calories/kg):  · Weight Used for Calculation  adjusted  72.5kg    Estimated Energy Needs (25-30 calories/kg):  · Weight  (lbs)  159.8 lb  · Weight (kg)  72.5 kg  · From (25cal/kg)  1812  · To (30cal/kg)  2175    Other Calculation:  · Other Calculation  Ht: 73in; Wt: 88.4kg; BMI: 31.4; IBW: 67.2kg; IBW%: 131%    Estimated Protein Needs (1.6-1.8 gm/kg): (IBW used)  · Weight (kg)  67.2  · From (1.6 g/kg)  107  · To (1.8 g/kg)  121    Estimated Fluid Needs (25-30 ml/kg):  · Weight  (lbs)  159.8  · Weight (kg)  72.5  · From (25 ml/kg)  1812  · To (30 ml/kg)  2175        Nutrition Diagnosis is [x] ongoing  [ ] resolved [ ] not applicable   Nutrition Diagnostic #1:  · Nutrition Diagnostic Terminology #1: Nutrient  · Nutrient: Malnutrition; Meets criteria for Severe protein-calorie nutrition in the context  of chronic illness  · Etiology: Increased metabolic needs 2/2 SOB and HF  · Signs/Symptoms: Severe muscle and fat wasting, 3+ edema  · Nutrition Intervention: Meals and Snack; Medical Food Supplements  · Meals and Snacks: General/healthful diet; Low Sodium Diet  · Medical and Food Supplements: Commercial food; Gelatein BID  · Goal/Expected Outcome: Pt will complete > 80% at each meal and drink supplement               Pertinent Medications: MEDICATIONS  (STANDING):  ascorbic acid 500 milliGRAM(s) Oral daily  buDESOnide 160 MICROgram(s)/formoterol 4.5 MICROgram(s) Inhaler 2 Puff(s) Inhalation two times a day  carbidopa/levodopa  25/100 1 Tablet(s) Oral three times a day  clopidogrel Tablet 75 milliGRAM(s) Oral daily  furosemide   Injectable 40 milliGRAM(s) IV Push every 12 hours  metoprolol tartrate 12.5 milliGRAM(s) Oral every 12 hours  multivitamin 1 Tablet(s) Oral daily  pantoprazole    Tablet 40 milliGRAM(s) Oral before breakfast  simvastatin 20 milliGRAM(s) Oral at bedtime  tamsulosin Oral Tab/Cap - Peds 0.4 milliGRAM(s) Oral at bedtime  tiotropium 18 MICROgram(s) Capsule 1 Capsule(s) Inhalation daily    MEDICATIONS  (PRN):  ALBUTerol    0.083% 2.5 milliGRAM(s) Nebulizer four times a day PRN Shortness of Breath and/or Wheezing  HYDROmorphone   Tablet 2 milliGRAM(s) Oral every 4 hours PRN Moderate Pain (4 - 6)dyspnea  HYDROmorphone  Injectable 0.3 milliGRAM(s) IV Push every 3 hours PRN Severe Pain (7 - 10)dyspnea  senna 8.6 milliGRAM(s) Oral Tablet - Peds 2 Tablet(s) Oral at bedtime PRN for constipation    Pertinent Labs: 04-23 Na141 mmol/L Glu 123 mg/dL<H> K+ 3.7 mmol/L Cr  1.34 mg/dL<H> BUN 43 mg/dL<H> 04-17 Phos 3.9 mg/dL     CAPILLARY BLOOD GLUCOSE          Skin: art score = 12          Monitoring and Evaluation:   [x] PO intake/Nutr support infusion [ x ] Tolerance to Nutr [ x ] weights [ x ] labs[ x ] follow up per protocol  [ ] other:

## 2019-04-24 ENCOUNTER — TRANSCRIPTION ENCOUNTER (OUTPATIENT)
Age: 84
End: 2019-04-24

## 2019-04-24 LAB
ANION GAP SERPL CALC-SCNC: 7 MMOL/L — SIGNIFICANT CHANGE UP (ref 5–17)
BUN SERPL-MCNC: 43 MG/DL — HIGH (ref 7–23)
CALCIUM SERPL-MCNC: 8.5 MG/DL — SIGNIFICANT CHANGE UP (ref 8.5–10.1)
CHLORIDE SERPL-SCNC: 104 MMOL/L — SIGNIFICANT CHANGE UP (ref 96–108)
CO2 SERPL-SCNC: 31 MMOL/L — SIGNIFICANT CHANGE UP (ref 22–31)
CREAT SERPL-MCNC: 1.42 MG/DL — HIGH (ref 0.5–1.3)
GLUCOSE SERPL-MCNC: 106 MG/DL — HIGH (ref 70–99)
HCT VFR BLD CALC: 29.9 % — LOW (ref 39–50)
HGB BLD-MCNC: 9 G/DL — LOW (ref 13–17)
INR BLD: 3.16 RATIO — HIGH (ref 0.88–1.16)
INR BLD: 3.21 RATIO — HIGH (ref 0.88–1.16)
MAGNESIUM SERPL-MCNC: 2.4 MG/DL — SIGNIFICANT CHANGE UP (ref 1.6–2.6)
MCHC RBC-ENTMCNC: 25.1 PG — LOW (ref 27–34)
MCHC RBC-ENTMCNC: 30.1 GM/DL — LOW (ref 32–36)
MCV RBC AUTO: 83.5 FL — SIGNIFICANT CHANGE UP (ref 80–100)
NRBC # BLD: 0 /100 WBCS — SIGNIFICANT CHANGE UP (ref 0–0)
PLATELET # BLD AUTO: 164 K/UL — SIGNIFICANT CHANGE UP (ref 150–400)
POTASSIUM SERPL-MCNC: 3.6 MMOL/L — SIGNIFICANT CHANGE UP (ref 3.5–5.3)
POTASSIUM SERPL-SCNC: 3.6 MMOL/L — SIGNIFICANT CHANGE UP (ref 3.5–5.3)
PROTHROM AB SERPL-ACNC: 36.4 SEC — HIGH (ref 10–12.9)
PROTHROM AB SERPL-ACNC: 36.9 SEC — HIGH (ref 10–12.9)
RBC # BLD: 3.58 M/UL — LOW (ref 4.2–5.8)
RBC # FLD: 18.5 % — HIGH (ref 10.3–14.5)
SODIUM SERPL-SCNC: 142 MMOL/L — SIGNIFICANT CHANGE UP (ref 135–145)
WBC # BLD: 3.89 K/UL — SIGNIFICANT CHANGE UP (ref 3.8–10.5)
WBC # FLD AUTO: 3.89 K/UL — SIGNIFICANT CHANGE UP (ref 3.8–10.5)

## 2019-04-24 PROCEDURE — 99232 SBSQ HOSP IP/OBS MODERATE 35: CPT

## 2019-04-24 RX ORDER — FUROSEMIDE 40 MG
80 TABLET ORAL EVERY 12 HOURS
Qty: 0 | Refills: 0 | Status: DISCONTINUED | OUTPATIENT
Start: 2019-04-24 | End: 2019-04-25

## 2019-04-24 RX ADMIN — CARBIDOPA AND LEVODOPA 1 TABLET(S): 25; 100 TABLET ORAL at 06:10

## 2019-04-24 RX ADMIN — CARBIDOPA AND LEVODOPA 1 TABLET(S): 25; 100 TABLET ORAL at 22:24

## 2019-04-24 RX ADMIN — CLOPIDOGREL BISULFATE 75 MILLIGRAM(S): 75 TABLET, FILM COATED ORAL at 11:47

## 2019-04-24 RX ADMIN — TIOTROPIUM BROMIDE 1 CAPSULE(S): 18 CAPSULE ORAL; RESPIRATORY (INHALATION) at 07:43

## 2019-04-24 RX ADMIN — ALBUTEROL 2.5 MILLIGRAM(S): 90 AEROSOL, METERED ORAL at 18:45

## 2019-04-24 RX ADMIN — Medication 500 MILLIGRAM(S): at 11:47

## 2019-04-24 RX ADMIN — Medication 1 TABLET(S): at 11:47

## 2019-04-24 RX ADMIN — Medication 40 MILLIGRAM(S): at 06:10

## 2019-04-24 RX ADMIN — TAMSULOSIN HYDROCHLORIDE 0.4 MILLIGRAM(S): 0.4 CAPSULE ORAL at 22:24

## 2019-04-24 RX ADMIN — Medication 80 MILLIGRAM(S): at 18:44

## 2019-04-24 RX ADMIN — BUDESONIDE AND FORMOTEROL FUMARATE DIHYDRATE 2 PUFF(S): 160; 4.5 AEROSOL RESPIRATORY (INHALATION) at 07:43

## 2019-04-24 RX ADMIN — SIMVASTATIN 20 MILLIGRAM(S): 20 TABLET, FILM COATED ORAL at 22:24

## 2019-04-24 RX ADMIN — PANTOPRAZOLE SODIUM 40 MILLIGRAM(S): 20 TABLET, DELAYED RELEASE ORAL at 06:10

## 2019-04-24 RX ADMIN — BUDESONIDE AND FORMOTEROL FUMARATE DIHYDRATE 2 PUFF(S): 160; 4.5 AEROSOL RESPIRATORY (INHALATION) at 20:38

## 2019-04-24 RX ADMIN — CARBIDOPA AND LEVODOPA 1 TABLET(S): 25; 100 TABLET ORAL at 13:57

## 2019-04-24 NOTE — PROGRESS NOTE ADULT - ASSESSMENT
89 year old Man [facility] resident with hx of CAD s/p PCI/CABG,  HFrEF, s/p AICD, HTN, dyslipidemia, CVA, orthostatic hypotension, CKD III, A fib on Coumadin, Parkinson's presented from [facility] via EMS for worsening SOB with O2 desat.     #Acute respiratory failure/hypoxia mulitfactorial: Acute on chronic HFrEF superimposed by valvular insufficiency  - LVEF 35-40% / mod-severe TR / Mod MR on 1/2018 TTE  - daily wts / strict I & O /  Fluid restriction 1500ml daily  - will start lasix 80 mg PO BID  - continue metoprolol  - supplemental oxygen as needed  - cardiology following    #Pleural effusion  - CXR - large R effusion, and small left effusion  - continue lasix    #Afib  - rate controlled  - continue coumadin  - continue metoprolol - decreased from 12.5 mg BID to 12.5 mg QD today due to bradycardia  - will hold coumadin tonight  - daily INR for goal of 2-3  - [id number] Score = 7 (age-2, HTN-1, CHF-1, CVA-2, vasc disease-1)    #Hypotension  - multifactorial: opiates, hypoalbuminemia, medications  - continue BP meds with holding parameters  - continue to monitor    #Hypoalbuminemia   - will consider albumin  - continue regular diet  - nutritionist recommendation appreciated    #Bradycardia  - continue metoprolol - decreased from 12.5 mg BID to 12.5 mg QD today  - continue to monitor  - cardiology following     #CKD Stage III   - creatinine stable  - continue flomax     #Parkinson Disease/ Dementia  - continue Sinemet / Aricept     #CAD s/p PCI and CABG  - continue plavix/statin/BB     #DVT ppx  - on coumadin

## 2019-04-24 NOTE — PROGRESS NOTE ADULT - SUBJECTIVE AND OBJECTIVE BOX
89 year old Man [facility] resident with hx of CAD s/p PCI/CABG,  HFrEF, s/p AICD, HTN, dyslipidemia, CVA, orthostatic hypotension, CKD III, A fib on Coumadin, Parkinson's presented from [facility] via EMS for worsening SOB with O2 desat.  O2 sat 90% en route to ED. In the ED  /50 initially then drop to 70's systolic, HR 50, O2 sat 99% with O2, RR 20. Denies CP/palpitation/HA/dizziness/abd pain/n/v/d/f/c    He received NS 1L IV bolus, vanco 1gm and cefepime 1gm, ventura placed    Interval history:  Patient evaluated at bedside. States that he dyspnea is improving. Patient is urinating frequently. Denies chest pain, fever, chills. [facility] meeting pending.    REVIEW OF SYSTEMS:    CONSTITUTIONAL: +weakness, No fevers or chills  EYES/ENT: No visual changes;  No vertigo or throat pain   NECK: No pain or stiffness  RESPIRATORY: +cough, No wheezing, hemoptysis; No shortness of breath  CARDIOVASCULAR: No chest pain or palpitations  GASTROINTESTINAL: No abdominal or epigastric pain. No nausea, vomiting, or hematemesis; No diarrhea or constipation. No melena or hematochezia.  GENITOURINARY: No dysuria, frequency or hematuria  NEUROLOGICAL: No numbness or weakness  SKIN: No itching, burning, rashes, or lesions   All other review of systems is negative unless indicated above.    MEDICATIONS  (STANDING):  ascorbic acid 500 milliGRAM(s) Oral daily  buDESOnide 160 MICROgram(s)/formoterol 4.5 MICROgram(s) Inhaler 2 Puff(s) Inhalation two times a day  carbidopa/levodopa  25/100 1 Tablet(s) Oral three times a day  clopidogrel Tablet 75 milliGRAM(s) Oral daily  furosemide    Tablet 80 milliGRAM(s) Oral every 12 hours  metoprolol tartrate 12.5 milliGRAM(s) Oral every 12 hours  multivitamin 1 Tablet(s) Oral daily  pantoprazole    Tablet 40 milliGRAM(s) Oral before breakfast  simvastatin 20 milliGRAM(s) Oral at bedtime  tamsulosin Oral Tab/Cap - Peds 0.4 milliGRAM(s) Oral at bedtime  tiotropium 18 MICROgram(s) Capsule 1 Capsule(s) Inhalation daily    MEDICATIONS  (PRN):  ALBUTerol    0.083% 2.5 milliGRAM(s) Nebulizer four times a day PRN Shortness of Breath and/or Wheezing  HYDROmorphone   Tablet 2 milliGRAM(s) Oral every 4 hours PRN Moderate Pain (4 - 6)dyspnea  HYDROmorphone  Injectable 0.3 milliGRAM(s) IV Push every 3 hours PRN Severe Pain (7 - 10)dyspnea  senna 8.6 milliGRAM(s) Oral Tablet - Peds 2 Tablet(s) Oral at bedtime PRN for constipation    Vital Signs (24 Hrs):  T(C): 36.8 (19 @ 11:10), Max: 36.8 (19 @ 11:10)  HR: 50 (19 @ 11:10) (49 - 70)  BP: 109/38 (19 @ 11:10) (99/51 - 112/49)  RR: 18 (19 @ 11:10) (18 - 22)  SpO2: 98% (19 @ 11:10) (92% - 98%)  Wt(kg): --  Daily     Daily Weight in k.4 (2019 05:45)    I&O's Summary    2019 07:01  -  2019 07:00  --------------------------------------------------------  IN: 600 mL / OUT: 150 mL / NET: 450 mL      PHYSICAL EXAM:  GENERAL: NAD, well-developed  HEAD:  Atraumatic, Normocephalic  EYES: EOMI, PERRLA, conjunctiva and sclera clear  NECK: Supple, No JVD  CHEST/LUNG: b/l diffuse crackles  HEART: Regular rate and rhythm; No murmurs, rubs, or gallops  ABDOMEN: Soft, Nontender, Nondistended; Bowel sounds present  EXTREMITIES: 2/3+ pitting edema b/l; chronic venous stasis changes noted b/l   PSYCH: AAOx3  NEUROLOGY: non-focal  SKIN: No rashes or lesions    LABS:                        9.0    3.89  )-----------( 164      ( 2019 06:11 )             29.9     2019 06:11    142    |  104    |  43     ----------------------------<  106    3.6     |  31     |  1.42     Ca    8.5        2019 06:11  Mg     2.4       2019 06:11      PT/INR - ( 2019 06:11 )   PT: 36.4 sec;   INR: 3.16 ratio         < from: CT Chest No Cont (19 @ 09:13) >  IMPRESSION: Large right effusion and small left effusion with adjacent   atelectasis. No definite evidence of pneumonia.    < end of copied text >        < from: Transthoracic Echocardiogram (19 @ 11:54) >  Impression     Summary     Normal leftventricular size with moderate, global systolic dysfunction.   Estimated left ventricular ejection fraction is 35%. Septal flattening is   seen; this finding is consistent with right heart pressure / volume   overload. Paradoxical septal motion.   The right ventricle exhibits mild dilation, mild diffuse hypokinesis, and   mild depression of contractility.   Biatrial enlargement.   Moderate mitral regurgitation.   Moderate to severe tricuspid valve regurgitation.   Mild pulmonary hypertension.    < end of copied text >

## 2019-04-24 NOTE — PROGRESS NOTE ADULT - SUBJECTIVE AND OBJECTIVE BOX
PCP:    REQUESTING PHYSICIAN:    REASON FOR CONSULT:    CHIEF COMPLAINT:    HPI:89 year old man with a history of CAD s/p CABG x3 and PCI, AF, chronic systolic HF, ICD, HTN, HLD, severe pulmonary HTN, mitral/tricuspid insufficiency, Parkinson's Disease admitted 4/15/19 after presenting to the ED with dyspnea, hypoxia, and hypotension.    19:  "I feel a little better."  No new complaints.    19 Patient is feeling little better , still has anasarca decreasing     19 Patient is comfortable ,   anasarca  including sctrotal edema despite on iv diuretic , low normal BP episodes , prior hx of orthostatic hypotension on midodrine at home   19 Patient is feeling ok ,  low normal S BP , did not recieve IV lasix this due to low BP ,   19 Patient is feeling fatigue ,breathing not that great , on oral lasix    low normal SBP , sitting in chair denies dizziness  19 Feels improved. Sitting in chair. No chest pain    PAST MEDICAL & SURGICAL HISTORY:  COPD (chronic obstructive pulmonary disease)  Parkinson's disease  CHF (congestive heart failure)  Hyperlipidemia  HTN (hypertension)  CAD (coronary artery disease)  AICD (automatic cardioverter/defibrillator) present  S/P CABG      SOCIAL HISTORY:    FAMILY HISTORY:      ALLERGIES:  Allergies    morphine (Headache)    Intolerances        MEDICATIONS:    MEDICATIONS  (STANDING):  ascorbic acid 500 milliGRAM(s) Oral daily  buDESOnide 160 MICROgram(s)/formoterol 4.5 MICROgram(s) Inhaler 2 Puff(s) Inhalation two times a day  carbidopa/levodopa  25/100 1 Tablet(s) Oral three times a day  clopidogrel Tablet 75 milliGRAM(s) Oral daily  furosemide   Injectable 40 milliGRAM(s) IV Push every 12 hours  metoprolol tartrate 12.5 milliGRAM(s) Oral every 12 hours  multivitamin 1 Tablet(s) Oral daily  pantoprazole    Tablet 40 milliGRAM(s) Oral before breakfast  simvastatin 20 milliGRAM(s) Oral at bedtime  tamsulosin Oral Tab/Cap - Peds 0.4 milliGRAM(s) Oral at bedtime  tiotropium 18 MICROgram(s) Capsule 1 Capsule(s) Inhalation daily    MEDICATIONS  (PRN):  ALBUTerol    0.083% 2.5 milliGRAM(s) Nebulizer four times a day PRN Shortness of Breath and/or Wheezing  HYDROmorphone   Tablet 2 milliGRAM(s) Oral every 4 hours PRN Moderate Pain (4 - 6)dyspnea  HYDROmorphone  Injectable 0.3 milliGRAM(s) IV Push every 3 hours PRN Severe Pain (7 - 10)dyspnea  senna 8.6 milliGRAM(s) Oral Tablet - Peds 2 Tablet(s) Oral at bedtime PRN for constipation    All other review of systems is negative unless indicated above    Vital Signs Last 24 Hrs  T(C): 36.4 (2019 06:02), Max: 37 (2019 11:46)  T(F): 97.5 (2019 06:02), Max: 98.6 (2019 11:46)  HR: 68 (2019 07:47) (49 - 70)  BP: 112/49 (2019 06:02) (99/51 - 112/49)  BP(mean): --  RR: 18 (2019 06:02) (18 - 22)  SpO2: 96% (2019 06:02) (92% - 100%)Daily     Daily Weight in k.4 (2019 05:45)I&O's Summary    2019 07:01  -  2019 07:00  --------------------------------------------------------  IN: 600 mL / OUT: 150 mL / NET: 450 mL        PHYSICAL EXAM:    Constitutional: NAD, awake and alert, well-developed  HEENT: PERR, EOMI,  No oral cyananosis.  Neck:  supple,  No JVD  Respiratory: Breath sounds are clear bilaterally, No wheezing, rales or rhonchi  Cardiovascular: S1 and S2, regular rate and rhythm, no Murmurs, gallops or rubs  Gastrointestinal: Bowel Sounds present, soft, nontender.   Extremities: No peripheral edema. No clubbing or cyanosis.  Vascular: 2+ peripheral pulses  Neurological: A/O x 3, no focal deficits  Musculoskeletal: no calf tenderness.  Skin: No rashes.      LABS: All Labs Reviewed:                        9.0    3.89  )-----------( 164      ( 2019 06:11 )             29.9                         9.3    4.47  )-----------( 173      ( 2019 05:39 )             30.9                         9.0    3.97  )-----------( 159      ( 2019 07:07 )             30.3     2019 06:11    142    |  104    |  43     ----------------------------<  106    3.6     |  31     |  1.42   2019 05:39    141    |  104    |  43     ----------------------------<  123    3.7     |  34     |  1.34   2019 07:08    145    |  105    |  40     ----------------------------<  105    3.1     |  34     |  1.28     Ca    8.5        2019 06:11  Ca    8.3        2019 05:39  Ca    8.0        2019 07:08  Mg     2.4       2019 06:11  Mg     2.2       2019 07:08      PT/INR - ( 2019 06:11 )   PT: 36.4 sec;   INR: 3.16 ratio               Blood Culture:         RADIOLOGY/EKG:< from: 12 Lead ECG (19 @ 17:20) >  Diagnosis Line Sinus rhythm with complete heart block and Ventricular-paced rhythm  Abnormal ECG  Confirmed by KASSIDY MCKENZIE MD ([id number]) on 2019 9:06:30 PM    < end of copied text >  < from: Transthoracic Echocardiogram (19 @ 11:54) >  Impression     Summary     Normal leftventricular size with moderate, global systolic dysfunction.   Estimated left ventricular ejection fraction is 35%. Septal flattening is   seen; this finding is consistent with right heart pressure / volume   overload. Paradoxical septal motion.   The right ventricle exhibits mild dilation, mild diffuse hypokinesis, and   mild depression of contractility.   Biatrial enlargement.   Moderate mitral regurgitation.   Moderate to severe tricuspid valve regurgitation.   Mild pulmonary hypertension.    Signature     ----------------------------------------------------------------   Electronically signed by Vern Villa MD(Interpreting   physician) on 2019 12:36 PM   ----------------------------------------------------------------    < end of copied text >        ECHO/CARDIAC CATHTERIZATION/STRESS TEST:

## 2019-04-24 NOTE — DISCHARGE NOTE PROVIDER - CARE PROVIDER_API CALL
Federico Huynh)  Internal Medicine  [address] [place] [address]  Phone: [phone]  Fax: [phone]  Follow Up Time:

## 2019-04-24 NOTE — PROGRESS NOTE ADULT - SUBJECTIVE AND OBJECTIVE BOX
HPI: Pt is a 89y old Male with hx of CAD s/p PCI/CABG,  HFrEF, s/p AICD, HTN, dyslipidemia, CVA, orthostatic hypotension, CKD III, A fib on Coumadin, Parkinson's presented from [facility] for worsening SOB with O2 desat. Pt hypotensive in the ED and was admitted for increased dyspnea secondary to heart failure. CT chest reveals sola pleural effusions with atelectasis. Palliative Consult to further establish GOC.  4/22/19 Seen and examined at bedside with no family present. Pt eating breakfast. Denies dyspnea at rest however states he is moderatley dyspneic on exertion. Also C/O pain in SOLA lower extremities upon movement.  4/23/19 Seen and examined at bedside with no family present. Denies pain or dyspnea however appears to have mild dyspnea at rest. Eating breakfast.  4/24/19 Seen and examined at bedside with no family present. States he feels better today. OOB/chair. Denies pain or dyspnea  PAIN: ( )Yes   ( X)No  DYSPNEA: (X ) Yes  ( ) No  Level: on exertion    PAST MEDICAL & SURGICAL HISTORY:  COPD (chronic obstructive pulmonary disease)  Parkinson's disease  CHF (congestive heart failure)  Hyperlipidemia  HTN (hypertension)  CAD (coronary artery disease)  AICD (automatic cardioverter/defibrillator) present  S/P CABG    SOCIAL HX:  Comes from [facility]  Hx opiate tolerance ( )YES  (X )NO    Baseline ADLs  (Prior to Admission)  ( ) Independent   (X )Dependent    FAMILY HISTORY:  Unable to provide poor historian    Review of Systems:    Anxiety-denies  Physical Discomfort-mod  Dyspnea-on exertion  Constipation-denies cant recall last BM  Anorexia-denies  Fatigue-mod-improved  Weakness-severe      All other systems reviewed and negative  Unable to obtain/Limited due to: poor historian      PHYSICAL EXAM:  ICU Vital Signs Last 24 Hrs  T(C): 36.4 (24 Apr 2019 06:02), Max: 37 (23 Apr 2019 11:46)  T(F): 97.5 (24 Apr 2019 06:02), Max: 98.6 (23 Apr 2019 11:46)  HR: 68 (24 Apr 2019 07:47) (49 - 70)  BP: 112/49 (24 Apr 2019 06:02) (99/51 - 112/49)  RR: 18 (24 Apr 2019 06:02) (18 - 22)  SpO2: 96% (24 Apr 2019 06:02) (92% - 100%)    PPSV2:  40-50%    General: Elderly male in the chair in NAD  Mental Status: A&O X3/ unable to provide hx of present illness  HEENT: nasal O2 intact  Lungs: exp wheezes sola  Cardiac: S1S2+  GI: abd soft NT ND + BS  : voids  Ext: BLE edema/erythema   Neuro: no focal def      LABS:                         9.0    3.89  )-----------( 164      ( 24 Apr 2019 06:11 )             29.9           04-24    142  |  104  |  43<H>  ----------------------------<  106<H>  3.6   |  31  |  1.42<H>    Ca    8.5      24 Apr 2019 06:11  Mg     2.4     04-24      PTT - ( 22 Apr 2019 07:08 )  PTT:41.0 sec  Albumin: Albumin, Serum: 2.9 g/dL (04-15 @ 20:00)      Allergies    morphine (Headache)    Intolerances      RADIOLOGY/ADDITIONAL STUDIES:

## 2019-04-24 NOTE — PROGRESS NOTE ADULT - ASSESSMENT
HPI: Pt is a 89y old Male with hx of CAD s/p PCI/CABG,  HFrEF, s/p AICD, HTN, dyslipidemia, CVA, orthostatic hypotension, CKD III, A fib on Coumadin, Parkinson's presented from [facility] for worsening SOB with O2 desat. Pt hypotensive in the ED and was admitted for increased dyspnea secondary to heart failure. CT chest reveals sola pleural effusions with atelectasis. Palliative Consult to further establish GOC.  4/22/19 Seen and examined at bedside with no family present. Pt eating breakfast. Denies dyspnea at rest however states he is moderately dyspneic on exertion. Also C/O pain in SOLA lower extremities upon movement.    Assessment and Plan:    1) Dyspnea  -Mod on exertion as per pt  -CT chest=sola pleural effusions/atelectasis  -H/O CAD  -H/O heart failure  -Supplemental O2 PRN  -cardio eval noted  -pulm eval noted  -cont inhalers  -cont Dilaudid 2 mg PO Q4H PRN mod pain/dyspnea  -cont Dilaudid 0.3 IVP Q3H PRN severe pain/dyspnea    2) Acute on chronic heart failure  -EF=35%  -CAD s/p CABG  -cont home meds  -cardio eval noted  -Afib  -S/P AICD    3) CKD  -Improved with hydration  -Diuresis as tolerated  -monitor renal function    4) Debility  -Overall deconditioning  -R/T dyspnea  -R/T BLE edema  -Limited mobility  -PT eval noted  -ERICKSON ?    5) Advanced Directives  -Pt with limited capacity  -defers to son  -Copy of MOLST on file reviewed 3/18/19  -DNR   -GOC conversation scheduled 4/24/19 2P

## 2019-04-24 NOTE — PROGRESS NOTE ADULT - ATTENDING COMMENTS
he has an ADHF- (HFrEF)-> acute resp failure.  will diureses, will need ACE-I and step up the BB.  consider entresto.  stop steroids, stop abx's.  Advanced directives reviewed. DNR
As above, treatment plan formulated on rounds.
As above, treatment plan formulated on rounds.
Patient seen and examined with Family Medicine Residents Jamir Mansfield, Radha Rodriguez, and Ashwini Fernandes on the Family Medicine Teaching Service.  Agree with history, physical, labs and plan which were reviewed in detail after a face to face encounter with the patient.
creat climbing thus lasix ndecreased, though he is still volume overloaded.  will f/u echo, and need to add ACE and increase BB when better compensated.
on exam- comfortable   -rs-aeeb, cta  -p/a-soft, bs+  -cvs-s1s2 normal    #overall poor prognosis and very high risk for re-admission    #Family meeting today for goal of care     #d/c plan
still volume overloaded.  he needs more aggressive diuresis.  we spoke about advanced directives and prognosis.  he would agree to [facility] eval but wants us and them to speak to his son first.
he feels less dyspneic.  lungs- still with bilateral rales (R>L) at least 1/3 up and a few scattered wheezes    imp- ADHF, cardiac asthma/COPD  plan- increase lasix, add LAMA, increase GEOVANNA, no steroids yet.     correct hypokalemia
Patient seen and examined with Family Medicine Residents Jamir Mansfield, Yogesh Kam, and NP student Teresa Pelayo on the Family Medicine Teaching Service.  Agree with history, physical, labs and plan which were reviewed in detail after a face to face encounter with the patient.

## 2019-04-24 NOTE — GOALS OF CARE CONVERSATION - PERSONAL ADVANCE DIRECTIVE - NS PRO AD PATIENT TYPE ON CHART
Health Care Proxy (HCP)/Do Not Resuscitate (DNR)/Medical Orders for Life-Sustaining Treatment (MOLST)
Do Not Resuscitate (DNR)

## 2019-04-24 NOTE — PROGRESS NOTE ADULT - PROBLEM SELECTOR PROBLEM 4
R/O CAD (coronary artery disease)
HTN (hypertension)
Peripheral edema

## 2019-04-24 NOTE — GOALS OF CARE CONVERSATION - PERSONAL ADVANCE DIRECTIVE - NS PRO AD PATIENT TYPE
Health Care Proxy (HCP)/Medical Orders for Life-Sustaining Treatment (MOLST)/Do Not Resuscitate (DNR)
Health Care Proxy (HCP)/Do Not Resuscitate (DNR)

## 2019-04-24 NOTE — DISCHARGE NOTE PROVIDER - HOSPITAL COURSE
89 year old Man [facility] resident with hx of CAD s/p PCI/CABG,  HFrEF, s/p AICD, HTN, dyslipidemia, CVA, orthostatic hypotension, CKD III, A fib on Coumadin, Parkinson's presented from [facility] via EMS for worsening SOB with O2 desat.  O2 sat 90% en route to ED. In the ED  /50 initially then drop to 70's systolic, HR 50, O2 sat 99% with O2, RR 20. Denies CP/palpitation/HA/dizziness/abd pain/n/v/d/f/c. ECHO revealed EF of 35-40%. Patient admitted for CHF exacerbation in which he received aggressive diuresis. Patient is now stable for discharge. Will follow up with outpatient providers for further management. 89 year old Man [facility] resident with hx of CAD s/p PCI/CABG,  HFrEF, s/p AICD, HTN, dyslipidemia, CVA, orthostatic hypotension, CKD III, A fib on Coumadin, Parkinson's presented from [facility] via EMS for worsening SOB with O2 desat.  O2 sat 90% en route to ED. In the ED  /50 initially then drop to 70's systolic, HR 50, O2 sat 99% with O2, RR 20. Denies CP/palpitation/HA/dizziness/abd pain/n/v/d/f/c. ECHO revealed EF of 35-40%. Patient admitted for CHF exacerbation in which he received aggressive diuresis. Patient is now stable for discharge. Will follow up with outpatient providers for further management.        Interval history    Patient evaluated at bedside. No acute complaints. No overnight events. Denies SOB, cough, chest pain. Stable for discharge. 89 year old Man [facility] resident with hx of CAD s/p PCI/CABG,  HFrEF, s/p AICD, HTN, dyslipidemia, CVA, orthostatic hypotension, CKD III, A fib on Coumadin, Parkinson's presented from [facility] via EMS for worsening SOB with O2 desat.  O2 sat 90% en route to ED. In the ED  /50 initially then drop to 70's systolic, HR 50, O2 sat 99% with O2, RR 20. Denies CP/palpitation/HA/dizziness/abd pain/n/v/d/f/c. ECHO revealed EF of 35-40%. Patient admitted for CHF exacerbation in which he received aggressive diuresis. Patient is now DNR/DNI; AICD now turned off. Patient is now stable for discharge. Will follow up with outpatient providers for further management. Chidi trevino home updated with hospital course and discharge plan.         Interval history:    Patient evaluated at bedside. No acute complaints. No overnight events. Denies SOB, cough, chest pain. Stable for discharge. 89 yo male from [facility] with PMH significant for HFrEF, CAD s/p PCI and CABG, with AICD, and Afib (on Coumadin) brought in by EMS for worsening SOB with oxygen desaturation, admitted for Acute respiratory failure with hypoxia secondary to acute on chronic heart failure. CT chest on admission with large pleural effusion R>L lung,  and with adjacent atelectasis, no evidence of PNA, BNP of ~1800, pt with 3-4+ pitting edema of b/l LE. Pt was placed on high dose IV Lasix for diuresis with appropriate adjustment as pt’s sxs improved. Pt also required O2 supplementation via nasal cannula for respiratory support. Pt was followed by both Pulmonology and Cardiology teams throughout hospital course. An echo obtained showed LVEF of 35%, with mild diffuse hypokinesis of the right ventricle reported (see below for full report). Pt’s Coumadin for Afib was adjusted accordingly to maintain INR level at therapeutic range of 2-3. Rest of pt’s hospital course was uneventful with no further acute issues. Patient is now DNR/DNI; AICD now turned off. Mancia catheter on admission was discontinued following successful trial of void. Patient will be discharged back to [facility] whom is aware of hospital course and discharge plan.         Interval history    Patient evaluated at bedside. No acute complaints. No overnight events. Denies SOB, cough, chest pain. Stable for discharge.

## 2019-04-24 NOTE — DISCHARGE NOTE PROVIDER - NSDCCPTREATMENT_GEN_ALL_CORE_FT
PRINCIPAL PROCEDURE  Procedure: 2D echo  Findings and Treatment:    Impression   Summary   Normal leftventricular size with moderate, global systolic dysfunction.   Estimated left ventricular ejection fraction is 35%. Septal flattening is   seen; this finding is consistent with right heart pressure / volume   overload. Paradoxical septal motion.   The right ventricle exhibits mild dilation, mild diffuse hypokinesis, and   mild depression of contractility.   Biatrial enlargement.   Moderate mitral regurgitation.   Moderate to severe tricuspid valve regurgitation.   Mild pulmonary hypertension.

## 2019-04-24 NOTE — DISCHARGE NOTE PROVIDER - NSDCCPCAREPLAN_GEN_ALL_CORE_FT
PRINCIPAL DISCHARGE DIAGNOSIS  Diagnosis: Heart failure  Assessment and Plan of Treatment: - LVEF 35-40% / mod-severe TR / Mod MR on 1/2018 TTE  - monitor your weight  - low salt diet  - fluid restriction 1500 ml daily  - continue lasix as prescirbed   - continue metoprolol  - follow up with your cardiologist        SECONDARY DISCHARGE DIAGNOSES  Diagnosis: Afib  Assessment and Plan of Treatment: - rate controlled  - continue coumadin; follow up with primary doctor to have INR checked in 3 days. Goal INR is 2-3.  - metoprolol - decreased from 12.5 mg two times a day to 12.5 mg once daily  due to low heart rate .  - [id number] Score = 7 (age-2, HTN-1, CHF-1, CVA-2, vasc disease-1) PRINCIPAL DISCHARGE DIAGNOSIS  Diagnosis: Heart failure  Assessment and Plan of Treatment: - LVEF 35-40% / mod-severe TR / Mod MR on 1/2018 TTE  - monitor your weight frequently  - low salt diet  - fluid restriction 1500 ml daily  - continue lasix 80 mg daily every 12 hours  - continue metoprolol  - monitor electrolytes frequently and replete if necessary  - monitor blood pressure and have medical providers adjust medications as necessary  - follow up with your cardiologist and primary care providers  - DNR/DNI, see MOLST form        SECONDARY DISCHARGE DIAGNOSES  Diagnosis: Afib  Assessment and Plan of Treatment: - rate controlled  - Hold coumadin now due to elevated INR. INR on discharge is now 3.21. Follow up with primary care providers to have IR checked tomorrow and adjust coumadin as necessary.   - Goal INR is 2-3.  - metoprolol - decreased from 12.5 mg two times a day to 12.5 mg once daily  due to low heart rate .  - [id number] Score = 7 (age-2, HTN-1, CHF-1, CVA-2, vasc disease-1)  - follow up with cardiologist and primary care providers PRINCIPAL DISCHARGE DIAGNOSIS  Diagnosis: Heart failure  Assessment and Plan of Treatment: - LVEF 35-40% / mod-severe TR / Mod MR on 1/2018 TTE  - monitor your weight frequently  - low salt diet  - fluid restriction 1500 ml daily  - continue lasix 80 mg daily every 12 hours  - continue metoprolol  - monitor electrolytes frequently and replete if necessary  - monitor renal function frequently  - monitor blood pressure and have medical providers adjust medications as necessary  - follow up with your cardiologist and primary care providers  - DNR/DNI, see MOLST form        SECONDARY DISCHARGE DIAGNOSES  Diagnosis: Afib  Assessment and Plan of Treatment: - rate controlled  - Hold coumadin now due to elevated INR. INR on discharge is now 3.21. Follow up with primary care providers to have INR checked tomorrow and adjust coumadin as necessary.   - Goal INR is 2-3.  - metoprolol - decreased from 12.5 mg two times a day to 12.5 mg once daily  due to low heart rate .  - [id number] Score = 7 (age-2, HTN-1, CHF-1, CVA-2, vasc disease-1)  - follow up with cardiologist and primary care providers PRINCIPAL DISCHARGE DIAGNOSIS  Diagnosis: Heart failure  Assessment and Plan of Treatment: - LVEF 35-40% / mod-severe TR / Mod MR on 1/2018 TTE  - monitor your weight frequently  - low salt diet  - fluid restriction 1500 ml daily  - continue lasix 80 mg daily every 12 hours  - continue metoprolol  - monitor electrolytes frequently and replete if necessary  - monitor renal function frequently  - monitor blood pressure and have medical providers adjust medications as necessary  - follow up with your cardiologist and primary care providers  - DNR/DNI, see MOLST form        SECONDARY DISCHARGE DIAGNOSES  Diagnosis: Afib  Assessment and Plan of Treatment: - rate controlled  - Hold coumadin now due to elevated INR. INR on discharge is now 3.21. Follow up with primary care providers to have INR checked tomorrow and adjust coumadin as necessary.   - Goal INR is 2-3.  - continue metoprolol   - [id number] Score = 7 (age-2, HTN-1, CHF-1, CVA-2, vasc disease-1)  - follow up with cardiologist and primary care providers

## 2019-04-24 NOTE — GOALS OF CARE CONVERSATION - PERSONAL ADVANCE DIRECTIVE - CONVERSATION DETAILS
The role of palliative medicine was reviewed with the pt's son who discussed his father's decline over the past 3 months with repeat hospitalizations due to his chronic heart failure. He would like him to return to [facility] for [facility] when he is ready for disch. We discussed home hospice as the pt would qualify after he optimizes his strength. They had a positive experience with [facility] when his mother  .  We reviewed and completed  the MOLST DNR/DNI/LMT/STH/NFT/TrialIV/determin use abx placed on chart. 45 minutes spent discussing advanced care planning.
Team met with pts son to discuss goals of care, assist with planning and provide supportive counseling. Pt. has been at [facility] for [facility] and prior to lived home with his son David. Pts son discussed how they were very on top of his diet at home and that pt. was functioning.     Pts current medical condition and goals of care discussed. Pts son has a good understanding of pts medical issues. He reports their plan at this time is for pt. to return to [facility] for [facility] with the goal of returning home. Pts son reports [facility] is assisting them with the Community medicaid process to help them get aides at home. Team also reviewed hospice services for the future if pt. ever got to the point where he just wanted to focus on Hospice. Pts son was receptive to the information as pts wife was on [facility] and they had a positive experience. They are not quite ready for a comfort focus right now but are receptive to this in the future if pt. continues to decline.    MOLST was reviewed and completed. MOLST states DNR/DNI, Limited Medical, Send to Hospital, No feeding tube, Trial of Iv fluids, Determine use of antibiotics.    Plan at this time is for pt. to return to [facility] for [facility].

## 2019-04-25 ENCOUNTER — TRANSCRIPTION ENCOUNTER (OUTPATIENT)
Age: 84
End: 2019-04-25

## 2019-04-25 VITALS
RESPIRATION RATE: 18 BRPM | SYSTOLIC BLOOD PRESSURE: 98 MMHG | TEMPERATURE: 97 F | HEART RATE: 84 BPM | DIASTOLIC BLOOD PRESSURE: 47 MMHG | OXYGEN SATURATION: 99 %

## 2019-04-25 LAB
ANION GAP SERPL CALC-SCNC: 4 MMOL/L — LOW (ref 5–17)
BUN SERPL-MCNC: 42 MG/DL — HIGH (ref 7–23)
CALCIUM SERPL-MCNC: 8.5 MG/DL — SIGNIFICANT CHANGE UP (ref 8.5–10.1)
CHLORIDE SERPL-SCNC: 104 MMOL/L — SIGNIFICANT CHANGE UP (ref 96–108)
CO2 SERPL-SCNC: 32 MMOL/L — HIGH (ref 22–31)
CREAT SERPL-MCNC: 1.37 MG/DL — HIGH (ref 0.5–1.3)
GLUCOSE SERPL-MCNC: 99 MG/DL — SIGNIFICANT CHANGE UP (ref 70–99)
HCT VFR BLD CALC: 30.1 % — LOW (ref 39–50)
HGB BLD-MCNC: 9.3 G/DL — LOW (ref 13–17)
MAGNESIUM SERPL-MCNC: 2.3 MG/DL — SIGNIFICANT CHANGE UP (ref 1.6–2.6)
MCHC RBC-ENTMCNC: 25.5 PG — LOW (ref 27–34)
MCHC RBC-ENTMCNC: 30.9 GM/DL — LOW (ref 32–36)
MCV RBC AUTO: 82.5 FL — SIGNIFICANT CHANGE UP (ref 80–100)
NRBC # BLD: 0 /100 WBCS — SIGNIFICANT CHANGE UP (ref 0–0)
PLATELET # BLD AUTO: 161 K/UL — SIGNIFICANT CHANGE UP (ref 150–400)
POTASSIUM SERPL-MCNC: 3.4 MMOL/L — LOW (ref 3.5–5.3)
POTASSIUM SERPL-SCNC: 3.4 MMOL/L — LOW (ref 3.5–5.3)
RBC # BLD: 3.65 M/UL — LOW (ref 4.2–5.8)
RBC # FLD: 18.6 % — HIGH (ref 10.3–14.5)
SODIUM SERPL-SCNC: 140 MMOL/L — SIGNIFICANT CHANGE UP (ref 135–145)
WBC # BLD: 3.78 K/UL — LOW (ref 3.8–10.5)
WBC # FLD AUTO: 3.78 K/UL — LOW (ref 3.8–10.5)

## 2019-04-25 PROCEDURE — 99232 SBSQ HOSP IP/OBS MODERATE 35: CPT

## 2019-04-25 PROCEDURE — 93010 ELECTROCARDIOGRAM REPORT: CPT

## 2019-04-25 PROCEDURE — 93287 PERI-PX DEVICE EVAL & PRGR: CPT | Mod: 26

## 2019-04-25 RX ORDER — FUROSEMIDE 40 MG
1 TABLET ORAL
Qty: 0 | Refills: 0 | DISCHARGE
Start: 2019-04-25

## 2019-04-25 RX ORDER — POTASSIUM CHLORIDE 20 MEQ
40 PACKET (EA) ORAL ONCE
Qty: 0 | Refills: 0 | Status: COMPLETED | OUTPATIENT
Start: 2019-04-25 | End: 2019-04-25

## 2019-04-25 RX ORDER — FUROSEMIDE 40 MG
1 TABLET ORAL
Qty: 0 | Refills: 0 | COMMUNITY

## 2019-04-25 RX ORDER — WARFARIN SODIUM 2.5 MG/1
1 TABLET ORAL
Qty: 0 | Refills: 0 | COMMUNITY

## 2019-04-25 RX ADMIN — PANTOPRAZOLE SODIUM 40 MILLIGRAM(S): 20 TABLET, DELAYED RELEASE ORAL at 06:03

## 2019-04-25 RX ADMIN — Medication 40 MILLIEQUIVALENT(S): at 11:21

## 2019-04-25 RX ADMIN — CARBIDOPA AND LEVODOPA 1 TABLET(S): 25; 100 TABLET ORAL at 06:03

## 2019-04-25 RX ADMIN — BUDESONIDE AND FORMOTEROL FUMARATE DIHYDRATE 2 PUFF(S): 160; 4.5 AEROSOL RESPIRATORY (INHALATION) at 08:21

## 2019-04-25 RX ADMIN — Medication 1 TABLET(S): at 11:22

## 2019-04-25 RX ADMIN — Medication 80 MILLIGRAM(S): at 06:03

## 2019-04-25 RX ADMIN — ALBUTEROL 2.5 MILLIGRAM(S): 90 AEROSOL, METERED ORAL at 12:43

## 2019-04-25 RX ADMIN — TIOTROPIUM BROMIDE 1 CAPSULE(S): 18 CAPSULE ORAL; RESPIRATORY (INHALATION) at 08:20

## 2019-04-25 RX ADMIN — Medication 12.5 MILLIGRAM(S): at 06:03

## 2019-04-25 RX ADMIN — Medication 500 MILLIGRAM(S): at 11:22

## 2019-04-25 RX ADMIN — CLOPIDOGREL BISULFATE 75 MILLIGRAM(S): 75 TABLET, FILM COATED ORAL at 11:22

## 2019-04-25 RX ADMIN — CARBIDOPA AND LEVODOPA 1 TABLET(S): 25; 100 TABLET ORAL at 14:12

## 2019-04-25 NOTE — PROGRESS NOTE ADULT - PROBLEM SELECTOR PLAN 3
No angina; normal serial assays of troponin early during hospital course.
Continue metoprolol.
No angina; normal serial assays of troponin.

## 2019-04-25 NOTE — PROGRESS NOTE ADULT - REASON FOR ADMISSION
Hypoxia, shortness of breath

## 2019-04-25 NOTE — PROGRESS NOTE ADULT - PROBLEM SELECTOR PLAN 1
Overall prognosis is poor with multiple co-morbid conditions in the setting of advanced age and poor functional status; continue diuresis with oral Lasix -- Mr. Mckeon will require close monitoring of serum potassium and renal function upon hospital discharge; if BP allows a 2nd diuretic could be tried (i.e. aldactone or zaroxolyn) -- he has had some weight loss over the course of this admission.  He now has advanced directives in place and a decision was made to turn off ICD tachyarrhythmia therapy; he remains at high risk for readmission.  I discussed case with Dr Rose.
Continue daily Lasix. Follow lytes, BUN, Cr. Pulmonary follow up
Large right pleural effusion; continue to diurese with IV Lasix.
Large right pleural effusion; continue to diurese with IV Lasix. repeat CXR
Large right pleural effusion; with anasarca possible due to hypoalbuminemia , Continue po lasix.
Large right pleural effusion; with anasarca possible due to hypoalbuminemia , change diuresis to po , change lopressor to twice a day    repeat CXR, , nutritional support
Large right pleural effusion; with anasarca possible due to hypoalbuminemia , change diuresis to po , discontinue hydralazine    repeat CXR, , nutritional support
Large right pleural effusion; with anasarca possible due to hypoalbuminemia , continue to diurese with IV Lasix. repeat CXR, consider changing to po lasix , nutritional support

## 2019-04-25 NOTE — DISCHARGE NOTE NURSING/CASE MANAGEMENT/SOCIAL WORK - NSDCDPATPORTLINK_GEN_ALL_CORE
You can access the [facility], offered by [facility], by registering with the following website: [email or web address]

## 2019-04-25 NOTE — PROGRESS NOTE ADULT - PROBLEM SELECTOR PROBLEM 3
CAD (coronary artery disease)
Atrial fibrillation
CAD (coronary artery disease)
R/O CAD (coronary artery disease)

## 2019-04-25 NOTE — DISCHARGE NOTE NURSING/CASE MANAGEMENT/SOCIAL WORK - NSDCPEPT PROEDHF_GEN_ALL_CORE
Activities as tolerated/Low salt diet/Call primary care provider for follow up after discharge/Monitor weight daily/Report signs and symptoms to primary care provider

## 2019-04-25 NOTE — PROGRESS NOTE ADULT - ASSESSMENT
89 year old Man [facility] resident with hx of CAD s/p PCI/CABG,  HFrEF, s/p AICD, HTN, dyslipidemia, CVA, orthostatic hypotension, CKD III, A fib on Coumadin, Parkinson's presented from [facility] via EMS for worsening SOB with O2 desat.     #Acute respiratory failure/hypoxia mulitfactorial: Acute on chronic HFrEF superimposed by valvular insufficiency  - LVEF 35-40% / mod-severe TR / Mod MR on 1/2018 TTE  - daily wts / strict I & O /  Fluid restriction 1500ml daily  - continue lasix 80 mg PO BID  - continue metoprolol  - supplemental oxygen as needed  - cardiology following    #Pleural effusion  - CXR - large R effusion, and small left effusion  - continue lasix    #Afib  - rate controlled  - continue coumadin  - continue metoprolol   - will hold coumadin  - daily INR for goal of 2-3  - [id number] Score = 7 (age-2, HTN-1, CHF-1, CVA-2, vasc disease-1)    #Hypotension  - multifactorial: opiates, hypoalbuminemia, medications  - continue BP meds with holding parameters  - continue to monitor    #Hypoalbuminemia   - will consider albumin  - continue regular diet  - nutritionist recommendation appreciated    #Bradycardia  - continue metoprolol - decreased from 12.5 mg BID to 12.5 mg QD today  - continue to monitor  - cardiology following     #CKD Stage III   - creatinine stable  - continue flomax     #Parkinson Disease/ Dementia  - continue Sinemet / Aricept     #CAD s/p PCI and CABG  - continue plavix/statin/BB     #DVT ppx  - on coumadin    #Dispo  - DC to rehab today 89 year old Man [facility] resident with hx of CAD s/p PCI/CABG,  HFrEF, s/p AICD, HTN, dyslipidemia, CVA, orthostatic hypotension, CKD III, A fib on Coumadin, Parkinson's presented from [facility] via EMS for worsening SOB with O2 desat.     #Acute respiratory failure/hypoxia mulitfactorial: Acute on chronic HFrEF superimposed by valvular insufficiency  - LVEF 35-40% / mod-severe TR / Mod MR on 1/2018 TTE  - daily wts / strict I & O /  Fluid restriction 1500ml daily  - continue lasix 80 mg PO BID  - continue metoprolol  - supplemental oxygen as needed  - cardiology following    #Pleural effusion  - CXR - large R effusion, and small left effusion  - continue lasix    #Afib  - rate controlled  - continue coumadin  - continue metoprolol   - will hold coumadin  - daily INR for goal of 2-3  - [id number] Score = 7 (age-2, HTN-1, CHF-1, CVA-2, vasc disease-1)    #Hypotension  - multifactorial: opiates, hypoalbuminemia, medications  - continue BP meds with holding parameters  - continue to monitor    #Hypoalbuminemia   - will consider albumin  - continue regular diet  - nutritionist recommendation appreciated    #CKD Stage III   - creatinine stable  - continue flomax     #Parkinson Disease/ Dementia  - continue Sinemet / Aricept     #CAD s/p PCI and CABG  - continue plavix/statin/BB     #DVT ppx  - on coumadin    #Dispo  - DC to rehab today 89 year old Man [facility] resident with hx of CAD s/p PCI/CABG,  HFrEF, s/p AICD, HTN, dyslipidemia, CVA, orthostatic hypotension, CKD III, A fib on Coumadin, Parkinson's presented from [facility] via EMS for worsening SOB with O2 desat.     #Acute respiratory failure/hypoxia mulitfactorial: Acute on chronic HFrEF superimposed by valvular insufficiency  - LVEF 35-40% / mod-severe TR / Mod MR on 1/2018 TTE  - daily wts / strict I & O /  Fluid restriction 1500ml daily  - continue lasix 80 mg PO BID  - continue metoprolol  - supplemental oxygen as needed  - cardiology following    #Pleural effusion  - CXR - large R effusion, and small left effusion  - continue lasix    #Afib  - rate controlled  - continue coumadin  - continue metoprolol   - will hold coumadin  - daily INR for goal of 2-3  - [id number] Score = 7 (age-2, HTN-1, CHF-1, CVA-2, vasc disease-1)    #Hypotension  - multifactorial: opiates, hypoalbuminemia, medications  - continue BP meds with holding parameters  - continue to monitor    #Hypoalbuminemia   - will consider albumin  - continue regular diet  - nutritionist recommendation appreciated    #CKD Stage III   - creatinine stable  - continue flomax     #Parkinson Disease/ Dementia  - continue Sinemet / Aricept     #CAD s/p PCI and CABG  - continue plavix/statin/BB     #DVT ppx  - on coumadin    #Dispo  - DC today 89 year old Man [facility] resident with hx of CAD s/p PCI/CABG,  HFrEF, s/p AICD, HTN, dyslipidemia, CVA, orthostatic hypotension, CKD III, A fib on Coumadin, Parkinson's presented from [facility] via EMS for worsening SOB with O2 desat.     #Acute respiratory failure/hypoxia mulitfactorial: Acute on chronic HFrEF superimposed by valvular insufficiency  - LVEF 35-40% / mod-severe TR / Mod MR on 1/2018 TTE  - daily wts / strict I & O /  Fluid restriction 1500ml daily  - continue lasix 80 mg PO BID  - continue metoprolol  - supplemental oxygen as needed  - cardiology following    #Pleural effusion  - CXR - large R effusion, and small left effusion  - continue lasix    #Afib  - rate controlled  - continue coumadin  - continue metoprolol   - will hold coumadin  - daily INR for goal of 2-3  - [id number] Score = 7 (age-2, HTN-1, CHF-1, CVA-2, vasc disease-1)    #Hypotension  - multifactorial: opiates, hypoalbuminemia, medications  - continue BP meds with holding parameters  - continue to monitor    #Hypoalbuminemia   - will consider albumin  - continue regular diet  - nutritionist recommendation appreciated    #CKD Stage III   - creatinine stable  - continue flomax     #Parkinson Disease/ Dementia  - continue Sinemet   - Aricept discontinued due to prolonged QT    #CAD s/p PCI and CABG  - continue plavix/statin/BB     #DVT ppx  - on coumadin    #Dispo  - DC today

## 2019-04-25 NOTE — PROGRESS NOTE ADULT - PROBLEM SELECTOR PROBLEM 1
Acute on chronic systolic (congestive) heart failure
Respiratory failure, acute and chronic
CHF (congestive heart failure)
Acute on chronic systolic (congestive) heart failure

## 2019-04-25 NOTE — PROGRESS NOTE ADULT - SUBJECTIVE AND OBJECTIVE BOX
HPI: Pt is a 89y old Male with hx of CAD s/p PCI/CABG,  HFrEF, s/p AICD, HTN, dyslipidemia, CVA, orthostatic hypotension, CKD III, A fib on Coumadin, Parkinson's presented from \[facility]\"" for worsening SOB with O2 desat. Pt hypotensive in the ED and was admitted for increased dyspnea secondary to heart failure. CT chest reveals sola pleural effusions with atelectasis. Palliative Consult to further establish GOC.  4/22/19 Seen and examined at bedside with no family present. Pt eating breakfast. Denies dyspnea at rest however states he is moderatley dyspneic on exertion. Also C/O pain in SOLA lower extremities upon movement.  4/23/19 Seen and examined at bedside with no family present. Denies pain or dyspnea however appears to have mild dyspnea at rest. Eating breakfast.  4/24/19 Seen and examined at bedside with no family present. States he feels better today. OOB/chair. Denies pain or dyspnea  4/25/19 Seen and examined at bedside. Lethargic and sleeping however arousable to loud voice. Denies complaints  PAIN: ( )Yes   ( X)No  DYSPNEA: (X ) Yes  ( ) No  Level: on exertion    PAST MEDICAL & SURGICAL HISTORY:  COPD (chronic obstructive pulmonary disease)  Parkinson's disease  CHF (congestive heart failure)  Hyperlipidemia  HTN (hypertension)  CAD (coronary artery disease)  AICD (automatic cardioverter/defibrillator) present  S/P CABG    SOCIAL HX:  Comes from [facility]  Hx opiate tolerance ( )YES  (X )NO    Baseline ADLs  (Prior to Admission)  ( ) Independent   (X )Dependent    FAMILY HISTORY:  Unable to provide poor historian    Review of Systems:    Anxiety-denies  Physical Discomfort-mod  Dyspnea-on exertion  Constipation-denies cant recall last BM  Anorexia-denies  Fatigue-mod-improved  Weakness-severe      All other systems reviewed and negative  Unable to obtain/Limited due to: poor historian      PHYSICAL EXAM:  ICU Vital Signs Last 24 Hrs  IT(C): 36.6 (25 Apr 2019 05:47), Max: 36.8 (24 Apr 2019 11:10)  T(F): 97.8 (25 Apr 2019 05:47), Max: 98.2 (24 Apr 2019 11:10)  HR: 66 (25 Apr 2019 08:21) (50 - 82)  BP: 106/46 (25 Apr 2019 05:47) (97/34 - 109/38)  RR: 18 (25 Apr 2019 05:47) (18 - 18)  SpO2: 98% (25 Apr 2019 05:47) (97% - 98%)      PPSV2:  40-50%    General: Elderly male in bed in NAD  Mental Status: A&O X3/ unable to provide hx of present illness  HEENT: nasal O2 intact  Lungs: exp wheezes sola  Cardiac: S1S2+  GI: abd soft NT ND + BS  : voids  Ext: BLE edema/erythema   Neuro: no focal def      LABS:                         9.0    3.89  )-----------( 164      ( 24 Apr 2019 06:11 )             29.9           04-24    142  |  104  |  43<H>  ----------------------------<  106<H>  3.6   |  31  |  1.42<H>    Ca    8.5      24 Apr 2019 06:11  Mg     2.4     04-24      PTT - ( 22 Apr 2019 07:08 )  PTT:41.0 sec  Albumin: Albumin, Serum: 2.9 g/dL (04-15 @ 20:00)      Allergies    morphine (Headache)    Intolerances      RADIOLOGY/ADDITIONAL STUDIES:

## 2019-04-25 NOTE — PROGRESS NOTE ADULT - PROVIDER SPECIALTY LIST ADULT
Cardiology
Family Medicine
Hospitalist
Palliative Care
Pulmonology
Family Medicine
Cardiology

## 2019-04-25 NOTE — PROGRESS NOTE ADULT - SUBJECTIVE AND OBJECTIVE BOX
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  He was treated for decompensated heart failure / volume overload; the palliative care service assisted with goals of care and a decision was made for advanced directives (including turning-off ICD therapy); he is scheduled to discharge to a nursing facility today.    4/25/19:  No complaints; says "with my problems I'm doing good."    MEDICATIONS  (STANDING):  ascorbic acid 500 milliGRAM(s) Oral daily  buDESOnide 160 MICROgram(s)/formoterol 4.5 MICROgram(s) Inhaler 2 Puff(s) Inhalation two times a day  carbidopa/levodopa  25/100 1 Tablet(s) Oral three times a day  clopidogrel Tablet 75 milliGRAM(s) Oral daily  furosemide    Tablet 80 milliGRAM(s) Oral every 12 hours  metoprolol tartrate 12.5 milliGRAM(s) Oral every 12 hours  multivitamin 1 Tablet(s) Oral daily  pantoprazole    Tablet 40 milliGRAM(s) Oral before breakfast  simvastatin 20 milliGRAM(s) Oral at bedtime  tamsulosin Oral Tab/Cap - Peds 0.4 milliGRAM(s) Oral at bedtime  tiotropium 18 MICROgram(s) Capsule 1 Capsule(s) Inhalation daily    Vital Signs Last 24 Hrs  T(C): 36.3 (25 Apr 2019 11:56), Max: 36.6 (24 Apr 2019 20:26)  T(F): 97.3 (25 Apr 2019 11:56), Max: 97.9 (24 Apr 2019 20:26)  HR: 84 (25 Apr 2019 11:56) (50 - 84)  BP: 98/47 (25 Apr 2019 11:56) (97/34 - 106/46)  RR: 18 (25 Apr 2019 11:56) (18 - 18)  SpO2: 99% (25 Apr 2019 11:56) (97% - 99%)    PHYSICAL EXAM:    Constitutional: Seated in bed eating a sandwich, appears frail and chronically-ill  Respiratory: Breath sounds are decreased (R>L); nonlabored, no crackles  Cardiovascular: Regular  Gastrointestinal: Bowel Sounds present, soft, nontender.   Extremities: 2+ pitting LE edema with soft extremities    LABS:                   9.3    3.78  )-----------( 161      ( 25 Apr 2019 05:24 )             30.1     140  |  104  |  42<H>  ----------------------------<  99  3.4<L>   |  32<H>  |  1.37<H>    Ca    8.5      25 Apr 2019 05:24  Mg     2.3     04-25    Transthoracic Echocardiogram (04.17.19 @ 11:54):   Normal left ventricular size with moderate, global systolic dysfunction. Estimated left ventricular ejection fraction is 35%. Septal flattening is seen; this finding is consistent with right heart pressure / volume overload. Paradoxical septal motion.   The right ventricle exhibits mild dilation, mild diffuse hypokinesis, and mild depression of contractility.   Biatrial enlargement.   Moderate mitral regurgitation.   Moderate to severe tricuspid valve regurgitation.   Mild pulmonary hypertension.    CT Chest No Cont (04.16.19 @ 09:13):  Large right effusion and small left effusion with adjacent atelectasis. No definite evidence of pneumonia.

## 2019-04-25 NOTE — PROGRESS NOTE ADULT - SUBJECTIVE AND OBJECTIVE BOX
89 year old Man [facility] resident with hx of CAD s/p PCI/CABG,  HFrEF, s/p AICD, HTN, dyslipidemia, CVA, orthostatic hypotension, CKD III, A fib on Coumadin, Parkinson's presented from [facility] via EMS for worsening SOB with O2 desat.  O2 sat 90% en route to ED. In the ED  /50 initially then drop to 70's systolic, HR 50, O2 sat 99% with O2, RR 20. Denies CP/palpitation/HA/dizziness/abd pain/n/v/d/f/c    He received NS 1L IV bolus, vanco 1gm and cefepime 1gm, ventura placed    Interval history:  Patient evaluated at bedside. No acute complaints. No overnight events.  Denies chest pain, fever, chills.  Pt will be discharged to [facility] nursing home today.     REVIEW OF SYSTEMS:    CONSTITUTIONAL: +weakness, No fevers or chills  EYES/ENT: No visual changes;  No vertigo or throat pain   NECK: No pain or stiffness  RESPIRATORY: +cough, No wheezing, hemoptysis; No shortness of breath  CARDIOVASCULAR: No chest pain or palpitations  GASTROINTESTINAL: No abdominal or epigastric pain. No nausea, vomiting, or hematemesis; No diarrhea or constipation. No melena or hematochezia.  GENITOURINARY: No dysuria, frequency or hematuria  NEUROLOGICAL: No numbness or weakness  SKIN: No itching, burning, rashes, or lesions   All other review of systems is negative unless indicated above.    MEDICATIONS  (STANDING):  ascorbic acid 500 milliGRAM(s) Oral daily  buDESOnide 160 MICROgram(s)/formoterol 4.5 MICROgram(s) Inhaler 2 Puff(s) Inhalation two times a day  carbidopa/levodopa  25/100 1 Tablet(s) Oral three times a day  clopidogrel Tablet 75 milliGRAM(s) Oral daily  furosemide    Tablet 80 milliGRAM(s) Oral every 12 hours  metoprolol tartrate 12.5 milliGRAM(s) Oral every 12 hours  multivitamin 1 Tablet(s) Oral daily  pantoprazole    Tablet 40 milliGRAM(s) Oral before breakfast  simvastatin 20 milliGRAM(s) Oral at bedtime  tamsulosin Oral Tab/Cap - Peds 0.4 milliGRAM(s) Oral at bedtime  tiotropium 18 MICROgram(s) Capsule 1 Capsule(s) Inhalation daily    MEDICATIONS  (PRN):  ALBUTerol    0.083% 2.5 milliGRAM(s) Nebulizer four times a day PRN Shortness of Breath and/or Wheezing  HYDROmorphone   Tablet 2 milliGRAM(s) Oral every 4 hours PRN Moderate Pain (4 - 6)dyspnea  HYDROmorphone  Injectable 0.3 milliGRAM(s) IV Push every 3 hours PRN Severe Pain (7 - 10)dyspnea  senna 8.6 milliGRAM(s) Oral Tablet - Peds 2 Tablet(s) Oral at bedtime PRN for constipation    Vital Signs (24 Hrs):  T(C): 36.3 (19 @ 11:56), Max: 36.6 (19 @ 20:26)  HR: 80 (19 @ 12:44) (50 - 84)  BP: 98/47 (19 @ 11:56) (97/34 - 106/46)  RR: 18 (19 @ 11:56) (18 - 18)  SpO2: 99% (19 @ 11:56) (97% - 99%)  Wt(kg): --  Daily     Daily Weight in k.9 (2019 05:47)    I&O's Summary    2019 07:01  -  2019 07:00  --------------------------------------------------------  IN: 50 mL / OUT: 0 mL / NET: 50 mL    PHYSICAL EXAM:  GENERAL: NAD, well-developed  HEAD:  Atraumatic, Normocephalic  EYES: EOMI, PERRLA, conjunctiva and sclera clear  NECK: Supple, No JVD  CHEST/LUNG: crackles in RLL which is improving, otherwise CTA b/l  HEART: Regular rate and rhythm; No murmurs, rubs, or gallops  ABDOMEN: Soft, Nontender, Nondistended; Bowel sounds present  EXTREMITIES: 2/3+ pitting edema b/l; chronic venous stasis changes noted b/l   PSYCH: AAOx3  NEUROLOGY: non-focal  SKIN: No rashes or lesions    LABS:                        9.3    3.78  )-----------( 161      ( 2019 05:24 )             30.1     2019 05:24    140    |  104    |  42     ----------------------------<  99     3.4     |  32     |  1.37     Ca    8.5        2019 05:24  Mg     2.3       2019 05:24      PT/INR - ( 2019 14:50 )   PT: 36.9 sec;   INR: 3.21 ratio                < from: CT Chest No Cont (19 @ 09:13) >  IMPRESSION: Large right effusion and small left effusion with adjacent   atelectasis. No definite evidence of pneumonia.    < end of copied text >        < from: Transthoracic Echocardiogram (19 @ 11:54) >  Impression     Summary     Normal leftventricular size with moderate, global systolic dysfunction.   Estimated left ventricular ejection fraction is 35%. Septal flattening is   seen; this finding is consistent with right heart pressure / volume   overload. Paradoxical septal motion.   The right ventricle exhibits mild dilation, mild diffuse hypokinesis, and   mild depression of contractility.   Biatrial enlargement.   Moderate mitral regurgitation.   Moderate to severe tricuspid valve regurgitation.   Mild pulmonary hypertension.    < end of copied text > 89 year old Man [facility] resident with hx of CAD s/p PCI/CABG,  HFrEF, s/p AICD, HTN, dyslipidemia, CVA, orthostatic hypotension, CKD III, A fib on Coumadin, Parkinson's presented from [facility] via EMS for worsening SOB with O2 desat.  O2 sat 90% en route to ED. In the ED  /50 initially then drop to 70's systolic, HR 50, O2 sat 99% with O2, RR 20. Denies CP/palpitation/HA/dizziness/abd pain/n/v/d/f/c    He received NS 1L IV bolus, vanco 1gm and cefepime 1gm, ventura placed    Interval history:  Patient evaluated at bedside. No acute complaints. No overnight events.  Denies chest pain, fever, chills.  Pt will be discharged to [facility] nursing home today.     REVIEW OF SYSTEMS:    CONSTITUTIONAL: +weakness, No fevers or chills  EYES/ENT: No visual changes;  No vertigo or throat pain   NECK: No pain or stiffness  RESPIRATORY: +cough, No wheezing, hemoptysis; No shortness of breath  CARDIOVASCULAR: No chest pain or palpitations  GASTROINTESTINAL: No abdominal or epigastric pain. No nausea, vomiting, or hematemesis; No diarrhea or constipation. No melena or hematochezia.  GENITOURINARY: No dysuria, frequency or hematuria  NEUROLOGICAL: No numbness or weakness  SKIN: No itching, burning, rashes, or lesions   All other review of systems is negative unless indicated above.    MEDICATIONS  (STANDING):  ascorbic acid 500 milliGRAM(s) Oral daily  buDESOnide 160 MICROgram(s)/formoterol 4.5 MICROgram(s) Inhaler 2 Puff(s) Inhalation two times a day  carbidopa/levodopa  25/100 1 Tablet(s) Oral three times a day  clopidogrel Tablet 75 milliGRAM(s) Oral daily  furosemide    Tablet 80 milliGRAM(s) Oral every 12 hours  metoprolol tartrate 12.5 milliGRAM(s) Oral every 12 hours  multivitamin 1 Tablet(s) Oral daily  pantoprazole    Tablet 40 milliGRAM(s) Oral before breakfast  simvastatin 20 milliGRAM(s) Oral at bedtime  tamsulosin Oral Tab/Cap - Peds 0.4 milliGRAM(s) Oral at bedtime  tiotropium 18 MICROgram(s) Capsule 1 Capsule(s) Inhalation daily    MEDICATIONS  (PRN):  ALBUTerol    0.083% 2.5 milliGRAM(s) Nebulizer four times a day PRN Shortness of Breath and/or Wheezing  HYDROmorphone   Tablet 2 milliGRAM(s) Oral every 4 hours PRN Moderate Pain (4 - 6)dyspnea  HYDROmorphone  Injectable 0.3 milliGRAM(s) IV Push every 3 hours PRN Severe Pain (7 - 10)dyspnea  senna 8.6 milliGRAM(s) Oral Tablet - Peds 2 Tablet(s) Oral at bedtime PRN for constipation    Vital Signs (24 Hrs):  T(C): 36.3 (19 @ 11:56), Max: 36.6 (19 @ 20:26)  HR: 80 (19 @ 12:44) (50 - 84)  BP: 98/47 (19 @ 11:56) (97/34 - 106/46)  RR: 18 (19 @ 11:56) (18 - 18)  SpO2: 99% (19 @ 11:56) (97% - 99%)  Wt(kg): --  Daily     Daily Weight in k.9 (2019 05:47)    I&O's Summary    2019 07:01  -  2019 07:00  --------------------------------------------------------  IN: 50 mL / OUT: 0 mL / NET: 50 mL    PHYSICAL EXAM:  GENERAL: NAD, well-developed  HEAD:  Atraumatic, Normocephalic  EYES: EOMI, PERRLA, conjunctiva and sclera clear  NECK: Supple, No JVD  CHEST/LUNG: crackles in RLL which is improving, otherwise CTA b/l  HEART: Regular rate and rhythm; No murmurs, rubs, or gallops  ABDOMEN: Soft, Nontender, Nondistended; Bowel sounds present  EXTREMITIES: 2/3+ pitting edema b/l; chronic venous stasis changes noted b/l   PSYCH: AAOx3  NEUROLOGY: non-focal  SKIN: No rashes or lesions    LABS:                        9.3    3.78  )-----------( 161      ( 2019 05:24 )             30.1     2019 05:24    140    |  104    |  42     ----------------------------<  99     3.4     |  32     |  1.37     Ca    8.5        2019 05:24  Mg     2.3       2019 05:24      PT/INR - ( 2019 14:50 )   PT: 36.9 sec;   INR: 3.21 ratio      .  LABS:                         9.3    3.78  )-----------( 161      ( 2019 05:24 )             30.1     0425    140  |  104  |  42<H>  ----------------------------<  99  3.4<L>   |  32<H>  |  1.37<H>    Ca    8.5      2019 05:24  Mg     2.3     25      PT/INR - ( 2019 14:50 )   PT: 36.9 sec;   INR: 3.21 ratio       < from: 12 Lead ECG (19 @ 07:07) >  Ventricular Rate 52 BPM    Atrial Rate 44 BPM    QRS Duration 178 ms    Q-T Interval 576 ms    QTC Calculation(Bezet) 535 ms    R Axis -88 degrees    T Axis 66 degrees    Diagnosis Line Ventricular-paced rhythm with occasional Premature ventricularcomplexes  Abnormal ECG    Confirmed by JOHANNA LEE MD ([id number]) on 2019 8:37:29 AM    < end of copied text >       < from: CT Chest No Cont (19 @ 09:13) >  IMPRESSION: Large right effusion and small left effusion with adjacent   atelectasis. No definite evidence of pneumonia.    < end of copied text >        < from: Transthoracic Echocardiogram (19 @ 11:54) >  Impression     Summary     Normal leftventricular size with moderate, global systolic dysfunction.   Estimated left ventricular ejection fraction is 35%. Septal flattening is   seen; this finding is consistent with right heart pressure / volume   overload. Paradoxical septal motion.   The right ventricle exhibits mild dilation, mild diffuse hypokinesis, and   mild depression of contractility.   Biatrial enlargement.   Moderate mitral regurgitation.   Moderate to severe tricuspid valve regurgitation.   Mild pulmonary hypertension.    < end of copied text >

## 2019-04-25 NOTE — PROGRESS NOTE ADULT - SUBJECTIVE AND OBJECTIVE BOX
Pt has been seen and examined with FP resident, resident supervised agree with a/p       Patient is a 89y old  Male who presents with a chief complaint of Hypoxia, shortness of breath (25 Apr 2019 12:11)          PHYSICAL EXAM:  Vital Signs Last 24 Hrs  T(C): 36.3 (25 Apr 2019 11:56), Max: 36.6 (24 Apr 2019 20:26)  T(F): 97.3 (25 Apr 2019 11:56), Max: 97.9 (24 Apr 2019 20:26)  HR: 80 (25 Apr 2019 12:44) (50 - 84)  BP: 98/47 (25 Apr 2019 11:56) (97/34 - 106/46)  BP(mean): --  RR: 18 (25 Apr 2019 11:56) (18 - 18)  SpO2: 99% (25 Apr 2019 11:56) (97% - 99%)  general- comfortable   -rs-aeeb,cta  -cvs-s1s2 normal   -p/a-soft,bs+        A/P    #Discussed with Dr. Villa. Pt is optimized considering his co-morbidities and is being discharged with further management as an outpt     #time spent 65 minutes     #Overall poor prognosis and high risk for re-admission.

## 2019-04-25 NOTE — PROGRESS NOTE ADULT - PROBLEM SELECTOR PROBLEM 2
Peripheral edema
Valvular heart disease
CHF (congestive heart failure)
Peripheral edema

## 2019-04-25 NOTE — PROGRESS NOTE ADULT - PROBLEM SELECTOR PLAN 2
Multifactorial (low EF, severe pulmonary HTN with right heart failure, poor functional status, low albumin).
Daily Lasix. Follow labs. Continue metoprolol
Multifactorial (CHF, severe pulmonary HTN)
Multifactorial (CHF, severe pulmonary HTN) encourage po intake
Multifactorial (CHF, severe pulmonary HTN) encourage po intake , try to improve  nutritional status

## 2019-04-25 NOTE — PROGRESS NOTE ADULT - ASSESSMENT
HPI: Pt is a 89y old Male with hx of CAD s/p PCI/CABG,  HFrEF, s/p AICD, HTN, dyslipidemia, CVA, orthostatic hypotension, CKD III, A fib on Coumadin, Parkinson's presented from [facility] for worsening SOB with O2 desat. Pt hypotensive in the ED and was admitted for increased dyspnea secondary to heart failure. CT chest reveals sola pleural effusions with atelectasis. Palliative Consult to further establish GOC.  4/22/19 Seen and examined at bedside with no family present. Pt eating breakfast. Denies dyspnea at rest however states he is moderately dyspneic on exertion. Also C/O pain in SOLA lower extremities upon movement.    Assessment and Plan:    1) Dyspnea  -Mod on exertion as per pt  -CT chest=sola pleural effusions/atelectasis  -H/O CAD  -H/O heart failure  -Supplemental O2 PRN  -cardio eval noted  -pulm eval noted  -cont inhalers  -cont Dilaudid 2 mg PO Q4H PRN mod pain/dyspnea  -cont Dilaudid 0.3 IVP Q3H PRN severe pain/dyspnea    2) Acute on chronic heart failure  -EF=35%  -CAD s/p CABG  -cont home meds  -cardio eval noted  -Afib  -S/P AICD    3) CKD  -Improved with hydration  -Diuresis as tolerated  -monitor renal function    4) Debility  -Overall deconditioning  -R/T dyspnea  -R/T BLE edema  -Limited mobility  -PT eval noted  -[facility] ?    5) Advanced Directives  -Pt with limited capacity  -defers to son  -Copy of MOLST on file reviewed 3/18/19  -DNR   -GOC conversation 4/24/19 2P  -Transition to [facility] and then LTC vs home with hospice depending on progress.  -Discussed deactivation of AICD with son David and will request EP eval and termination today 4/25

## 2019-04-30 DIAGNOSIS — E88.09 OTHER DISORDERS OF PLASMA-PROTEIN METABOLISM, NOT ELSEWHERE CLASSIFIED: ICD-10-CM

## 2019-04-30 DIAGNOSIS — F03.90 UNSPECIFIED DEMENTIA WITHOUT BEHAVIORAL DISTURBANCE: ICD-10-CM

## 2019-04-30 DIAGNOSIS — E87.6 HYPOKALEMIA: ICD-10-CM

## 2019-04-30 DIAGNOSIS — I25.10 ATHEROSCLEROTIC HEART DISEASE OF NATIVE CORONARY ARTERY WITHOUT ANGINA PECTORIS: ICD-10-CM

## 2019-04-30 DIAGNOSIS — I13.0 HYPERTENSIVE HEART AND CHRONIC KIDNEY DISEASE WITH HEART FAILURE AND STAGE 1 THROUGH STAGE 4 CHRONIC KIDNEY DISEASE, OR UNSPECIFIED CHRONIC KIDNEY DISEASE: ICD-10-CM

## 2019-04-30 DIAGNOSIS — I50.23 ACUTE ON CHRONIC SYSTOLIC (CONGESTIVE) HEART FAILURE: ICD-10-CM

## 2019-04-30 DIAGNOSIS — J98.11 ATELECTASIS: ICD-10-CM

## 2019-04-30 DIAGNOSIS — G20 PARKINSON'S DISEASE: ICD-10-CM

## 2019-04-30 DIAGNOSIS — I48.91 UNSPECIFIED ATRIAL FIBRILLATION: ICD-10-CM

## 2019-04-30 DIAGNOSIS — Z66 DO NOT RESUSCITATE: ICD-10-CM

## 2019-04-30 DIAGNOSIS — Z95.810 PRESENCE OF AUTOMATIC (IMPLANTABLE) CARDIAC DEFIBRILLATOR: ICD-10-CM

## 2019-04-30 DIAGNOSIS — N18.3 CHRONIC KIDNEY DISEASE, STAGE 3 (MODERATE): ICD-10-CM

## 2019-04-30 DIAGNOSIS — E78.5 HYPERLIPIDEMIA, UNSPECIFIED: ICD-10-CM

## 2019-04-30 DIAGNOSIS — J96.21 ACUTE AND CHRONIC RESPIRATORY FAILURE WITH HYPOXIA: ICD-10-CM

## 2019-05-03 ENCOUNTER — APPOINTMENT (OUTPATIENT)
Dept: CARDIOLOGY | Facility: CLINIC | Age: 84
End: 2019-05-03

## 2019-05-06 PROBLEM — J44.9 CHRONIC OBSTRUCTIVE PULMONARY DISEASE, UNSPECIFIED: Chronic | Status: ACTIVE | Noted: 2019-04-15

## 2019-05-08 ENCOUNTER — APPOINTMENT (OUTPATIENT)
Dept: ELECTROPHYSIOLOGY | Facility: CLINIC | Age: 84
End: 2019-05-08
Payer: MEDICARE

## 2019-05-08 VITALS
DIASTOLIC BLOOD PRESSURE: 62 MMHG | WEIGHT: 213 LBS | SYSTOLIC BLOOD PRESSURE: 124 MMHG | BODY MASS INDEX: 32.28 KG/M2 | HEIGHT: 68 IN

## 2019-05-08 DIAGNOSIS — Z95.810 PRESENCE OF AUTOMATIC (IMPLANTABLE) CARDIAC DEFIBRILLATOR: ICD-10-CM

## 2019-05-08 DIAGNOSIS — E78.5 HYPERLIPIDEMIA, UNSPECIFIED: ICD-10-CM

## 2019-05-08 DIAGNOSIS — I50.9 HEART FAILURE, UNSPECIFIED: ICD-10-CM

## 2019-05-08 PROCEDURE — 93282 PRGRMG EVAL IMPLANTABLE DFB: CPT

## 2019-05-08 PROCEDURE — 93290 INTERROG DEV EVAL ICPMS IP: CPT | Mod: 26

## 2019-05-14 ENCOUNTER — INPATIENT (INPATIENT)
Facility: HOSPITAL | Age: 84
LOS: 5 days | Discharge: HOSPICE | End: 2019-05-20
Attending: FAMILY MEDICINE | Admitting: FAMILY MEDICINE
Payer: MEDICARE

## 2019-05-14 VITALS
SYSTOLIC BLOOD PRESSURE: 103 MMHG | DIASTOLIC BLOOD PRESSURE: 44 MMHG | RESPIRATION RATE: 17 BRPM | TEMPERATURE: 98 F | HEIGHT: 66 IN | WEIGHT: 210.98 LBS | OXYGEN SATURATION: 91 % | HEART RATE: 65 BPM

## 2019-05-14 DIAGNOSIS — Z86.73 PERSONAL HISTORY OF TRANSIENT ISCHEMIC ATTACK (TIA), AND CEREBRAL INFARCTION WITHOUT RESIDUAL DEFICITS: ICD-10-CM

## 2019-05-14 DIAGNOSIS — I48.2 CHRONIC ATRIAL FIBRILLATION: ICD-10-CM

## 2019-05-14 DIAGNOSIS — G93.41 METABOLIC ENCEPHALOPATHY: ICD-10-CM

## 2019-05-14 DIAGNOSIS — Z95.810 PRESENCE OF AUTOMATIC (IMPLANTABLE) CARDIAC DEFIBRILLATOR: ICD-10-CM

## 2019-05-14 DIAGNOSIS — J44.9 CHRONIC OBSTRUCTIVE PULMONARY DISEASE, UNSPECIFIED: ICD-10-CM

## 2019-05-14 DIAGNOSIS — I25.10 ATHEROSCLEROTIC HEART DISEASE OF NATIVE CORONARY ARTERY WITHOUT ANGINA PECTORIS: ICD-10-CM

## 2019-05-14 DIAGNOSIS — Z66 DO NOT RESUSCITATE: ICD-10-CM

## 2019-05-14 DIAGNOSIS — E78.5 HYPERLIPIDEMIA, UNSPECIFIED: ICD-10-CM

## 2019-05-14 DIAGNOSIS — I13.0 HYPERTENSIVE HEART AND CHRONIC KIDNEY DISEASE WITH HEART FAILURE AND STAGE 1 THROUGH STAGE 4 CHRONIC KIDNEY DISEASE, OR UNSPECIFIED CHRONIC KIDNEY DISEASE: ICD-10-CM

## 2019-05-14 DIAGNOSIS — I50.23 ACUTE ON CHRONIC SYSTOLIC (CONGESTIVE) HEART FAILURE: ICD-10-CM

## 2019-05-14 DIAGNOSIS — I08.1 RHEUMATIC DISORDERS OF BOTH MITRAL AND TRICUSPID VALVES: ICD-10-CM

## 2019-05-14 DIAGNOSIS — G20 PARKINSON'S DISEASE: ICD-10-CM

## 2019-05-14 DIAGNOSIS — N17.9 ACUTE KIDNEY FAILURE, UNSPECIFIED: ICD-10-CM

## 2019-05-14 DIAGNOSIS — N18.3 CHRONIC KIDNEY DISEASE, STAGE 3 (MODERATE): ICD-10-CM

## 2019-05-14 LAB
ALBUMIN SERPL ELPH-MCNC: 3.1 G/DL — LOW (ref 3.3–5)
ALP SERPL-CCNC: 132 U/L — HIGH (ref 40–120)
ALT FLD-CCNC: 9 U/L — LOW (ref 12–78)
ANION GAP SERPL CALC-SCNC: 8 MMOL/L — SIGNIFICANT CHANGE UP (ref 5–17)
ANISOCYTOSIS BLD QL: SLIGHT — SIGNIFICANT CHANGE UP
APTT BLD: 42.5 SEC — HIGH (ref 27.5–36.3)
AST SERPL-CCNC: 14 U/L — LOW (ref 15–37)
BASOPHILS # BLD AUTO: 0 K/UL — SIGNIFICANT CHANGE UP (ref 0–0.2)
BASOPHILS NFR BLD AUTO: 0 % — SIGNIFICANT CHANGE UP (ref 0–2)
BILIRUB SERPL-MCNC: 1.1 MG/DL — SIGNIFICANT CHANGE UP (ref 0.2–1.2)
BUN SERPL-MCNC: 62 MG/DL — HIGH (ref 7–23)
CALCIUM SERPL-MCNC: 9 MG/DL — SIGNIFICANT CHANGE UP (ref 8.5–10.1)
CHLORIDE SERPL-SCNC: 103 MMOL/L — SIGNIFICANT CHANGE UP (ref 96–108)
CO2 SERPL-SCNC: 29 MMOL/L — SIGNIFICANT CHANGE UP (ref 22–31)
CREAT SERPL-MCNC: 2.33 MG/DL — HIGH (ref 0.5–1.3)
ELLIPTOCYTES BLD QL SMEAR: SLIGHT — SIGNIFICANT CHANGE UP
EOSINOPHIL # BLD AUTO: 0 K/UL — SIGNIFICANT CHANGE UP (ref 0–0.5)
EOSINOPHIL NFR BLD AUTO: 0 % — SIGNIFICANT CHANGE UP (ref 0–6)
GLUCOSE SERPL-MCNC: 115 MG/DL — HIGH (ref 70–99)
HCT VFR BLD CALC: 31.5 % — LOW (ref 39–50)
HGB BLD-MCNC: 9.6 G/DL — LOW (ref 13–17)
INR BLD: 2.13 RATIO — HIGH (ref 0.88–1.16)
LACTATE SERPL-SCNC: 1.8 MMOL/L — SIGNIFICANT CHANGE UP (ref 0.7–2)
LACTATE SERPL-SCNC: 3.4 MMOL/L — HIGH (ref 0.7–2)
LYMPHOCYTES # BLD AUTO: 0.22 K/UL — LOW (ref 1–3.3)
LYMPHOCYTES # BLD AUTO: 2 % — LOW (ref 13–44)
MACROCYTES BLD QL: SLIGHT — SIGNIFICANT CHANGE UP
MAGNESIUM SERPL-MCNC: 2.6 MG/DL — SIGNIFICANT CHANGE UP (ref 1.6–2.6)
MANUAL SMEAR VERIFICATION: SIGNIFICANT CHANGE UP
MCHC RBC-ENTMCNC: 25.4 PG — LOW (ref 27–34)
MCHC RBC-ENTMCNC: 30.5 GM/DL — LOW (ref 32–36)
MCV RBC AUTO: 83.3 FL — SIGNIFICANT CHANGE UP (ref 80–100)
MONOCYTES # BLD AUTO: 0.33 K/UL — SIGNIFICANT CHANGE UP (ref 0–0.9)
MONOCYTES NFR BLD AUTO: 3 % — SIGNIFICANT CHANGE UP (ref 2–14)
NEUTROPHILS # BLD AUTO: 10.55 K/UL — HIGH (ref 1.8–7.4)
NEUTROPHILS NFR BLD AUTO: 94 % — HIGH (ref 43–77)
NEUTS BAND # BLD: 1 % — SIGNIFICANT CHANGE UP (ref 0–8)
NRBC # BLD: 0 /100 — SIGNIFICANT CHANGE UP (ref 0–0)
NRBC # BLD: SIGNIFICANT CHANGE UP /100 WBCS (ref 0–0)
NT-PROBNP SERPL-SCNC: 4303 PG/ML — HIGH (ref 0–450)
OVALOCYTES BLD QL SMEAR: SLIGHT — SIGNIFICANT CHANGE UP
PLAT MORPH BLD: NORMAL — SIGNIFICANT CHANGE UP
PLATELET # BLD AUTO: 208 K/UL — SIGNIFICANT CHANGE UP (ref 150–400)
POIKILOCYTOSIS BLD QL AUTO: SLIGHT — SIGNIFICANT CHANGE UP
POLYCHROMASIA BLD QL SMEAR: SLIGHT — SIGNIFICANT CHANGE UP
POTASSIUM SERPL-MCNC: 5.2 MMOL/L — SIGNIFICANT CHANGE UP (ref 3.5–5.3)
POTASSIUM SERPL-SCNC: 5.2 MMOL/L — SIGNIFICANT CHANGE UP (ref 3.5–5.3)
PROT SERPL-MCNC: 7.3 GM/DL — SIGNIFICANT CHANGE UP (ref 6–8.3)
PROTHROM AB SERPL-ACNC: 24.2 SEC — HIGH (ref 10–12.9)
RBC # BLD: 3.78 M/UL — LOW (ref 4.2–5.8)
RBC # FLD: 20.8 % — HIGH (ref 10.3–14.5)
RBC BLD AUTO: ABNORMAL
SODIUM SERPL-SCNC: 140 MMOL/L — SIGNIFICANT CHANGE UP (ref 135–145)
TROPONIN I SERPL-MCNC: 0.05 NG/ML — HIGH (ref 0.01–0.04)
WBC # BLD: 11.11 K/UL — HIGH (ref 3.8–10.5)
WBC # FLD AUTO: 11.11 K/UL — HIGH (ref 3.8–10.5)

## 2019-05-14 PROCEDURE — 99291 CRITICAL CARE FIRST HOUR: CPT

## 2019-05-14 PROCEDURE — 71045 X-RAY EXAM CHEST 1 VIEW: CPT | Mod: 26

## 2019-05-14 PROCEDURE — 93010 ELECTROCARDIOGRAM REPORT: CPT

## 2019-05-14 RX ORDER — SENNA PLUS 8.6 MG/1
2 TABLET ORAL
Qty: 0 | Refills: 0 | DISCHARGE

## 2019-05-14 RX ORDER — SIMVASTATIN 20 MG/1
20 TABLET, FILM COATED ORAL AT BEDTIME
Refills: 0 | Status: DISCONTINUED | OUTPATIENT
Start: 2019-05-14 | End: 2019-05-20

## 2019-05-14 RX ORDER — SODIUM CHLORIDE 9 MG/ML
500 INJECTION INTRAMUSCULAR; INTRAVENOUS; SUBCUTANEOUS ONCE
Refills: 0 | Status: COMPLETED | OUTPATIENT
Start: 2019-05-14 | End: 2019-05-14

## 2019-05-14 RX ORDER — CARBIDOPA AND LEVODOPA 25; 100 MG/1; MG/1
1 TABLET ORAL THREE TIMES A DAY
Refills: 0 | Status: DISCONTINUED | OUTPATIENT
Start: 2019-05-14 | End: 2019-05-20

## 2019-05-14 RX ORDER — MIDODRINE HYDROCHLORIDE 2.5 MG/1
5 TABLET ORAL
Refills: 0 | Status: DISCONTINUED | OUTPATIENT
Start: 2019-05-14 | End: 2019-05-20

## 2019-05-14 RX ORDER — CLOPIDOGREL BISULFATE 75 MG/1
75 TABLET, FILM COATED ORAL DAILY
Refills: 0 | Status: DISCONTINUED | OUTPATIENT
Start: 2019-05-14 | End: 2019-05-20

## 2019-05-14 RX ORDER — PANTOPRAZOLE SODIUM 20 MG/1
40 TABLET, DELAYED RELEASE ORAL
Refills: 0 | Status: DISCONTINUED | OUTPATIENT
Start: 2019-05-14 | End: 2019-05-20

## 2019-05-14 RX ORDER — METOPROLOL TARTRATE 50 MG
0.5 TABLET ORAL
Qty: 0 | Refills: 0 | DISCHARGE

## 2019-05-14 RX ORDER — SENNA PLUS 8.6 MG/1
2 TABLET ORAL AT BEDTIME
Refills: 0 | Status: DISCONTINUED | OUTPATIENT
Start: 2019-05-14 | End: 2019-05-20

## 2019-05-14 RX ORDER — TAMSULOSIN HYDROCHLORIDE 0.4 MG/1
0.4 CAPSULE ORAL AT BEDTIME
Refills: 0 | Status: DISCONTINUED | OUTPATIENT
Start: 2019-05-14 | End: 2019-05-20

## 2019-05-14 RX ORDER — LEVOTHYROXINE SODIUM 125 MCG
25 TABLET ORAL DAILY
Refills: 0 | Status: DISCONTINUED | OUTPATIENT
Start: 2019-05-14 | End: 2019-05-20

## 2019-05-14 RX ORDER — IPRATROPIUM/ALBUTEROL SULFATE 18-103MCG
3 AEROSOL WITH ADAPTER (GRAM) INHALATION EVERY 4 HOURS
Refills: 0 | Status: DISCONTINUED | OUTPATIENT
Start: 2019-05-14 | End: 2019-05-20

## 2019-05-14 RX ORDER — ASCORBIC ACID 60 MG
500 TABLET,CHEWABLE ORAL DAILY
Refills: 0 | Status: DISCONTINUED | OUTPATIENT
Start: 2019-05-14 | End: 2019-05-15

## 2019-05-14 RX ORDER — MAGNESIUM HYDROXIDE 400 MG/1
30 TABLET, CHEWABLE ORAL DAILY
Refills: 0 | Status: DISCONTINUED | OUTPATIENT
Start: 2019-05-14 | End: 2019-05-20

## 2019-05-14 RX ORDER — OMEPRAZOLE 10 MG/1
1 CAPSULE, DELAYED RELEASE ORAL
Qty: 0 | Refills: 0 | DISCHARGE

## 2019-05-14 RX ORDER — VANCOMYCIN HCL 1 G
1500 VIAL (EA) INTRAVENOUS ONCE
Refills: 0 | Status: COMPLETED | OUTPATIENT
Start: 2019-05-14 | End: 2019-05-14

## 2019-05-14 RX ORDER — DOCUSATE SODIUM 100 MG
100 CAPSULE ORAL
Refills: 0 | Status: DISCONTINUED | OUTPATIENT
Start: 2019-05-14 | End: 2019-05-20

## 2019-05-14 RX ORDER — BUDESONIDE AND FORMOTEROL FUMARATE DIHYDRATE 160; 4.5 UG/1; UG/1
2 AEROSOL RESPIRATORY (INHALATION)
Refills: 0 | Status: DISCONTINUED | OUTPATIENT
Start: 2019-05-14 | End: 2019-05-20

## 2019-05-14 RX ORDER — SODIUM CHLORIDE 9 MG/ML
250 INJECTION INTRAMUSCULAR; INTRAVENOUS; SUBCUTANEOUS ONCE
Refills: 0 | Status: COMPLETED | OUTPATIENT
Start: 2019-05-14 | End: 2019-05-14

## 2019-05-14 RX ORDER — CEFEPIME 1 G/1
1000 INJECTION, POWDER, FOR SOLUTION INTRAMUSCULAR; INTRAVENOUS ONCE
Refills: 0 | Status: COMPLETED | OUTPATIENT
Start: 2019-05-14 | End: 2019-05-14

## 2019-05-14 RX ADMIN — Medication 1500 MILLIGRAM(S): at 20:00

## 2019-05-14 RX ADMIN — Medication 250 MILLIGRAM(S): at 18:02

## 2019-05-14 RX ADMIN — SODIUM CHLORIDE 500 MILLILITER(S): 9 INJECTION INTRAMUSCULAR; INTRAVENOUS; SUBCUTANEOUS at 21:01

## 2019-05-14 RX ADMIN — SODIUM CHLORIDE 250 MILLILITER(S): 9 INJECTION INTRAMUSCULAR; INTRAVENOUS; SUBCUTANEOUS at 22:57

## 2019-05-14 RX ADMIN — CEFEPIME 1000 MILLIGRAM(S): 1 INJECTION, POWDER, FOR SOLUTION INTRAMUSCULAR; INTRAVENOUS at 17:39

## 2019-05-14 RX ADMIN — SODIUM CHLORIDE 500 MILLILITER(S): 9 INJECTION INTRAMUSCULAR; INTRAVENOUS; SUBCUTANEOUS at 20:01

## 2019-05-14 NOTE — ED PROVIDER NOTE - OBJECTIVE STATEMENT
89 y/o male with a PMHx of CAD on Coumadin, CHF, HTN, HLD, COPD, Parkinson's disease presents to the ED c/o. Pt last seen here on 4/25/ for CHF exacerbation and right sided pleural effusion. 89 y/o male with a PMHx of CAD on Coumadin, CHF, HTN, HLD, COPD, Parkinson's disease presents to the ED c/o scrotum swelling. Pt was sent to ED from [facility] after staff noticed an abrasion to the scrotum with associated edema. Unknown mechanism. Family also notes pt with increased SOB and extremity swelling with fluid leaking. Pt last seen here on 4/25/ for CHF exacerbation and right sided pleural effusion. Pt is poor historian, unable to obtain full hx.

## 2019-05-14 NOTE — ED PROVIDER NOTE - SKIN, MLM
Skin normal color for race, warm, dry and intact. No evidence of rash. Skin normal color for race, warm, dry and intact. No evidence of rash. 3+ pitting edema.

## 2019-05-14 NOTE — ED ADULT NURSE NOTE - OBJECTIVE STATEMENT
Pt sent from [facility] for scrotal bleeding.  Scant blood from punctate wound to scrotum.  Pt dyspneic, 75% on 4L NC.   4+ pitting edema to BLE, dressings from NH removed.  Scrotal edema, urethra constricted, unable to pass catheter for specimen; urine collection pouch placed.   RT placed BiPAP.  Family at bedside.  Cardiac and VS monitoring initiated. 20g PIV placed in left forarm.

## 2019-05-14 NOTE — ED ADULT TRIAGE NOTE - CHIEF COMPLAINT QUOTE
Patient brought in by EMS for laceration to scrotum. Per EMS unknown mechanism of injury. Patient is not on blood thinners.

## 2019-05-14 NOTE — ED PROVIDER NOTE - PROGRESS NOTE DETAILS
MOLST form filled out, pt DNR/DNI.  Cannot give Lasix due to low blood pressures.  GIven small fluid bolus as may have element of infection/sepsis as well, however not given 30 cc/kg as patient is fluid overloaded and DNR.  Discussed possible use of pressors with family, they have declined pressors at this time.  Also discussed possible hospice, however family still wants to have discussions prior to this possibility.  Admit to CICU.  Obed Moreau D.O.

## 2019-05-14 NOTE — ED PROVIDER NOTE - CRITICAL CARE PROVIDED
interpretation of diagnostic studies/consult w/ pt's family directly relating to pts condition/documentation/direct patient care (not related to procedure)/additional history taking/conducted a detailed discussion of DNR status

## 2019-05-14 NOTE — ED ADULT NURSE NOTE - NSIMPLEMENTINTERV_GEN_ALL_ED
Implemented All Fall Risk Interventions:  [place] to call system. Call bell, personal items and telephone within reach. Instruct patient to call for assistance. Room bathroom lighting operational. Non-slip footwear when patient is off stretcher. Physically safe environment: no spills, clutter or unnecessary equipment. Stretcher in lowest position, wheels locked, appropriate side rails in place. Provide visual cue, wrist band, yellow gown, etc. Monitor gait and stability. Monitor for mental status changes and reorient to person, place, and time. Review medications for side effects contributing to fall risk. Reinforce activity limits and safety measures with patient and family.

## 2019-05-14 NOTE — ED ADULT NURSE REASSESSMENT NOTE - NS ED NURSE REASSESS COMMENT FT1
received report from RN Wendy Perea. pt AMS. unable to verbally communicate at this time. pt CHF exacerbation, on BIPAP. Molst form in chart, Family at bedside. pt VSS. Pt/family denies any needs/complaints at this time. pt safety maintained, will CTM pt.

## 2019-05-14 NOTE — ED PROVIDER NOTE - NEUROLOGICAL, MLM
Alert and oriented, no focal deficits, no motor or sensory deficits. Alert and oriented to self, no focal deficits, no motor or sensory deficits.

## 2019-05-14 NOTE — H&P ADULT - ASSESSMENT
89 y/o M with PMH of CAD s/p PCI / CABG, HTN, dyslipidemia, systolic CHF, AICD placement, CVA, orthostatic hypotension, pulmonary HTN,  CKD III , a-fib (on coumadin), Parkinson's, p/w abrasion on scrotum    *SOB - multifactorial in etiology - acute on chronic CHF vs pulmonary HTN vs possible PNA?  -Patient received lasix 20 at the NH earlier today, unable to give any more lasix due to hypotension  -I/Os + Daily weights  -No ACE 2/2 MARILYN  -No BB 2/2 hypotension  -Bipap  -NPO while on bipap  -ABG  -Cardio consult  -Questionable gram negative PNA? -> vanco / cefepime given in ED  -ID consult  -Blood cultures  -Lactate 3.4 -> 1.8  -Elevated troponins w/ h/o CAD - likely 2/2 demand ischemia - will trend troponins + Serial EKGs   -ASA     *Acute on CKD III  -Nephro consult  -I/Os   -UA  -Avoid nephrotoxic agents     *LE cellulitis  -Vanco / cefepime  -ID consult  -Blood cultures     *Scrotal abrasion  -Wound care  -No signs of infection at this time     *Anemia / Elevated alk phos   -if stable, f/u outpatient for further work up     *HTN  -Hold anti-hypertensives 2/2 hypotension    *A-fib  -On coumadin - therapeutic INR    *Advance directives   -DNR / DNI / No pressors - MOLST in chart  -Palliative consult   -Explained to daughter at bedside that patient is critically ill and prognosis is poor    *DVT ppx  -On coumadin 89 y/o M with PMH of CAD s/p PCI / CABG, HTN, dyslipidemia, systolic CHF, AICD placement, CVA, orthostatic hypotension, pulmonary HTN,  CKD III , a-fib (on coumadin), Parkinson's, p/w abrasion on scrotum    *SOB - multifactorial in etiology - acute on chronic CHF vs pulmonary HTN vs possible PNA?  -Patient received lasix 20 at the NH earlier today, unable to give any more lasix due to hypotension  -I/Os + Daily weights  -No ACE 2/2 MARILYN  -No BB 2/2 hypotension  -Bipap  -NPO while on bipap  -ABG  -Cardio consult  -Questionable gram negative PNA? -> vanco / cefepime given in ED  -ID consult  -Blood cultures  -Lactate 3.4 -> 1.8  -Elevated troponins w/ h/o CAD - likely 2/2 demand ischemia - will trend troponins + Serial EKGs   -ASA     *Acute on CKD III  -Nephro consult if no improvement in kidney function   -I/Os   -Avoid nephrotoxic agents     *LE cellulitis  -Vanco / cefepime  -ID consult  -Blood cultures     *Scrotal abrasion  -Wound care  -No signs of infection at this time     *Anemia / Elevated alk phos   -if stable, f/u outpatient for further work up     *HTN  -Hold anti-hypertensives 2/2 hypotension    *A-fib  -On coumadin - therapeutic INR    *Advance directives   -DNR / DNI / No pressors - MOLST in chart  -Palliative consult   -Explained to daughter at bedside that patient is critically ill and prognosis is poor    *DVT ppx  -On coumadin

## 2019-05-14 NOTE — ED PROVIDER NOTE - CLINICAL SUMMARY MEDICAL DECISION MAKING FREE TEXT BOX
Patient with CHF exacerbation, possible underlying PNA.  Given IV antibiotics, started on BiPAP.  Holding lasix 2/2 low BP.  Pt DNR/DNI/no pressors.  Given small fluid boluses for low BP.  Admit to medicine service.

## 2019-05-14 NOTE — H&P ADULT - HISTORY OF PRESENT ILLNESS
89 y/o M with PMH of CAD s/p PCI / CABG, HTN, dyslipidemia, systolic CHF, AICD placement, CVA, orthostatic hypotension, pulmonary HTN,  CKD III , a-fib (on coumadin), Parkinson's, p/w abrasion on scrotum. As per daughter at bedside, uncertain how patient got the abrasion. However, she states it happened today, as it was not present before. Patient denies any pain / fever / chills. Patient found to be in fluid overload and started on bipap in ED. Patient is a poor historian.     PSH: CABG, AICD     Social hx: Denies x 3    Family Hx:  Denies

## 2019-05-14 NOTE — ED PROVIDER NOTE - CONSTITUTIONAL, MLM
normal... Well appearing, well nourished, awake, alert, oriented to person, place, time/situation and in no apparent distress. Somnolent, well nourished, awake, alert, oriented to self and in no apparent distress.

## 2019-05-15 DIAGNOSIS — I50.9 HEART FAILURE, UNSPECIFIED: ICD-10-CM

## 2019-05-15 DIAGNOSIS — J44.9 CHRONIC OBSTRUCTIVE PULMONARY DISEASE, UNSPECIFIED: ICD-10-CM

## 2019-05-15 DIAGNOSIS — R06.00 DYSPNEA, UNSPECIFIED: ICD-10-CM

## 2019-05-15 DIAGNOSIS — I25.10 ATHEROSCLEROTIC HEART DISEASE OF NATIVE CORONARY ARTERY WITHOUT ANGINA PECTORIS: ICD-10-CM

## 2019-05-15 DIAGNOSIS — I48.2 CHRONIC ATRIAL FIBRILLATION: ICD-10-CM

## 2019-05-15 DIAGNOSIS — I50.23 ACUTE ON CHRONIC SYSTOLIC (CONGESTIVE) HEART FAILURE: ICD-10-CM

## 2019-05-15 DIAGNOSIS — E78.2 MIXED HYPERLIPIDEMIA: ICD-10-CM

## 2019-05-15 DIAGNOSIS — G20 PARKINSON'S DISEASE: ICD-10-CM

## 2019-05-15 LAB
ALBUMIN SERPL ELPH-MCNC: 2.8 G/DL — LOW (ref 3.3–5)
ALP SERPL-CCNC: 113 U/L — SIGNIFICANT CHANGE UP (ref 40–120)
ALT FLD-CCNC: 8 U/L — LOW (ref 12–78)
ANION GAP SERPL CALC-SCNC: 7 MMOL/L — SIGNIFICANT CHANGE UP (ref 5–17)
APPEARANCE UR: CLEAR — SIGNIFICANT CHANGE UP
APTT BLD: 47 SEC — HIGH (ref 27.5–36.3)
AST SERPL-CCNC: 15 U/L — SIGNIFICANT CHANGE UP (ref 15–37)
BACTERIA # UR AUTO: ABNORMAL
BASOPHILS # BLD AUTO: 0.03 K/UL — SIGNIFICANT CHANGE UP (ref 0–0.2)
BASOPHILS NFR BLD AUTO: 0.4 % — SIGNIFICANT CHANGE UP (ref 0–2)
BILIRUB SERPL-MCNC: 1.1 MG/DL — SIGNIFICANT CHANGE UP (ref 0.2–1.2)
BILIRUB UR-MCNC: NEGATIVE — SIGNIFICANT CHANGE UP
BUN SERPL-MCNC: 66 MG/DL — HIGH (ref 7–23)
CALCIUM SERPL-MCNC: 8.4 MG/DL — LOW (ref 8.5–10.1)
CHLORIDE SERPL-SCNC: 107 MMOL/L — SIGNIFICANT CHANGE UP (ref 96–108)
CO2 SERPL-SCNC: 28 MMOL/L — SIGNIFICANT CHANGE UP (ref 22–31)
COLOR SPEC: YELLOW — SIGNIFICANT CHANGE UP
CREAT SERPL-MCNC: 2.36 MG/DL — HIGH (ref 0.5–1.3)
DIFF PNL FLD: ABNORMAL
EOSINOPHIL # BLD AUTO: 0.04 K/UL — SIGNIFICANT CHANGE UP (ref 0–0.5)
EOSINOPHIL NFR BLD AUTO: 0.5 % — SIGNIFICANT CHANGE UP (ref 0–6)
EPI CELLS # UR: SIGNIFICANT CHANGE UP
GLUCOSE SERPL-MCNC: 101 MG/DL — HIGH (ref 70–99)
GLUCOSE UR QL: NEGATIVE MG/DL — SIGNIFICANT CHANGE UP
HCT VFR BLD CALC: 29.3 % — LOW (ref 39–50)
HGB BLD-MCNC: 8.8 G/DL — LOW (ref 13–17)
HYALINE CASTS # UR AUTO: ABNORMAL /LPF
IMM GRANULOCYTES NFR BLD AUTO: 0.4 % — SIGNIFICANT CHANGE UP (ref 0–1.5)
INR BLD: 1.83 RATIO — HIGH (ref 0.88–1.16)
KETONES UR-MCNC: ABNORMAL
LEUKOCYTE ESTERASE UR-ACNC: ABNORMAL
LYMPHOCYTES # BLD AUTO: 0.44 K/UL — LOW (ref 1–3.3)
LYMPHOCYTES # BLD AUTO: 5.4 % — LOW (ref 13–44)
MAGNESIUM SERPL-MCNC: 2.6 MG/DL — SIGNIFICANT CHANGE UP (ref 1.6–2.6)
MCHC RBC-ENTMCNC: 24.9 PG — LOW (ref 27–34)
MCHC RBC-ENTMCNC: 30 GM/DL — LOW (ref 32–36)
MCV RBC AUTO: 83 FL — SIGNIFICANT CHANGE UP (ref 80–100)
MONOCYTES # BLD AUTO: 0.57 K/UL — SIGNIFICANT CHANGE UP (ref 0–0.9)
MONOCYTES NFR BLD AUTO: 7.1 % — SIGNIFICANT CHANGE UP (ref 2–14)
NEUTROPHILS # BLD AUTO: 6.97 K/UL — SIGNIFICANT CHANGE UP (ref 1.8–7.4)
NEUTROPHILS NFR BLD AUTO: 86.2 % — HIGH (ref 43–77)
NITRITE UR-MCNC: NEGATIVE — SIGNIFICANT CHANGE UP
PH UR: 5 — SIGNIFICANT CHANGE UP (ref 5–8)
PHOSPHATE SERPL-MCNC: 3.9 MG/DL — SIGNIFICANT CHANGE UP (ref 2.5–4.5)
PLATELET # BLD AUTO: 209 K/UL — SIGNIFICANT CHANGE UP (ref 150–400)
POTASSIUM SERPL-MCNC: 4.6 MMOL/L — SIGNIFICANT CHANGE UP (ref 3.5–5.3)
POTASSIUM SERPL-SCNC: 4.6 MMOL/L — SIGNIFICANT CHANGE UP (ref 3.5–5.3)
PROT SERPL-MCNC: 6.5 GM/DL — SIGNIFICANT CHANGE UP (ref 6–8.3)
PROT UR-MCNC: NEGATIVE MG/DL — SIGNIFICANT CHANGE UP
PROTHROM AB SERPL-ACNC: 20.7 SEC — HIGH (ref 10–12.9)
RBC # BLD: 3.53 M/UL — LOW (ref 4.2–5.8)
RBC # FLD: 20.5 % — HIGH (ref 10.3–14.5)
RBC CASTS # UR COMP ASSIST: NEGATIVE /HPF — SIGNIFICANT CHANGE UP (ref 0–4)
SODIUM SERPL-SCNC: 142 MMOL/L — SIGNIFICANT CHANGE UP (ref 135–145)
SP GR SPEC: 1.02 — SIGNIFICANT CHANGE UP (ref 1.01–1.02)
UROBILINOGEN FLD QL: NEGATIVE MG/DL — SIGNIFICANT CHANGE UP
WBC # BLD: 8.08 K/UL — SIGNIFICANT CHANGE UP (ref 3.8–10.5)
WBC # FLD AUTO: 8.08 K/UL — SIGNIFICANT CHANGE UP (ref 3.8–10.5)
WBC UR QL: ABNORMAL

## 2019-05-15 PROCEDURE — 93010 ELECTROCARDIOGRAM REPORT: CPT

## 2019-05-15 PROCEDURE — 99223 1ST HOSP IP/OBS HIGH 75: CPT

## 2019-05-15 RX ORDER — CEFEPIME 1 G/1
500 INJECTION, POWDER, FOR SOLUTION INTRAMUSCULAR; INTRAVENOUS EVERY 12 HOURS
Refills: 0 | Status: DISCONTINUED | OUTPATIENT
Start: 2019-05-15 | End: 2019-05-15

## 2019-05-15 RX ORDER — CEFTRIAXONE 500 MG/1
1000 INJECTION, POWDER, FOR SOLUTION INTRAMUSCULAR; INTRAVENOUS EVERY 24 HOURS
Refills: 0 | Status: DISCONTINUED | OUTPATIENT
Start: 2019-05-15 | End: 2019-05-16

## 2019-05-15 RX ORDER — ASPIRIN/CALCIUM CARB/MAGNESIUM 324 MG
325 TABLET ORAL ONCE
Refills: 0 | Status: COMPLETED | OUTPATIENT
Start: 2019-05-15 | End: 2019-05-15

## 2019-05-15 RX ORDER — HYDROMORPHONE HYDROCHLORIDE 2 MG/ML
0.2 INJECTION INTRAMUSCULAR; INTRAVENOUS; SUBCUTANEOUS
Refills: 0 | Status: DISCONTINUED | OUTPATIENT
Start: 2019-05-15 | End: 2019-05-16

## 2019-05-15 RX ORDER — ACETAMINOPHEN 500 MG
650 TABLET ORAL EVERY 6 HOURS
Refills: 0 | Status: DISCONTINUED | OUTPATIENT
Start: 2019-05-15 | End: 2019-05-20

## 2019-05-15 RX ADMIN — Medication 650 MILLIGRAM(S): at 12:02

## 2019-05-15 RX ADMIN — CLOPIDOGREL BISULFATE 75 MILLIGRAM(S): 75 TABLET, FILM COATED ORAL at 11:19

## 2019-05-15 RX ADMIN — Medication 650 MILLIGRAM(S): at 11:19

## 2019-05-15 RX ADMIN — Medication 3 MILLILITER(S): at 07:53

## 2019-05-15 RX ADMIN — SENNA PLUS 2 TABLET(S): 8.6 TABLET ORAL at 21:15

## 2019-05-15 RX ADMIN — CARBIDOPA AND LEVODOPA 1 TABLET(S): 25; 100 TABLET ORAL at 21:15

## 2019-05-15 RX ADMIN — CARBIDOPA AND LEVODOPA 1 TABLET(S): 25; 100 TABLET ORAL at 11:19

## 2019-05-15 RX ADMIN — Medication 1 TABLET(S): at 11:19

## 2019-05-15 RX ADMIN — Medication 500 MILLIGRAM(S): at 11:19

## 2019-05-15 RX ADMIN — Medication 3 MILLILITER(S): at 13:09

## 2019-05-15 RX ADMIN — CEFTRIAXONE 1000 MILLIGRAM(S): 500 INJECTION, POWDER, FOR SOLUTION INTRAMUSCULAR; INTRAVENOUS at 18:23

## 2019-05-15 RX ADMIN — MIDODRINE HYDROCHLORIDE 5 MILLIGRAM(S): 2.5 TABLET ORAL at 11:19

## 2019-05-15 RX ADMIN — BUDESONIDE AND FORMOTEROL FUMARATE DIHYDRATE 2 PUFF(S): 160; 4.5 AEROSOL RESPIRATORY (INHALATION) at 21:07

## 2019-05-15 RX ADMIN — Medication 3 MILLILITER(S): at 18:35

## 2019-05-15 RX ADMIN — TAMSULOSIN HYDROCHLORIDE 0.4 MILLIGRAM(S): 0.4 CAPSULE ORAL at 21:15

## 2019-05-15 RX ADMIN — SIMVASTATIN 20 MILLIGRAM(S): 20 TABLET, FILM COATED ORAL at 21:15

## 2019-05-15 RX ADMIN — Medication 3 MILLILITER(S): at 21:06

## 2019-05-15 NOTE — PROGRESS NOTE ADULT - SUBJECTIVE AND OBJECTIVE BOX
89 y/o M with PMH of CAD s/p PCI / CABG, HTN, dyslipidemia, systolic CHF, AICD placement, CVA, orthostatic hypotension, pulmonary HTN,  CKD III , a-fib (on coumadin), Parkinson's, admitted on  for evaluation of shortness of breath; patient is lethargic and cannot provide history; history per medical record.     5/15: lethargic unable to follow commands, fam present, opens eyes and squeezes hand at times; he is rx for PNA at this time; low BP precludes use of diuretics        Vital Signs Last 24 Hrs  T(C): 36.1 (15 May 2019 13:04), Max: 37.8 (14 May 2019 16:35)  T(F): 97 (15 May 2019 13:04), Max: 100 (14 May 2019 16:35)  HR: 66 (15 May 2019 14:00) (54 - 99)  BP: 104/38 (15 May 2019 14:00) (79/39 - 126/41)  BP(mean): 52 (15 May 2019 14:00) (39 - 82)  RR: 21 (15 May 2019 14:00) (17 - 35)  SpO2: 100% (15 May 2019 14:00) (76% - 100%)  Daily Height in cm: 167.64 (14 May 2019 16:13)    Daily     PE:    Constitutional: frail looking  HEENT: NC/AT   Neck: supple; thyroid not palpable  Back: no tenderness  Respiratory: respiratory effort normal; scattered coarse breath sounds  Cardiovascular: S1S2 regular, no murmurs  Abdomen: soft, not tender, not distended, positive BS; no liver or spleen organomegaly  Genitourinary: no suprapubic tenderness  Musculoskeletal: no muscle tenderness, bilateral lower extremity edema  Neurological/ Psychiatric: lethargic  Skin: no rashes; no palpable lesions    Labs: all available labs reviewed                        8.8    8.08  )-----------( 209      ( 15 May 2019 05:56 )             29.3     05-15    142  |  107  |  66<H>  ----------------------------<  101<H>  4.6   |  28  |  2.36<H>    Ca    8.4<L>      15 May 2019 05:56  Phos  3.9     05-15  Mg     2.6     05-15    TPro  6.5  /  Alb  2.8<L>  /  TBili  1.1  /  DBili  x   /  AST  15  /  ALT  8<L>  /  AlkPhos  113  05-15     LIVER FUNCTIONS - ( 15 May 2019 05:56 )  Alb: 2.8 g/dL / Pro: 6.5 gm/dL / ALK PHOS: 113 U/L / ALT: 8 U/L / AST: 15 U/L / GGT: x           Urinalysis Basic - ( 14 May 2019 23:56 )    Color: Yellow / Appearance: Clear / S.020 / pH: x  Gluc: x / Ketone: Trace  / Bili: Negative / Urobili: Negative mg/dL   Blood: x / Protein: Negative mg/dL / Nitrite: Negative   Leuk Esterase: Moderate / RBC: Negative /HPF / WBC 6-10   Sq Epi: x / Non Sq Epi: Few / Bacteria: Moderate      * Large left pl. effusion  -pt needs to be aval for hospice; thora at this point is too invasive given his lethargy  - abx will be dc no evidence of PNA- keep 24 hrs per ID; add low dose steroids for bronchospasm      *Scrotal abrasion  -Wound care  -No signs of infection at this time       *HTN  -Hold anti-hypertensives 2/2 hypotension    *A-fib  - inr high on adm; <3 now  - restart vka    *Advance directives   -DNR / DNI / No pressors - MOLST in chart  -Palliative consult   Fam aware that patient is critically ill and prognosis is poor

## 2019-05-15 NOTE — ADVANCED PRACTICE NURSE CONSULT - RECOMMEDATIONS
1) Turn and position every 2 hours  2) Elevate heels off mattress  3) Apply Triad to scrotum daily and as needed

## 2019-05-15 NOTE — ED ADULT NURSE REASSESSMENT NOTE - NS ED NURSE REASSESS COMMENT FT1
EDMUND Chambers changed pt linen. pt scrotum and penis edematous, weaping fluid/blood. MD Valdes informed and at pt bedside. pt condom catheter removed and EDMUND PATTERSON placed 14F ventura, 50cc output. as per MD Valdes verbal order at bedside. pt changed, turned and repositioned provided with new linen and gown.

## 2019-05-15 NOTE — ADVANCED PRACTICE NURSE CONSULT - ASSESSMENT
This is a 89 year old male that was admitted to the hospital on 5/14/2019 for shortness of breath.  PMH- 89 y/o M with PMH of CAD s/p PCI / CABG, HTN, dyslipidemia, systolic CHF, AICD placement, CVA, orthostatic hypotension, pulmonary HTN,  CKD III , a-fib (on coumadin), Parkinson's, admitted on 5/14 for evaluation of shortness of breath.    Patient presents on an [employer] 9000 MRS on his backside sitting up eating dinner. Patient turned to his right side to assess scrotum.  Patient noted to have generalized edema as well as scrotal edema. Patient's scrotum assessed to have a 0.5cmx0.5cmx0.2cm tear, most likely related to edema.  Wound intact. No odor or drainage. Triad wound cream applied for protection. Diaper removed as patient has a Mancia catheter. Patient on purple pad.  Patient remains on his backside after assessment completed.     Heels remain elevated off mattress.

## 2019-05-15 NOTE — CONSULT NOTE ADULT - ASSESSMENT
HPI: Pt is a 89y old Male with a PMH of CAD s/p PCI / CABG, HTN, dyslipidemia, systolic CHF, AICD placement, CVA, orthostatic hypotension, pulmonary HTN,  CKD III , a-fib (on coumadin), Parkinson's, p/w abrasion on scrotum. As per daughter at bedside, uncertain how patient got the abrasion. Patient found to be in fluid overload and started on bipap in ED. This is his 5th adm over the past 12 months and Palliative medicine is consulted to establish GOC. Pt is known to or service as he was seen and disch to [facility] last adm approx 4 weeks ago.    5/15/19 Seen and examined at bedside with daughter present. Pt is currently lethargic however arousable. Able to answer simple questions. Mild dyspnea at rest on nasal O2. Denies pain    Assessment and Plan:    1) Resp Failure  -Hypoxia  -Supplemental O2  -Had been on BiPap  -Now on nasal O2 PRN  -CXR=pleural effusion/ pneumonia  -declines intubation    2) Heart failure  -Acute on chronic systolic congestive heart failure  -diuresis as tolerated  -cardio eval noted  -EF=35% 4/2019  -Mult hospitalizations over 12 months    3) MARILYN on CKD  -Monitor creat  -Currently> 2.3  -Increasing   -gentle hydration     4) AMS  -Lethargic  -Metabolic encephalopathy  -supportive care  -? baseline dementia    5) Advanced Dementia  -Pt without capacity   -Son David named HCP   -MOLST on file  -Completed 5/14  -DNR/DNI/LMT/NFT/TrialIV/useabx  -Will schedule GOC discussion with lauren
89 y/o M with PMH of CAD s/p PCI / CABG, HTN, dyslipidemia, systolic CHF, AICD placement, CVA, orthostatic hypotension, pulmonary HTN,  CKD III , a-fib (on coumadin), Parkinson's, admitted on 5/14 for evaluation of shortness of breath; patient is lethargic and cannot provide history; history per medical record.   1. Patient admitted with dyspnea, most likely due to chf exacerbation, unclear if patient truly has underlying pneumonia, given bilateral effusions  - follow up cultures   - oxygen and nebs as needed   - serial cbc and monitor temperature   - reviewed prior medical records to evaluate for resistant or atypical pathogens   - patient may benefit from thoracentesis  - will continue ceftriaxone most likely for another 24 hours pending cultures  2. other issues: per medicine
Dyspnea.  Acuter/chronic CHF. O2, diuresis limited by BP. Cardiology following.    ? superimposed pneumonia. on ceftriaxone. follow up on cultures.     AF. Chronic.     CAD.  AICD.    COPD.  symbicort 80 strength,  duo nebs.  O2.   reserve steroids.    Parkinson's disease.

## 2019-05-15 NOTE — CONSULT NOTE ADULT - SUBJECTIVE AND OBJECTIVE BOX
Patient is a 89y old  Male who presents with a chief complaint of acute on chronic CHF (15 May 2019 10:00)    HPI:  89 y/o M with PMH of CAD s/p PCI / CABG, HTN, dyslipidemia, systolic CHF, AICD placement, CVA, orthostatic hypotension, pulmonary HTN,  CKD III , a-fib (on coumadin), Parkinson's, admitted on  for evaluation of shortness of breath; patient is lethargic and cannot provide history; history per medical record.           PMH: as above  PSH: as above  Meds: per reconciliation sheet, noted below  MEDICATIONS  (STANDING):  ALBUTerol/ipratropium for Nebulization 3 milliLiter(s) Nebulizer every 4 hours  buDESOnide  80 MICROgram(s)/formoterol 4.5 MICROgram(s) Inhaler 2 Puff(s) Inhalation two times a day  carbidopa/levodopa  25/100 1 Tablet(s) Oral three times a day  cefTRIAXone Injectable. 1000 milliGRAM(s) IV Push every 24 hours  clopidogrel Tablet 75 milliGRAM(s) Oral daily  levothyroxine 25 MICROGram(s) Oral daily  midodrine 5 milliGRAM(s) Oral <User Schedule>  multivitamin 1 Tablet(s) Oral daily  pantoprazole    Tablet 40 milliGRAM(s) Oral before breakfast  senna 8.6 milliGRAM(s) Oral Tablet - Peds 2 Tablet(s) Oral at bedtime  simvastatin 20 milliGRAM(s) Oral at bedtime  tamsulosin Oral Tab/Cap - Peds 0.4 milliGRAM(s) Oral at bedtime    MEDICATIONS  (PRN):  acetaminophen   Tablet .. 650 milliGRAM(s) Oral every 6 hours PRN Temp greater or equal to 38C (100.4F), Moderate Pain (4 - 6)  docusate sodium 100 milliGRAM(s) Oral two times a day PRN Constipation  HYDROmorphone  Injectable 0.2 milliGRAM(s) IV Push every 2 hours PRN Severe Pain (7 - 10)dyspnea  magnesium hydroxide Suspension 30 milliLiter(s) Oral daily PRN Constipation    Allergies    morphine (Headache)    Intolerances      Social: no smoking, no alcohol, no illegal drugs; no recent travel, no exposure to TB  FAMILY HISTORY:     no history of premature cardiovascular disease in first degree relatives  ROS: unable to obtain secondary to patient medical condition     Vital Signs Last 24 Hrs  T(C): 36.1 (15 May 2019 13:04), Max: 37.8 (14 May 2019 16:35)  T(F): 97 (15 May 2019 13:04), Max: 100 (14 May 2019 16:35)  HR: 66 (15 May 2019 14:00) (54 - 99)  BP: 104/38 (15 May 2019 14:00) (79/39 - 126/41)  BP(mean): 52 (15 May 2019 14:00) (39 - 82)  RR: 21 (15 May 2019 14:00) (17 - 35)  SpO2: 100% (15 May 2019 14:00) (76% - 100%)  Daily Height in cm: 167.64 (14 May 2019 16:13)    Daily     PE:    Constitutional: frail looking  HEENT: NC/AT   Neck: supple; thyroid not palpable  Back: no tenderness  Respiratory: respiratory effort normal; scattered coarse breath sounds  Cardiovascular: S1S2 regular, no murmurs  Abdomen: soft, not tender, not distended, positive BS; no liver or spleen organomegaly  Genitourinary: no suprapubic tenderness  Musculoskeletal: no muscle tenderness, bilateral lower extremity edema  Neurological/ Psychiatric: lethargic  Skin: no rashes; no palpable lesions    Labs: all available labs reviewed                        8.8    8.08  )-----------( 209      ( 15 May 2019 05:56 )             29.3     05-15    142  |  107  |  66<H>  ----------------------------<  101<H>  4.6   |  28  |  2.36<H>    Ca    8.4<L>      15 May 2019 05:56  Phos  3.9     05-15  Mg     2.6     05-15    TPro  6.5  /  Alb  2.8<L>  /  TBili  1.1  /  DBili  x   /  AST  15  /  ALT  8<L>  /  AlkPhos  113  05-15     LIVER FUNCTIONS - ( 15 May 2019 05:56 )  Alb: 2.8 g/dL / Pro: 6.5 gm/dL / ALK PHOS: 113 U/L / ALT: 8 U/L / AST: 15 U/L / GGT: x           Urinalysis Basic - ( 14 May 2019 23:56 )    Color: Yellow / Appearance: Clear / S.020 / pH: x  Gluc: x / Ketone: Trace  / Bili: Negative / Urobili: Negative mg/dL   Blood: x / Protein: Negative mg/dL / Nitrite: Negative   Leuk Esterase: Moderate / RBC: Negative /HPF / WBC 6-10   Sq Epi: x / Non Sq Epi: Few / Bacteria: Moderate      < from: CT Chest No Cont (19 @ 09:13) >    EXAM:  CT CHEST                            PROCEDURE DATE:  2019          INTERPRETATION:  CLINICAL INFORMATION: Shortness of breath      COMPARISON: chest radiograph of the previous day and CT of the chest of   2018.    PROCEDURE:   CTof the Chest was performed without intravenous contrast.  Sagittal and coronal reformats were performed.      FINDINGS:    CHEST:     LUNGS AND LARGE AIRWAYS: Patent central airways.  No pulmonary nodules.  PLEURA: Large right effusion and small lefteffusion  VESSELS: Atherosclerotic calcifications  HEART: Coronary artery bypass graft surgical changes and pacemaker in   place. No pericardial effusion.  MEDIASTINUM AND SHIRLEY: No lymphadenopathy.  CHEST WALL AND LOWER NECK: Within normal limits.  VISUALIZED UPPER ABDOMEN: Splenic calcifications and cysts  BONES: Within normal limits.    IMPRESSION: Large right effusion and small left effusion with adjacent   atelectasis. No definite evidence of pneumonia.    < end of copied text >      Radiology: all available radiological tests reviewed    Advanced directives addressed: DNR
HPI: Pt is a 89y old Male with a PMH of CAD s/p PCI / CABG, HTN, dyslipidemia, systolic CHF, AICD placement, CVA, orthostatic hypotension, pulmonary HTN,  CKD III , a-fib (on coumadin), Parkinson's, p/w abrasion on scrotum. As per daughter at bedside, uncertain how patient got the abrasion. Patient found to be in fluid overload and started on bipap in ED. This is his 5th adm over the past 12 months and Palliative medicine is consulted to establish GOC. Pt is known to or service as he was seen and disch to [facility] last adm approx 4 weeks ago.    5/15/19 Seen and examined at bedside with daughter present. Pt is currently lethargic however arousable. Able to answer simple questions. Mild dyspnea at rest on nasal O2. Denies pain    PAIN: ( )Yes   (X )No    DYSPNEA: (X ) Yes  ( ) No  Level: mild at rest    PAST MEDICAL & SURGICAL HISTORY:  COPD (chronic obstructive pulmonary disease)  Parkinson's disease  CHF (congestive heart failure)  Hyperlipidemia  HTN (hypertension)  CAD (coronary artery disease)  AICD (automatic cardioverter/defibrillator) present  S/P CABG      SOCIAL HX:  Lives with son  Previously at [facility]  Hx opiate tolerance ( )YES  ( X)NO    Baseline ADLs  (Prior to Admission)  ( ) Independent   (X )Dependent    FAMILY HISTORY:  Unable to obtain due to lethargy    Review of Systems:        Unable to obtain/Limited due to: lethargy      PHYSICAL EXAM:    Vital Signs Last 24 Hrs  T(C): 36.6 (15 May 2019 06:30), Max: 37.8 (14 May 2019 16:35)  T(F): 97.9 (15 May 2019 06:30), Max: 100 (14 May 2019 16:35)  HR: 65 (15 May 2019 07:00) (55 - 99)  BP: 93/36 (15 May 2019 07:00) (79/39 - 126/41)  BP(mean): 45 (15 May 2019 07:00) (45 - 82)  RR: 24 (15 May 2019 07:00) (17 - 35)  SpO2: 99% (15 May 2019 07:00) (76% - 100%)  Daily Height in cm: 167.64 (14 May 2019 16:13)      PPSV2:  20-30 %      General: Elderly male in bed in mild distress  Mental Status: Lethargy/arouses to voice  HEENT: oral mucosa dry  Lungs: clear diminished keli  Cardiac: S1S2+  GI: abd soft NT ND + BS  : ventura /dsg to scrotal abrasion intact    Ext: BLE edema  Neuro: lethargy/confusion      LABS:                        8.8    8.08  )-----------( 209      ( 15 May 2019 05:56 )             29.3     05-15    142  |  107  |  66<H>  ----------------------------<  101<H>  4.6   |  28  |  2.36<H>    Ca    8.4<L>      15 May 2019 05:56  Phos  3.9     05-15  Mg     2.6     05-15    TPro  6.5  /  Alb  2.8<L>  /  TBili  1.1  /  DBili  x   /  AST  15  /  ALT  8<L>  /  AlkPhos  113  05-15    PT/INR - ( 15 May 2019 05:56 )   PT: 20.7 sec;   INR: 1.83 ratio         PTT - ( 15 May 2019 05:56 )  PTT:47.0 sec  Albumin: Albumin, Serum: 2.8 g/dL (05-15 @ 05:56)      Allergies    morphine (Headache)    Intolerances      MEDICATIONS  (STANDING):  ALBUTerol/ipratropium for Nebulization 3 milliLiter(s) Nebulizer every 4 hours  ascorbic acid 500 milliGRAM(s) Oral daily  buDESOnide  80 MICROgram(s)/formoterol 4.5 MICROgram(s) Inhaler 2 Puff(s) Inhalation two times a day  carbidopa/levodopa  25/100 1 Tablet(s) Oral three times a day  cefepime   IVPB 500 milliGRAM(s) IV Intermittent every 12 hours  clopidogrel Tablet 75 milliGRAM(s) Oral daily  levothyroxine 25 MICROGram(s) Oral daily  midodrine 5 milliGRAM(s) Oral <User Schedule>  multivitamin 1 Tablet(s) Oral daily  pantoprazole    Tablet 40 milliGRAM(s) Oral before breakfast  senna 8.6 milliGRAM(s) Oral Tablet - Peds 2 Tablet(s) Oral at bedtime  simvastatin 20 milliGRAM(s) Oral at bedtime  tamsulosin Oral Tab/Cap - Peds 0.4 milliGRAM(s) Oral at bedtime    MEDICATIONS  (PRN):  acetaminophen   Tablet .. 650 milliGRAM(s) Oral every 6 hours PRN Temp greater or equal to 38C (100.4F), Moderate Pain (4 - 6)  docusate sodium 100 milliGRAM(s) Oral two times a day PRN Constipation  magnesium hydroxide Suspension 30 milliLiter(s) Oral daily PRN Constipation      RADIOLOGY/ADDITIONAL STUDIES:    < from: Xray Chest 1 View- PORTABLE-Urgent (05.14.19 @ 17:34) >    EXAM:  XR CHEST PORTABLE URGENT 1V                            PROCEDURE DATE:  05/14/2019          INTERPRETATION:  Exam Date: 5/14/2019 5:34 PM    Chest radiograph (one view)    CLINICAL INFORMATION: SOB    TECHNIQUE:  Single frontal view of the chest was obtained.    COMPARISON: 4/15/2019    FINDINGS/  IMPRESSION:    There is a nonspecific catheter that appears to be overlying the   patient's right axilla, and visual inspection is needed.    Diffuse airspace opacities and bilateral pleural effusions are present,   may reflect multifocal pneumonia versus other etiology. Median   sternotomy. Left chest wall pacemaker in place.  Cardiomegaly present.
HPI:  89 y/o M with PMH of CAD s/p PCI / CABG, HTN, dyslipidemia, systolic CHF, AICD placement, CVA, orthostatic hypotension, pulmonary HTN,  CKD III , a-fib (on coumadin), Parkinson's, p/w abrasion on scrotum. As per daughter at bedside, uncertain how patient got the abrasion. However, she states it happened today, as it was not present before. Patient denies any pain / fever / chills. Patient found to be in fluid overload and started on bipap in ED. Patient is a poor historian.     5/15: resting in bed. not SOB.  was on BIPAP,  on NC O2 now.     PSH: CABG, AICD     Social hx: Denies x 3    Family Hx:  Denies (14 May 2019 23:44)      PAST MEDICAL & SURGICAL HISTORY:  COPD (chronic obstructive pulmonary disease)  Parkinson's disease  CHF (congestive heart failure)  Hyperlipidemia  HTN (hypertension)  CAD (coronary artery disease)  AICD (automatic cardioverter/defibrillator) present  S/P CABG      MEDICATIONS  (STANDING):  ALBUTerol/ipratropium for Nebulization 3 milliLiter(s) Nebulizer every 4 hours  buDESOnide  80 MICROgram(s)/formoterol 4.5 MICROgram(s) Inhaler 2 Puff(s) Inhalation two times a day  carbidopa/levodopa  25/100 1 Tablet(s) Oral three times a day  cefTRIAXone Injectable. 1000 milliGRAM(s) IV Push every 24 hours  clopidogrel Tablet 75 milliGRAM(s) Oral daily  levothyroxine 25 MICROGram(s) Oral daily  midodrine 5 milliGRAM(s) Oral <User Schedule>  multivitamin 1 Tablet(s) Oral daily  pantoprazole    Tablet 40 milliGRAM(s) Oral before breakfast  senna 8.6 milliGRAM(s) Oral Tablet - Peds 2 Tablet(s) Oral at bedtime  simvastatin 20 milliGRAM(s) Oral at bedtime  tamsulosin Oral Tab/Cap - Peds 0.4 milliGRAM(s) Oral at bedtime    MEDICATIONS  (PRN):  acetaminophen   Tablet .. 650 milliGRAM(s) Oral every 6 hours PRN Temp greater or equal to 38C (100.4F), Moderate Pain (4 - 6)  docusate sodium 100 milliGRAM(s) Oral two times a day PRN Constipation  HYDROmorphone  Injectable 0.2 milliGRAM(s) IV Push every 2 hours PRN Severe Pain (7 - 10)dyspnea  magnesium hydroxide Suspension 30 milliLiter(s) Oral daily PRN Constipation      Allergies    morphine (Headache)    Intolerances        SOCIAL HISTORY: Denies tobacco, etoh abuse or illicit drug use    FAMILY HISTORY:      Vital Signs Last 24 Hrs  T(C): 36.1 (15 May 2019 16:54), Max: 37.4 (14 May 2019 23:56)  T(F): 97 (15 May 2019 16:54), Max: 99.3 (14 May 2019 23:56)  HR: 53 (15 May 2019 17:00) (52 - 99)  BP: 105/25 (15 May 2019 17:00) (80/47 - 126/41)  BP(mean): 42 (15 May 2019 17:00) (39 - 82)  RR: 30 (15 May 2019 17:00) (20 - 35)  SpO2: 99% (15 May 2019 17:00) (94% - 100%)    REVIEW OF SYSTEMS:    CONSTITUTIONAL:  As per HPI.  HEENT:  Eyes:  No diplopia or blurred vision. ENT:  No earache, sore throat or runny nose.  CARDIOVASCULAR:  see above.  RESPIRATORY: see above.  GASTROINTESTINAL:  No nausea, vomiting or diarrhea.  GENITOURINARY:  No dysuria, frequency or urgency.  MUSCULOSKELETAL:  As per HPI.  SKIN:  No change in skin, hair or nails.  NEUROLOGIC:  No paresthesias, fasciculations, seizures or weakness.  PSYCHIATRIC:  No disorder of thought or mood.  ENDOCRINE:  No heat or cold intolerance, polyuria or polydipsia.  HEMATOLOGICAL:  No easy bruising or bleedings:  .     PHYSICAL EXAMINATION:    GENERAL APPEARANCE:  Pt. is not currently dyspneic, in no distress.   HEENT:  Pupils are normal and react normally. No icterus. Mucous membranes well colored.  NECK:  Supple. No lymphadenopathy. Jugular venous pressure not elevated. Carotids equal.   HEART:   The cardiac impulse has a normal quality.  HR 60.  CHEST:  Bilateral crackles, anterior exam.  ABDOMEN:  Soft and nontender.   EXTREMITIES:  There is no cyanosis, clubbing.  Positive LE edema.  SKIN:  No rash or significant lesions are noted.  Neuro: Resting.      LABS:                        8.8    8.08  )-----------( 209      ( 15 May 2019 05:56 )             29.3     05-15    142  |  107  |  66<H>  ----------------------------<  101<H>  4.6   |  28  |  2.36<H>    Ca    8.4<L>      15 May 2019 05:56  Phos  3.9     05-15  Mg     2.6     -15    TPro  6.5  /  Alb  2.8<L>  /  TBili  1.1  /  DBili  x   /  AST  15  /  ALT  8<L>  /  AlkPhos  113  05-15    LIVER FUNCTIONS - ( 15 May 2019 05:56 )  Alb: 2.8 g/dL / Pro: 6.5 gm/dL / ALK PHOS: 113 U/L / ALT: 8 U/L / AST: 15 U/L / GGT: x           PT/INR - ( 15 May 2019 05:56 )   PT: 20.7 sec;   INR: 1.83 ratio         PTT - ( 15 May 2019 05:56 )  PTT:47.0 sec  CARDIAC MARKERS ( 14 May 2019 16:57 )  0.051 ng/mL / x     / x     / x     / x          Urinalysis Basic - ( 14 May 2019 23:56 )    Color: Yellow / Appearance: Clear / S.020 / pH: x  Gluc: x / Ketone: Trace  / Bili: Negative / Urobili: Negative mg/dL   Blood: x / Protein: Negative mg/dL / Nitrite: Negative   Leuk Esterase: Moderate / RBC: Negative /HPF / WBC 6-10   Sq Epi: x / Non Sq Epi: Few / Bacteria: Moderate      Serum Pro-Brain Natriuretic Peptide (19 @ 16:57)    Serum Pro-Brain Natriuretic Peptide: 4303 pg/mL        RADIOLOGY & ADDITIONAL STUDIES:     EXAM:  XR CHEST PORTABLE URGENT 1V                            PROCEDURE DATE:  2019          INTERPRETATION:  Exam Date: 2019 5:34 PM    Chest radiograph (one view)    CLINICAL INFORMATION: SOB    TECHNIQUE:  Single frontal view of the chest was obtained.    COMPARISON: 4/15/2019    FINDINGS/  IMPRESSION:    There is a nonspecific catheter that appears to be overlying the   patient's right axilla, and visual inspection is needed.    Diffuse airspace opacities and bilateral pleural effusions are present,   may reflect multifocal pneumonia versus other etiology. Median   sternotomy. Left chest wall pacemaker in place.  Cardiomegaly present.
PCP:    REQUESTING PHYSICIAN:    REASON FOR CONSULT:    CHIEF COMPLAINT:    HPI:  89 y/o M with PMH of CAD s/p PCI / CABG, HTN, dyslipidemia, systolic CHF, AICD placement, CVA, orthostatic hypotension, pulmonary HTN,  CKD III , a-fib (on coumadin), Parkinson's, p/w abrasion on scrotum. As per daughter at bedside, uncertain how patient got the abrasion. However, she states it happened today, as it was not present before. Patient denies any pain / fever / chills. Cardiology requested to evaluate symptoms of CHF. Dtr is present at the bedside and indicates that the patient was asymptomatic prior to his scrotal injury. Pt appears comfortable and in no respiratory distress today although offers limited history. Dtr reports that her father was given a dose of IM Lasix in the SNF prior to his arrival to the ED.     PSH: CABG, AICD     Social hx: Denies x 3    Family Hx:  Denies (14 May 2019 23:44)      PAST MEDICAL & SURGICAL HISTORY:  COPD (chronic obstructive pulmonary disease)  Parkinson's disease  CHF (congestive heart failure)  Hyperlipidemia  HTN (hypertension)  CAD (coronary artery disease)  AICD (automatic cardioverter/defibrillator) present  S/P CABG      Allergies    morphine (Headache)    Intolerances        SOCIAL HISTORY:    FAMILY HISTORY:      MEDICATIONS:  MEDICATIONS  (STANDING):  ALBUTerol/ipratropium for Nebulization 3 milliLiter(s) Nebulizer every 4 hours  ascorbic acid 500 milliGRAM(s) Oral daily  buDESOnide  80 MICROgram(s)/formoterol 4.5 MICROgram(s) Inhaler 2 Puff(s) Inhalation two times a day  carbidopa/levodopa  25/100 1 Tablet(s) Oral three times a day  cefepime   IVPB 500 milliGRAM(s) IV Intermittent every 12 hours  clopidogrel Tablet 75 milliGRAM(s) Oral daily  levothyroxine 25 MICROGram(s) Oral daily  midodrine 5 milliGRAM(s) Oral <User Schedule>  multivitamin 1 Tablet(s) Oral daily  pantoprazole    Tablet 40 milliGRAM(s) Oral before breakfast  senna 8.6 milliGRAM(s) Oral Tablet - Peds 2 Tablet(s) Oral at bedtime  simvastatin 20 milliGRAM(s) Oral at bedtime  tamsulosin Oral Tab/Cap - Peds 0.4 milliGRAM(s) Oral at bedtime    MEDICATIONS  (PRN):  acetaminophen   Tablet .. 650 milliGRAM(s) Oral every 6 hours PRN Temp greater or equal to 38C (100.4F), Moderate Pain (4 - 6)  docusate sodium 100 milliGRAM(s) Oral two times a day PRN Constipation  magnesium hydroxide Suspension 30 milliLiter(s) Oral daily PRN Constipation      REVIEW OF SYSTEMS: Unable to obtain. Pt is non verbal today      Vital Signs Last 24 Hrs  T(C): 36.6 (15 May 2019 06:30), Max: 37.8 (14 May 2019 16:35)  T(F): 97.9 (15 May 2019 06:30), Max: 100 (14 May 2019 16:35)  HR: 65 (15 May 2019 07:00) (55 - 99)  BP: 93/36 (15 May 2019 07:00) (79/39 - 126/41)  BP(mean): 45 (15 May 2019 07:00) (45 - 82)  RR: 24 (15 May 2019 07:00) (17 - 35)  SpO2: 99% (15 May 2019 07:00) (76% - 100%)    I&O's Summary      PHYSICAL EXAM:    Constitutional: NAD, awake   HEENT: PERR, EOMI,  No oral cyananosis.  Neck:  supple,  No JVD  Respiratory: minimal basilar crackles  Cardiovascular: S1 and S2, regular rate and rhythm, 1/6 KATERINA  Gastrointestinal: Bowel Sounds present, soft, nontender.   Extremities: Pedal edema  Vascular: Diminished pulses  Neurological: A/O x 3, no focal deficits  Musculoskeletal: no calf tenderness.  Skin: No rashes.      LABS: All Labs Reviewed:                        8.8    8.08  )-----------( 209      ( 15 May 2019 05:56 )             29.3                         9.6    11.11 )-----------( 208      ( 14 May 2019 16:57 )             31.5     15 May 2019 05:56    142    |  107    |  66     ----------------------------<  101    4.6     |  28     |  2.36   14 May 2019 16:57    140    |  103    |  62     ----------------------------<  115    5.2     |  29     |  2.33     Ca    8.4        15 May 2019 05:56  Ca    9.0        14 May 2019 16:57  Phos  3.9       15 May 2019 05:56  Mg     2.6       15 May 2019 05:56  Mg     2.6       14 May 2019 16:57    TPro  6.5    /  Alb  2.8    /  TBili  1.1    /  DBili  x      /  AST  15     /  ALT  8      /  AlkPhos  113    15 May 2019 05:56  TPro  7.3    /  Alb  3.1    /  TBili  1.1    /  DBili  x      /  AST  14     /  ALT  9      /  AlkPhos  132    14 May 2019 16:57    PT/INR - ( 15 May 2019 05:56 )   PT: 20.7 sec;   INR: 1.83 ratio         PTT - ( 15 May 2019 05:56 )  PTT:47.0 sec  CARDIAC MARKERS ( 14 May 2019 16:57 )  0.051 ng/mL / x     / x     / x     / x          Blood Culture:   05-14 @ 16:57  Pro Bnp [id number]        RADIOLOGY/EKG: Ventricular paced with underlying atrial fibrillation

## 2019-05-15 NOTE — CONSULT NOTE ADULT - REASON FOR ADMISSION
acute on chronic CHF
acute on chronic CHF, shortness of breath
acute on chronic CHF
acute on chronic CHF

## 2019-05-15 NOTE — CONSULT NOTE ADULT - PROBLEM SELECTOR RECOMMENDATION 9
Improved. Blood pressure precludes diuresis. Follow labs including H/H Improved. Blood pressure precludes diuresis. Follow labs including H/H after scrotal injury. If pt remains stable pt can be transferred to a medical floor in 24 h.

## 2019-05-15 NOTE — CHART NOTE - NSCHARTNOTEFT_GEN_A_CORE
This SW spoke with pts son Ravi [phone] to schedule a family meeting. Pts son is available tomorrow 5/16 at 2PM. Our team will continue to follow.

## 2019-05-16 DIAGNOSIS — I27.29 OTHER SECONDARY PULMONARY HYPERTENSION: ICD-10-CM

## 2019-05-16 LAB
CULTURE RESULTS: SIGNIFICANT CHANGE UP
SPECIMEN SOURCE: SIGNIFICANT CHANGE UP

## 2019-05-16 PROCEDURE — 99233 SBSQ HOSP IP/OBS HIGH 50: CPT

## 2019-05-16 RX ORDER — FUROSEMIDE 40 MG
20 TABLET ORAL ONCE
Refills: 0 | Status: COMPLETED | OUTPATIENT
Start: 2019-05-16 | End: 2019-05-16

## 2019-05-16 RX ORDER — HYDROMORPHONE HYDROCHLORIDE 2 MG/ML
0.5 INJECTION INTRAMUSCULAR; INTRAVENOUS; SUBCUTANEOUS
Refills: 0 | Status: DISCONTINUED | OUTPATIENT
Start: 2019-05-16 | End: 2019-05-19

## 2019-05-16 RX ADMIN — Medication 3 MILLILITER(S): at 08:55

## 2019-05-16 RX ADMIN — Medication 1 TABLET(S): at 10:02

## 2019-05-16 RX ADMIN — Medication 3 MILLILITER(S): at 05:11

## 2019-05-16 RX ADMIN — Medication 3 MILLILITER(S): at 20:40

## 2019-05-16 RX ADMIN — Medication 20 MILLIGRAM(S): at 12:22

## 2019-05-16 RX ADMIN — MIDODRINE HYDROCHLORIDE 5 MILLIGRAM(S): 2.5 TABLET ORAL at 10:02

## 2019-05-16 RX ADMIN — Medication 25 MICROGRAM(S): at 05:18

## 2019-05-16 RX ADMIN — PANTOPRAZOLE SODIUM 40 MILLIGRAM(S): 20 TABLET, DELAYED RELEASE ORAL at 06:04

## 2019-05-16 RX ADMIN — HYDROMORPHONE HYDROCHLORIDE 0.2 MILLIGRAM(S): 2 INJECTION INTRAMUSCULAR; INTRAVENOUS; SUBCUTANEOUS at 10:38

## 2019-05-16 RX ADMIN — Medication 3 MILLILITER(S): at 11:22

## 2019-05-16 RX ADMIN — CARBIDOPA AND LEVODOPA 1 TABLET(S): 25; 100 TABLET ORAL at 05:19

## 2019-05-16 RX ADMIN — Medication 3 MILLILITER(S): at 00:14

## 2019-05-16 RX ADMIN — CLOPIDOGREL BISULFATE 75 MILLIGRAM(S): 75 TABLET, FILM COATED ORAL at 10:02

## 2019-05-16 RX ADMIN — CARBIDOPA AND LEVODOPA 1 TABLET(S): 25; 100 TABLET ORAL at 14:56

## 2019-05-16 RX ADMIN — HYDROMORPHONE HYDROCHLORIDE 0.5 MILLIGRAM(S): 2 INJECTION INTRAMUSCULAR; INTRAVENOUS; SUBCUTANEOUS at 17:03

## 2019-05-16 RX ADMIN — HYDROMORPHONE HYDROCHLORIDE 0.2 MILLIGRAM(S): 2 INJECTION INTRAMUSCULAR; INTRAVENOUS; SUBCUTANEOUS at 11:00

## 2019-05-16 RX ADMIN — HYDROMORPHONE HYDROCHLORIDE 0.5 MILLIGRAM(S): 2 INJECTION INTRAMUSCULAR; INTRAVENOUS; SUBCUTANEOUS at 17:00

## 2019-05-16 NOTE — PROGRESS NOTE ADULT - PROBLEM SELECTOR PLAN 2
Chronic edema is multifactorial (severe pulmonary HTN w/ R heart failure, left heart failure, poor nutrition / low albumin, poor functional status).

## 2019-05-16 NOTE — PROGRESS NOTE ADULT - ASSESSMENT
HPI: Pt is a 89y old Male with a PMH of CAD s/p PCI / CABG, HTN, dyslipidemia, systolic CHF, AICD placement, CVA, orthostatic hypotension, pulmonary HTN,  CKD III , a-fib (on coumadin), Parkinson's, p/w abrasion on scrotum. As per daughter at bedside, uncertain how patient got the abrasion. Patient found to be in fluid overload and started on bipap in ED. This is his 5th adm over the past 12 months and Palliative medicine is consulted to establish GOC. Pt is known to or service as he was seen and disch to [facility] last adm approx 4 weeks ago.    5/15/19 Seen and examined at bedside with daughter present. Pt is currently lethargic however arousable. Able to answer simple questions. Mild dyspnea at rest on nasal O2. Denies pain    Assessment and Plan:    1) Resp Failure  -Hypoxia  -Supplemental O2  -BiPap intermittently  -Now on nasal O2 PRN  -CXR=pleural effusion/ pneumonia  -declines intubation    2) Heart failure  -Acute on chronic systolic congestive heart failure  -diuresis as tolerated  -cardio eval noted  -EF=35% 4/2019  -Mult hospitalizations over 12 months    3) MARILYN on CKD  -Monitor creat  -Currently> 2.3  -Increasing   -gentle hydration     4) AMS  -Lethargic  -Metabolic encephalopathy  -supportive care  -? baseline dementia    5) Advanced Dementia  -Pt without capacity   -Son David named HCP   -MOLST on file  -Completed 5/14  -DNR/DNI/LMT/NFT/TrialIV/useabx  -GOC discussion scheduled for today with son 5/16

## 2019-05-16 NOTE — PROGRESS NOTE ADULT - SUBJECTIVE AND OBJECTIVE BOX
REASON FOR VISIT: CHF    HPI:  89 year-old, chronically-ill man with a history of severe CAD, CABG, coronary stent, atrial fibrillation, chronic systolic HF with moderate LV dysfunction, ICD (tachy therapy turned-off during recent admission in April 2019), HTN, HLD, severe pulmonary HTN, mitral/tricuspid insufficiency, Parkinson's Disease, orthostatic hypotension, hypoalbuminemia, chronic leg edema, who was admitted on 5/14/19 after he presented to the ER for the evaluation of a scrotal abrasion associated with edema; seen by Dr Moya in 5/15 to assist with heart failure management.    5/16/19:  Patient complaining of fatigue; denies SOB / orthopnea.    MEDICATIONS  (STANDING):  ALBUTerol/ipratropium for Nebulization 3 milliLiter(s) Nebulizer every 4 hours  buDESOnide  80 MICROgram(s)/formoterol 4.5 MICROgram(s) Inhaler 2 Puff(s) Inhalation two times a day  carbidopa/levodopa  25/100 1 Tablet(s) Oral three times a day  cefTRIAXone Injectable. 1000 milliGRAM(s) IV Push every 24 hours  clopidogrel Tablet 75 milliGRAM(s) Oral daily  levothyroxine 25 MICROGram(s) Oral daily  midodrine 5 milliGRAM(s) Oral <User Schedule>  multivitamin 1 Tablet(s) Oral daily  pantoprazole    Tablet 40 milliGRAM(s) Oral before breakfast  senna 8.6 milliGRAM(s) Oral Tablet - Peds 2 Tablet(s) Oral at bedtime  simvastatin 20 milliGRAM(s) Oral at bedtime  tamsulosin Oral Tab/Cap - Peds 0.4 milliGRAM(s) Oral at bedtime    Vital Signs Last 24 Hrs  T(C): 37.1 (15 May 2019 23:04), Max: 37.1 (15 May 2019 23:04)  T(F): 98.7 (15 May 2019 23:04), Max: 98.7 (15 May 2019 23:04)  HR: 58 (16 May 2019 05:12) (52 - 84)  BP: 96/53 (16 May 2019 04:58) (92/22 - 114/53)  BP(mean): 64 (16 May 2019 04:58) (39 - 78)  RR: 30 (15 May 2019 19:12) (20 - 30)  SpO2: 95% (16 May 2019 04:58) (65% - 100%)    PHYSICAL EXAM:  Constitutional: Chronically-ill appearing, semirecumbent in bed, minimal breakfast eaten  Respiratory: Tachypneic but nonlabored with shallow respirations; decreased breath sounds at bases  Cardiovascular: Regular, systolic murmur  Gastrointestinal: Bowel Sounds present, soft, nontender.   Extremities: Pitting LE edema (L>R)    LABS:   CARDIAC MARKERS ( 14 May 2019 16:57 ) 0.051 ng/mL / x     / x     / x     / x                         8.8    8.08  )-----------( 209      ( 15 May 2019 05:56 )             29.3     142  |  107  |  66<H>  ----------------------------<  101<H>  4.6   |  28  |  2.36<H>    Transthoracic Echocardiogram (04.17.19 @ 11:54):   Normal left ventricular size with moderate, global systolic dysfunction. Estimated left ventricular ejection fraction is 35%. Septal flattening is seen; this finding is consistent with right heart pressure / volume   overload. Paradoxical septal motion.   The right ventricle exhibits mild dilation, mild diffuse hypokinesis, and mild depression of contractility.   Biatrial enlargement.   Moderate mitral regurgitation.   Moderate to severe tricuspid valve regurgitation.   Mild pulmonary hypertension.

## 2019-05-16 NOTE — PROGRESS NOTE ADULT - ASSESSMENT
89 y/o M with PMH of CAD s/p PCI / CABG, HTN, dyslipidemia, systolic CHF, AICD placement, CVA, orthostatic hypotension, pulmonary HTN,  CKD III , a-fib (on coumadin), Parkinson's, admitted on 5/14 for evaluation of shortness of breath; patient is lethargic and cannot provide history; history per medical record.   1. Patient admitted with dyspnea, most likely due to chf exacerbation, unclear if patient truly has underlying pneumonia, given bilateral effusions  - follow up cultures   - oxygen and nebs as needed   - serial cbc and monitor temperature   - reviewed prior medical records to evaluate for resistant or atypical pathogens   - patient may benefit from thoracentesis  - will discontinue ceftriaxone today  - palliative evaluation  2. other issues: per medicine

## 2019-05-16 NOTE — PROGRESS NOTE ADULT - SUBJECTIVE AND OBJECTIVE BOX
CC/reason for follow up: edema, dyspnea    S: pt is a poor historian. notified by RN that pt is having increased work of breathing. getting neb treatment now.     ROS: unable to obtain due to patient factors    T(C): 36.4 (05-16-19 @ 16:42), Max: 37.1 (05-15-19 @ 23:04)  HR: 79 (05-16-19 @ 16:00) (58 - 102)  BP: 96/57 (05-16-19 @ 16:00) (96/53 - 118/56)  RR: 30 (05-15-19 @ 19:12) (24 - 30)  SpO2: 96% (05-16-19 @ 16:00) (65% - 99%)    Gen - awake, cooperative, ill appearing and in acute distress  HEENT- PERRL, moist mucus membranes, OP clear  CV - IRIR, No m/r/g, +pulses, +b/l pedal edema  Lungs - increased effort, coars BS and crackles bilaterally  Abdomen - Soft, nontender, nondistended, +BS, No rebound/rigidity/guarding  Ext - No cyanosis/clubbing.   Skin - No rashes, No jaundice  Psych- Alert. cannot assess orientation  Neuro-EOMI. limited exam    acetaminophen   Tablet .. 650 milliGRAM(s) Oral every 6 hours PRN  ALBUTerol/ipratropium for Nebulization 3 milliLiter(s) Nebulizer every 4 hours  buDESOnide  80 MICROgram(s)/formoterol 4.5 MICROgram(s) Inhaler 2 Puff(s) Inhalation two times a day  carbidopa/levodopa  25/100 1 Tablet(s) Oral three times a day  clopidogrel Tablet 75 milliGRAM(s) Oral daily  docusate sodium 100 milliGRAM(s) Oral two times a day PRN  HYDROmorphone  Injectable 0.5 milliGRAM(s) IV Push every 3 hours PRN  levothyroxine 25 MICROGram(s) Oral daily  magnesium hydroxide Suspension 30 milliLiter(s) Oral daily PRN  midodrine 5 milliGRAM(s) Oral <User Schedule>  multivitamin 1 Tablet(s) Oral daily  pantoprazole    Tablet 40 milliGRAM(s) Oral before breakfast  senna 8.6 milliGRAM(s) Oral Tablet - Peds 2 Tablet(s) Oral at bedtime  simvastatin 20 milliGRAM(s) Oral at bedtime  tamsulosin Oral Tab/Cap - Peds 0.4 milliGRAM(s) Oral at bedtime      Diagnostic studies: personally reviewed  LABS: All Labs Reviewed:                        8.8    8.08  )-----------( 209      ( 15 May 2019 05:56 )             29.3     05-15    142  |  107  |  66<H>  ----------------------------<  101<H>  4.6   |  28  |  2.36<H>    Ca    8.4<L>      15 May 2019 05:56  Phos  3.9     05-15  Mg     2.6     05-15    TPro  6.5  /  Alb  2.8<L>  /  TBili  1.1  /  DBili  x   /  AST  15  /  ALT  8<L>  /  AlkPhos  113  05-15    PT/INR - ( 15 May 2019 05:56 )   PT: 20.7 sec;   INR: 1.83 ratio         PTT - ( 15 May 2019 05:56 )  PTT:47.0 sec        Blood Culture: 05-14 @ 23:56  Organism --  Gram Stain Blood -- Gram Stain --  Specimen Source .Urine None  Culture-Blood --    05-14 @ 16:57  Organism --  Gram Stain Blood -- Gram Stain --  Specimen Source .Blood None  Culture-Blood --      RADIOLOGY/EKG:    < from: Xray Chest 1 View- PORTABLE-Urgent (05.14.19 @ 17:34) >  IMPRESSION:    There is a nonspecific catheter that appears to be overlying the   patient's right axilla, and visual inspection is needed.    Diffuse airspace opacities and bilateral pleural effusions are present,   may reflect multifocal pneumonia versus other etiology. Median   sternotomy. Left chest wall pacemaker in place.  Cardiomegaly present.    < end of copied text >      Assessment and Plan:  89 y/o M with PMH of CAD s/p PCI / CABG, HTN, dyslipidemia, systolic CHF, AICD placement, CVA, orthostatic hypotension, pulmonary HTN,  CKD III , a-fib (on coumadin), Parkinson's, p/w abrasion on scrotum. Patient found to be in fluid overload and started on bipap in ED. Patient is a poor historian.    * Large left pl. effusion  *Acute on chronic CHF, right heart failure  *COPD  -pt needs to be aval for hospice; thora at this point is too invasive given his lethargy  - abx will be dc'd today   -cont low dose steroids for bronchospasm  -give lasix 20 mg IV x 1 now  -place on BiPAP	  -cardiology, ID and pulm following, appreciate recs    *Scrotal abrasion  -Wound care  -No signs of infection at this time     *HTN  -Hold anti-hypertensives 2/2 hypotension    *A-fib  - inr high on adm; <3 now  - restart vka  -cardiology following    *Advance directives   -DNR / DNI / No pressors - MOLST in chart  -Palliative consult appreciated  Fam aware that patient is critically ill and prognosis is poor    Dispo: inpatient. patient   ELOS: possible transfer to inpatient hospice tomorrow, referrals were made    All questions/concerns were discussed with patient/family by bedside.    Case d/w Dr. Villa     Total time spent: 35min. More than 50% of time was spent in counseling, discussion of medications, and coordination of care

## 2019-05-16 NOTE — PROGRESS NOTE ADULT - SUBJECTIVE AND OBJECTIVE BOX
HPI: Pt is a 89y old Male with a PMH of CAD s/p PCI / CABG, HTN, dyslipidemia, systolic CHF, AICD placement, CVA, orthostatic hypotension, pulmonary HTN,  CKD III , a-fib (on coumadin), Parkinson's, p/w abrasion on scrotum. As per daughter at bedside, uncertain how patient got the abrasion. Patient found to be in fluid overload and started on bipap in ED. This is his 5th adm over the past 12 months and Palliative medicine is consulted to establish GOC. Pt is known to or service as he was seen and disch to [facility] last adm approx 4 weeks ago.    5/15/19 Seen and examined at bedside with daughter present. Pt is currently lethargic however arousable. Able to answer simple questions. Mild dyspnea at rest on nasal O2. Denies pain  5/16/19 Seen and examined at bedside with no family present. Pt endorse dyspnea at rest. Denies pain  5/17/19 Seen and examined at bedside with no family present. Pt lethargic and dyspneic. Minimally arousable.    PAIN: ( )Yes   (X )No    DYSPNEA: (X ) Yes  ( ) No  Level: mild at rest    PAST MEDICAL & SURGICAL HISTORY:  COPD (chronic obstructive pulmonary disease)  Parkinson's disease  CHF (congestive heart failure)  Hyperlipidemia  HTN (hypertension)  CAD (coronary artery disease)  AICD (automatic cardioverter/defibrillator) present  S/P CABG      SOCIAL HX:  Lives with son  Previously at [facility]  Hx opiate tolerance ( )YES  ( X)NO    Baseline ADLs  (Prior to Admission)  ( ) Independent   (X )Dependent    FAMILY HISTORY:  Unable to obtain due to lethargy    Review of Systems:    Unable to obtain/Limited due to: lethargy      PHYSICAL EXAM:  ICU Vital Signs Last 24 Hrs  T(C): 36.1 (16 May 2019 20:51), Max: 36.4 (16 May 2019 16:42)  T(F): 97 (16 May 2019 20:51), Max: 97.5 (16 May 2019 16:42)  HR: 56 (17 May 2019 08:00) (52 - 102)  BP: 106/41 (17 May 2019 08:00) (96/57 - 118/56)  BP(mean): 54 (17 May 2019 08:00) (45 - 71)  SpO2: 100% (17 May 2019 08:00) (95% - 100%)      PPSV2:  10-20 %      General: Elderly male in bed in mild distress  Mental Status: Lethargy/arouses to voice  HEENT: oral mucosa dry  Lungs: clear diminished keli  Cardiac: S1S2+  GI: abd soft NT ND + BS  : ventura /dsg to scrotal abrasion intact    Ext: BLE edema  Neuro: lethargy/confusion      LABS:            Allergies    morphine (Headache)    Intolerances

## 2019-05-16 NOTE — PROGRESS NOTE ADULT - PROBLEM SELECTOR PLAN 1
Poor prognosis and functional status with combined left and right heart failure; low BP precludes diuresis; palliative care meeting with family scheduled.

## 2019-05-16 NOTE — PROGRESS NOTE ADULT - SUBJECTIVE AND OBJECTIVE BOX
HPI:  89 y/o M with PMH of CAD s/p PCI / CABG, HTN, dyslipidemia, systolic CHF, AICD placement, CVA, orthostatic hypotension, pulmonary HTN,  CKD III , a-fib (on coumadin), Parkinson's, p/w abrasion on scrotum. As per daughter at bedside, uncertain how patient got the abrasion. However, she states it happened today, as it was not present before. Patient denies any pain / fever / chills. Patient found to be in fluid overload and started on bipap in ED. Patient is a poor historian.     5/15: resting in bed. not SOB.  was on BIPAP,  on NC O2 now.   : in bed, no distress, on NC O2.     PSH: CABG, AICD     Social hx: Denies x 3    Family Hx:  Denies (14 May 2019 23:44)      PAST MEDICAL & SURGICAL HISTORY:  COPD (chronic obstructive pulmonary disease)  Parkinson's disease  CHF (congestive heart failure)  Hyperlipidemia  HTN (hypertension)  CAD (coronary artery disease)  AICD (automatic cardioverter/defibrillator) present  S/P CABG      MEDICATIONS  (STANDING):  ALBUTerol/ipratropium for Nebulization 3 milliLiter(s) Nebulizer every 4 hours  buDESOnide  80 MICROgram(s)/formoterol 4.5 MICROgram(s) Inhaler 2 Puff(s) Inhalation two times a day  carbidopa/levodopa  25/100 1 Tablet(s) Oral three times a day  cefTRIAXone Injectable. 1000 milliGRAM(s) IV Push every 24 hours  clopidogrel Tablet 75 milliGRAM(s) Oral daily  levothyroxine 25 MICROGram(s) Oral daily  midodrine 5 milliGRAM(s) Oral <User Schedule>  multivitamin 1 Tablet(s) Oral daily  pantoprazole    Tablet 40 milliGRAM(s) Oral before breakfast  senna 8.6 milliGRAM(s) Oral Tablet - Peds 2 Tablet(s) Oral at bedtime  simvastatin 20 milliGRAM(s) Oral at bedtime  tamsulosin Oral Tab/Cap - Peds 0.4 milliGRAM(s) Oral at bedtime    MEDICATIONS  (PRN):  acetaminophen   Tablet .. 650 milliGRAM(s) Oral every 6 hours PRN Temp greater or equal to 38C (100.4F), Moderate Pain (4 - 6)  docusate sodium 100 milliGRAM(s) Oral two times a day PRN Constipation  HYDROmorphone  Injectable 0.2 milliGRAM(s) IV Push every 2 hours PRN Severe Pain (7 - 10)dyspnea  magnesium hydroxide Suspension 30 milliLiter(s) Oral daily PRN Constipation      Allergies    morphine (Headache)    Intolerances        SOCIAL HISTORY: Denies tobacco, etoh abuse or illicit drug use    FAMILY HISTORY:      Vital Signs Last 24 Hrs  T(C): 36.1 (15 May 2019 16:54), Max: 37.4 (14 May 2019 23:56)  T(F): 97 (15 May 2019 16:54), Max: 99.3 (14 May 2019 23:56)  HR: 53 (15 May 2019 17:00) (52 - 99)  BP: 105/25 (15 May 2019 17:00) (80/47 - 126/41)  BP(mean): 42 (15 May 2019 17:00) (39 - 82)  RR: 30 (15 May 2019 17:00) (20 - 35)  SpO2: 99% (15 May 2019 17:00) (94% - 100%)    REVIEW OF SYSTEMS:    CONSTITUTIONAL:  As per HPI.  HEENT:  Eyes:  No diplopia or blurred vision. ENT:  No earache, sore throat or runny nose.  CARDIOVASCULAR:  see above.  RESPIRATORY: see above.  GASTROINTESTINAL:  No nausea, vomiting or diarrhea.  GENITOURINARY:  No dysuria, frequency or urgency.  MUSCULOSKELETAL:  As per HPI.  SKIN:  No change in skin, hair or nails.  NEUROLOGIC:  No paresthesias, fasciculations, seizures or weakness.  PSYCHIATRIC:  No disorder of thought or mood.  ENDOCRINE:  No heat or cold intolerance, polyuria or polydipsia.  HEMATOLOGICAL:  No easy bruising or bleedings:  .     PHYSICAL EXAMINATION:    GENERAL APPEARANCE:  Pt. is not currently dyspneic, in no distress.   HEENT:  Pupils are normal and react normally. No icterus. Mucous membranes well colored.  NECK:  Supple. No lymphadenopathy. Jugular venous pressure not elevated. Carotids equal.   HEART:   The cardiac impulse has a normal quality.  HR 60.  CHEST:  Bilateral L greater than R crackles, anterior exam.  ABDOMEN:  Soft and nontender.   EXTREMITIES:  There is no cyanosis, clubbing.  Positive LE edema.  SKIN:  No rash or significant lesions are noted.  Neuro: Resting.      LABS:                        8.8    8.08  )-----------( 209      ( 15 May 2019 05:56 )             29.3     -15    142  |  107  |  66<H>  ----------------------------<  101<H>  4.6   |  28  |  2.36<H>    Ca    8.4<L>      15 May 2019 05:56  Phos  3.9     05-15  Mg     2.6     05-15    TPro  6.5  /  Alb  2.8<L>  /  TBili  1.1  /  DBili  x   /  AST  15  /  ALT  8<L>  /  AlkPhos  113  15    LIVER FUNCTIONS - ( 15 May 2019 05:56 )  Alb: 2.8 g/dL / Pro: 6.5 gm/dL / ALK PHOS: 113 U/L / ALT: 8 U/L / AST: 15 U/L / GGT: x           PT/INR - ( 15 May 2019 05:56 )   PT: 20.7 sec;   INR: 1.83 ratio         PTT - ( 15 May 2019 05:56 )  PTT:47.0 sec  CARDIAC MARKERS ( 14 May 2019 16:57 )  0.051 ng/mL / x     / x     / x     / x          Urinalysis Basic - ( 14 May 2019 23:56 )    Color: Yellow / Appearance: Clear / S.020 / pH: x  Gluc: x / Ketone: Trace  / Bili: Negative / Urobili: Negative mg/dL   Blood: x / Protein: Negative mg/dL / Nitrite: Negative   Leuk Esterase: Moderate / RBC: Negative /HPF / WBC 6-10   Sq Epi: x / Non Sq Epi: Few / Bacteria: Moderate      Serum Pro-Brain Natriuretic Peptide (19 @ 16:57)    Serum Pro-Brain Natriuretic Peptide: 4303 pg/mL        RADIOLOGY & ADDITIONAL STUDIES:     EXAM:  XR CHEST PORTABLE URGENT 1V                            PROCEDURE DATE:  2019          INTERPRETATION:  Exam Date: 2019 5:34 PM    Chest radiograph (one view)    CLINICAL INFORMATION: SOB    TECHNIQUE:  Single frontal view of the chest was obtained.    COMPARISON: 4/15/2019    FINDINGS/  IMPRESSION:    There is a nonspecific catheter that appears to be overlying the   patient's right axilla, and visual inspection is needed.    Diffuse airspace opacities and bilateral pleural effusions are present,   may reflect multifocal pneumonia versus other etiology. Median   sternotomy. Left chest wall pacemaker in place.  Cardiomegaly present.

## 2019-05-16 NOTE — PROGRESS NOTE ADULT - ASSESSMENT
Dyspnea.  Acuter/chronic CHF. O2. Cardiology following.    ? superimposed pneumonia. on ceftriaxone. follow up on cultures.     AF. Chronic.     CAD.  AICD.    COPD.  symbicort 80 strength,  duo nebs.  O2.   reserve systemic steroids.    Parkinson's disease.

## 2019-05-16 NOTE — GOALS OF CARE CONVERSATION - PERSONAL ADVANCE DIRECTIVE - CONVERSATION DETAILS
Team met with pts son to discuss goals of care, assist with planning and provide supportive counseling. Team has met with pts son in the past. Pts son discussed how pt. was at [facility] for [facility] and declined further.    Pts current medical condition and goals of care discussed. Pt. is currently on BiPap and has been receiving IV medications for shortness of breath. We discussed options including continue with current management and coming back and forth to the hospital VS removing Bipap and focusing on comfort with the support of hospice. Our team recommended inpatient hospice if pts son chose a comfort focus.    Pts son reports that he and his family are in agreement with inpatient hospice and would like a referral to [facility]. He would like to speak with his sister and other family members about the Bipap before removing and also would like to speak with them about deactivating pts AICD. Pts AICD had been deactivated and was then turned back on a couple of weeks ago at pts request. Pt. is lethargic now and unable to make decisions. We explained that in order to go to Hospice AICD would need to be turned off. Pts son agrees with this (pt. is DNR/DNI) but just wants to speak with his family before deactivating. Pts son agreed to a referral being placed to [facility] in the mean time and will contact team by tomorrow morning about BiPap and AICD.     Pt. has MOLST on chart stating DNR/DNI, Limited Medical, Send to hospital, No feeding tube, Trial of IV fluids, No pressors.    Plan at this time likely is for inpatient hospice at [facility] upon d/c. Emotional support provided. Our team will continue to follow.
The role of Palliative medicine was reviewed as we have met within the past month on a prior admission. His son David sees huis father's decline and feels he is suffering. We discussed comfort care in a hospice setting which he requests. A referral to be placed for [facility]. He understands that he will need the AICD terminated again and will discuss with family. He would also like to discuss removal of BiPap mask as he realizes that it is uncomfortable and would like to focus on comfort measures only. He will discuss with family and call us back later today or tomorrow. JEMIMA on chart DNR/DNI. 45 minutes spent discussing advanced care planning.

## 2019-05-16 NOTE — PROGRESS NOTE ADULT - SUBJECTIVE AND OBJECTIVE BOX
Patient is a 89y old  Male who presents with a chief complaint of acute on chronic CHF (15 May 2019 10:00)        Date of service: 05-16-19 @ 12:27    Patient lying in bed; on bipap        ROS unable to obtain secondary to patient medical condition     MEDICATIONS  (STANDING):  ALBUTerol/ipratropium for Nebulization 3 milliLiter(s) Nebulizer every 4 hours  buDESOnide  80 MICROgram(s)/formoterol 4.5 MICROgram(s) Inhaler 2 Puff(s) Inhalation two times a day  carbidopa/levodopa  25/100 1 Tablet(s) Oral three times a day  clopidogrel Tablet 75 milliGRAM(s) Oral daily  levothyroxine 25 MICROGram(s) Oral daily  midodrine 5 milliGRAM(s) Oral <User Schedule>  multivitamin 1 Tablet(s) Oral daily  pantoprazole    Tablet 40 milliGRAM(s) Oral before breakfast  senna 8.6 milliGRAM(s) Oral Tablet - Peds 2 Tablet(s) Oral at bedtime  simvastatin 20 milliGRAM(s) Oral at bedtime  tamsulosin Oral Tab/Cap - Peds 0.4 milliGRAM(s) Oral at bedtime    MEDICATIONS  (PRN):  acetaminophen   Tablet .. 650 milliGRAM(s) Oral every 6 hours PRN Temp greater or equal to 38C (100.4F), Moderate Pain (4 - 6)  docusate sodium 100 milliGRAM(s) Oral two times a day PRN Constipation  HYDROmorphone  Injectable 0.2 milliGRAM(s) IV Push every 2 hours PRN Severe Pain (7 - 10)dyspnea  magnesium hydroxide Suspension 30 milliLiter(s) Oral daily PRN Constipation      Vital Signs Last 24 Hrs  T(C): 36.7 (16 May 2019 08:00), Max: 37.1 (15 May 2019 23:04)  T(F): 98 (16 May 2019 08:00), Max: 98.7 (15 May 2019 23:04)  HR: 102 (16 May 2019 11:45) (52 - 102)  BP: 103/64 (16 May 2019 09:00) (96/53 - 114/53)  BP(mean): 71 (16 May 2019 08:00) (52 - 71)  RR: 30 (15 May 2019 19:12) (21 - 30)  SpO2: 97% (16 May 2019 11:45) (65% - 100%)    Physical Exam:            Constitutional: frail looking  HEENT: NC/AT   Neck: supple; thyroid not palpable  Back: no tenderness  Respiratory: respiratory effort normal; scattered coarse breath sounds  Cardiovascular: S1S2 regular, no murmurs  Abdomen: soft, not tender, not distended, positive BS; no liver or spleen organomegaly  Genitourinary: no suprapubic tenderness  Musculoskeletal: no muscle tenderness, bilateral lower extremity edema  Neurological/ Psychiatric: lethargic  Skin: no rashes; no palpable lesions    Labs: all available labs reviewed                        Labs:                        8.8    8.08  )-----------( 209      ( 15 May 2019 05:56 )             29.3     05-15    142  |  107  |  66<H>  ----------------------------<  101<H>  4.6   |  28  |  2.36<H>    Ca    8.4<L>      15 May 2019 05:56  Phos  3.9     05-15  Mg     2.6     05-15    TPro  6.5  /  Alb  2.8<L>  /  TBili  1.1  /  DBili  x   /  AST  15  /  ALT  8<L>  /  AlkPhos  113  05-15           Cultures:       Culture - Urine (collected 05-14-19 @ 23:56)  Source: .Urine None  Final Report (05-16-19 @ 06:21):    >=3 organisms. Probable collection contamination.    Culture - Blood (collected 05-14-19 @ 16:57)  Source: .Blood None  Preliminary Report (05-16-19 @ 01:02):    No growth to date.    Culture - Blood (collected 05-14-19 @ 16:57)  Source: .Blood None  Preliminary Report (05-16-19 @ 01:02):    No growth to date.            < from: CT Chest No Cont (04.16.19 @ 09:13) >    EXAM:  CT CHEST                            PROCEDURE DATE:  04/16/2019          INTERPRETATION:  CLINICAL INFORMATION: Shortness of breath      COMPARISON: chest radiograph of the previous day and CT of the chest of   12/17/2018.    PROCEDURE:   CTof the Chest was performed without intravenous contrast.  Sagittal and coronal reformats were performed.      FINDINGS:    CHEST:     LUNGS AND LARGE AIRWAYS: Patent central airways.  No pulmonary nodules.  PLEURA: Large right effusion and small lefteffusion  VESSELS: Atherosclerotic calcifications  HEART: Coronary artery bypass graft surgical changes and pacemaker in   place. No pericardial effusion.  MEDIASTINUM AND SHIRLEY: No lymphadenopathy.  CHEST WALL AND LOWER NECK: Within normal limits.  VISUALIZED UPPER ABDOMEN: Splenic calcifications and cysts  BONES: Within normal limits.    IMPRESSION: Large right effusion and small left effusion with adjacent   atelectasis. No definite evidence of pneumonia.    < end of copied text >      Radiology: all available radiological tests reviewed    Advanced directives addressed: DNR

## 2019-05-17 PROCEDURE — 93287 PERI-PX DEVICE EVAL & PRGR: CPT | Mod: 26

## 2019-05-17 PROCEDURE — 99232 SBSQ HOSP IP/OBS MODERATE 35: CPT

## 2019-05-17 RX ADMIN — Medication 1 TABLET(S): at 13:34

## 2019-05-17 RX ADMIN — SIMVASTATIN 20 MILLIGRAM(S): 20 TABLET, FILM COATED ORAL at 21:24

## 2019-05-17 RX ADMIN — BUDESONIDE AND FORMOTEROL FUMARATE DIHYDRATE 2 PUFF(S): 160; 4.5 AEROSOL RESPIRATORY (INHALATION) at 07:48

## 2019-05-17 RX ADMIN — BUDESONIDE AND FORMOTEROL FUMARATE DIHYDRATE 2 PUFF(S): 160; 4.5 AEROSOL RESPIRATORY (INHALATION) at 20:07

## 2019-05-17 RX ADMIN — CLOPIDOGREL BISULFATE 75 MILLIGRAM(S): 75 TABLET, FILM COATED ORAL at 13:34

## 2019-05-17 RX ADMIN — Medication 3 MILLILITER(S): at 23:29

## 2019-05-17 RX ADMIN — TAMSULOSIN HYDROCHLORIDE 0.4 MILLIGRAM(S): 0.4 CAPSULE ORAL at 21:24

## 2019-05-17 RX ADMIN — CARBIDOPA AND LEVODOPA 1 TABLET(S): 25; 100 TABLET ORAL at 21:24

## 2019-05-17 RX ADMIN — Medication 25 MICROGRAM(S): at 05:52

## 2019-05-17 RX ADMIN — HYDROMORPHONE HYDROCHLORIDE 0.5 MILLIGRAM(S): 2 INJECTION INTRAMUSCULAR; INTRAVENOUS; SUBCUTANEOUS at 09:44

## 2019-05-17 RX ADMIN — SENNA PLUS 2 TABLET(S): 8.6 TABLET ORAL at 21:24

## 2019-05-17 RX ADMIN — Medication 3 MILLILITER(S): at 06:29

## 2019-05-17 RX ADMIN — Medication 3 MILLILITER(S): at 00:18

## 2019-05-17 RX ADMIN — HYDROMORPHONE HYDROCHLORIDE 0.5 MILLIGRAM(S): 2 INJECTION INTRAMUSCULAR; INTRAVENOUS; SUBCUTANEOUS at 10:15

## 2019-05-17 RX ADMIN — CARBIDOPA AND LEVODOPA 1 TABLET(S): 25; 100 TABLET ORAL at 13:34

## 2019-05-17 RX ADMIN — Medication 3 MILLILITER(S): at 20:05

## 2019-05-17 RX ADMIN — Medication 3 MILLILITER(S): at 11:53

## 2019-05-17 RX ADMIN — MIDODRINE HYDROCHLORIDE 5 MILLIGRAM(S): 2.5 TABLET ORAL at 09:15

## 2019-05-17 RX ADMIN — Medication 3 MILLILITER(S): at 07:45

## 2019-05-17 RX ADMIN — PANTOPRAZOLE SODIUM 40 MILLIGRAM(S): 20 TABLET, DELAYED RELEASE ORAL at 06:01

## 2019-05-17 RX ADMIN — Medication 3 MILLILITER(S): at 15:40

## 2019-05-17 RX ADMIN — CARBIDOPA AND LEVODOPA 1 TABLET(S): 25; 100 TABLET ORAL at 05:51

## 2019-05-17 NOTE — PHYSICAL THERAPY INITIAL EVALUATION ADULT - ADDITIONAL COMMENTS
pt ambulated ~100ft with RW/CGx1 on Feb 12,2019 prior to DC to rehab after last hospitalization. Info obtained from eval 2/2019.

## 2019-05-17 NOTE — PHYSICAL THERAPY INITIAL EVALUATION ADULT - LEVEL OF INDEPENDENCE: SUPINE/SIT, REHAB EVAL
Did not attempt OOB at this time. Pt having difficulty keeping his eyes open and following commands. Would recommend OOB via rosa lift for pt and staff safety.

## 2019-05-17 NOTE — PROCEDURE NOTE - ADDITIONAL PROCEDURE DETAILS
As per palliative care order, all tachy detections and therapies turned OFF.  Pt is DNR.  Device remains @ VVI 50 bpm.

## 2019-05-17 NOTE — PROGRESS NOTE ADULT - SUBJECTIVE AND OBJECTIVE BOX
CC/reason for follow up: edema, dyspnea    S:   5/16: pt is a poor historian. notified by RN that pt is having increased work of breathing. getting neb treatment now.   5/17: AICD turned off today. family not wanting bipap anymore. pt is about the same    ROS: unable to obtain due to patient factors    Vital Signs Last 24 Hrs  T(C): 36.2 (17 May 2019 11:19), Max: 36.2 (17 May 2019 11:19)  T(F): 97.1 (17 May 2019 11:19), Max: 97.1 (17 May 2019 11:19)  HR: 75 (17 May 2019 16:00) (52 - 97)  BP: 108/42 (17 May 2019 16:00) (101/28 - 110/60)  BP(mean): 56 (17 May 2019 16:00) (45 - 71)  RR: --  SpO2: 100% (17 May 2019 16:00) (97% - 100%)    Gen - awake, cooperative, ill appearing and in acute distress  HEENT- PERRL, moist mucus membranes, OP clear  CV - IRIR, No m/r/g, +pulses, +b/l pedal edema  Lungs - increased effort, coars BS and crackles bilaterally  Abdomen - Soft, nontender, nondistended, +BS, No rebound/rigidity/guarding  Ext - No cyanosis/clubbing.   Skin - No rashes, No jaundice  Psych- Alert. cannot assess orientation  Neuro-EOMI. limited exam    acetaminophen   Tablet .. 650 milliGRAM(s) Oral every 6 hours PRN  ALBUTerol/ipratropium for Nebulization 3 milliLiter(s) Nebulizer every 4 hours  buDESOnide  80 MICROgram(s)/formoterol 4.5 MICROgram(s) Inhaler 2 Puff(s) Inhalation two times a day  carbidopa/levodopa  25/100 1 Tablet(s) Oral three times a day  clopidogrel Tablet 75 milliGRAM(s) Oral daily  docusate sodium 100 milliGRAM(s) Oral two times a day PRN  HYDROmorphone  Injectable 0.5 milliGRAM(s) IV Push every 3 hours PRN  levothyroxine 25 MICROGram(s) Oral daily  magnesium hydroxide Suspension 30 milliLiter(s) Oral daily PRN  midodrine 5 milliGRAM(s) Oral <User Schedule>  multivitamin 1 Tablet(s) Oral daily  pantoprazole    Tablet 40 milliGRAM(s) Oral before breakfast  senna 8.6 milliGRAM(s) Oral Tablet - Peds 2 Tablet(s) Oral at bedtime  simvastatin 20 milliGRAM(s) Oral at bedtime  tamsulosin Oral Tab/Cap - Peds 0.4 milliGRAM(s) Oral at bedtime      Diagnostic studies: personally reviewed  LABS: All Labs Reviewed:                        8.8    8.08  )-----------( 209      ( 15 May 2019 05:56 )             29.3     05-15    142  |  107  |  66<H>  ----------------------------<  101<H>  4.6   |  28  |  2.36<H>    Ca    8.4<L>      15 May 2019 05:56  Phos  3.9     05-15  Mg     2.6     05-15    TPro  6.5  /  Alb  2.8<L>  /  TBili  1.1  /  DBili  x   /  AST  15  /  ALT  8<L>  /  AlkPhos  113  05-15    PT/INR - ( 15 May 2019 05:56 )   PT: 20.7 sec;   INR: 1.83 ratio         PTT - ( 15 May 2019 05:56 )  PTT:47.0 sec        Blood Culture: 05-14 @ 23:56  Organism --  Gram Stain Blood -- Gram Stain --  Specimen Source .Urine None  Culture-Blood --    05-14 @ 16:57  Organism --  Gram Stain Blood -- Gram Stain --  Specimen Source .Blood None  Culture-Blood --      RADIOLOGY/EKG:    < from: Xray Chest 1 View- PORTABLE-Urgent (05.14.19 @ 17:34) >  IMPRESSION:    There is a nonspecific catheter that appears to be overlying the   patient's right axilla, and visual inspection is needed.    Diffuse airspace opacities and bilateral pleural effusions are present,   may reflect multifocal pneumonia versus other etiology. Median   sternotomy. Left chest wall pacemaker in place.  Cardiomegaly present.    < end of copied text >      Assessment and Plan:  89 y/o M with PMH of CAD s/p PCI / CABG, HTN, dyslipidemia, systolic CHF, AICD placement, CVA, orthostatic hypotension, pulmonary HTN,  CKD III , a-fib (on coumadin), Parkinson's, p/w abrasion on scrotum. Patient found to be in fluid overload and started on bipap in ED. Patient is a poor historian.    * Large left pl. effusion  *Acute on chronic CHF, right heart failure  *COPD  -thora at this point is too invasive given his lethargy  - s/p antibx  -cont low dose steroids for bronchospasm  -s/p lasix 20 mg IV x 1 now  -no more BiPAP	per pt/family request  -cardiology, ID and pulm following, appreciate recs  -awaiting placement in inpatient hospice    *Scrotal abrasion  -Wound care  -No signs of infection at this time     *HTN  -Hold anti-hypertensives 2/2 hypotension    *A-fib  - inr high on adm; <3 now  - restart vka  -cardiology following    *Advance directives   -DNR / DNI / No pressors - MOLST in chart  -Palliative consult appreciated  Fam aware that patient is critically ill and prognosis is poor    Dispo: inpatient.  ELOS: awaiting transfer to inpatient hospice when bed available, referrals were made    All questions/concerns were discussed with patient/family by bedside.    Total time spent: 35min. More than 50% of time was spent in counseling, discussion of medications, and coordination of care

## 2019-05-17 NOTE — PROGRESS NOTE ADULT - ASSESSMENT
HPI: Pt is a 89y old Male with a PMH of CAD s/p PCI / CABG, HTN, dyslipidemia, systolic CHF, AICD placement, CVA, orthostatic hypotension, pulmonary HTN,  CKD III , a-fib (on coumadin), Parkinson's, p/w abrasion on scrotum. As per daughter at bedside, uncertain how patient got the abrasion. Patient found to be in fluid overload and started on bipap in ED. This is his 5th adm over the past 12 months and Palliative medicine is consulted to establish GOC. Pt is known to or service as he was seen and disch to [facility] last adm approx 4 weeks ago.    5/15/19 Seen and examined at bedside with daughter present. Pt is currently lethargic however arousable. Able to answer simple questions. Mild dyspnea at rest on nasal O2. Denies pain    Assessment and Plan:    1) Resp Failure  -Hypoxia  -Supplemental O2  -Had been on BiPap  -Now on nasal O2 PRN  -CXR=pleural effusion/ pneumonia  -declines intubation    2) Heart failure  -Acute on chronic systolic congestive heart failure  -diuresis as tolerated  -cardio eval noted  -EF=35% 4/2019  -Mult hospitalizations over 12 months    3) MARILYN on CKD  -Monitor creat  -Currently> 2.3  -Increasing   -gentle hydration     4) AMS  -Lethargic  -Metabolic encephalopathy  -supportive care  -? baseline dementia    5) Advanced Dementia  -Pt without capacity   -Son David named HCP   -KATLYNST on file  -Completed 5/14  -DNR/DNI/LMT/NFT/TrialIV/useabx  -GOC discussion with son 5/16  -He called today after discussing options with his family and requests in[facility]  -Deactivate AICD and not to allow for any further BiPap mask as he would like comfort measures only

## 2019-05-17 NOTE — PROGRESS NOTE ADULT - PROBLEM SELECTOR PROBLEM 1
Acute on chronic systolic congestive heart failure
Acute on chronic systolic congestive heart failure
Dyspnea

## 2019-05-17 NOTE — PROGRESS NOTE ADULT - PROBLEM SELECTOR PLAN 1
Poor prognosis and functional status with combined left and right heart failure; low BP precludes diuresis; palliative care   family does want only comfort  as per nurse , will follow as needed

## 2019-05-17 NOTE — PROGRESS NOTE ADULT - SUBJECTIVE AND OBJECTIVE BOX
REASON FOR VISIT: CHF    HPI:  89 year-old, chronically-ill man with a history of severe CAD, CABG, coronary stent, atrial fibrillation, chronic systolic HF with moderate LV dysfunction, ICD (tachy therapy turned-off during recent admission in April 2019), HTN, HLD, severe pulmonary HTN, mitral/tricuspid insufficiency, Parkinson's Disease, orthostatic hypotension, hypoalbuminemia, chronic leg edema, who was admitted on 5/14/19 after he presented to the ER for the evaluation of a scrotal abrasion associated with edema; seen by Dr Moya in 5/15 to assist with heart failure management.    5/16/19:  Patient complaining of fatigue; denies SOB / orthopnea.  5/17/19 Patient is comfortable , no sob at rest ,  as per nurse family does not want aggressive therapy     MEDICATIONS  (STANDING):  ALBUTerol/ipratropium for Nebulization 3 milliLiter(s) Nebulizer every 4 hours  buDESOnide  80 MICROgram(s)/formoterol 4.5 MICROgram(s) Inhaler 2 Puff(s) Inhalation two times a day  carbidopa/levodopa  25/100 1 Tablet(s) Oral three times a day  clopidogrel Tablet 75 milliGRAM(s) Oral daily  levothyroxine 25 MICROGram(s) Oral daily  midodrine 5 milliGRAM(s) Oral <User Schedule>  multivitamin 1 Tablet(s) Oral daily  pantoprazole    Tablet 40 milliGRAM(s) Oral before breakfast  senna 8.6 milliGRAM(s) Oral Tablet - Peds 2 Tablet(s) Oral at bedtime  simvastatin 20 milliGRAM(s) Oral at bedtime  tamsulosin Oral Tab/Cap - Peds 0.4 milliGRAM(s) Oral at bedtime    MEDICATIONS  (PRN):  acetaminophen   Tablet .. 650 milliGRAM(s) Oral every 6 hours PRN Temp greater or equal to 38C (100.4F), Moderate Pain (4 - 6)  docusate sodium 100 milliGRAM(s) Oral two times a day PRN Constipation  HYDROmorphone  Injectable 0.5 milliGRAM(s) IV Push every 3 hours PRN Severe Pain (7 - 10)dyspnea  magnesium hydroxide Suspension 30 milliLiter(s) Oral daily PRN Constipation    Vital Signs Last 24 Hrs  T(C): 36.1 (16 May 2019 20:51), Max: 36.4 (16 May 2019 16:42)  T(F): 97 (16 May 2019 20:51), Max: 97.5 (16 May 2019 16:42)  HR: 56 (17 May 2019 08:00) (52 - 102)  BP: 106/41 (17 May 2019 08:00) (96/57 - 118/56)  BP(mean): 54 (17 May 2019 08:00) (45 - 71)  RR: --  SpO2: 100% (17 May 2019 08:00) (95% - 100%)    I&O's Summary    16 May 2019 07:01  -  17 May 2019 07:00  --------------------------------------------------------  IN: 0 mL / OUT: 825 mL / NET: -825 mL          PHYSICAL EXAM:  Constitutional: Chronically-ill appearing, semirecumbent in bed, minimal breakfast eaten  Respiratory: Tachypneic but nonlabored with shallow respirations; decreased breath sounds at bases  Cardiovascular: Regular, systolic murmur  Gastrointestinal: Bowel Sounds present, soft, nontender.   Extremities: Pitting LE edema (L>R) scrotal edema     LABS:                           Culture Results:   >=3 organisms. Probable collection contamination. (05-14-19 @ 23:56)  Culture Results:   No growth to date. (05-14-19 @ 16:57)  Culture Results:   No growth to date. (05-14-19 @ 16:57)        Transthoracic Echocardiogram (04.17.19 @ 11:54):   Normal left ventricular size with moderate, global systolic dysfunction. Estimated left ventricular ejection fraction is 35%. Septal flattening is seen; this finding is consistent with right heart pressure / volume   overload. Paradoxical septal motion.   The right ventricle exhibits mild dilation, mild diffuse hypokinesis, and mild depression of contractility.   Biatrial enlargement.   Moderate mitral regurgitation.   Moderate to severe tricuspid valve regurgitation.   Mild pulmonary hypertension.

## 2019-05-17 NOTE — PROGRESS NOTE ADULT - PROBLEM SELECTOR PROBLEM 2
Acute on chronic systolic congestive heart failure
Right heart failure due to pulmonary hypertension
Right heart failure due to pulmonary hypertension

## 2019-05-17 NOTE — PHYSICAL THERAPY INITIAL EVALUATION ADULT - PASSIVE RANGE OF MOTION EXAMINATION, REHAB EVAL
no Passive ROM deficits were identified/Bilateral Hip flex ~ 90 degreesand bilateral DF neutral. Pt having some difficulty following commands and keeping his eyes open.

## 2019-05-17 NOTE — PHYSICAL THERAPY INITIAL EVALUATION ADULT - PRECAUTIONS/LIMITATIONS, REHAB EVAL
Tele/cardiac precautions/oxygen therapy device and L/min fall precautions/oxygen therapy device and L/min/cardiac precautions/Tele

## 2019-05-17 NOTE — CHART NOTE - NSCHARTNOTEFT_GEN_A_CORE
This SW spoke with pts son/HCP David via phone [phone]. Pts son reports they have decided to focus on comfort and not have pt. placed back on the BiPap and to have pts AICD deactivated. He would like pt. to go to inpatient hospice at [facility]. Floor SW to place referral. Emotional support provided. Our team will continue to follow.

## 2019-05-17 NOTE — PHYSICAL THERAPY INITIAL EVALUATION ADULT - REHAB POTENTIAL, PT EVAL
Pt is not a skilled acute care setting PT candidate at this time. RN told therapist that pt is going to inpatient hospice. Will D/C PT at this time. Please re-order PT if pt's status changes./poor

## 2019-05-17 NOTE — PHYSICAL THERAPY INITIAL EVALUATION ADULT - MANUAL MUSCLE TESTING RESULTS, REHAB EVAL
Bilateral UE strength 2+-3/5 throughout grossly. Right LE strength 2+-3/5 throughout grossly. Left LE strength 3-3+/5 throughout grossly. Pt having difficulty following commands and keeping his eyes open.

## 2019-05-17 NOTE — PROGRESS NOTE ADULT - SUBJECTIVE AND OBJECTIVE BOX
HPI: Pt is a 89y old Male with a PMH of CAD s/p PCI / CABG, HTN, dyslipidemia, systolic CHF, AICD placement, CVA, orthostatic hypotension, pulmonary HTN,  CKD III , a-fib (on coumadin), Parkinson's, p/w abrasion on scrotum. As per daughter at bedside, uncertain how patient got the abrasion. Patient found to be in fluid overload and started on bipap in ED. This is his 5th adm over the past 12 months and Palliative medicine is consulted to establish GOC. Pt is known to or service as he was seen and disch to [facility] last adm approx 4 weeks ago.    5/15/19 Seen and examined at bedside with daughter present. Pt is currently lethargic however arousable. Able to answer simple questions. Mild dyspnea at rest on nasal O2. Denies pain  5/17/19 Seen and examined at bedside with no family present. Pt lethargic and dyspneic. Minimally arousable.    PAIN: ( )Yes   (X )No    DYSPNEA: (X ) Yes  ( ) No  Level: mild at rest    PAST MEDICAL & SURGICAL HISTORY:  COPD (chronic obstructive pulmonary disease)  Parkinson's disease  CHF (congestive heart failure)  Hyperlipidemia  HTN (hypertension)  CAD (coronary artery disease)  AICD (automatic cardioverter/defibrillator) present  S/P CABG      SOCIAL HX:  Lives with son  Previously at [facility]  Hx opiate tolerance ( )YES  ( X)NO    Baseline ADLs  (Prior to Admission)  ( ) Independent   (X )Dependent    FAMILY HISTORY:  Unable to obtain due to lethargy    Review of Systems:        Unable to obtain/Limited due to: lethargy      PHYSICAL EXAM:  ICU Vital Signs Last 24 Hrs  T(C): 36.1 (16 May 2019 20:51), Max: 36.4 (16 May 2019 16:42)  T(F): 97 (16 May 2019 20:51), Max: 97.5 (16 May 2019 16:42)  HR: 56 (17 May 2019 08:00) (52 - 102)  BP: 106/41 (17 May 2019 08:00) (96/57 - 118/56)  BP(mean): 54 (17 May 2019 08:00) (45 - 71)  SpO2: 100% (17 May 2019 08:00) (95% - 100%)      PPSV2:  10-20 %      General: Elderly male in bed in mild distress  Mental Status: Lethargy/arouses to voice  HEENT: oral mucosa dry  Lungs: clear diminished keli  Cardiac: S1S2+  GI: abd soft NT ND + BS  : ventura /dsg to scrotal abrasion intact    Ext: BLE edema  Neuro: lethargy/confusion      LABS:                        8.8    8.08  )-----------( 209      ( 15 May 2019 05:56 )             29.3     05-15    142  |  107  |  66<H>  ----------------------------<  101<H>  4.6   |  28  |  2.36<H>    Ca    8.4<L>      15 May 2019 05:56  Phos  3.9     05-15  Mg     2.6     05-15    TPro  6.5  /  Alb  2.8<L>  /  TBili  1.1  /  DBili  x   /  AST  15  /  ALT  8<L>  /  AlkPhos  113  05-15    PT/INR - ( 15 May 2019 05:56 )   PT: 20.7 sec;   INR: 1.83 ratio         PTT - ( 15 May 2019 05:56 )  PTT:47.0 sec  Albumin: Albumin, Serum: 2.8 g/dL (05-15 @ 05:56)      Allergies    morphine (Headache)    Intolerances

## 2019-05-18 RX ADMIN — CARBIDOPA AND LEVODOPA 1 TABLET(S): 25; 100 TABLET ORAL at 22:06

## 2019-05-18 RX ADMIN — TAMSULOSIN HYDROCHLORIDE 0.4 MILLIGRAM(S): 0.4 CAPSULE ORAL at 22:06

## 2019-05-18 RX ADMIN — Medication 3 MILLILITER(S): at 15:03

## 2019-05-18 RX ADMIN — Medication 3 MILLILITER(S): at 03:20

## 2019-05-18 RX ADMIN — BUDESONIDE AND FORMOTEROL FUMARATE DIHYDRATE 2 PUFF(S): 160; 4.5 AEROSOL RESPIRATORY (INHALATION) at 08:35

## 2019-05-18 RX ADMIN — Medication 3 MILLILITER(S): at 23:17

## 2019-05-18 RX ADMIN — MIDODRINE HYDROCHLORIDE 5 MILLIGRAM(S): 2.5 TABLET ORAL at 09:36

## 2019-05-18 RX ADMIN — Medication 3 MILLILITER(S): at 20:34

## 2019-05-18 RX ADMIN — Medication 1 TABLET(S): at 11:33

## 2019-05-18 RX ADMIN — SIMVASTATIN 20 MILLIGRAM(S): 20 TABLET, FILM COATED ORAL at 22:06

## 2019-05-18 RX ADMIN — SENNA PLUS 2 TABLET(S): 8.6 TABLET ORAL at 22:06

## 2019-05-18 RX ADMIN — CARBIDOPA AND LEVODOPA 1 TABLET(S): 25; 100 TABLET ORAL at 13:05

## 2019-05-18 RX ADMIN — Medication 3 MILLILITER(S): at 08:36

## 2019-05-18 RX ADMIN — BUDESONIDE AND FORMOTEROL FUMARATE DIHYDRATE 2 PUFF(S): 160; 4.5 AEROSOL RESPIRATORY (INHALATION) at 20:34

## 2019-05-18 RX ADMIN — CLOPIDOGREL BISULFATE 75 MILLIGRAM(S): 75 TABLET, FILM COATED ORAL at 11:33

## 2019-05-18 NOTE — PROGRESS NOTE ADULT - SUBJECTIVE AND OBJECTIVE BOX
CC/reason for follow up: edema, dyspnea    S:   5/16: pt is a poor historian. notified by RN that pt is having increased work of breathing. getting neb treatment now.   5/17: AICD turned off today. family not wanting bipap anymore. pt is about the same  5/18- feels tired.    ROS: unable to obtain due to patient factors    Vital Signs Last 24 Hrs  T(C): 36.5 (18 May 2019 05:00), Max: 37 (18 May 2019 00:40)  T(F): 97.7 (18 May 2019 05:00), Max: 98.6 (18 May 2019 00:40)  HR: 50 (18 May 2019 12:00) (50 - 85)  BP: 119/44 (18 May 2019 12:00) (93/48 - 119/44)  BP(mean): 64 (18 May 2019 12:00) (54 - 65)  RR: 21 (18 May 2019 08:00) (21 - 21)  SpO2: 99% (18 May 2019 12:00) (93% - 100%)    Gen - awake, cooperative, ill appearing and in acute distress  HEENT- PERRL, moist mucus membranes, OP clear  CV - IRIR, No m/r/g, +pulses, +b/l pedal edema  Lungs - increased effort, coars BS and crackles bilaterally  Abdomen - Soft, nontender, nondistended, +BS, No rebound/rigidity/guarding  Ext - No cyanosis/clubbing.   Skin - No rashes, No jaundice  Psych- Alert. cannot assess orientation  Neuro-EOMI. limited exam    acetaminophen   Tablet .. 650 milliGRAM(s) Oral every 6 hours PRN  ALBUTerol/ipratropium for Nebulization 3 milliLiter(s) Nebulizer every 4 hours  buDESOnide  80 MICROgram(s)/formoterol 4.5 MICROgram(s) Inhaler 2 Puff(s) Inhalation two times a day  carbidopa/levodopa  25/100 1 Tablet(s) Oral three times a day  clopidogrel Tablet 75 milliGRAM(s) Oral daily  docusate sodium 100 milliGRAM(s) Oral two times a day PRN  HYDROmorphone  Injectable 0.5 milliGRAM(s) IV Push every 3 hours PRN  levothyroxine 25 MICROGram(s) Oral daily  magnesium hydroxide Suspension 30 milliLiter(s) Oral daily PRN  midodrine 5 milliGRAM(s) Oral <User Schedule>  multivitamin 1 Tablet(s) Oral daily  pantoprazole    Tablet 40 milliGRAM(s) Oral before breakfast  senna 8.6 milliGRAM(s) Oral Tablet - Peds 2 Tablet(s) Oral at bedtime  simvastatin 20 milliGRAM(s) Oral at bedtime  tamsulosin Oral Tab/Cap - Peds 0.4 milliGRAM(s) Oral at bedtime      Diagnostic studies: personally reviewed  LABS: All Labs Reviewed:                        8.8    8.08  )-----------( 209      ( 15 May 2019 05:56 )             29.3     05-15    142  |  107  |  66<H>  ----------------------------<  101<H>  4.6   |  28  |  2.36<H>    Ca    8.4<L>      15 May 2019 05:56  Phos  3.9     05-15  Mg     2.6     05-15    TPro  6.5  /  Alb  2.8<L>  /  TBili  1.1  /  DBili  x   /  AST  15  /  ALT  8<L>  /  AlkPhos  113  05-15    PT/INR - ( 15 May 2019 05:56 )   PT: 20.7 sec;   INR: 1.83 ratio         PTT - ( 15 May 2019 05:56 )  PTT:47.0 sec        Blood Culture: 05-14 @ 23:56  Organism --  Gram Stain Blood -- Gram Stain --  Specimen Source .Urine None  Culture-Blood --    05-14 @ 16:57  Organism --  Gram Stain Blood -- Gram Stain --  Specimen Source .Blood None  Culture-Blood --      RADIOLOGY/EKG:    < from: Xray Chest 1 View- PORTABLE-Urgent (05.14.19 @ 17:34) >  IMPRESSION:    There is a nonspecific catheter that appears to be overlying the   patient's right axilla, and visual inspection is needed.    Diffuse airspace opacities and bilateral pleural effusions are present,   may reflect multifocal pneumonia versus other etiology. Median   sternotomy. Left chest wall pacemaker in place.  Cardiomegaly present.    < end of copied text >      Assessment and Plan:  89 y/o M with PMH of CAD s/p PCI / CABG, HTN, dyslipidemia, systolic CHF, AICD placement, CVA, orthostatic hypotension, pulmonary HTN,  CKD III , a-fib (on coumadin), Parkinson's, p/w abrasion on scrotum. Patient found to be in fluid overload and started on bipap in ED. Patient is a poor historian.    * Large left pl. effusion  *Acute on chronic CHF, right heart failure  *COPD  -thora at this point is too invasive given his lethargy  - s/p antibx  -cont low dose steroids for bronchospasm  -s/p lasix 20 mg IV   -no more BiPAP	per pt/family request  -cardiology, ID and pulm following, appreciate recs  -awaiting placement in inpatient hospice  -dilaudid IV prn    *Scrotal abrasion  -Wound care  -No signs of infection at this time     *HTN  -Hold anti-hypertensives 2/2 hypotension    *A-fib  -not on anticoag. awaiting hospice placement  -cardiology following    *Advance directives   -DNR / DNI / No pressors - MOLST in chart  -Palliative consult appreciated  Fam aware that patient is critically ill and prognosis is poor  d/w Dr. Durand at hospice [facility] who has accepted the patient and awaiting bed.    Dispo: inpatient.  ELOS: awaiting transfer to inpatient hospice when bed available, referrals were made    All questions/concerns were discussed with patient/family by bedside.    Total time spent: 35min. More than 50% of time was spent in counseling, discussion of medications, and coordination of care

## 2019-05-19 RX ORDER — HYDROMORPHONE HYDROCHLORIDE 2 MG/ML
0.25 INJECTION INTRAMUSCULAR; INTRAVENOUS; SUBCUTANEOUS
Refills: 0 | Status: DISCONTINUED | OUTPATIENT
Start: 2019-05-19 | End: 2019-05-20

## 2019-05-19 RX ADMIN — Medication 3 MILLILITER(S): at 23:16

## 2019-05-19 RX ADMIN — BUDESONIDE AND FORMOTEROL FUMARATE DIHYDRATE 2 PUFF(S): 160; 4.5 AEROSOL RESPIRATORY (INHALATION) at 21:29

## 2019-05-19 RX ADMIN — HYDROMORPHONE HYDROCHLORIDE 0.25 MILLIGRAM(S): 2 INJECTION INTRAMUSCULAR; INTRAVENOUS; SUBCUTANEOUS at 16:39

## 2019-05-19 RX ADMIN — BUDESONIDE AND FORMOTEROL FUMARATE DIHYDRATE 2 PUFF(S): 160; 4.5 AEROSOL RESPIRATORY (INHALATION) at 08:44

## 2019-05-19 RX ADMIN — Medication 3 MILLILITER(S): at 12:22

## 2019-05-19 RX ADMIN — Medication 3 MILLILITER(S): at 21:30

## 2019-05-19 RX ADMIN — Medication 3 MILLILITER(S): at 08:44

## 2019-05-19 NOTE — PROGRESS NOTE ADULT - REASON FOR ADMISSION
acute on chronic CHF

## 2019-05-19 NOTE — PROGRESS NOTE ADULT - SUBJECTIVE AND OBJECTIVE BOX
CC/reason for follow up: edema, dyspnea    S:   5/16: pt is a poor historian. notified by RN that pt is having increased work of breathing. getting neb treatment now.   5/17: AICD turned off today. family not wanting bipap anymore. pt is about the same  5/18- feels tired.  5/19- pt unresponsive    ROS: unable to obtain due to patient factors    Vital Signs Last 24 Hrs  T(C): 36.3 (19 May 2019 10:20), Max: 36.3 (19 May 2019 06:00)  T(F): 97.4 (19 May 2019 10:20), Max: 97.4 (19 May 2019 06:00)  HR: 60 (19 May 2019 12:22) (52 - 89)  BP: 102/43 (19 May 2019 10:20) (102/43 - 111/45)  BP(mean): 61 (19 May 2019 08:00) (61 - 61)  RR: 18 (19 May 2019 10:20) (18 - 19)  SpO2: 98% (19 May 2019 10:20) (91% - 98%)    Gen - unresponsive, ill appearing, appears comfortable, cold and clammy  HEENT- PERRL, moist mucus membranes, OP clear  CV - IRIR, No m/r/g, +pulses, +b/l LE edema  Lungs - increased effort, coarse BS and crackles bilaterally  Abdomen - Soft, nontender, nondistended, +BS  Ext - No cyanosis/clubbing.   Skin - No rashes, No jaundice  Psych- Alert. cannot assess orientation  Neuro-EOMI. limited exam    acetaminophen   Tablet .. 650 milliGRAM(s) Oral every 6 hours PRN  ALBUTerol/ipratropium for Nebulization 3 milliLiter(s) Nebulizer every 4 hours  buDESOnide  80 MICROgram(s)/formoterol 4.5 MICROgram(s) Inhaler 2 Puff(s) Inhalation two times a day  carbidopa/levodopa  25/100 1 Tablet(s) Oral three times a day  clopidogrel Tablet 75 milliGRAM(s) Oral daily  docusate sodium 100 milliGRAM(s) Oral two times a day PRN  HYDROmorphone  Injectable 0.5 milliGRAM(s) IV Push every 3 hours PRN  levothyroxine 25 MICROGram(s) Oral daily  magnesium hydroxide Suspension 30 milliLiter(s) Oral daily PRN  midodrine 5 milliGRAM(s) Oral <User Schedule>  multivitamin 1 Tablet(s) Oral daily  pantoprazole    Tablet 40 milliGRAM(s) Oral before breakfast  senna 8.6 milliGRAM(s) Oral Tablet - Peds 2 Tablet(s) Oral at bedtime  simvastatin 20 milliGRAM(s) Oral at bedtime  tamsulosin Oral Tab/Cap - Peds 0.4 milliGRAM(s) Oral at bedtime      Diagnostic studies: personally reviewed  LABS: All Labs Reviewed:                        8.8    8.08  )-----------( 209      ( 15 May 2019 05:56 )             29.3     05-15    142  |  107  |  66<H>  ----------------------------<  101<H>  4.6   |  28  |  2.36<H>    Ca    8.4<L>      15 May 2019 05:56  Phos  3.9     05-15  Mg     2.6     05-15    TPro  6.5  /  Alb  2.8<L>  /  TBili  1.1  /  DBili  x   /  AST  15  /  ALT  8<L>  /  AlkPhos  113  05-15    PT/INR - ( 15 May 2019 05:56 )   PT: 20.7 sec;   INR: 1.83 ratio         PTT - ( 15 May 2019 05:56 )  PTT:47.0 sec        Blood Culture: 05-14 @ 23:56  Organism --  Gram Stain Blood -- Gram Stain --  Specimen Source .Urine None  Culture-Blood --    05-14 @ 16:57  Organism --  Gram Stain Blood -- Gram Stain --  Specimen Source .Blood None  Culture-Blood --      RADIOLOGY/EKG:    < from: Xray Chest 1 View- PORTABLE-Urgent (05.14.19 @ 17:34) >  IMPRESSION:    There is a nonspecific catheter that appears to be overlying the   patient's right axilla, and visual inspection is needed.    Diffuse airspace opacities and bilateral pleural effusions are present,   may reflect multifocal pneumonia versus other etiology. Median   sternotomy. Left chest wall pacemaker in place.  Cardiomegaly present.    < end of copied text >      Assessment and Plan:  89 y/o M with PMH of CAD s/p PCI / CABG, HTN, dyslipidemia, systolic CHF, AICD placement, CVA, orthostatic hypotension, pulmonary HTN,  CKD III , a-fib (on coumadin), Parkinson's, p/w abrasion on scrotum. Patient found to be in fluid overload and started on bipap in ED. Patient is a poor historian.    * Large left pl. effusion  *Acute on chronic CHF, right heart failure  *COPD  -thora at this point is too invasive given his lethargy  - s/p antibx  -cont low dose steroids for bronchospasm  -s/p lasix 20 mg IV   -no more BiPAP	per pt/family request  -cardiology, ID and pulm following, appreciate recs  -awaiting placement in inpatient hospice --- pt is unresponsive, cold, clammy and bradycardic during my exam. d/w pt's son on the phone. no beds at hospice facility yet. recommend continuing comfort care measures. per son's request will decrease dose of dilaudid IV but explained to him that pt is actively dying and he may die during transport and that our goal is to keep him comfortable. asked son to call family members/relatives if not done so to come and say their goodbyes. pt has been down-graded to medical floor. no tele. ACID is off.  -dilaudid IV prn    *Scrotal abrasion  -Wound care    *HTN  -Hold anti-hypertensives 2/2 hypotension    *A-fib  -not on anticoag. awaiting hospice placement  -cardiology was following    *Advance directives   -DNR / DNI / No pressors - MOLST in chart  -Palliative consult appreciated  Fam aware that patient is critically ill and prognosis is poor  d/w Dr. Durand at hospice [facility] who has accepted the patient and awaiting bed.    Dispo: inpatient.  ELOS: awaiting transfer to inpatient hospice when bed available but pt is actively dying. see above    All questions/concerns were discussed with patient/family by bedside.    Total time spent: 35min. More than 50% of time was spent in counseling, discussion of medications, and coordination of care

## 2019-05-20 ENCOUNTER — TRANSCRIPTION ENCOUNTER (OUTPATIENT)
Age: 84
End: 2019-05-20

## 2019-05-20 VITALS — TEMPERATURE: 97 F

## 2019-05-20 LAB
CULTURE RESULTS: SIGNIFICANT CHANGE UP
CULTURE RESULTS: SIGNIFICANT CHANGE UP
SPECIMEN SOURCE: SIGNIFICANT CHANGE UP
SPECIMEN SOURCE: SIGNIFICANT CHANGE UP

## 2019-05-20 RX ORDER — LEVOTHYROXINE SODIUM 125 MCG
1 TABLET ORAL
Qty: 0 | Refills: 0 | DISCHARGE

## 2019-05-20 RX ORDER — HYDROMORPHONE HYDROCHLORIDE 2 MG/ML
0.25 INJECTION INTRAMUSCULAR; INTRAVENOUS; SUBCUTANEOUS
Qty: 0 | Refills: 0 | DISCHARGE
Start: 2019-05-20

## 2019-05-20 RX ORDER — BUDESONIDE AND FORMOTEROL FUMARATE DIHYDRATE 160; 4.5 UG/1; UG/1
0 AEROSOL RESPIRATORY (INHALATION)
Qty: 0 | Refills: 0 | DISCHARGE
Start: 2019-05-20

## 2019-05-20 RX ORDER — BUDESONIDE AND FORMOTEROL FUMARATE DIHYDRATE 160; 4.5 UG/1; UG/1
2 AEROSOL RESPIRATORY (INHALATION)
Qty: 0 | Refills: 0 | DISCHARGE

## 2019-05-20 RX ORDER — ASCORBIC ACID 60 MG
1 TABLET,CHEWABLE ORAL
Qty: 0 | Refills: 0 | DISCHARGE

## 2019-05-20 RX ORDER — IPRATROPIUM/ALBUTEROL SULFATE 18-103MCG
3 AEROSOL WITH ADAPTER (GRAM) INHALATION
Qty: 0 | Refills: 0 | DISCHARGE
Start: 2019-05-20

## 2019-05-20 RX ORDER — SIMVASTATIN 20 MG/1
1 TABLET, FILM COATED ORAL
Qty: 0 | Refills: 0 | DISCHARGE

## 2019-05-20 RX ORDER — CARBIDOPA AND LEVODOPA 25; 100 MG/1; MG/1
1 TABLET ORAL
Qty: 0 | Refills: 0 | DISCHARGE
Start: 2019-05-20

## 2019-05-20 RX ORDER — LEVOTHYROXINE SODIUM 125 MCG
1 TABLET ORAL
Qty: 0 | Refills: 0 | DISCHARGE
Start: 2019-05-20

## 2019-05-20 RX ORDER — CLOPIDOGREL BISULFATE 75 MG/1
1 TABLET, FILM COATED ORAL
Qty: 0 | Refills: 0 | DISCHARGE
Start: 2019-05-20

## 2019-05-20 RX ORDER — MAGNESIUM HYDROXIDE 400 MG/1
30 TABLET, CHEWABLE ORAL
Qty: 0 | Refills: 0 | DISCHARGE

## 2019-05-20 RX ORDER — ACETAMINOPHEN 500 MG
2 TABLET ORAL
Qty: 0 | Refills: 0 | DISCHARGE
Start: 2019-05-20

## 2019-05-20 RX ORDER — METOPROLOL TARTRATE 50 MG
0.5 TABLET ORAL
Qty: 0 | Refills: 0 | DISCHARGE

## 2019-05-20 RX ADMIN — Medication 3 MILLILITER(S): at 08:59

## 2019-05-20 RX ADMIN — Medication 3 MILLILITER(S): at 11:14

## 2019-05-20 RX ADMIN — HYDROMORPHONE HYDROCHLORIDE 0.25 MILLIGRAM(S): 2 INJECTION INTRAMUSCULAR; INTRAVENOUS; SUBCUTANEOUS at 02:38

## 2019-05-20 RX ADMIN — HYDROMORPHONE HYDROCHLORIDE 0.25 MILLIGRAM(S): 2 INJECTION INTRAMUSCULAR; INTRAVENOUS; SUBCUTANEOUS at 11:25

## 2019-05-20 NOTE — DISCHARGE NOTE PROVIDER - HOSPITAL COURSE
89 y/o M with PMH of CAD s/p PCI / CABG, HTN, dyslipidemia, systolic CHF, AICD placement, CVA, orthostatic hypotension, pulmonary HTN,  CKD III , a-fib (on coumadin), Parkinson's, p/w abrasion on scrotum. Patient found to be in fluid overload and started on bipap in ED. Patient is a poor historian.        5/16: pt is a poor historian. notified by RN that pt is having increased work of breathing. getting neb treatment now.     5/17: AICD turned off today. family not wanting bipap anymore. pt is about the same    5/18- feels tired.    5/19- pt unresponsive    5/20: Patient seen and examined at bedside, in mild resp distress, confused. Accepted to inpatient hospice        Physical Exam:    Gen - ill appearing    HEENT- PERRL, moist mucus membranes, OP clear    CV - IRIR, No m/r/g, +pulses    Lungs - increased effort, coarse BS and crackles bilaterally, mild wheezing B/L upper lobes    Abdomen - Soft, nontender, nondistended, +BS    Ext - No cyanosis/clubbing, +b/l LE edema    Skin - warm, dry    Neuro- nonfocal        *Large left pl. effusion    *Acute on chronic CHF, right heart failure    *COPD    -thora at this point is too invasive given his lethargy    - s/p antibx    -cont low dose steroids for bronchospasm    -s/p lasix 20 mg IV     -no more BiPAP	per pt/family request    -cardiology, ID and pulm following, appreciate recs    -awaiting placement in inpatient hospice --- Previous hospitalist spoke with pt's son on the phone. no beds at hospice facility yet. recommend continuing comfort care measures. per son's request will decrease dose of dilaudid IV but explained to him that pt is actively dying and he may die during transport and that our goal is to keep him comfortable. asked son to call family members/relatives if not done so to come and say their goodbyes. pt has been down-graded to medical floor. no tele. AICD is off.    -dilaudid IV prn        *Scrotal abrasion    -Wound care        *HTN    -Hold anti-hypertensives 2/2 hypotension        *A-fib    -not on anticoag. awaiting hospice placement    -cardiology was following        *Advance directives     -DNR / DNI / No pressors - MOLST in chart    -Palliative consult appreciated    Fam aware that patient is critically ill and prognosis is poor    d/w Dr. Durand at hospice [facility] who has accepted the patient and awaiting bed.        Total time spent on discharge: 40 minutes

## 2019-05-20 NOTE — DISCHARGE NOTE NURSING/CASE MANAGEMENT/SOCIAL WORK - NSDCDPATPORTLINK_GEN_ALL_CORE
You can access the [facility] Patient Portal, offered by [facility], by registering with the following website: [email or web address]

## 2019-05-20 NOTE — DISCHARGE NOTE PROVIDER - NSDCCPCAREPLAN_GEN_ALL_CORE_FT
PRINCIPAL DISCHARGE DIAGNOSIS  Diagnosis: CHF exacerbation  Assessment and Plan of Treatment: comfort care

## 2019-05-23 ENCOUNTER — OTHER (OUTPATIENT)
Age: 84
End: 2019-05-23

## 2019-05-23 NOTE — CDI QUERY NOTE - NSCDIOTHERTXTBX_GEN_ALL_CORE_HH
This patient is documented with  MARILYN on CKD  Patient with a history of CKD III    Creatinine Trend:  Creatinine, Serum: 2.36 mg/dL <H> [0.50 - 1.30] (05-15-19)  Creatinine, Serum: 2.33 mg/dL <H> [0.50 - 1.30] (05-14-19)    Previous admit  On April 25- creatinine was 1.37  On April 24- creatinine was 1.42  On April 23- creatinine was 1.34    [facility] policy based on KDIGO guidelines defines MARILYN (applicable to both adult and pediatric patients) as any of the following;  •	Increase Creatinine = 0.3 mg/dl from baseline within 48 hours; or  •	Increase in Creatinine level to = 1.5x baseline, which is known or presumed to have occurred within the prior 7 days; or    According to clinical guidelines, clinical criteria must support documented clinical diagnoses.    Can you please clarify the clinical indicators supporting the diagnosis of MARILYN or clarify that MARILYN has been ruled out?  a) MARILYN ruled in ( Provide baseline creat or other clinical indicators )  b) MARILYN ruled-out, CKD III alone  c) MARILYN ruled-out, does not meet the clinical criteria of KDIGO  d) Other, please clarify

## 2019-06-04 NOTE — CHART NOTE - NSCHARTNOTEFT_GEN_A_CORE
Clarification of diagnosis:  Patient likely with MARILYN on CKD 3- baseline Cr around 1.3-1.4, Cr 2.3 on admission

## 2019-08-13 ENCOUNTER — APPOINTMENT (OUTPATIENT)
Dept: ELECTROPHYSIOLOGY | Facility: CLINIC | Age: 84
End: 2019-08-13

## 2019-09-09 NOTE — PHYSICAL THERAPY INITIAL EVALUATION ADULT - IMPAIRMENTS FOUND, PT EVAL
[FreeTextEntry1] : Mr. NAVA presents with one clear convulsion from [place] 10, and one very likely convulsion (bitten tongue, bruised back and shoulder) from Jan 31. On the day prior to convulsion in June, Mr. NAVA experienced multiple dizzy spells associated with GI sensation lasting 1-3 minutes.  EEG showed L temporal slowing. MRI was unremarkable.  Based on age - primary generalized seizure is unlikely, the occurrence of seizure out of sleep or possibly early morning raises possibility of frontal lobe epilepsy. Mr. NAVA does not drive - he lives in [place] and does not use car. \par \par We discussed risk/benefit of stopping levetiracetam - I estimated risk of seizure recurrence is approx 50% over 1-2 years. Mr. NAVA expressed interest in trial of dc'ing levetiracetam. \par \par Plan:\par 1. dc levetiracetam \par 2. repeat 48h ambulatory EEG no sooner than 2 weeks after dc'ing levetiracetam \par 3. follow-up as needed after dc'ing levetiracetam \par 4. I reviewed seizure first aid with spouse. \par \par I have spent 25 minutes of face to face time with the patient reviewing the cause of seizures or seizure-like events, assessing the risk of recurrence, educating the patient or family to recognize seizures, and discussing possible treatment options and possible side effects of seizure medications. I also discussed seizure safety, and ways of reducing seizure risk. Greater than 50% of the encounter time was spent on counseling and coordination of care for reviewing records in [employer], discussion with patient regarding plan.  gait, locomotion, and balance

## 2020-06-30 NOTE — PATIENT PROFILE ADULT. - FUNCTIONAL SCREEN CURRENT LEVEL: EATING, MLM
Behavioral Health Consultation  Kristel Brunson, [address] Consultant  6/30/2020  9:06 AM      Note is for   6/29/2020     . Did not have access to Epic at the time of service. 3:33pm-4:14pm        TELEHEALTH EVALUATION -- Audio/Visual (During HCVLH-49 public health emergency)    Patient being evaluated by a Virtual Visit (phone visit) encounter to address concerns as mentioned above. A caregiver was present when appropriate. Due to this being a TeleHealth encounter (During LZZIB-31 public health emergency), evaluation of the following organ systems was limited: Vitals/Constitutional/EENT/Resp/CV/GI//MS/Neuro/Skin/Heme-Lymph-Imm. Pursuant to the emergency declaration under the [address] [place] [address] authority and the [employer] Act, this Virtual Visit was conducted with patient's (and/or legal guardian's) consent, to reduce the patient's risk of exposure to COVID-19 and provide necessary medical care. The patient (and/or legal guardian) has also been advised to contact this office for worsening conditions or problems, and seek emergency medical treatment and/or call 911 if deemed necessary. Patient identification was verified at the start of the visit: Yes     Services were provided through a video synchronous discussion virtually to substitute for in-person clinic visit. Patient and provider were located at their individual homes/office. Time spent with Patient:  45 minutes  This is patient's first  [facility] appointment.     Reason for Consult:    Chief Complaint   Patient presents with    Anxiety    Stress     Referring Provider: Jose Antonio Carrillo MD  [facility] [address]    Pt provided informed consent for the behavioral health program. Discussed with patient model of service to include the limits of confidentiality (i.e. abuse reporting, suicide intervention, etc.)
(2) assistive person

## 2020-07-31 NOTE — PROGRESS NOTE ADULT - PROBLEM SELECTOR PLAN 4
58 y/o now POD#1 s/p dilation and curettage, MyoSure polypectomy, and laparoscopic right salpingo-oophorectomy for right ovarian cyst and uterine polyp, in stable condition.     -Continue post operative care  -AM labs pending  -: voiding spontaneously  -GI: regular diet, will evaluate tolerance after breakfast   -DVT ppx: SCDs  -Pain control PRN   -anticipated discharge home today    d/w Dr. Garland 58 y/o now POD#1 s/p dilation and curettage, MyoSure polypectomy, and laparoscopic right salpingo-oophorectomy for right ovarian cyst and uterine polyp, in stable condition.     -Continue post operative care  -: voiding spontaneously  -GI: regular diet, will evaluate tolerance after breakfast   -DVT ppx: SCDs  -Pain control PRN   -anticipated discharge home today    d/w Dr. Garland continue statin

## 2020-09-01 NOTE — PATIENT PROFILE ADULT - NSPROEDAREADYLEARN_GEN_A_NUR
Flexeril 3x/day as needed for muscle spasms.    You will be contacted to schedule physical therapy.    Please follow-up with your primary care provider this fall re: your blood pressure.    acuteness of illness

## 2020-10-20 NOTE — PROVIDER CONTACT NOTE (CRITICAL VALUE NOTIFICATION) - DATE AND TIME:
24-Feb-2018 20:20 HR=69 bpm, ZYSJ=971/60 mmhg, SpO2=97.0 %, Resp=14 B/min, EtCO2=24 mmHg, Apnea=4 Seconds, Pain=0, Hima=10, Solorzano=2

## 2020-11-09 NOTE — PHYSICAL EXAM
[Well Groomed] : well groomed [General Appearance - Well Developed] : well developed [Normal Appearance] : normal appearance [General Appearance - Well Nourished] : well nourished [No Deformities] : no deformities [General Appearance - In No Acute Distress] : no acute distress [Normal Conjunctiva] : the conjunctiva exhibited no abnormalities [Normal Oral Mucosa] : normal oral mucosa [Eyelids - No Xanthelasma] : the eyelids demonstrated no xanthelasmas [No Oral Pallor] : no oral pallor [No Oral Cyanosis] : no oral cyanosis [Normal Jugular Venous A Waves Present] : normal jugular venous A waves present [No Jugular Venous Jarrett A Waves] : no jugular venous jarrett A waves [Normal Jugular Venous V Waves Present] : normal jugular venous V waves present [Respiration, Rhythm And Depth] : normal respiratory rhythm and effort [Auscultation Breath Sounds / Voice Sounds] : lungs were clear to auscultation bilaterally [Exaggerated Use Of Accessory Muscles For Inspiration] : no accessory muscle use [Heart Rate And Rhythm] : heart rate and rhythm were normal [Heart Sounds] : normal S1 and S2 [Murmurs] : no murmurs present [Abdomen Soft] : soft [Abdomen Tenderness] : non-tender [Abnormal Walk] : normal gait [Abdomen Mass (___ Cm)] : no abdominal mass palpated [Gait - Sufficient For Exercise Testing] : the gait was sufficient for exercise testing [Skin Color & Pigmentation] : normal skin color and pigmentation [No Venous Stasis] : no venous stasis [Skin Lesions] : no skin lesions [] : no rash [No Skin Ulcers] : no skin ulcer [Affect] : the affect was normal [Oriented To Time, Place, And Person] : oriented to person, place, and time [Mood] : the mood was normal [No Anxiety] : not feeling anxious [FreeTextEntry1] : leg cellulites wrapped with ace bandage Calcipotriene Pregnancy And Lactation Text: This medication has not been proven safe during pregnancy. It is unknown if this medication is excreted in breast milk.

## 2020-12-27 NOTE — ED ADULT TRIAGE NOTE - ESI TRIAGE ACUITY LEVEL, MLM
Likely related to her constipation but also may be related to her diuretics. Will check UA to rule out UTI. 3

## 2021-01-05 NOTE — PHYSICAL THERAPY INITIAL EVALUATION ADULT - MUSCLE TONE ASSESSMENT, REHAB EVAL
[facility]  INTERNAL MEDICINE FEMALE ANNUAL PHYSICAL EXAM    CHIEF COMPLAINT:  Physical       HISTORY OF PRESENT ILLNESS:    Amie Burch is a pleasant 60 year old female who presents today for an annual physical exam. PMH significant for DM, HLD, HTN and stress incontinence. Diabetes is diet controlled. Wears glasses for vision. Planning to update vision and dental exams which were delayed during pandemic. Reports had shingles earlier this year, otherwise healthy.     New concerns discussed include: increase stress incontinence on Detrol LA. Wishes to increase Kegels and monitor for now, will consider urology referral is symptoms persist or worsen. Also would like treatment for bilateral toenail fungus. Would like to see dermatology thru [facility] in [place]. Also has several skin lesions she would like to have assessed.     PROBLEM LIST:    Patient Active Problem List   Diagnosis   • Colon cancer screening   • Benign essential HTN   • Mixed hyperlipidemia   • Stress incontinence, female   • Metatarsalgia of left foot   • Type 2 diabetes mellitus without complication, without long-term current use of insulin (CMS/[facility])   • Asymptomatic bilateral carotid artery stenosis   • Proximal muscle weakness   • Heart palpitations       HISTORIES:    I have reviewed the past medical history, family history, social history, medications and allergies listed in the medical record as obtained by my nursing staff and support staff and agree with their documentation.    REVIEW OF SYSTEMS:    Constitutional:  No weight change.  No significant fatigue.  Eyes:  No visual disturbances.    HENT:  No hearing problems.  No ear pain.  No sore throat.   Respiratory:  No cough.  No wheezing.  No shortness of breath.    Cardiovascular:  No chest pain.  No palpitations.  No swelling.  Gastrointestinal:  No abdominal pain.  No frequent heartburn.  No nausea.  No vomiting.  No diarrhea.  No constipation.  No blood in stool.    Genitourinary:  No dysuria.  No frequency.  No hematuria.    Extremities:  No significant joint swelling.  No joint pain.  Skin:  No rash.   Neurologic:  No weakness.  No numbness.  No headache.  No dizziness.   Endocrine:  No heat intolerance.  No cold intolerance.  Psychiatric:  No depression.  No appetite changes.  No changes in sleep.      PHYSICAL EXAM:  Vital Signs:    Visit Vitals  /82 (BP Location: RUE - Right upper extremity, Patient Position: Sitting, Cuff Size: Large Adult)   Pulse 84 Comment: radial   Ht 5' 6\" (1.676 m)   Wt 79 kg   BMI 28.11 kg/m²       Constitutional:  Well developed, well nourished, no acute distress.   Eyes:  Pupils equal, round, reactive to light and accommodation, extraocular movements intact. Conjunctivae pink.  Sclerae anicteric.   HENT:  Normocephalic and atraumatic.  Bilateral external ears are normal.  On otoscopic exam, external auditory canals and tympanic membranes are within normal limits.  External nose appears normal.  Nasal mucosa, septum and turbinates appear normal.  Oropharynx moist and within normal limits.  Lips and gums within normal limits.  Neck:  Supple, nontender.  Normal range of motion.  No masses.  No thyromegaly.  Trachea midline.    Respiratory:  Clear to auscultation bilaterally.  No wheezes, rales, rhonchi or crackles.  Good respiratory effort.  No retraction or accessory muscle use.  Symmetrical chest expansion.    Cardiovascular:  Regular rate and rhythm. No murmurs.  Normal S1 and S2. No jugular venous distension. Good dorsalis pedis pulses bilaterally.  No peripheral edema.  Gastrointestinal:  Soft, nontender, nondistended.  No rebound or guarding.  Normal bowel sounds.   Musculoskeletal:  No clubbing, cyanosis or edema.  Full range of motion in all 4 extremities proximal and distal.    Neurologic:  Alert and oriented x3.  Deep tendon reflexes 2+ in both upper and lower extremities.  Gait and station is normal.  Proximal and distal strength  5/5 in bilateral upper and lower extremities with normal tone.  No gross sensory deficits noted.  Cranial nerves II-XII intact.    Skin:  Warm and dry.  No rashes, lesions or subcutaneous masses.    Lymphatic:  No lymphadenopathy in submental, submandibular, or cervical chain.  No supraclavicular lymphadenopathy.    Psychiatric:  The patient is cooperative and demonstrates good judgment, insight and affect.      Diabetic Foot Exam Documentation   Normal Bilateral Foot Exam:  Skin integrity is normal. Dorsalis pedis and posterior tibial pulses are present.  Pressure sensation using the [place] monofilament is present.       LABORATORY DATA:    Component      Latest Ref Rng & Units 10/19/2020 10/19/2020           7:45 AM  7:45 AM   Fasting Status      Hours 11 11   CHOLESTEROL      <=199 mg/dL 226 (H)    TRIGLYCERIDE      <=149 mg/dL 170 (H)    HDL      >=50 mg/dL 63    CALCULATED LDL      <=129 mg/dL 129    CALCULATED NON HDL      mg/dL 163    CHOL/HDL      <=4.4 3.6    Glucose      65 - 99 mg/dL  120 (H)   GLYCOHEMOGLOBIN A1C      4.5 - 5.6 % 6.5 (H)        ASSESSMENT:    1. Preventative health care    2. Type 2 diabetes mellitus without complication, without long-term current use of insulin (CMS/[facility])    3. Mixed hyperlipidemia    4. Benign essential HTN    5. Stress incontinence, female    6. Skin lesions, generalized    7. Toenail fungus        PLAN:       Over the last 2 weeks, how often have you been bothered by the following problems?          PHQ2 Score: 0  PHQ2 Score Interpretation: No further screening needed  1. Little interest or pleasure in activity?: 0  2. Feeling down, depressed, or hopeless?: 0         DEPRESSION ASSESSMENT/PLAN:  Depression screening is negative no further plan needed.      Body mass index is 28.11 kg/m².    BMI ASSESSMENT/PLAN:  Patient is overweight.    30 minutes of physical activity a day     1. Preventative health care  Recommend annual CPE with routine diabetic follow  up.     2. Type 2 diabetes mellitus without complication, without long-term current use of insulin (CMS/HCC)  Stable, continue current management.   - rosuvastatin (CRESTOR) 10 MG tablet; Take 1 tablet by mouth daily.  Dispense: 90 tablet; Refill: 3  - GLYCOHEMOGLOBIN; Future  - MICROALBUMIN URINE RANDOM; Future  - BASIC METABOLIC PANEL; Future  - SGOT; Future  - SGPT; Future    3. Mixed hyperlipidemia  Stable, continue current management.   - rosuvastatin (CRESTOR) 10 MG tablet; Take 1 tablet by mouth daily.  Dispense: 90 tablet; Refill: 3  - BASIC METABOLIC PANEL; Future  - SGOT; Future  - SGPT; Future    4. Benign essential HTN  Stable, continue current management.   - BASIC METABOLIC PANEL; Future    5. Stress incontinence, female  Monitor symptoms and consider urology referral pending clinical course.   - tolterodine (DETROL LA) 4 MG 24 hr capsule; Take 1 capsule by mouth daily.  Dispense: 90 capsule; Refill: 3    6. Skin lesions, generalized  - SERVICE TO DERMATOLOGY    7. Toenail fungus  - SERVICE TO DERMATOLOGY      Return in about 6 months (around 7/5/2021) for DM visit in 6 mo with labs prior.    Instructions provided as documented in the after visit summary.    The patient indicated understanding of the diagnosis and agreed with the plan of care.    mildly increased tone/bilateral lower extremities

## 2021-02-03 NOTE — PROGRESS NOTE ADULT - SUBJECTIVE AND OBJECTIVE BOX
REASON FOR VISIT: CHF    HPI:  89 year old man with a history of CAD s/p CABG x3 and PCI, AF, chronic systolic HF, ICD, HTN, HLD, severe pulmonary HTN, mitral/tricuspid insufficiency, Parkinson's Disease admitted 4/15/19 after presenting to the ED with dyspnea, hypoxia, and hypotension.    4/18/19:  "I feel a little better."  No new complaints.    4/19/19 Patient is feeling little better , still has anasarca decreasing     4/21/19 Patient is comfortable ,   anasarca  including sctrotal edema despite on iv diuretic , low normal BP episodes , prior hx of orthostatic hypotension on midodrine at home   4/22/19 Patient is feeling ok ,  low normal S BP , did not recieve IV lasix this due to low BP ,   4/23/19 Patient is feeling fatigue ,breathing not that great , on oral lasix    low normal SBP , sitting in chair denies dizziness            MEDICATIONS  (STANDING):  ascorbic acid 500 milliGRAM(s) Oral daily  buDESOnide 160 MICROgram(s)/formoterol 4.5 MICROgram(s) Inhaler 2 Puff(s) Inhalation two times a day  carbidopa/levodopa  25/100 1 Tablet(s) Oral three times a day  clopidogrel Tablet 75 milliGRAM(s) Oral daily  furosemide    Tablet 80 milliGRAM(s) Oral two times a day  metoprolol tartrate 12.5 milliGRAM(s) Oral daily  multivitamin 1 Tablet(s) Oral daily  pantoprazole    Tablet 40 milliGRAM(s) Oral before breakfast  simvastatin 20 milliGRAM(s) Oral at bedtime  tamsulosin Oral Tab/Cap - Peds 0.4 milliGRAM(s) Oral at bedtime  tiotropium 18 MICROgram(s) Capsule 1 Capsule(s) Inhalation daily    MEDICATIONS  (PRN):  ALBUTerol    0.083% 2.5 milliGRAM(s) Nebulizer four times a day PRN Shortness of Breath and/or Wheezing  senna 8.6 milliGRAM(s) Oral Tablet - Peds 2 Tablet(s) Oral at bedtime PRN for constipation      Vital Signs Last 24 Hrs  T(C): 36.6 (23 Apr 2019 05:37), Max: 36.9 (22 Apr 2019 20:01)  T(F): 97.8 (23 Apr 2019 05:37), Max: 98.5 (22 Apr 2019 20:01)  HR: 60 (23 Apr 2019 07:43) (50 - 64)  BP: 110/45 (23 Apr 2019 06:27) (98/41 - 118/50)  BP(mean): --  RR: 17 (23 Apr 2019 05:37) (17 - 18)  SpO2: 95% (23 Apr 2019 07:43) (95% - 100%)    I&O's Summary      PHYSICAL EXAM:  Constitutional: Chronically-ill appearing, no distress  Respiratory: Breath sounds are clear but decreased (R>L); no crackles  Cardiovascular: S1 and S2, regular rate and rhythm,   Gastrointestinal: Bowel Sounds present, soft, nontender.   Extremities: 2+ pitting pedal/ UE  edema b/l  scrotal edema ,     LABS:                                 9.3    4.47  )-----------( 173      ( 23 Apr 2019 05:39 )             30.9     04-23    141  |  104  |  43<H>  ----------------------------<  123<H>  3.7   |  34<H>  |  1.34<H>    Ca    8.3<L>      23 Apr 2019 05:39  Mg     2.2     04-22            PT/INR - ( 22 Apr 2019 07:08 )   PT: 29.7 sec;   INR: 2.60 ratio         PTT - ( 22 Apr 2019 07:08 )  PTT:41.0 sec        Transthoracic Echocardiogram (02.26.18 @ 09:24) >   The left ventricle is normal in size, wall thickness. Moderately decreased left ventricular systolic function with an estimated left ventricular ejection fraction of 35-40%.    The right atrium appears moderately dilated.   Moderate (2+) mitral regurgitation.   Moderate to severe (3+) tricuspid valve regurgitation.   There is severe elevation in right ventricular systolic pressure    CT Chest No Cont (04.16.19 @ 09:13):  Large right effusion and small left effusion with adjacent atelectasis. No definite evidence of pneumonia. General: Seated in Stretcher, Awake, Alert, Conversant, non toxic  Head, Ears, Eyes, Nose, Throat: Pupils equal, round, reactive to light, normal sclera, moist oral mucosa  Pulmonary: Breath sounds bilaterally, no increased work of breathing, speaking full sentences  Cardiovascular: Normal and regular heart rate, normal S1/S2, no murmurs, rubs, or gallops  Abdominal: Soft and nontender, no rebound or guarding  Extremities: No lower extremity edema or erythema, nontender  Dermatologic: No rash  Neurologic: Alert and oriented x3, clear and fluent speech, moving all extremities with good strength

## 2021-03-31 NOTE — PROGRESS NOTE ADULT - SUBJECTIVE AND OBJECTIVE BOX
7/15/2020  Future Appointments   Date Time Provider Anand Abdi   4/8/2021 10:40 AM LAVONNE Nugent - CNP [facility] Patient is a 89y Male who reports no complaints overnight. no chest pain or Sob. Feels breathing improving overall    REVIEW OF SYSTEMS:    CONSTITUTIONAL: stable  weakness, no fevers or chills  RESPIRATORY: No cough, wheezing, hemoptysis; No shortness of breath  CARDIOVASCULAR: No chest pain or palpitations  GENITOURINARY: No dysuria, frequency or hematuria  All other review of systems is negative unless indicated above.    MEDICATIONS  (STANDING):  ALBUTerol    90 MICROgram(s) HFA Inhaler 1 Puff(s) Inhalation every 4 hours  ALBUTerol/ipratropium for Nebulization 3 milliLiter(s) Nebulizer every 4 hours  atorvastatin 10 milliGRAM(s) Oral at bedtime  azithromycin   Tablet 500 milliGRAM(s) Oral daily  carbidopa/levodopa  25/100 1 Tablet(s) Oral three times a day  cefTRIAXone Injectable. 1000 milliGRAM(s) IV Push every 24 hours  cefTRIAXone Injectable.      clopidogrel Tablet 75 milliGRAM(s) Oral daily  donepezil 5 milliGRAM(s) Oral at bedtime  furosemide   Oral Tab/Cap - Peds 40 milliGRAM(s) Oral daily  metoprolol succinate ER 25 milliGRAM(s) Oral daily  multivitamin 1 Tablet(s) Oral daily  pantoprazole    Tablet 40 milliGRAM(s) Oral before breakfast  potassium chloride    Tablet ER 20 milliEquivalent(s) Oral daily  tiotropium 18 MICROgram(s) Capsule 1 Capsule(s) Inhalation daily  warfarin 3 milliGRAM(s) Oral daily    MEDICATIONS  (PRN):  docusate sodium 100 milliGRAM(s) Oral three times a day PRN Constipation        T(C): , Max: 36.5 (18 @ 16:29)  T(F): , Max: 97.7 (18 @ 16:29)  HR: 49 (18 @ 10:39)  BP: 100/55 (18 @ 10:39)  BP(mean): --  RR: 17 (18 @ 10:39)  SpO2: 96% (18 @ 10:39)  Wt(kg): --        PHYSICAL EXAM:    Constitutional: NAD, frail  HEENT: PERRLA, EOMI,  MMM  Neck: No LAD, No JVD  Respiratory: dist BS  Cardiovascular: S1 and S2, RRR  Gastrointestinal: BS+, soft, NT/ND  Extremities: No peripheral edema  Neurological: Alert  : No Mancia  Skin: No rashes  Access: Not applicable        LABS:                        10.6   3.93  )-----------( 152      ( 18 Dec 2018 06:08 )             32.8     18 Dec 2018 06:08    142    |  101    |  34     ----------------------------<  122    3.2     |  31     |  1.62   17 Dec 2018 23:53    x      |  x      |  x      ----------------------------<  x      2.8     |  x      |  x      17 Dec 2018 16:17    x      |  x      |  x      ----------------------------<  x      2.7     |  x      |  x      17 Dec 2018 06:00    139    |  99     |  36     ----------------------------<  110    2.8     |  31     |  1.83   16 Dec 2018 18:11    139    |  100    |  35     ----------------------------<  108    3.4     |  29     |  1.96     Ca    8.2        18 Dec 2018 06:08  Ca    8.3        17 Dec 2018 06:00  Ca    8.3        16 Dec 2018 18:11  Phos  3.1       18 Dec 2018 06:08  Phos  4.1       17 Dec 2018 06:00  Mg     2.2       18 Dec 2018 06:08  Mg     2.1       17 Dec 2018 06:00    TPro  7.3    /  Alb  3.1    /  TBili  0.6    /  DBili  x      /  AST  19     /  ALT  8      /  AlkPhos  110    17 Dec 2018 06:00  TPro  7.5    /  Alb  3.1    /  TBili  0.5    /  DBili  x      /  AST  20     /  ALT  13     /  AlkPhos  102    16 Dec 2018 18:11          Urine Studies:  Urinalysis Basic - ( 16 Dec 2018 18:42 )    Color: Yellow / Appearance: Clear / S.015 / pH: x  Gluc: x / Ketone: Negative  / Bili: Negative / Urobili: Negative mg/dL   Blood: x / Protein: Negative mg/dL / Nitrite: Negative   Leuk Esterase: Negative / RBC: 3-5 /HPF / WBC 0-2   Sq Epi: x / Non Sq Epi: Negative / Bacteria: Negative            RADIOLOGY & ADDITIONAL STUDIES:

## 2021-04-14 NOTE — DISCHARGE NOTE ADULT - THE PATIENT HAS RECEIVED WRITTEN DISCHARGE INSTRUCTIONS FOR WARFARIN/COUMADIN, INCLUDING THE WARFARIN/COUMADIN DISCHARGE BOOKLET, WHICH CONTAINS ALL OF THE INFORMATION LISTED BELOW.EPING
Problem: Patient Centered Care  Goal: Patient preferences are identified and integrated in the patient's plan of care  Description: Interventions:  - What would you like us to know as we care for you?  \"We want our dad/ to get the tube out\"  - Prov breathe, Incentive Spirometry  - Assess the need for suctioning and perform as needed  - Assess and instruct to report SOB or any respiratory difficulty  - Respiratory Therapy support as indicated  - Manage/alleviate anxiety  - Monitor for signs/symptoms o trends  - Administer supportive blood products/factors, fluids and medications as ordered and appropriate  - Administer supportive blood products/factors as ordered and appropriate  Outcome: Progressing  Goal: Free from bleeding injury  Description: (Examp Statement Selected

## 2022-01-01 NOTE — ED ADULT TRIAGE NOTE - CADM TRG TX PRIOR TO ARRIVAL
none IV/fluids/#18 left fa, 750cc NS PTA. Right ear hearing screen completed date: 2022  Right ear screen method: EOAE (evoked otoacoustic emission)  Right ear screen result: Passed  Right ear screen comment: N/A    Left ear hearing screen completed date: 2022  Left ear screen method: EOAE (evoked otoacoustic emission)  Left ear screen result: Failed  Left ear screen comments: will re-screen tomorrow.   Right ear hearing screen completed date: 2022  Right ear screen method: ABR (auditory brainstem response)  Right ear screen result: Passed  Right ear screen comment: N/A    Left ear hearing screen completed date: 2022  Left ear screen method: ABR (auditory brainstem response)  Left ear screen result: Passed  Left ear screen comments: N/A

## 2022-01-05 NOTE — PHYSICAL THERAPY INITIAL EVALUATION ADULT - LIVES WITH, PROFILE
Audiology Evaluation Completed. Please refer SCANNED AUDIOGRAM and/or TYMPANOGRAM for BACKGROUND, RESULTS, RECOMMENDATIONS.      Marce ALVARADO, [facility]  Audiologist #[id number]         son/family per pt

## 2022-06-29 NOTE — DISCHARGE NOTE ADULT - KEEP YOUR INTAKE OF VITAMIN K REGULAR. THE HIGHEST AMOUNT OF VITAMIN K IS FOUND IN GREEN AND LEAFY VEGETABLES LIKE BROCCOLI, LETTUCES, CABBAGE, AND SPINACH. YOU CAN EAT THESE FOODS BUT KEEP THE PORTION
Subjective:  HPI  Physical Exam                    Objective:  System                   Shoulder Evaluation:  ROM:  AROM:    Flexion:  Left:  122    Right:  143    Abduction:  Left: 85               Elbow Flexion:  Left:  WNL    Right:  WNL  Elbow Extension:  Left:  WNL   Right:  WNL              Strength:    Flexion: Left:4+/5   Pain:        Abduction:  Left: 3+/5  Weak/painful  Pain:        Internal Rotation:  Left:5/5     Pain:      External Rotation:   Left:3+/5     Pain:                                                      General     ROS    Assessment/Plan:    PROGRESS  REPORT    Progress reporting period is from 5/10/2022 to 6/29/2022.       SUBJECTIVE  Subjective changes noted by patient:  The shoulder is ok.  I have had a tough time getting back into the exercises following COVID.  The diagnosis was mid June around 6/16/2022.  During the same timeframe I had a colonoscopy.      Pain is primarily with reaching  Current pain level is 4/10  .     Previous pain level was 7/10.   Changes in function:  None  Adverse reaction to treatment or activity: None    OBJECTIVE  Changes noted in objective findings:  The objective findings below are from DOS 6/29/2022.  See measurements above.      ASSESSMENT/PLAN  Updated problem list and treatment plan: Diagnosis 1:  L shoulder pain     Pain -  hot/cold therapy, US, manual therapy, self management and home program  Decreased ROM/flexibility - manual therapy, therapeutic exercise and home program  Decreased joint mobility - manual therapy and therapeutic exercise  Decreased strength - therapeutic exercise and therapeutic activities  Impaired balance - neuro re-education and therapeutic activities  Impaired gait - gait training  Impaired muscle performance - neuro re-education  Decreased function - therapeutic activities  Impaired posture - neuro re-education  STG/LTGs have been met or progress has been made towards goals:  Yes (See Goal flow sheet completed  today.)  Assessment of Progress: The patient's condition has potential to improve.  Self Management Plans:  Patient has been instructed in a home treatment program.  Patient  has been instructed in self management of symptoms.  I have re-evaluated this patient and find that the nature, scope, duration and intensity of the therapy is appropriate for the medical condition of the patient.  Tee continues to require the following intervention to meet STG and LTG's:  PT    Recommendations:  This patient would benefit from continued therapy.     Frequency:  2 X a month, once daily  Duration:  for 2 months    Re-initiated PT exercises for motion and strength.  Recommended pt to talk with MD regarding advanced imaging as 3/4 RTC muscles continue to demonstrate weakness.        Please refer to the daily flowsheet for treatment today, total treatment time and time spent performing 1:1 timed codes.           Statement Selected

## 2022-08-10 NOTE — ED ADULT NURSE REASSESSMENT NOTE - NS ED NURSE REASSESS COMMENT FT1
Pt in stretcher. Asleep but arousable to verbal stimuli. A&Ox3. MAEx4. On supplemental O2 maintaining well. VS as documented. Droplet precaution maintained. Awaiting for admitting orders. All needs are met and safety maintained. Will continue to monitor.
Pt noted to have K of 2.8. Dr. Duarte made aware. As per MD, due to pt's kidney failure pt okay w/ pt's current regimen of Kcl of 20meQ daily. NNO given.
pt cleaned, turned and positioned. denies pain, vss. no s/s of acute distress noted. awaiting bed placement. will continue to monitor
pt in stretcher. VS as documented. Lost bed this time. Awaiting for new bed placement. will continue to monitor.
pt received in stretcher, resting comfortably. no s/s of acute distress. no c/o pain. pt on NC, saturating 97% on room air. awaiting bed placement. will continue to monitor
(1) 13 years and above

## 2022-11-02 NOTE — PHYSICAL THERAPY INITIAL EVALUATION ADULT - AMBULATION SKILLS, REHAB EVAL
independent Clofazimine Counseling:  I discussed with the patient the risks of clofazimine including but not limited to skin and eye pigmentation, liver damage, nausea/vomiting, gastrointestinal bleeding and allergy.

## 2023-01-10 NOTE — CDI QUERY NOTE - NSCDIOTHERTXTBX_GEN_ALL_CORE_HH
89yo male admitted with s/p fall, MARILYN    Documentation noted  2/6/19 Dietitian Intial Evaluation adult note: · Nutrient: Malnutrition; Pt meets criteria for severe protein/calorie malnutrition in context of acute illness secondary to severe fluid accumulation and severe muscle/fat wasting.  · Etiology: Inability to consume adequate po intake to meet ENN 2/2 Dehydration and MARILYN    Please clarify if you agree or disagree with Registered Dietitian and if so please clarify the diagnosis with an addendum note and/or sign consult.
Transport from other Hospital

## 2023-01-26 NOTE — CDI QUERY NOTE - NSCDINOTECODERS_GEN_A_CORE
CDI Specialist
sinus at 68
This was a shared visit with the BERENICE. I reviewed and verified the documentation and independently performed the documented:

## 2023-08-26 NOTE — DISCHARGE NOTE ADULT - LAUNCH MEDICATION RECONCILIATION
<<-----Click here for Discharge Medication Review
53 year old woman hx of lung cancer metastatic to brain p/w ongoing dizziness and migraines admitted for further txt

## 2024-04-12 NOTE — PATIENT PROFILE ADULT. - CANCER SCREEN CANCER ADMISSION
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     
unable to assess

## 2024-07-31 NOTE — ED PROVIDER NOTE - X-RAY INTERPRETATION
Pt returned HST device. It was downloaded and forwarded data to the clinical specialist for scoring.     ER physician

## 2025-01-11 NOTE — PROVIDER CONTACT NOTE (OTHER) - SITUATION
GRIS Neal aware will geive another dose of K will continue to monitor
Dr. Brady's answering service is aware of consult.
new orders received
Stable
87

## 2025-05-23 NOTE — ED ADULT TRIAGE NOTE - RESPIRATORY RATE (BREATHS/MIN)
Avoid any strenuous activities, pulling, tugging, straining or lifting anything over 10 pounds for the next 24 HOURS.    You may remove the bandage tomorrow and shower tomorrow; but you will need to avoid tub baths or any situation where your are submersed in water for the next  5 days to avoid the risk of infection. If you have any problems or concern please contact your physician's office.     DO NOT DRIVE ON THE DAY OF YOUR PROCEDURE.    If you have any problems or concern please contact your physician's office.         
16

## 2025-08-05 NOTE — ED ADULT NURSE NOTE - NSSISCREENINGQ1_ED_A_ED
8/5/2025    Yodit Canseco  Office Note    Chief Complaint   Patient presents with    Follow-up    Asthma    COPD       HPI:     8/5/2025 pt had flare UC and aseptic meningitis. Salagen helps.  Tramadol working with no issues..      5/6/2025 asthma stable, needs tramadol, having more UC problems,  dry mouth bothersome on salagen low dose.  Patient Instructions   Tramadol increased -- you are having more U.C. problems    Salagen dose could be increased up to 10 mg 3 times daily reasonable -- mouth rinse may help???  Asthma stable  2/3/2025 bowels ok, no diarrhea.   Had one large dark green mucous this am, still congested-- varies somewhat..  pt boosted prednisone to 20 daily for c2 days with some improvement  Patient Instructions   Sputum no bad germs  Chest xray was good.    Use albuterol more    Would take more prednisone -- use minimally but you need to be cleared for trelegy to keep clear  Afb cultures pending         1/27/2025   Pt had presentations to [facility] 1/21(astrovirus detected) and [facility] 1/22-- pt had diarrhea onset 1/18 -had slowed,, had low grade temp, pt dx astrovirus.  Pt was rx azithro- vancomycin     Ct chest 1/21 abd had min left lower lung infiltrate, very tiny on  film.  Pt had green mucous at admit [facility], now yellow green...      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 Astrovirus infection occurs in individuals of all ages; it appears to be less pathogenic among adults than norovirus infection [157,158]. In temperate regions, there is a peak in infection during winter months; in tropical regions, infection occurs most frequently during rainy seasons [159,160]. Sporadic astrovirus gastroenteritis occurs primarily in children younger than four years. The incubation period is three to four days. Clinical manifestations include low-grade fever, diarrhea,  headache, malaise, and nausea; vomiting occurs relatively infrequently [161-163]. Symptoms generally last two to three days.     Patient Instructions   Left  lower lung still abnormal on exam,  chest xray reasonable to assure not much worse  Need sputum culture  May need more antibiotic ?    Will renew dilaudid for cough suppression.  Cough still very severe and pneumonia seem lingering  You should dose hirzentra asap as may help clear pneumonia  We discuss tramadol -- you use to stay mobile with chr ra and gut pains-- will give 60/m , will need pain contract next visit.  Follow up next wk if problem  otherwise April.        1/16/2025 pt had dental wk 12/23 with sinus infection and cleared with doxycycline.  Pt had stomach virus last wk ( 2 days off doxy)with nausea and diarrhea- remitted.   Pt developed wheezes and had left ear discomfort/ringing---red eardrum on left side --   used abuterol but no prednisoen    Yesterday began haivng green mucous from lungs..  Patient Instructions   Asthma exacerbating - -with recurrent sinus infection.     Need to treat sinuses to have no symptoms for 5+ days -- use doxy with vancomycin to prevent c diff    Use tramadol for abd pain -- call if more needed..  may give 45 monthly for up to 3 months.  Would limit      Use dialaudid for cough with exacerbation    Use prednisone now , may need up to 3 days of 20 mg -- stop as soon as controlled.  Use trelegy for a month to assure  no  asthma relapse.    Get flu vaccine , use tamiflu  if flu      10/8/2024-- ulcerative colitis better on [place], on hizentra and no infection, ashtma controlled.    Has sinus pressure   has severe RA.        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    Trelegy use is reasonable as you are doing well.....  5/13/2024 pt developed bronchitis during admit for ulcerative colitis [facility]-  3  days, took doxy for sinus and bronchitis and took course prophylactic  vanc  Pt was off hizentra for a month--  Renvoq oral rx for uc ongoing  Uses trelegy daily  Patient Instructions   May use doxycycline for 7-10 days if sinus/lung infection-- use vancomycin for duration of systemic antibiotic plus additional (5-) 7 days    To minimize need antibiotic needs -- start Hizentra..    Chest xray and ct abd lower lungs were clear from 4/28/2024 April 4, 2024-  lungs getting by , had UC exacerbated with lyrica and now back on tramadol.     No more hemoptysis.  Pt having am congestion  Pt had  increase steroids to 30/ hydrocortine.    Patient Instructions   Double lasix to 40/d  and check blood next wk     May use doxycycline if sinus infection-- low c diff risk.      Will ask staff to arrange virtual visit with  if needed..      1/4/2024 pt was in icu for rotovirus gi infection-- was in icu 4 days, got lots fluid, .. Record reviewed.  Pt was dosed high dose steroids.  Pt was given 7 d course levaquin with no c diff prevention.    Pt had some hemoptysis froathy blood in mucous    Pt has had green mucous with neg cuilture earlier December,     Pt very weak and had fall in ER checking in.       Pt now off prednisone -- stopped abruptly after leaving hosp.   Pt was weaned off stopping 5 days or so prior to admit.    Pt now very weak .   Pt was on steroids for about a yr.     On trelegy and lungs stable    Patient Instructions   Would re culture mucous -- check for afb and regular germs.  Coritosol check is reasonable -- do cortisol level in am.  May consider replacement.    Ct chest likely low yield -- pattern of hemoptysis sounded more like fluid and sepsis.  Could do ct  later if still bringing up  yellow mucous.  Chest xray viewed..      Edema should mobiliize    C diff may recur -- if diarrhea increase may test..    You are having severe persistent sinus fullness-- antibiotics very risky and just took levaquin.    Would recommend sinus xray - ct would be best.  May need ent   12/5/2023  Pt had exacerbatoin cough and increased UC prednisone from 5/d to 20 mg for last 10 days along with doxy -- off abx and had green mucous this am.  Albuterol helps and dosed tid.    Pt had u/a showing wbc at home - says recurrent uti.  H/o c diff.  Not on controller.      Appetite good nad not weak, no fever.   Patient Instructions   Green mucous after increased medication for exacerbation -- failed doxycycline.  Need culture     Trelegy may stabilize-- once daily    Boost prednisone to 20 /d if cough controlled-- dilaudid sent in for 30  tabs-- call next wk if need more.            Cxr ordered if worsens    Need to re evaluate next wk if not clearing.  5/8/2023 took doxycycline no issues.  Bowels may be better on Entyvio. Lungs doing well.  On Hizentra.  Pt on prednisone 20/d -- on prednisone since November with plans to decrease in near future?   Still having blood in stool so will not taper prednisone til bleed resolves-- should improve in near future (on 3 rd entyvio now and should be good after 4th)  Patient Instructions   Might consider pneumocystis prophylaxis but antibiotics will increase c diff-- bactrim 2 single strength 3 days a week.  Use vancomycin daily while on bactrim.  Stop bactrim (followed by vancomycin) on prednisone less than 20mg/d.    Bactrim generally has high risk allergies and kidney problems.     12/29/2022 having sinus problems with green mucous nad coughing, right face to chin hurts last 3 days. Had in prior.  May exacerbate lungs as in past.  Started levaquin 2 days ago. Prednisone boosted from 10 to 20/d.  Need pain control and cough suppresion .  Has prednisoen. Not getting [facility]ol [facility]-- not covered on plan??   1/30/2023 chart note after above visit.  Pt has had colitis-- dx c diff recently.   Epic reveals c diff ag but no toxin.    Had green sinus dc-- cleared slight with levaquin but now dx c diff  with ag no toxin    Will dc levaquin and instructed not to use.    Doxycycline may be ok -- would avoid abx as much as able.    If sinuses relapse -- doxycycline sent in.    If green lung mucous -- pt told to submit sputum for culture.    2022 dx lupus, bechets, Ulcerative colitis, RA-- on hizentra.  Pt exposed to grandson with rsv.  Having abd pain -- dilaudid helped and suppressed cough-- not able to use codeine/hydrocodone.     Ct abd and cxr 10/2022 clear lungs.       Pt currently felt to have rsv-- improved over last 5 da   pt was sl febrile initially.   Pt now having green mucous. Cough is violent .  similar sick. Had min leg edema.  On hizentra. Asthma doing wellwith RSV.  Eating ok but weak and coughs a lot.  Breathing good.     Patient Instructions   May use levaquin for green mucous    Rsv should run course-- hirzentra should help?  Breztri may be needed.      Use dilaudid minimally for pain -- should suppress cough    Diflucan for yeast as needed    Maxzide as needed for edema    Tessalon for cough    Could do phone follow up    Ct abd 10/2022 was clear lower lungs and cxr 10/31/2022 was clear -- lungs not a concern.  Asthma doing well even with rsv.  2022 mom  sept complications from hip fx/covid.  Had few sinus infection-- rx Levaquin/prednisone.  Pt on plaquenil, dx ra and lupus.  Also on colchicine.      Uses breo but skipped.  Uses albuterol once a wk.    Patient Instructions   May use breztri 2 twice daily -- could use more or less as needed.   If used regular -- breztri would act as controller.    May use levaquin and prednisone if sinus/lung exacerbates.    Lungs loook good wrt lung tissue-- no lung tissue disease seen from Rheumatoid disease.   Ct abd 2021 viewed.   2021 - had covid in July, had rapid home test +, managed outpt.  Got infusion rx.  Has wheezes but feels over covid.  Had nausea, vomiting, weakness, headache, -- sick 8 days.  Mom got hip fx complicating  recovery-- at rehab. Pt and mom were vaccinated.  Pt on abx for sinus infection  Dx lupus/rheumatoid - 5 mg prednisone daily  Patient Instructions   Recovering covid doing well.    Use levaquin as needed.    Ordered cxr if needed.     4/6/2021 had pneumonia March 2020 - no problems. Needs disability paper updated.  We review today.    Patient Instructions   We reviewed your disability- need to complete paper work.  You had pneumonia in March 2020.  Breo, prednisone, levaquin, albuterol renewed.    10/14/2020- all well.  No abx/prednisone, cxr 10/14/2020 nad- rul pneumonia [facility] cleared.  Avoids irritants.  Patient Instructions   You likely would be best to get high dose flu vaccine.   Your immune system may have problem responding to flu vaccine.  4/14/2020- on hirzentra since sept 2016.  Saw [facility] Wood in [place] 4/13- virtual visit- azithro and prednisone 10/d x 3 for sinusitis.     Uses   500 bid, breo 200 daily, , xopenex nebs prn, singulair. Needs more prednisone, had pneumonia 3/4 to 8th.  Had ct chest feb.  Felt had covid symptoms.  No ox needed for pneumonia- no steroids nor bd. Took doxy - vanc in hosp.   Patient Instructions   Pneumonia - follow up chest xray good- not urgent.    Prednisone action plan for asthma/wheeze/cough    May use levaquin for sinus/lung infections  2/10/2020- had shingles vaccine, states felt an immune response.   Cough- improved, daily, not severe, non productive occasionally productive thick dark yellow color. Using nebulizer only as needed. Has noticed fewer asthma exacerbations since starting Hyzentra, went from 1 months to 2x yearly.   Not able to use albuterol rescue inhaler or nebulized due to tachycardia, palpitations, and hand tremors. No complaint with xopenex medications.   Patient Instructions   Continue current Asthma medication regiment  Review of CT shows no areas of concern. The small nodule is not significant  Pulmonary function test shows lungs to  still be strong.   Continue to use Xopenex Nebulizer with aerobika attachement once a day three days a week every week.  When your sick you can use nebulizer or Xopenex rescue inhaler four times a day.     1/9/2020- two exacerbations in 2 months, states cough is unchanged, daily, states bark like cough, severe and affects daily life, Productive occasionally clear/cream color thick in consistency, uses nebulizer 2-3 x daily.   Patient Instructions   [employer] sells an Aerobika device, this device shakes the air inside of you to break mucous off the bronchial wall. Use with nebulizer and with out daily.    Continue current Asthma medication regiment     Use nebulizer at least once a day three days a week.     CT to evaluate lungs for any abnormalities       11/8/2019- Had recent Rotator cuff surgery with nerve block Oct 9, Has SOB worse after surgery for 3 days, Took prednisone 20 mg for 3 days and Levaquin for 9 days. Seen by PCP dx URI.   Coughing, chest tightness, congestion, wheezing, Hoarse voice, fatigue, Shortness of breath worse with exertion, onset following surgery to shoulder. Cough- not able to clear secretions from chest.  Complaint of hand shaking, heart rate increases, and becomes rapid with Albuterol nebulizer onset years. Tries not to use due to side effects.     04/24/2019- breathing is good except for high pollen days, had 3 exacerbation in 6 months. Albuterol rescue inhaler 3x weekly, 1-2 nocturnal arousals monthly, could not do PFT at [facility],     11/6/18- ER visit for dehydration gastroenteritis, no prednisone use in 4 months. Not currently on fasenra injection, insurance would not cover. Currently on Hirzentra IGG therapy.   Sinus drip present, cough occasional, non productive, no nocturnal arousals, Currently on Dulera daily, uses Albuterol rescue inhaler daily before exercise no SOB.  Sepsis [facility] August 2017.  Flu vaccine   July 9, 2018 - had gastric sleeve with  leak complication- lost 45 lbs already.  Has appetite but fills quickly.  Still on hirzentra.  Asthma ok avoiding fragrances/ordors.  No prednisone.   No nocturnal arousals, uses rescue weekly avoiding any irritants.  Use prednisone 4-5 last year and twice this year.        March 19, 2018-since recovered from pleuritic pain - doing well.  No prednisone use recent.  Pt has had no wheezes.  feb 22,2108   Had to do prednisone again.  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 I have advised not to work given severe asthma,  hypersensitivity to fragrances, and immune deficiency.   Pt seems better since Hirzentra, but still unstable severe asthma  Jan 29. 2018- 6 days ago had been exposed to sick , had nasal congestion , cough, ear stuffy, muscle aches /joint aches / fever.  Temp to 100.1.  Seen by np and flu screen negative.  Had asthma 3-4 flares last year. eos up past,   oct 5, 2017 - had marce in April, skipped couple doses hirzentra, had mild bronchitis once, otherwise doing very well.  No prednisone.  No side effects and covered. Ppt flu sense for 2 days  Jan 24,2017 on  hirzentra q o week since sept and asthma more stable, no hosp, no prednisone, no noct asthma/ no rescue therapy- control not this good for years.  Sept 27, 2016  HPI:has had lung problems since birth, no nocturnal arousals, uses rescue 2/d, breathing controlled satisfactory except with irritants/infection - strong abx needed to clear.  Prednisone 2/yr, last hosp 18 months.  No ventilator.    Had exacerbation recent due uri from .  Has had shingles vaccine past.       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    Exacerbation of ulcerative colitis with rectal bleeding     Fibromyalgia 02/19/2024    Gastroparesis     Gram-negative bacteremia 12/29/2023    Hand arthritis     Herpes simplex without mention of complication     oral    Hidradenitis     History of morbid obesity     Hyperlipidemia     Hypogammaglobulinemia     Hypothyroidism     Immune deficiency disorder     Intraperitoneal abscess 05/07/2018    LBP radiating to left leg     Leukocytosis, unspecified     Migraine headache     Normocytic anemia 10/05/2022    Obesity, Class III, BMI 40-49.9 (morbid obesity)     Periorbital cellulitis 12/31/2016    Prediabetes     RA (rheumatoid arthritis)     Recurrent upper respiratory infection (URI)     Rheumatoid arthritis 05/06/2025    Rotavirus enteritis 12/27/2023    RSV (respiratory syncytial virus infection) 01/30/2023    S/P colonoscopy 11/2010    Sepsis 05/07/2018    Sepsis secondary to colitis 05/07/2018    Systemic lupus erythematosus, organ or system involvement unspecified     Tubular adenoma of colon 08/17/2022    Tubular adenoma of colon 05/01/2024    Ulcerative pancolitis with rectal bleeding 10/17/2022    Urticaria          Past Surgical History:   Procedure Laterality Date    ADENOIDECTOMY      CATARACT EXTRACTION W/  INTRAOCULAR LENS IMPLANT Right 08/21/2024    Panoptix    CATARACT EXTRACTION W/  INTRAOCULAR LENS IMPLANT Left 09/25/2024    Panoptix    CHOLECYSTECTOMY      CHOLECYSTECTOMY      colitis      COLONOSCOPY  2015    repeat in 10    COLONOSCOPY N/A 10/08/2015    Procedure: COLONOSCOPY;  Surgeon: Panda Bose MD;  Location: [facility];  Service: Endoscopy;   Laterality: N/A;    COLONOSCOPY N/A 08/17/2022    Procedure: COLONOSCOPY;  Surgeon: Olaf Del Valle MD;  Location: [facility] ENDO;  Service: General;  Laterality: N/A;    COLONOSCOPY N/A 10/06/2022    Procedure: COLONOSCOPY;  Surgeon: Quinton Celeste MD;  Location: [facility];  Service: Gastroenterology;  Laterality: N/A;    COLONOSCOPY N/A 05/01/2024    Procedure: COLONOSCOPY;  Surgeon: Quinton Celeste MD;  Location: [facility];  Service: Endoscopy;  Laterality: N/A;    COLONOSCOPY  05/08/2025    Repeat in 1 year    ESOPHAGOGASTRODUODENOSCOPY N/A 05/01/2024    Procedure: EGD (ESOPHAGOGASTRODUODENOSCOPY);  Surgeon: Quinton Celeste MD;  Location: [facility];  Service: Endoscopy;  Laterality: N/A;    Hand fracture surgery      HARDWARE REMOVAL      left hand 5th [facility]    hymenectomy      HYSTERECTOMY      menorrhagia-Ovaries intact    ROTATOR CUFF REPAIR Right     SLEEVE GASTROPLASTY  04/16/2018    TONSILLECTOMY      Urethral dilatation       Social History     Tobacco Use    Smoking status: Never     Passive exposure: Past    Smokeless tobacco: Never   Substance Use Topics    Alcohol use: Not Currently     Comment: very rarely    Drug use: No     Family History   Problem Relation Name Age of Onset    Melanoma Mother Loyd     Basal cell carcinoma Mother Loyd     Lung disease Mother Loyd         pulm htn    Cataracts Mother Loyd     Hypertension Mother Loyd     Lung cancer Father Josh     Diabetes Father Josh     Hypertension Father Josh     Cancer Father Josh     Melanoma Maternal Aunt      Melanoma Maternal Uncle      Diabetes Paternal Aunt Estee     Colon cancer Paternal Aunt Lilliam 40    Diabetes Paternal Aunt Lilliam     Breast cancer Paternal Aunt  40    Lymphoma Paternal Aunt      Cancer Maternal Grandfather Gilmar     Ovarian cancer Paternal Grandmother  40    Ulcerative colitis Son      Psoriasis Son      Psoriasis Son      Breast cancer Cousin Paternal 1st 25    Allergic rhinitis Neg Hx       "Allergies Neg Hx      Angioedema Neg Hx      Asthma Neg Hx      Atopy Neg Hx      Eczema Neg Hx      Immunodeficiency Neg Hx      Rhinitis Neg Hx      Urticaria Neg Hx       Review of patient's allergies indicates:   Allergen Reactions    Bromelains Hives     " causes mouth to bleed"    Sudafed [pseudoephedrine hcl] Other (See Comments)     Does not want to wake up .    Amoxicillin-pot clavulanate Itching     Other reaction(s): Itching    Hydrocodone Itching    Iodinated contrast- oral and iv dye      childhood    Iodine and iodide containing products Other (See Comments)    Melon Hives    Pantoprazole Nausea Only     Other reaction(s): upset stomach/halitosis    Percocet [oxycodone-acetaminophen] Itching    Barium iodide Rash    Ephedrine Other (See Comments)     Other reaction(s): comatose    Penicillins      Other reaction(s): does not work on pt symptoms     I have reviewed past medical, family, and social history. I have reviewed previous nurse notes.    Performance Status:The patient's activity level is functions out of house.      Review of Systems   Constitutional: Negative for activity change, appetite change, chills, diaphoresis, fatigue, fever and unexpected weight change.   HENT: Negative for dental problem, sneezing, sore throat, trouble swallowing, postnasal drip, rhinorrhea, sinus pressure, sinus pain, and voice change.     Respiratory: Negative for chest tightness, shortness of breath, wheezing.  Positive for cough,   Cardiovascular: Negative for chest pain, palpitations and leg swelling.   Musculoskeletal: Negative for gait problem, myalgias and neck pain.   Skin: Negative for color change and pallor.   Allergic/Immunologic: Negative for environmental allergies and food allergies.   Neurological: Negative for dizziness, speech difficulty, weakness, light-headedness, numbness and headaches.   Hematological: Negative for adenopathy. Does not bruise/bleed easily.   Psychiatric/Behavioral: Negative for " "dysphoric mood and sleep disturbance. The patient is not nervous/anxious.             Exam:Comprehensive exam done. BP (!) 170/79 (BP Location: Right arm, Patient Position: Sitting)   Pulse (!) 56   Ht 5' 10" (1.778 m)   Wt 119.2 kg (262 lb 10.9 oz)   SpO2 95% Comment: on room air  BMI 37.69 kg/m²   Exam included Vitals as listed, and patient's appearance and affect and alertness and mood, oral exam for yeast and hygiene and pharynx lesions and Mallapatti (M) score, neck with inspection for jvd and masses and thyroid abnormalities and lymph nodes (supraclavicular and infraclavicular nodes and axillary also examined and noted if abn), chest exam included symmetry and effort and fremitus and percussion and auscultation, cardiac exam included rhythm and gallops and murmur and rubs and jvd and edema, abdominal exam for mass and hepatosplenomegaly and tenderness and hernias and bowel sounds, Musculoskeletal exam with muscle tone and posture and mobility/gait and  strength, and skin for rashes and cyanosis and pallor and turgor, extremity for clubbing.  Findings were normal except for pertinent findings listed below:  M3, chest is symmetric, no distress, normal percussion, normal fremitus and good normal breath sounds        Radiographs (ct chest and cxr) reviewed: results reviewed by direct vision  CT Chest Without Contrast 2/10/2020  Normal exam, 3.3 mm nodule seen in right upper lobe.    Lungs are clear.  Minimal retained mucus secretions in the trachea.  No pleural effusion or pneumothorax.  Normal sized heart.  Thyroid is small and heterogeneous with a subcentimeter nodule or cyst in the right lobe.     XR CHEST 1 VIEW 05/10/2018   There is left lower lobe atelectasis and possible small left pleural effusion on this study.  No pneumothorax is seen.      Labs reviewed       Lab Results   Component Value Date    WBC 5.35 06/17/2025    RBC 4.14 06/17/2025    HGB 13.0 06/17/2025    HCT 37.8 06/17/2025    MCV 91 " 06/17/2025    MCH 31.4 (H) 06/17/2025    MCHC 34.4 06/17/2025    RDW 14.4 06/17/2025     06/17/2025    MPV 11.2 06/17/2025    GRAN 9.3 (H) 06/08/2025    GRAN 84.3 (H) 06/08/2025    LYMPH 41.9 06/17/2025    LYMPH 2.24 06/17/2025    MONO 11.8 06/17/2025    MONO 0.63 06/17/2025    EOS 3.0 06/17/2025    EOS 0.16 06/17/2025    BASO 0.04 06/08/2025    EOSINOPHIL 0.5 06/08/2025    BASOPHIL 0.6 06/17/2025    BASOPHIL 0.03 06/17/2025       PFT reviewed  2/10/2020 no obstruction seen, 7% bronchodilator response with 12% needed for clinical improvement; TLC 86% with DLC0 at 88%, normal exam with mild asthma  Spirometry bronchodilator, lung volume by gas dilution, diffusion capacity measured February 10, 2020.  The FEV1 to FVC ratio was 79%, there is no airflow obstruction measured by spirometry technique.  The FEV1 measured 85% predicted at 1.97 L. The 8%   improvement in FEV1 failed to reach statistical significance ( 12% is needed for statistical significance ).  Lung volumes by gas dilution were normal.  Diffusion was normal.      Spirometry, lung volumes, diffusion are all normal.  The bronchodilator response was not statistically significant.  Clinical correlation recommended.     Plan:  Clinical impression is apparently straight forward and impression with management as below.    Yodit was seen today for follow-up, asthma and copd.    Diagnoses and all orders for this visit:    Severe persistent asthma without complication  -     albuterol (PROVENTIL/VENTOLIN HFA) 90 mcg/actuation inhaler; Inhale 2 puffs into the lungs every 4 (four) hours as needed for Wheezing or Shortness of Breath. 2 puffs every 4 hours as needed for cough, wheeze, or shortness of breath    Abdominal pain, unspecified abdominal location  -     traMADoL (ULTRAM) 50 mg tablet; Take 1 tablet (50 mg total) by mouth every 4 (four) hours as needed for Pain.  -     traMADoL (ULTRAM) 50 mg tablet; Take 1 tablet (50 mg total) by mouth every 4 (four)  hours as needed for Pain.    Rheumatoid arthritis, involving unspecified site, unspecified whether rheumatoid factor present  -     traMADoL (ULTRAM) 50 mg tablet; Take 1 tablet (50 mg total) by mouth every 4 (four) hours as needed for Pain.  -     traMADoL (ULTRAM) 50 mg tablet; Take 1 tablet (50 mg total) by mouth every 4 (four) hours as needed for Pain.                                Follow up in about 3 months (around 11/3/2025), or if symptoms worsen or fail to improve, for Virtual Visit.    Discussed with patient above for education the following:      Patient Instructions   Trelegy due October    Ultram to be increased opt 90/m renewed til early October-- dilaudid dose in may --- noted...     Need office visit once a yr -- virtual visit every 3 months for tramadol      Salagen working with no appreciated issues  Asthma doing well                                                No